# Patient Record
Sex: MALE | Race: WHITE | NOT HISPANIC OR LATINO | Employment: OTHER | URBAN - METROPOLITAN AREA
[De-identification: names, ages, dates, MRNs, and addresses within clinical notes are randomized per-mention and may not be internally consistent; named-entity substitution may affect disease eponyms.]

---

## 2017-01-13 ENCOUNTER — TELEPHONE (OUTPATIENT)
Dept: HEMATOLOGY ONCOLOGY | Facility: MEDICAL CENTER | Age: 47
End: 2017-01-13

## 2017-01-13 NOTE — TELEPHONE ENCOUNTER
Called patient's father and emergency contact regarding Temodar medication that has been shipped to [facility] pharmacy.  Someone will need to  medication for patient and bring to hospital, so he can begin treatment next week.  Left VM requesting call back.     Gael at the [facility] pharmacy is also aware that medication with new dose will be delivered shortly.  He will dispose of all previous doses not picked up by the patient.

## 2017-01-16 ENCOUNTER — HOSPITAL ENCOUNTER (OUTPATIENT)
Dept: RADIATION ONCOLOGY | Facility: MEDICAL CENTER | Age: 47
End: 2017-01-31
Attending: RADIOLOGY
Payer: COMMERCIAL

## 2017-01-16 ENCOUNTER — TELEPHONE (OUTPATIENT)
Dept: HEMATOLOGY ONCOLOGY | Facility: MEDICAL CENTER | Age: 47
End: 2017-01-16

## 2017-01-16 NOTE — TELEPHONE ENCOUNTER
Attempted to meet pt in radiation prior to his first appointment to discuss him picking up Temodar from [facility].  Pt no-showed for appointment today.  Attempted to reach pt's contact listed, his father, who does not know where patient will be residing.  Radiation dept. States they will call if patient ends up showing up late.

## 2017-01-20 ENCOUNTER — TELEPHONE (OUTPATIENT)
Dept: HEMATOLOGY ONCOLOGY | Facility: MEDICAL CENTER | Age: 47
End: 2017-01-20

## 2017-01-20 ENCOUNTER — TELEPHONE (OUTPATIENT)
Dept: OTHER | Facility: MEDICAL CENTER | Age: 47
End: 2017-01-20

## 2017-01-20 NOTE — TELEPHONE ENCOUNTER
Received phone message from unidentified male person reporting he would make sure pt would be at radiation therapy on Monday.  Oncology medical group talked to patient's father today who had reported he did not know where patient was.  Report to oncology medical group RN was that pt could not walk more than 3-4 feet without falling per father.  Pt will need to be assessed if he shows up at Radiation therapy.  Pt has not picked up Temodar at the [facility] pharmacy according to father.  Question the safety of patient having medication at this time r/t compliancy issues.  Notified radiation therapy regarding possibility of patient coming Monday.

## 2017-01-20 NOTE — TELEPHONE ENCOUNTER
"Called patient's contact, his father, asking if he knows where Fortino is or a way to get a hold of him.  He states that he left the house a few days ago and that he believes the patient went \"somewhere downtown\".  He states that the patient cannot walk more than 3-4 feet without falling.  He has not been going to radiation and has not picked up his Temodar from [facility] pharmacy.  Father states \"I'm sure he'll just end up back in the emergency room\".  I requested him to please let us know if he hears from patient or knows of a way to contact him.  Fortino West Verbalized agreement.    Spoke with radiation and they will be keeping him on the schedule, in case he shows up to future visits.   "

## 2017-01-23 ENCOUNTER — TELEPHONE (OUTPATIENT)
Dept: HEMATOLOGY ONCOLOGY | Facility: MEDICAL CENTER | Age: 47
End: 2017-01-23

## 2017-01-23 NOTE — TELEPHONE ENCOUNTER
"Received a call from, Jose Ricci, patient's brother.  He states pt was suppose to come in today for appointment with radiation and he would not be able to because of the snow.  Unsure when appointment was suppose to be.  Then saying \"you know your place on [address]?\" we would like him to go there but they said they need a referral.  Discussed that physician would need to see pt.  Asking when they could come in, Jose reporting back \"a couple of days, when the snow has melted\".  Jose then saying pt's eyesight is worse and he can't walk.  Discussed if patient had worsening symptoms he needed to go to the ER.  They can call ambulance if they feel he is worse.  Jose then saying he can walk but just a few feet.  Asking if Dr Lee can refer to skilled facility.  Informed brother that patient would also have to agree to go.  Discussed that patient had eloped from hospital.  Brother saying \"he told us 'they told me to leave'\".  Family has been inconsistent with information and not reliable regarding patient.  Jose did report they are unable to care for him and he requires 24/7 care.  Again encouraged them to have pt go to ER if he is not safe.  Also discussed he would need to be medically cleared before going to skilled since he was never discharged from hospital.  Brother then asking \"so you will take care of that?\".  Repeated that they need to bring pt to ER or call ambulance if patient had worsening symptoms.  Brother reporting \"ok\" but unsure of what follow up will be.  Asked where they were staying.  Brother said in \"motel\" but not where pt was before.  Contact number for brother provided of [phone].  Jose reported could also contact patient's father.  Verified numbers listed in [facility].  Will notify Dr Lee and radiation therapy.  "

## 2017-01-25 ENCOUNTER — APPOINTMENT (OUTPATIENT)
Dept: RADIOLOGY | Facility: MEDICAL CENTER | Age: 47
DRG: 054 | End: 2017-01-25
Attending: EMERGENCY MEDICINE

## 2017-01-25 ENCOUNTER — HOSPITAL ENCOUNTER (INPATIENT)
Facility: MEDICAL CENTER | Age: 47
LOS: 21 days | DRG: 054 | End: 2017-02-15
Attending: EMERGENCY MEDICINE | Admitting: INTERNAL MEDICINE

## 2017-01-25 ENCOUNTER — RESOLUTE PROFESSIONAL BILLING HOSPITAL PROF FEE (OUTPATIENT)
Dept: HOSPITALIST | Facility: MEDICAL CENTER | Age: 47
End: 2017-01-25

## 2017-01-25 DIAGNOSIS — R56.9 SEIZURE (HCC): ICD-10-CM

## 2017-01-25 DIAGNOSIS — C71.9 GBM (GLIOBLASTOMA MULTIFORME) (HCC): ICD-10-CM

## 2017-01-25 PROBLEM — F32.A DEPRESSION: Status: ACTIVE | Noted: 2017-01-25

## 2017-01-25 LAB
ALBUMIN SERPL BCP-MCNC: 4.5 G/DL (ref 3.2–4.9)
ALBUMIN/GLOB SERPL: 1.6 G/DL
ALP SERPL-CCNC: 80 U/L (ref 30–99)
ALT SERPL-CCNC: 16 U/L (ref 2–50)
ANION GAP SERPL CALC-SCNC: 4 MMOL/L (ref 0–11.9)
APAP SERPL-MCNC: <10 UG/ML (ref 10–30)
APTT PPP: 24.5 SEC (ref 24.7–36)
AST SERPL-CCNC: 13 U/L (ref 12–45)
BASOPHILS # BLD AUTO: 0.8 % (ref 0–1.8)
BASOPHILS # BLD: 0.06 K/UL (ref 0–0.12)
BILIRUB SERPL-MCNC: 0.5 MG/DL (ref 0.1–1.5)
BUN SERPL-MCNC: 12 MG/DL (ref 8–22)
CALCIUM SERPL-MCNC: 9.2 MG/DL (ref 8.5–10.5)
CHLORIDE SERPL-SCNC: 106 MMOL/L (ref 96–112)
CO2 SERPL-SCNC: 27 MMOL/L (ref 20–33)
CREAT SERPL-MCNC: 0.6 MG/DL (ref 0.5–1.4)
EOSINOPHIL # BLD AUTO: 0.17 K/UL (ref 0–0.51)
EOSINOPHIL NFR BLD: 2.3 % (ref 0–6.9)
ERYTHROCYTE [DISTWIDTH] IN BLOOD BY AUTOMATED COUNT: 44.4 FL (ref 35.9–50)
GFR SERPL CREATININE-BSD FRML MDRD: >60 ML/MIN/1.73 M 2
GLOBULIN SER CALC-MCNC: 2.9 G/DL (ref 1.9–3.5)
GLUCOSE SERPL-MCNC: 97 MG/DL (ref 65–99)
HCT VFR BLD AUTO: 42.1 % (ref 42–52)
HGB BLD-MCNC: 14.6 G/DL (ref 14–18)
IMM GRANULOCYTES # BLD AUTO: 0.03 K/UL (ref 0–0.11)
IMM GRANULOCYTES NFR BLD AUTO: 0.4 % (ref 0–0.9)
INR PPP: 0.95 (ref 0.87–1.13)
LYMPHOCYTES # BLD AUTO: 1.45 K/UL (ref 1–4.8)
LYMPHOCYTES NFR BLD: 19.3 % (ref 22–41)
MCH RBC QN AUTO: 32.5 PG (ref 27–33)
MCHC RBC AUTO-ENTMCNC: 34.7 G/DL (ref 33.7–35.3)
MCV RBC AUTO: 93.8 FL (ref 81.4–97.8)
MONOCYTES # BLD AUTO: 0.76 K/UL (ref 0–0.85)
MONOCYTES NFR BLD AUTO: 10.1 % (ref 0–13.4)
NEUTROPHILS # BLD AUTO: 5.04 K/UL (ref 1.82–7.42)
NEUTROPHILS NFR BLD: 67.1 % (ref 44–72)
NRBC # BLD AUTO: 0 K/UL
NRBC BLD AUTO-RTO: 0 /100 WBC
PLATELET # BLD AUTO: 290 K/UL (ref 164–446)
PMV BLD AUTO: 8.8 FL (ref 9–12.9)
POTASSIUM SERPL-SCNC: 3.8 MMOL/L (ref 3.6–5.5)
PROT SERPL-MCNC: 7.4 G/DL (ref 6–8.2)
PROTHROMBIN TIME: 13 SEC (ref 12–14.6)
RBC # BLD AUTO: 4.49 M/UL (ref 4.7–6.1)
SODIUM SERPL-SCNC: 137 MMOL/L (ref 135–145)
WBC # BLD AUTO: 7.5 K/UL (ref 4.8–10.8)

## 2017-01-25 PROCEDURE — 70450 CT HEAD/BRAIN W/O DYE: CPT

## 2017-01-25 PROCEDURE — 700111 HCHG RX REV CODE 636 W/ 250 OVERRIDE (IP): Performed by: EMERGENCY MEDICINE

## 2017-01-25 PROCEDURE — 99291 CRITICAL CARE FIRST HOUR: CPT

## 2017-01-25 PROCEDURE — 90686 IIV4 VACC NO PRSV 0.5 ML IM: CPT | Performed by: INTERNAL MEDICINE

## 2017-01-25 PROCEDURE — 96375 TX/PRO/DX INJ NEW DRUG ADDON: CPT

## 2017-01-25 PROCEDURE — 700105 HCHG RX REV CODE 258: Performed by: INTERNAL MEDICINE

## 2017-01-25 PROCEDURE — A9270 NON-COVERED ITEM OR SERVICE: HCPCS | Performed by: INTERNAL MEDICINE

## 2017-01-25 PROCEDURE — 90670 PCV13 VACCINE IM: CPT

## 2017-01-25 PROCEDURE — 700111 HCHG RX REV CODE 636 W/ 250 OVERRIDE (IP): Performed by: INTERNAL MEDICINE

## 2017-01-25 PROCEDURE — 80307 DRUG TEST PRSMV CHEM ANLYZR: CPT

## 2017-01-25 PROCEDURE — 700111 HCHG RX REV CODE 636 W/ 250 OVERRIDE (IP)

## 2017-01-25 PROCEDURE — 96376 TX/PRO/DX INJ SAME DRUG ADON: CPT

## 2017-01-25 PROCEDURE — 99291 CRITICAL CARE FIRST HOUR: CPT | Performed by: INTERNAL MEDICINE

## 2017-01-25 PROCEDURE — 700112 HCHG RX REV CODE 229: Performed by: INTERNAL MEDICINE

## 2017-01-25 PROCEDURE — 96374 THER/PROPH/DIAG INJ IV PUSH: CPT

## 2017-01-25 PROCEDURE — 80053 COMPREHEN METABOLIC PANEL: CPT

## 2017-01-25 PROCEDURE — 90471 IMMUNIZATION ADMIN: CPT

## 2017-01-25 PROCEDURE — 3E0234Z INTRODUCTION OF SERUM, TOXOID AND VACCINE INTO MUSCLE, PERCUTANEOUS APPROACH: ICD-10-PCS | Performed by: INTERNAL MEDICINE

## 2017-01-25 PROCEDURE — 770001 HCHG ROOM/CARE - MED/SURG/GYN PRIV*

## 2017-01-25 PROCEDURE — 94760 N-INVAS EAR/PLS OXIMETRY 1: CPT

## 2017-01-25 PROCEDURE — 85025 COMPLETE CBC W/AUTO DIFF WBC: CPT

## 2017-01-25 PROCEDURE — 700102 HCHG RX REV CODE 250 W/ 637 OVERRIDE(OP): Performed by: INTERNAL MEDICINE

## 2017-01-25 PROCEDURE — 85730 THROMBOPLASTIN TIME PARTIAL: CPT

## 2017-01-25 PROCEDURE — 85610 PROTHROMBIN TIME: CPT

## 2017-01-25 PROCEDURE — 96361 HYDRATE IV INFUSION ADD-ON: CPT

## 2017-01-25 RX ORDER — DIPHENHYDRAMINE HCL 25 MG
25 TABLET ORAL EVERY 6 HOURS PRN
COMMUNITY
End: 2017-04-24

## 2017-01-25 RX ORDER — LACTULOSE 20 G/30ML
30 SOLUTION ORAL
Status: DISCONTINUED | OUTPATIENT
Start: 2017-01-25 | End: 2017-02-15 | Stop reason: HOSPADM

## 2017-01-25 RX ORDER — SODIUM CHLORIDE 9 MG/ML
INJECTION, SOLUTION INTRAVENOUS CONTINUOUS
Status: DISCONTINUED | OUTPATIENT
Start: 2017-01-25 | End: 2017-01-26

## 2017-01-25 RX ORDER — ONDANSETRON 2 MG/ML
4 INJECTION INTRAMUSCULAR; INTRAVENOUS EVERY 4 HOURS PRN
Status: DISCONTINUED | OUTPATIENT
Start: 2017-01-25 | End: 2017-02-15 | Stop reason: HOSPADM

## 2017-01-25 RX ORDER — ONDANSETRON 2 MG/ML
4 INJECTION INTRAMUSCULAR; INTRAVENOUS ONCE
Status: COMPLETED | OUTPATIENT
Start: 2017-01-25 | End: 2017-01-25

## 2017-01-25 RX ORDER — AMOXICILLIN 250 MG
1 CAPSULE ORAL
Status: DISCONTINUED | OUTPATIENT
Start: 2017-01-25 | End: 2017-02-15 | Stop reason: HOSPADM

## 2017-01-25 RX ORDER — BISACODYL 10 MG
10 SUPPOSITORY, RECTAL RECTAL
Status: DISCONTINUED | OUTPATIENT
Start: 2017-01-25 | End: 2017-02-15 | Stop reason: HOSPADM

## 2017-01-25 RX ORDER — PROMETHAZINE HYDROCHLORIDE 25 MG/1
12.5-25 TABLET ORAL EVERY 4 HOURS PRN
Status: DISCONTINUED | OUTPATIENT
Start: 2017-01-25 | End: 2017-02-15 | Stop reason: HOSPADM

## 2017-01-25 RX ORDER — ACETAMINOPHEN 500 MG
500 TABLET ORAL
COMMUNITY
End: 2017-02-15

## 2017-01-25 RX ORDER — ONDANSETRON 2 MG/ML
4 INJECTION INTRAMUSCULAR; INTRAVENOUS EVERY 4 HOURS PRN
Status: DISCONTINUED | OUTPATIENT
Start: 2017-01-25 | End: 2017-01-25

## 2017-01-25 RX ORDER — ENEMA 19; 7 G/133ML; G/133ML
1 ENEMA RECTAL
Status: DISCONTINUED | OUTPATIENT
Start: 2017-01-25 | End: 2017-02-15 | Stop reason: HOSPADM

## 2017-01-25 RX ORDER — AMOXICILLIN 250 MG
1 CAPSULE ORAL NIGHTLY
Status: DISCONTINUED | OUTPATIENT
Start: 2017-01-25 | End: 2017-02-15 | Stop reason: HOSPADM

## 2017-01-25 RX ORDER — OXYCODONE HYDROCHLORIDE 5 MG/1
5 TABLET ORAL
Status: DISCONTINUED | OUTPATIENT
Start: 2017-01-25 | End: 2017-02-02

## 2017-01-25 RX ORDER — PROMETHAZINE HYDROCHLORIDE 25 MG/1
12.5-25 SUPPOSITORY RECTAL EVERY 4 HOURS PRN
Status: DISCONTINUED | OUTPATIENT
Start: 2017-01-25 | End: 2017-02-15 | Stop reason: HOSPADM

## 2017-01-25 RX ORDER — OXYCODONE HYDROCHLORIDE 10 MG/1
10 TABLET ORAL
Status: DISCONTINUED | OUTPATIENT
Start: 2017-01-25 | End: 2017-02-02

## 2017-01-25 RX ORDER — LABETALOL HYDROCHLORIDE 5 MG/ML
10 INJECTION, SOLUTION INTRAVENOUS EVERY 4 HOURS PRN
Status: DISCONTINUED | OUTPATIENT
Start: 2017-01-25 | End: 2017-02-15 | Stop reason: HOSPADM

## 2017-01-25 RX ORDER — DEXAMETHASONE SODIUM PHOSPHATE 4 MG/ML
4 INJECTION, SOLUTION INTRA-ARTICULAR; INTRALESIONAL; INTRAMUSCULAR; INTRAVENOUS; SOFT TISSUE ONCE
Status: COMPLETED | OUTPATIENT
Start: 2017-01-25 | End: 2017-01-25

## 2017-01-25 RX ORDER — SERTRALINE HYDROCHLORIDE 25 MG/1
25 TABLET, FILM COATED ORAL EVERY EVENING
Status: ON HOLD | COMMUNITY
End: 2017-02-14

## 2017-01-25 RX ORDER — ONDANSETRON 4 MG/1
4 TABLET, ORALLY DISINTEGRATING ORAL EVERY 4 HOURS PRN
Status: DISCONTINUED | OUTPATIENT
Start: 2017-01-25 | End: 2017-02-15 | Stop reason: HOSPADM

## 2017-01-25 RX ORDER — DEXAMETHASONE SODIUM PHOSPHATE 4 MG/ML
4 INJECTION, SOLUTION INTRA-ARTICULAR; INTRALESIONAL; INTRAMUSCULAR; INTRAVENOUS; SOFT TISSUE EVERY 6 HOURS
Status: DISCONTINUED | OUTPATIENT
Start: 2017-01-25 | End: 2017-01-28

## 2017-01-25 RX ORDER — DOCUSATE SODIUM 100 MG/1
100 CAPSULE, LIQUID FILLED ORAL EVERY MORNING
Status: DISCONTINUED | OUTPATIENT
Start: 2017-01-25 | End: 2017-02-15 | Stop reason: HOSPADM

## 2017-01-25 RX ORDER — LORAZEPAM 2 MG/ML
4 INJECTION INTRAMUSCULAR
Status: DISCONTINUED | OUTPATIENT
Start: 2017-01-25 | End: 2017-02-15 | Stop reason: HOSPADM

## 2017-01-25 RX ADMIN — PNEUMOCOCCAL 13-VALENT CONJUGATE VACCINE 0.5 ML: 2.2; 2.2; 2.2; 2.2; 2.2; 4.4; 2.2; 2.2; 2.2; 2.2; 2.2; 2.2; 2.2 INJECTION, SUSPENSION INTRAMUSCULAR at 20:16

## 2017-01-25 RX ADMIN — OXYCODONE HYDROCHLORIDE 10 MG: 10 TABLET ORAL at 23:59

## 2017-01-25 RX ADMIN — SERTRALINE 25 MG: 50 TABLET, FILM COATED ORAL at 20:13

## 2017-01-25 RX ADMIN — HYDROMORPHONE HYDROCHLORIDE 1 MG: 1 INJECTION, SOLUTION INTRAMUSCULAR; INTRAVENOUS; SUBCUTANEOUS at 11:33

## 2017-01-25 RX ADMIN — DEXAMETHASONE SODIUM PHOSPHATE 4 MG: 4 INJECTION, SOLUTION INTRAMUSCULAR; INTRAVENOUS at 17:48

## 2017-01-25 RX ADMIN — DEXAMETHASONE SODIUM PHOSPHATE 4 MG: 4 INJECTION, SOLUTION INTRAMUSCULAR; INTRAVENOUS at 11:46

## 2017-01-25 RX ADMIN — DOCUSATE SODIUM 100 MG: 100 CAPSULE ORAL at 14:54

## 2017-01-25 RX ADMIN — STANDARDIZED SENNA CONCENTRATE AND DOCUSATE SODIUM 1 TABLET: 8.6; 5 TABLET, FILM COATED ORAL at 20:13

## 2017-01-25 RX ADMIN — DEXTROSE MONOHYDRATE 1000 MG: 50 INJECTION, SOLUTION INTRAVENOUS at 20:13

## 2017-01-25 RX ADMIN — DEXTROSE MONOHYDRATE 500 MG: 50 INJECTION, SOLUTION INTRAVENOUS at 11:58

## 2017-01-25 RX ADMIN — ONDANSETRON 4 MG: 2 INJECTION, SOLUTION INTRAMUSCULAR; INTRAVENOUS at 11:33

## 2017-01-25 RX ADMIN — HYDROMORPHONE HYDROCHLORIDE 0.5 MG: 1 INJECTION, SOLUTION INTRAMUSCULAR; INTRAVENOUS; SUBCUTANEOUS at 13:27

## 2017-01-25 RX ADMIN — SODIUM CHLORIDE: 9 INJECTION, SOLUTION INTRAVENOUS at 14:58

## 2017-01-25 RX ADMIN — INFLUENZA A VIRUS A/CALIFORNIA/7/2009 X-179A (H1N1) ANTIGEN (FORMALDEHYDE INACTIVATED), INFLUENZA A VIRUS A/HONG KONG/4801/2014 X-263B (H3N2) ANTIGEN (FORMALDEHYDE INACTIVATED), INFLUENZA B VIRUS B/PHUKET/3073/2013 ANTIGEN (FORMALDEHYDE INACTIVATED), AND INFLUENZA B VIRUS B/BRISBANE/60/2008 ANTIGEN (FORMALDEHYDE INACTIVATED) 0.5 ML: 15; 15; 15; 15 INJECTION, SUSPENSION INTRAMUSCULAR at 20:14

## 2017-01-25 RX ADMIN — DEXAMETHASONE SODIUM PHOSPHATE 4 MG: 4 INJECTION, SOLUTION INTRAMUSCULAR; INTRAVENOUS at 23:59

## 2017-01-25 RX ADMIN — OXYCODONE HYDROCHLORIDE 5 MG: 5 TABLET ORAL at 17:45

## 2017-01-25 RX ADMIN — HYDROMORPHONE HYDROCHLORIDE 0.5 MG: 1 INJECTION, SOLUTION INTRAMUSCULAR; INTRAVENOUS; SUBCUTANEOUS at 20:20

## 2017-01-25 ASSESSMENT — LIFESTYLE VARIABLES
AVERAGE NUMBER OF DAYS PER WEEK YOU HAVE A DRINK CONTAINING ALCOHOL: 1
ALCOHOL_USE: YES
HAVE PEOPLE ANNOYED YOU BY CRITICIZING YOUR DRINKING: NO
ON A TYPICAL DAY WHEN YOU DRINK ALCOHOL HOW MANY DRINKS DO YOU HAVE: 1
HOW MANY TIMES IN THE PAST YEAR HAVE YOU HAD 5 OR MORE DRINKS IN A DAY: 5
TOTAL SCORE: 0
DO YOU DRINK ALCOHOL: NO
HAVE YOU EVER FELT YOU SHOULD CUT DOWN ON YOUR DRINKING: NO
TOTAL SCORE: 0
CONSUMPTION TOTAL: POSITIVE
TOTAL SCORE: 0
EVER FELT BAD OR GUILTY ABOUT YOUR DRINKING: NO
EVER_SMOKED: YES
EVER HAD A DRINK FIRST THING IN THE MORNING TO STEADY YOUR NERVES TO GET RID OF A HANGOVER: NO

## 2017-01-25 ASSESSMENT — PAIN SCALES - GENERAL
PAINLEVEL_OUTOF10: 5
PAINLEVEL_OUTOF10: 8
PAINLEVEL_OUTOF10: 7
PAINLEVEL_OUTOF10: 5
PAINLEVEL_OUTOF10: 8
PAINLEVEL_OUTOF10: 10
PAINLEVEL_OUTOF10: 8
PAINLEVEL_OUTOF10: 9
PAINLEVEL_OUTOF10: 10

## 2017-01-25 NOTE — ED NOTES
"Pt to triage c/o headache, dizziness, on and off numbness to right hip and shaking in left arm. Pt's friend reports pt had a seizure this morning. Pt states that he had brain surgery to remove a tumor approx. 2 weeks ago. Pt was sent by Marianne Hernandez for evaluation of possible \"brain swelling.\"  "

## 2017-01-25 NOTE — ED PROVIDER NOTES
"ED Provider Note    CHIEF COMPLAINT  Chief Complaint   Patient presents with   • Head Ache   • Dizziness   • Seizure   • Sent by MD PENNY  Fortino Ricci Jr. is a 46 y.o. male who presents for evaluation of worsening headaches, possible seizure activity to the left arm and leg. The patient unfortunately has history of a brain tumor that underwent resection and [place] 3 years ago, subsequently it recurred and he underwent stealth craniotomy with Dr. Edgar approximately 2-3 weeks ago. Having worsening headache. In addition the patient has been taking describes as \"2 bottles of Tylenol over the last 2 days for headaches. He has never had seizure activity before    REVIEW OF SYSTEMS  See HPI for further details. All other systems are negative.     PAST MEDICAL HISTORY  Past Medical History   Diagnosis Date   • Cancer (CMS-HCC)      brain   • Brain neoplasm malignant (CMS-HCC)        FAMILY HISTORY  No history of bleeding disorder    SOCIAL HISTORY  Social History     Social History   • Marital Status: Single     Spouse Name: N/A   • Number of Children: N/A   • Years of Education: N/A     Social History Main Topics   • Smoking status: Current Every Day Smoker -- 1.00 packs/day for 6 years     Types: Cigarettes     Start date: 01/01/1986   • Smokeless tobacco: Former User     Types: Chew     Quit date: 01/01/2014   • Alcohol Use: 3.5 oz/week     7 Cans of beer per week      Comment: occassional   • Drug Use: No   • Sexual Activity: No     Other Topics Concern   • None     Social History Narrative     Positive for alcohol use  SURGICAL HISTORY  Past Surgical History   Procedure Laterality Date   • Craniotomy tumor  12/7/14     [facility]   • Craniotomy  11/214   • Craniotomy stealth Right 12/23/2016     Procedure: CRANIOTOMY STEALTH  RIGHT PARIETAL OCCIPITAL FOR BRAIN TUMOR;  Surgeon: Girma Edgar M.D.;  Location: SURGERY [facility];  Service:        CURRENT MEDICATIONS  Home Medications     " "Reviewed by Paola Martinez R.N. (Registered Nurse) on 01/25/17 at 1010  Med List Status: Partial    Medication Last Dose Status          Patient Oneal Taking any Medications                        ALLERGIES  Allergies   Allergen Reactions   • Pcn [Penicillins]      Reaction was as a child.  Pt stated tolerated Augmentin and Amoxicillin        PHYSICAL EXAM  VITAL SIGNS: /83 mmHg  Pulse 66  Temp(Src) 36.5 °C (97.7 °F) (Temporal)  Resp 16  Ht 1.753 m (5' 9\")  Wt 76.204 kg (168 lb)  BMI 24.80 kg/m2  SpO2 97%      Constitutional: Well developed, Well nourished, No acute distress, Non-toxic appearance.   HENT: Craniotomy site on the right parietal region appears to be clean dry and intact no erythema no pus noted dehiscence, Bilateral external ears normal, Oropharynx moist, No oral exudates, Nose normal.   Eyes: PERRLA, EOMI, Conjunctiva normal, No discharge.   Neck: Normal range of motion, No tenderness, Supple, No stridor.   Cardiovascular: Normal heart rate, Normal rhythm, No murmurs, No rubs, No gallops.   Thorax & Lungs: Normal breath sounds, No respiratory distress, No wheezing, No chest tenderness.   Abdomen: Bowel sounds normal, Soft, No tenderness, No masses, No pulsatile masses.   Skin: Warm, Dry, No erythema, No rash.   Back: No tenderness, No CVA tenderness.   Extremities: Intact distal pulses, No edema, No tenderness, No cyanosis, No clubbing.   Neurologic: Alert & oriented x 3, Normal motor function, Normal sensory function, No focal deficits noted. Cranial nerves II through XII grossly intact activity  Psychiatric: Anxious      COURSE & MEDICAL DECISION MAKING  Pertinent Labs & Imaging studies reviewed. (See chart for details)  Results for orders placed or performed during the hospital encounter of 01/25/17   CBC WITH DIFFERENTIAL   Result Value Ref Range    WBC 7.5 4.8 - 10.8 K/uL    RBC 4.49 (L) 4.70 - 6.10 M/uL    Hemoglobin 14.6 14.0 - 18.0 g/dL    Hematocrit 42.1 42.0 - 52.0 %    " MCV 93.8 81.4 - 97.8 fL    MCH 32.5 27.0 - 33.0 pg    MCHC 34.7 33.7 - 35.3 g/dL    RDW 44.4 35.9 - 50.0 fL    Platelet Count 290 164 - 446 K/uL    MPV 8.8 (L) 9.0 - 12.9 fL    Neutrophils-Polys 67.10 44.00 - 72.00 %    Lymphocytes 19.30 (L) 22.00 - 41.00 %    Monocytes 10.10 0.00 - 13.40 %    Eosinophils 2.30 0.00 - 6.90 %    Basophils 0.80 0.00 - 1.80 %    Immature Granulocytes 0.40 0.00 - 0.90 %    Nucleated RBC 0.00 /100 WBC    Neutrophils (Absolute) 5.04 1.82 - 7.42 K/uL    Lymphs (Absolute) 1.45 1.00 - 4.80 K/uL    Monos (Absolute) 0.76 0.00 - 0.85 K/uL    Eos (Absolute) 0.17 0.00 - 0.51 K/uL    Baso (Absolute) 0.06 0.00 - 0.12 K/uL    Immature Granulocytes (abs) 0.03 0.00 - 0.11 K/uL    NRBC (Absolute) 0.00 K/uL   COMP METABOLIC PANEL   Result Value Ref Range    Sodium 137 135 - 145 mmol/L    Potassium 3.8 3.6 - 5.5 mmol/L    Chloride 106 96 - 112 mmol/L    Co2 27 20 - 33 mmol/L    Anion Gap 4.0 0.0 - 11.9    Glucose 97 65 - 99 mg/dL    Bun 12 8 - 22 mg/dL    Creatinine 0.60 0.50 - 1.40 mg/dL    Calcium 9.2 8.5 - 10.5 mg/dL    AST(SGOT) 13 12 - 45 U/L    ALT(SGPT) 16 2 - 50 U/L    Alkaline Phosphatase 80 30 - 99 U/L    Total Bilirubin 0.5 0.1 - 1.5 mg/dL    Albumin 4.5 3.2 - 4.9 g/dL    Total Protein 7.4 6.0 - 8.2 g/dL    Globulin 2.9 1.9 - 3.5 g/dL    A-G Ratio 1.6 g/dL   PROTHROMBIN TIME   Result Value Ref Range    PT 13.0 12.0 - 14.6 sec    INR 0.95 0.87 - 1.13   APTT   Result Value Ref Range    APTT 24.5 (L) 24.7 - 36.0 sec   ACETAMINOPHEN   Result Value Ref Range    Acetomenophen -Tylenol <10 10 - 30 ug/mL   ESTIMATED GFR   Result Value Ref Range    GFR If African American >60 >60 mL/min/1.73 m 2    GFR If Non African American >60 >60 mL/min/1.73 m 2      CT-HEAD W/O   Final Result      1.  Postoperative changes right temporal parietal lobe.   2.  Recent medial right occipital lobe infarct.   3.  Diffuse edema within the right cerebral hemisphere and increasing right-sided mass effect with 7.2 mm right  to left midline shift.   4.  Compressed right lateral ventricle and mild entrapment dilatation left lateral ventricle.   5.  No acute hemorrhage.   6.  Recurrent neoplasm not excluded. MRI follow-up is consideration.        Emergent consultation with Dr. Edgar was obtained. He reviewed the CT scan. He would like the patient to be admitted to internal medicine. I administered pain medicine as well as loaded him with IV Keppra as well as Decadron. He'll be admitted to internal medicine for management of brain swelling as well as seizure activity. Dr. Edgar consult on the patient later today    FINAL IMPRESSION  1. Cerebral edema related to brain tumor  2. New development of likely focal seizure activity in the left upper and lower extremity         Electronically signed by: Jose Pacheco, 1/25/2017 10:16 AM

## 2017-01-25 NOTE — IP AVS SNAPSHOT
ASSET4 Access Code: [email or web address]  Expires: 3/1/2017 10:44 AM    Your email address is not on file at [facility].  Email Addresses are required for you to sign up for ASSET4, please contact [phone] to verify your personal information and to provide your email address prior to attempting to register for ASSET4.    Fortino Hanson Radhames Oconnor  [address] [place] [address]    ASSET4  A secure, online tool to manage your health information     [facility]’s ASSET4® is a secure, online tool that connects you to your personalized health information from the privacy of your home -- day or night - making it very easy for you to manage your healthcare. Once the activation process is completed, you can even access your medical information using the ASSET4 logan, which is available for free in the Apple Logan store or Google Play store.     To learn more about ASSET4, visit [email or web address]    There are two levels of access available (as shown below):   My Chart Features  [facility] Primary Care Doctor [facility]  Specialists [facility]  Urgent  Care Non-[facility] Primary Care Doctor   Email your healthcare team securely and privately 24/7 X X X    Manage appointments: schedule your next appointment; view details of past/upcoming appointments X      Request prescription refills. X      View recent personal medical records, including lab and immunizations X X X X   View health record, including health history, allergies, medications X X X X   Read reports about your outpatient visits, procedures, consult and ER notes X X X X   See your discharge summary, which is a recap of your hospital and/or ER visit that includes your diagnosis, lab results, and care plan X X  X     How to register for ASSET4:  Once your e-mail address has been verified, follow the following steps to sign up for ASSET4.     1. Go to  [email or web address]  2. Click on the Sign Up Now box, which takes you to the New Member Sign Up page.  You will need to provide the following information:  a. Enter your [facility] Access Code exactly as it appears at the top of this page. (You will not need to use this code after you’ve completed the sign-up process. If you do not sign up before the expiration date, you must request a new code.)   b. Enter your date of birth.   c. Enter your home email address.   d. Click Submit, and follow the next screen’s instructions.  3. Create a Event[employer] ID. This will be your [facility] login ID and cannot be changed, so think of one that is secure and easy to remember.  4. Create a [facility] password. You can change your password at any time.  5. Enter your Password Reset Question and Answer. This can be used at a later time if you forget your password.   6. Enter your e-mail address. This allows you to receive e-mail notifications when new information is available in [facility].  7. Click Sign Up. You can now view your health information.    For assistance activating your [facility] account, call [phone]

## 2017-01-25 NOTE — IP AVS SNAPSHOT
" Home Care Instructions                                                                                                                  Name:Fortino Ricci Jr.  Medical Record Number:[id number]  CSN: [id number]    YOB: 1970   Age: 46 y.o.  Sex: male  HT:1.753 m (5' 9\") WT: 63.4 kg (139 lb 12.4 oz)          Admit Date: 1/25/2017     Discharge Date:   Today's Date: 2/15/2017  Attending Doctor:  Yue Thakur D.O.                  Allergies:  Pcn            Discharge Instructions       Discharge Instructions    Discharged to home by car with relative. Discharged via wheelchair, hospital escort: Yes.  Special equipment needed: Cane    Be sure to schedule a follow-up appointment with your primary care doctor or any specialists as instructed.     Discharge Plan:   Diet Plan: Discussed  Activity Level: Discussed  Smoking Cessation Offered: Patient Refused  Confirmed Follow up Appointment: Patient to Call and Schedule Appointment  Confirmed Symptoms Management: Discussed  Medication Reconciliation Updated: Yes  Pneumococcal Vaccine Given - only chart on this line when given: Given (See MAR)  Influenza Vaccine Indication: Indicated: 9 to 64 years of age  Influenza Vaccine Given - only chart on this line when given: Influenza Vaccine Given (See MAR)    I understand that a diet low in cholesterol, fat, and sodium is recommended for good health. Unless I have been given specific instructions below for another diet, I accept this instruction as my diet prescription.           · Is patient discharged on Warfarin / Coumadin?   No     · Is patient Post Blood Transfusion?  No    Depression / Suicide Risk    As you are discharged from this [facility] facility, it is important to learn how to keep safe from harming yourself.    Recognize the warning signs:  · Abrupt changes in personality, positive or negative- including increase in energy   · Giving away possessions  · Change in eating patterns- significant " weight changes-  positive or negative  · Change in sleeping patterns- unable to sleep or sleeping all the time   · Unwillingness or inability to communicate  · Depression  · Unusual sadness, discouragement and loneliness  · Talk of wanting to die  · Neglect of personal appearance   · Rebelliousness- reckless behavior  · Withdrawal from people/activities they love  · Confusion- inability to concentrate     If you or a loved one observes any of these behaviors or has concerns about self-harm, here's what you can do:  · Talk about it- your feelings and reasons for harming yourself  · Remove any means that you might use to hurt yourself (examples: pills, rope, extension cords, firearm)  · Get professional help from the community (Mental Health, Substance Abuse, psychological counseling)  · Do not be alone:Call your Safe Contact- someone whom you trust who will be there for you.  · Call your local CRISIS HOTLINE 190-6678 or 350-547-4716  · Call your local Children's Mobile Crisis Response Team [place] (763) 987-5419 or wwwMusiwave  · Call the toll free National Suicide Prevention Hotlines   · National Suicide Prevention Lifeline 593-523-ONED (6590)  · National Hope Line Network 800-SUICIDE (856-4038)        Your appointments     Feb 22, 2017 10:20 AM   ONCOLOGY EST PATIENT 30 MIN with Ashley Lee M.D.   [facility] (--)    [address], [address]  [facility] [address] [phone]              Follow-up Information     1. Follow up with Ashley Lee M.D.. Go in 1 week.    Specialty:  Oncology    Why:  Follow Up    Contact information    [address]  [facility] [address] [phone]           Discharge Medication Instructions:    Below are the medications your physician expects you to take upon discharge:    Review all your home medications and newly ordered medications with your doctor and/or pharmacist. Follow medication instructions as directed by your doctor and/or  pharmacist.    Please keep your medication list with you and share with your physician.               Medication List      START taking these medications        Instructions    dexamethasone 4 MG Tabs   Last time this was given:  4 mg on 2/15/2017  7:30 AM   Commonly known as:  DECADRON    Take 1 Tab by mouth 3 times a day.   Dose:  4 mg       gabapentin 300 MG Caps   Last time this was given:  300 mg on 2/15/2017  7:30 AM   Commonly known as:  NEURONTIN    Take 1 Cap by mouth 3 times a day.   Dose:  300 mg       HYDROmorphone 2 MG Tabs   Last time this was given:  4 mg on 2/15/2017  3:03 AM   Commonly known as:  DILAUDID    Take 1-2 Tabs by mouth every four hours as needed for Severe Pain.   Dose:  2-4 mg       levetiracetam 1000 MG tablet   Last time this was given:  1,000 mg on 2/15/2017  7:30 AM   Commonly known as:  KEPPRA    Take 1 Tab by mouth 2 Times a Day.   Dose:  1000 mg       methocarbamol 750 MG Tabs   Last time this was given:  1,500 mg on 2/15/2017  7:30 AM   Commonly known as:  ROBAXIN    Take 2 Tabs by mouth every 6 hours as needed.   Dose:  1500 mg       ondansetron 4 MG Tbdp   Last time this was given:  4 mg on 2/9/2017  5:01 PM   Commonly known as:  ZOFRAN ODT    Take 1 Tab by mouth every four hours as needed for Nausea/Vomiting (give PO if no IV route available).   Dose:  4 mg       sodium chloride 1 GM Tabs   Last time this was given:  1 g on 2/15/2017  7:30 AM   Commonly known as:  SALT    Take 1 Tab by mouth 2 times a day, with meals.   Dose:  1 g       sulfamethoxazole-trimethoprim 800-160 MG tablet   Last time this was given:  1 Tab on 2/15/2017  7:32 AM   Commonly known as:  BACTRIM DS    Take 1 Tab by mouth every day for 4 days.   Dose:  1 Tab       * Temozolomide 140 MG Caps   Last time this was given:  145 mg on 2/14/2017  9:00 PM    Take 1 Cap by mouth every bedtime.   Dose:  140 mg       * temozolomide 5 MG Caps   Last time this was given:  145 mg on 2/14/2017  9:00 PM   Commonly  known as:  TEMODAR    Take 1 Cap by mouth every bedtime.   Dose:  5 mg       * Notice:  This list has 2 medication(s) that are the same as other medications prescribed for you. Read the directions carefully, and ask your doctor or other care provider to review them with you.      CONTINUE taking these medications        Instructions    acetaminophen 500 MG Tabs   Commonly known as:  TYLENOL    Take 500 mg by mouth every hour while awake. Indications: Pain   Dose:  500 mg       diphenhydrAMINE 25 MG Tabs   Commonly known as:  BENADRYL    Take 25 mg by mouth every 6 hours as needed. Indications: Cough   Dose:  25 mg       QC DAYTIME COLD MEDICINE PO    Take 2 Caps by mouth as needed (For cold symptoms).   Dose:  2 Cap       sertraline 25 MG tablet   Last time this was given:  25 mg on 2/14/2017  8:29 PM   Commonly known as:  ZOLOFT    Take 1 Tab by mouth every evening.   Dose:  25 mg               Instructions           Diet / Nutrition:    Follow any diet instructions given to you by your doctor or the dietician, including how much salt (sodium) you are allowed each day.    If you are overweight, talk to your doctor about a weight reduction plan.    Activity:    Remain physically active following your doctor's instructions about exercise and activity.    Rest often.     Any time you become even a little tired or short of breath, SIT DOWN and rest.    Worsening Symptoms:    Report any of the following signs and symptoms to the doctor's office immediately:    *Pain of jaw, arm, or neck  *Chest pain not relieved by medication                               *Dizziness or loss of consciousness  *Difficulty breathing even when at rest   *More tired than usual                                       *Bleeding drainage or swelling of surgical site  *Swelling of feet, ankles, legs or stomach                 *Fever (>100ºF)  *Pink or blood tinged sputum  *Weight gain (3lbs/day or 5lbs /week)           *Shock from internal  defibrillator (if applicable)  *Palpitations or irregular heartbeats                *Cool and/or numb extremities    Stroke Awareness    Common Risk Factors for Stroke include:    Age  Atrial Fibrillation  Carotid Artery Stenosis  Diabetes Mellitus  Excessive alcohol consumption  High blood pressure  Overweight   Physical inactivity  Smoking    Warning signs and symptoms of a stroke include:    *Sudden numbness or weakness of the face, arm or leg (especially on one side of the body).  *Sudden confusion, trouble speaking or understanding.  *Sudden trouble seeing in one or both eyes.  *Sudden trouble walking, dizziness, loss of balance or coordination.Sudden severe headache with no known cause.    It is very important to get treatment quickly when a stroke occurs. If you experience any of the above warning signs, call 911 immediately.                   Disclaimer         Quit Smoking / Tobacco Use:    I understand the use of any tobacco products increases my chance of suffering from future heart disease or stroke and could cause other illnesses which may shorten my life. Quitting the use of tobacco products is the single most important thing I can do to improve my health. For further information on smoking / tobacco cessation call a Toll Free Quit Line at 1-871.224.9423 (*National Cancer [place]) or 1-129.978.5985 (American Lung Association) or you can access the web based program at www.lungusa.org.    [place] Tobacco Users Help Line:  (727) 276-5520       Toll Free: 1-372.115.7143  Quit Tobacco Program [facility] (644)215-0787    Crisis Hotline:    [place] Crisis Hotline:  2-808-LGUGVUX or 1-290.820.4865    [place] Crisis Hotline:    1-648.948.8157 or 675-131-1451    Discharge Survey:   Thank you for choosing [facility]. We hope we did everything we could to make your hospital stay a pleasant one. You may be receiving a phone survey and we would appreciate your time and participation in  answering the questions. Your input is very valuable to us in our efforts to improve our service to our patients and their families.        My signature on this form indicates that:    1. I have reviewed and understand the above information.  2. My questions regarding this information have been answered to my satisfaction.  3. I have formulated a plan with my discharge nurse to obtain my prescribed medications for home.                  Disclaimer         __________________________________                     __________       ________                       Patient Signature                                                 Date                    Time

## 2017-01-25 NOTE — IP AVS SNAPSHOT
2/15/2017          Fortino Ricci Jr.  [address]  [place] [address]    Dear Fortino:    [facility] wants to ensure your discharge home is safe and you or your loved ones have had all your questions answered regarding your care after you leave the hospital.    You may receive a telephone call within two days of your discharge.  This call is to make certain you understand your discharge instructions as well as ensure we provided you with the best care possible during your stay with us.     The call will only last approximately 3-5 minutes and will be done by a nurse.    Once again, we want to ensure your discharge home is safe and that you have a clear understanding of any next steps in your care.  If you have any questions or concerns, please do not hesitate to contact us, we are here for you.  Thank you for choosing [facility] for your healthcare needs.    Sincerely,    Isrrael Eaton    [facility]

## 2017-01-25 NOTE — ED NOTES
Pt up to restroom, gait unsteady at time. A & 0 x 3 but appears disoriented and times and easily distracted.

## 2017-01-25 NOTE — ED NOTES
"Med rec updated and complete  Allergies reviewed  Pt was not sure of his antidepressant medication.  Called [facility] @ [phone] to verify name and strength.   Went back and asked pt the last time he took his medication.  Pt states \"I have been taking one 500MG TYLENOL every half hour to an hour for the last 2 days, I took over 24 tablets\".  I informed the pharmacist and nurse.  Pt states \"No antibiotics in the last 30 days\".  Pt states \"No vitamins\".      "

## 2017-01-25 NOTE — DISCHARGE PLANNING
Received call from [facility] who was screening pt for Medicaid. Pt is over income for Medicaid- $1700 on disability. He is not yet qualified for Medicare, six months more to meet the two year requirement.     Pt stated he does not know what to do because he does not have insurance and is not receiving care for his cancer diagnosis. Pt has been receiving care and has a nurse navigator, Marianne Hernandez, who has worked extensively with pt to find shelter, treatment and care.     SW phone call to Nurse Navigator Marianne to inform of pt's arrival and expected admission.

## 2017-01-25 NOTE — ED NOTES
Pt. Medicated per MAR. Pt. States no other needs at this time. Family at bedside. Will continue to monitor.

## 2017-01-25 NOTE — ED NOTES
Isrrael pt's brother left phone number to be contacted with updates on his brother (the pt.). [phone]

## 2017-01-25 NOTE — PROGRESS NOTES
Spoke with  [place].  Appreciate the notification.  Pt has been non-compliant with f/u and treatment.  [facility] had been working with patient and had oral chemo medications for patient.  Pt had also been scheduled with radiation therapy for treatment.  Cancer Nurse Navigator will be available when/if patient discharged and will assist with cancer treatment related support services.  Navigator will notify medical oncology and radiation oncology regarding admission.

## 2017-01-25 NOTE — IP AVS SNAPSHOT
" <p align=\"LEFT\"><IMG SRC=\"//[email or web address]$/[email or web address]\" alt=\"Image\" WIDTH=\"50%\" HEIGHT=\"200\" BORDER=\"\"></p>                   Name:Fortino Ricci Jr.  Medical Record Number:[id number]  CSN: [id number]    YOB: 1970   Age: 46 y.o.  Sex: male  HT:1.753 m (5' 9\") WT: 63.4 kg (139 lb 12.4 oz)          Admit Date: 1/25/2017     Discharge Date:   Today's Date: 2/15/2017  Attending Doctor:  Yue Thakur D.O.                  Allergies:  Pcn          Your appointments     Feb 22, 2017 10:20 AM   ONCOLOGY EST PATIENT 30 MIN with Ashley Lee M.D.   [facility] (--)    [address], [address]  [facility] [address] [phone]              Follow-up Information     1. Follow up with Ashley Lee M.D.. Go in 1 week.    Specialty:  Oncology    Why:  Follow Up    Contact information    [address]  [facility] [id number] [phone]           Medication List      Take these Medications        Instructions    acetaminophen 500 MG Tabs   Commonly known as:  TYLENOL    Take 500 mg by mouth every hour while awake. Indications: Pain   Dose:  500 mg       dexamethasone 4 MG Tabs   Commonly known as:  DECADRON    Take 1 Tab by mouth 3 times a day.   Dose:  4 mg       diphenhydrAMINE 25 MG Tabs   Commonly known as:  BENADRYL    Take 25 mg by mouth every 6 hours as needed. Indications: Cough   Dose:  25 mg       gabapentin 300 MG Caps   Commonly known as:  NEURONTIN    Take 1 Cap by mouth 3 times a day.   Dose:  300 mg       HYDROmorphone 2 MG Tabs   Commonly known as:  DILAUDID    Take 1-2 Tabs by mouth every four hours as needed for Severe Pain.   Dose:  2-4 mg       levetiracetam 1000 MG tablet   Commonly known as:  KEPPRA    Take 1 Tab by mouth 2 Times a Day.   Dose:  1000 mg       methocarbamol 750 MG Tabs   Commonly known as:  ROBAXIN    Take 2 Tabs by mouth every 6 hours as needed.   Dose:  1500 mg       ondansetron 4 MG Tbdp   Commonly known as:  ZOFRAN ODT    Take 1 Tab by mouth " every four hours as needed for Nausea/Vomiting (give PO if no IV route available).   Dose:  4 mg       QC DAYTIME COLD MEDICINE PO    Take 2 Caps by mouth as needed (For cold symptoms).   Dose:  2 Cap       sertraline 25 MG tablet   Commonly known as:  ZOLOFT    Take 1 Tab by mouth every evening.   Dose:  25 mg       sodium chloride 1 GM Tabs   Commonly known as:  SALT    Take 1 Tab by mouth 2 times a day, with meals.   Dose:  1 g       sulfamethoxazole-trimethoprim 800-160 MG tablet   Commonly known as:  BACTRIM DS    Take 1 Tab by mouth every day for 4 days.   Dose:  1 Tab       * Temozolomide 140 MG Caps    Take 1 Cap by mouth every bedtime.   Dose:  140 mg       * temozolomide 5 MG Caps   Commonly known as:  TEMODAR    Take 1 Cap by mouth every bedtime.   Dose:  5 mg       * Notice:  This list has 2 medication(s) that are the same as other medications prescribed for you. Read the directions carefully, and ask your doctor or other care provider to review them with you.

## 2017-01-26 ENCOUNTER — APPOINTMENT (OUTPATIENT)
Dept: RADIATION ONCOLOGY | Facility: MEDICAL CENTER | Age: 47
End: 2017-01-26
Attending: RADIOLOGY
Payer: COMMERCIAL

## 2017-01-26 LAB
ALBUMIN SERPL BCP-MCNC: 4.2 G/DL (ref 3.2–4.9)
ALBUMIN/GLOB SERPL: 1.4 G/DL
ALP SERPL-CCNC: 70 U/L (ref 30–99)
ALT SERPL-CCNC: 13 U/L (ref 2–50)
ANION GAP SERPL CALC-SCNC: 7 MMOL/L (ref 0–11.9)
AST SERPL-CCNC: 11 U/L (ref 12–45)
BASOPHILS # BLD AUTO: 0.1 % (ref 0–1.8)
BASOPHILS # BLD: 0.01 K/UL (ref 0–0.12)
BILIRUB SERPL-MCNC: 0.4 MG/DL (ref 0.1–1.5)
BUN SERPL-MCNC: 11 MG/DL (ref 8–22)
CALCIUM SERPL-MCNC: 9.7 MG/DL (ref 8.5–10.5)
CHLORIDE SERPL-SCNC: 102 MMOL/L (ref 96–112)
CO2 SERPL-SCNC: 25 MMOL/L (ref 20–33)
CREAT SERPL-MCNC: 0.61 MG/DL (ref 0.5–1.4)
EOSINOPHIL # BLD AUTO: 0.01 K/UL (ref 0–0.51)
EOSINOPHIL NFR BLD: 0.1 % (ref 0–6.9)
ERYTHROCYTE [DISTWIDTH] IN BLOOD BY AUTOMATED COUNT: 42.6 FL (ref 35.9–50)
GFR SERPL CREATININE-BSD FRML MDRD: >60 ML/MIN/1.73 M 2
GLOBULIN SER CALC-MCNC: 3.1 G/DL (ref 1.9–3.5)
GLUCOSE SERPL-MCNC: 141 MG/DL (ref 65–99)
HCT VFR BLD AUTO: 42.1 % (ref 42–52)
HGB BLD-MCNC: 14.4 G/DL (ref 14–18)
IMM GRANULOCYTES # BLD AUTO: 0.02 K/UL (ref 0–0.11)
IMM GRANULOCYTES NFR BLD AUTO: 0.2 % (ref 0–0.9)
LYMPHOCYTES # BLD AUTO: 1.08 K/UL (ref 1–4.8)
LYMPHOCYTES NFR BLD: 11.6 % (ref 22–41)
MCH RBC QN AUTO: 31.4 PG (ref 27–33)
MCHC RBC AUTO-ENTMCNC: 34.2 G/DL (ref 33.7–35.3)
MCV RBC AUTO: 91.9 FL (ref 81.4–97.8)
MONOCYTES # BLD AUTO: 0.36 K/UL (ref 0–0.85)
MONOCYTES NFR BLD AUTO: 3.9 % (ref 0–13.4)
NEUTROPHILS # BLD AUTO: 7.83 K/UL (ref 1.82–7.42)
NEUTROPHILS NFR BLD: 84.1 % (ref 44–72)
NRBC # BLD AUTO: 0 K/UL
NRBC BLD AUTO-RTO: 0 /100 WBC
PLATELET # BLD AUTO: 301 K/UL (ref 164–446)
PMV BLD AUTO: 9 FL (ref 9–12.9)
POTASSIUM SERPL-SCNC: 3.9 MMOL/L (ref 3.6–5.5)
PROT SERPL-MCNC: 7.3 G/DL (ref 6–8.2)
RBC # BLD AUTO: 4.58 M/UL (ref 4.7–6.1)
SODIUM SERPL-SCNC: 134 MMOL/L (ref 135–145)
WBC # BLD AUTO: 9.3 K/UL (ref 4.8–10.8)

## 2017-01-26 PROCEDURE — 99231 SBSQ HOSP IP/OBS SF/LOW 25: CPT | Performed by: INTERNAL MEDICINE

## 2017-01-26 PROCEDURE — 85025 COMPLETE CBC W/AUTO DIFF WBC: CPT

## 2017-01-26 PROCEDURE — 700102 HCHG RX REV CODE 250 W/ 637 OVERRIDE(OP): Performed by: INTERNAL MEDICINE

## 2017-01-26 PROCEDURE — 97162 PT EVAL MOD COMPLEX 30 MIN: CPT

## 2017-01-26 PROCEDURE — G8989 SELF CARE D/C STATUS: HCPCS | Mod: CI

## 2017-01-26 PROCEDURE — 97166 OT EVAL MOD COMPLEX 45 MIN: CPT

## 2017-01-26 PROCEDURE — 700111 HCHG RX REV CODE 636 W/ 250 OVERRIDE (IP): Performed by: INTERNAL MEDICINE

## 2017-01-26 PROCEDURE — 80053 COMPREHEN METABOLIC PANEL: CPT

## 2017-01-26 PROCEDURE — 770001 HCHG ROOM/CARE - MED/SURG/GYN PRIV*

## 2017-01-26 PROCEDURE — 36415 COLL VENOUS BLD VENIPUNCTURE: CPT

## 2017-01-26 PROCEDURE — G8978 MOBILITY CURRENT STATUS: HCPCS | Mod: CI

## 2017-01-26 PROCEDURE — G8980 MOBILITY D/C STATUS: HCPCS | Mod: CI

## 2017-01-26 PROCEDURE — 77386 HCHG IMRT DELIVERY COMPLEX: CPT | Performed by: RADIOLOGY

## 2017-01-26 PROCEDURE — 700112 HCHG RX REV CODE 229: Performed by: INTERNAL MEDICINE

## 2017-01-26 PROCEDURE — G8987 SELF CARE CURRENT STATUS: HCPCS | Mod: CI

## 2017-01-26 PROCEDURE — A9270 NON-COVERED ITEM OR SERVICE: HCPCS | Performed by: INTERNAL MEDICINE

## 2017-01-26 PROCEDURE — 77280 THER RAD SIMULAJ FIELD SMPL: CPT | Performed by: RADIOLOGY

## 2017-01-26 PROCEDURE — G8979 MOBILITY GOAL STATUS: HCPCS | Mod: CI

## 2017-01-26 PROCEDURE — G8988 SELF CARE GOAL STATUS: HCPCS | Mod: CI

## 2017-01-26 RX ORDER — LEVETIRACETAM 250 MG/1
1000 TABLET ORAL 2 TIMES DAILY
Status: DISCONTINUED | OUTPATIENT
Start: 2017-01-26 | End: 2017-02-15 | Stop reason: HOSPADM

## 2017-01-26 RX ADMIN — SERTRALINE 25 MG: 50 TABLET, FILM COATED ORAL at 20:49

## 2017-01-26 RX ADMIN — LEVETIRACETAM 1000 MG: 250 TABLET, FILM COATED ORAL at 20:48

## 2017-01-26 RX ADMIN — OXYCODONE HYDROCHLORIDE 10 MG: 10 TABLET ORAL at 11:42

## 2017-01-26 RX ADMIN — DOCUSATE SODIUM 100 MG: 100 CAPSULE ORAL at 08:07

## 2017-01-26 RX ADMIN — HYDROMORPHONE HYDROCHLORIDE 0.5 MG: 1 INJECTION, SOLUTION INTRAMUSCULAR; INTRAVENOUS; SUBCUTANEOUS at 10:30

## 2017-01-26 RX ADMIN — DEXAMETHASONE SODIUM PHOSPHATE 4 MG: 4 INJECTION, SOLUTION INTRAMUSCULAR; INTRAVENOUS at 11:42

## 2017-01-26 RX ADMIN — OXYCODONE HYDROCHLORIDE 10 MG: 10 TABLET ORAL at 04:54

## 2017-01-26 RX ADMIN — DEXAMETHASONE SODIUM PHOSPHATE 4 MG: 4 INJECTION, SOLUTION INTRAMUSCULAR; INTRAVENOUS at 17:59

## 2017-01-26 RX ADMIN — OXYCODONE HYDROCHLORIDE 10 MG: 10 TABLET ORAL at 08:07

## 2017-01-26 RX ADMIN — HYDROMORPHONE HYDROCHLORIDE 0.5 MG: 1 INJECTION, SOLUTION INTRAMUSCULAR; INTRAVENOUS; SUBCUTANEOUS at 00:58

## 2017-01-26 RX ADMIN — DEXAMETHASONE SODIUM PHOSPHATE 4 MG: 4 INJECTION, SOLUTION INTRAMUSCULAR; INTRAVENOUS at 04:54

## 2017-01-26 RX ADMIN — HYDROMORPHONE HYDROCHLORIDE 0.5 MG: 1 INJECTION, SOLUTION INTRAMUSCULAR; INTRAVENOUS; SUBCUTANEOUS at 14:59

## 2017-01-26 RX ADMIN — OXYCODONE HYDROCHLORIDE 10 MG: 10 TABLET ORAL at 14:53

## 2017-01-26 RX ADMIN — STANDARDIZED SENNA CONCENTRATE AND DOCUSATE SODIUM 1 TABLET: 8.6; 5 TABLET, FILM COATED ORAL at 20:48

## 2017-01-26 RX ADMIN — OXYCODONE HYDROCHLORIDE 10 MG: 10 TABLET ORAL at 17:59

## 2017-01-26 RX ADMIN — HYDROMORPHONE HYDROCHLORIDE 0.5 MG: 1 INJECTION, SOLUTION INTRAMUSCULAR; INTRAVENOUS; SUBCUTANEOUS at 20:49

## 2017-01-26 RX ADMIN — LEVETIRACETAM 1000 MG: 250 TABLET, FILM COATED ORAL at 09:15

## 2017-01-26 ASSESSMENT — ENCOUNTER SYMPTOMS
DIZZINESS: 0
SEIZURES: 1
BLURRED VISION: 1
NAUSEA: 0
VOMITING: 0
HEADACHES: 1

## 2017-01-26 ASSESSMENT — PAIN SCALES - GENERAL
PAINLEVEL_OUTOF10: 8
PAINLEVEL_OUTOF10: 6
PAINLEVEL_OUTOF10: 6
PAINLEVEL_OUTOF10: 7
PAINLEVEL_OUTOF10: 9
PAINLEVEL_OUTOF10: 10
PAINLEVEL_OUTOF10: 7
PAINLEVEL_OUTOF10: 10

## 2017-01-26 ASSESSMENT — GAIT ASSESSMENTS
GAIT LEVEL OF ASSIST: STAND BY ASSIST
DISTANCE (FEET): 180

## 2017-01-26 ASSESSMENT — ACTIVITIES OF DAILY LIVING (ADL): TOILETING: INDEPENDENT

## 2017-01-26 NOTE — CONSULTS
DATE OF SERVICE:  01/25/2017    HISTORY OF PRESENT ILLNESS:  Patient was born in 1970.  I did a right   parietooccipital craniotomy for near total excision of recurrent glioblastoma   multiforme on the 23rd of December, 3 years earlier.  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  The patient   has been in the hospital for an extended period of time, he was discharged   roughly a week ago, readmitted and apparently had a seizure, complaining of   headaches.  I asked the patient if he is taking his Keppra, he tells me he   does not have it, he has not taking any medication.    REVIEW OF SYSTEMS:  No history of bleeding disorder.    Patient's CT scan done today, which shows a little difference compared to his   last CT scan from the 24th of December.  He has got edema in the right   hemisphere.    PHYSICAL EXAMINATION:  VITAL SIGNS:  Recorded.  GENERAL:  The patient is in no distress, awake, alert, oriented.  His speech   is fluent.  HEENT:  Pupils are equally round and reactive to light.  Extraocular   movements, face _____ symmetric.  Tongue, palate move midline.  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  Trace reflexes.  No abnormality of motor tone.    IMPRESSION:  Postoperative resection of a glioblastoma multiforme.  The   patient is ready for radiation and chemotherapy starting at any time.  Patient   needs to be started on his Keppra, ideally he should be seen by neurology for   management of the seizure disorder.  There is nothing more to be done   surgically at this point in time.  From my standpoint, the patient can be   transferred to the floor and he can be discharged whenever he is on his Keppra   and likely will need to be started on some Decadron in preparation for his   radiation therapy that can be managed by the radiation _____.       ____________________________________     TU WALSH  MD FRY / JOVITA    DD:  01/25/2017 19:52:37  DT:  01/26/2017 00:52:08    D#:  [id number]  Job#:  [id number]

## 2017-01-26 NOTE — CARE PLAN
Problem: Safety  Goal: Will remain free from falls  Outcome: PROGRESSING AS EXPECTED  Pt in room near nursing station   Bed alarm, treaded socks, and hourly rounding in place        Problem: Venous Thromboembolism (VTW)/Deep Vein Thrombosis (DVT) Prevention:  Goal: Patient will participate in Venous Thrombosis (VTE)/Deep Vein Thrombosis (DVT)Prevention Measures  Outcome: PROGRESSING AS EXPECTED  SCD's in place   Educating and encouraging pt on the importance of early ambulation

## 2017-01-26 NOTE — PROGRESS NOTES
Progress Note               Author: Girma Edgar Date & Time created: 2017  11:28 PM     Interval History:  Consult dictated.  Nothing to be done surgically.  Patient needs post op RT/Temodar to begin ASAP.    Review of Systems:  ROS    Physical Exam:  Physical Exam    Labs:        Invalid input(s): [id number]      Recent Labs      17   1021   SODIUM  137   POTASSIUM  3.8   CHLORIDE  106   CO2  27   BUN  12   CREATININE  0.60   CALCIUM  9.2     Recent Labs      17   1021   ALTSGPT  16   ASTSGOT  13   ALKPHOSPHAT  80   TBILIRUBIN  0.5   GLUCOSE  97     Recent Labs      17   1021   RBC  4.49*   HEMOGLOBIN  14.6   HEMATOCRIT  42.1   PLATELETCT  290   PROTHROMBTM  13.0   APTT  24.5*   INR  0.95     Recent Labs      17   1021   WBC  7.5   NEUTSPOLYS  67.10   LYMPHOCYTES  19.30*   MONOCYTES  10.10   EOSINOPHILS  2.30   BASOPHILS  0.80   ASTSGOT  13   ALTSGPT  16   ALKPHOSPHAT  80   TBILIRUBIN  0.5     Hemodynamics:  Temp (24hrs), Av.7 °C (98.1 °F), Min:36.5 °C (97.7 °F), Max:36.9 °C (98.4 °F)  Temperature: 36.9 °C (98.4 °F)  Pulse  Av.6  Min: 66  Max: 98Heart Rate (Monitored): 85  Blood Pressure: 116/83 mmHg, NIBP: 107/78 mmHg     Respiratory:    Respiration: (!) 24, Pulse Oximetry: 94 %        RUL Breath Sounds: Clear, RML Breath Sounds: Clear, RLL Breath Sounds: Diminished, FLY Breath Sounds: Clear, LLL Breath Sounds: Diminished  Fluids:    Intake/Output Summary (Last 24 hours) at 17 8905  Last data filed at 17   Gross per 24 hour   Intake    860 ml   Output    400 ml   Net    460 ml     Weight: 63.4 kg (139 lb 12.4 oz)  GI/Nutrition:  Orders Placed This Encounter   Procedures   • DIET ORDER     Standing Status: Standing      Number of Occurrences: 1      Standing Expiration Date:      Order Specific Question:  Diet:     Answer:  Regular [1]     Medical Decision Making, by Problem:  Active Hospital Problems    Diagnosis   • Seizure (CMS-HCC) [R56.9]   •  Depression [F32.9]   • GBM (glioblastoma multiforme) (CMS-HCC) [C71.9]   • Headache [R51]       Plan:  Rec Med and RT Oncology follow up ASAP to begin post op RT/chemo Tx,  Also rec neurology consult for seizure management.    Core Measures

## 2017-01-26 NOTE — THERAPY
"Occupational Therapy Evaluation completed.   Functional Status:  Pt performing ADLs with sup/MI with no vc's for sequencing, educated and trained on ADL modifications in presence of lightheadedness with bending during ADLs; able to demo back accurately. Pt reports brother has been helping with s/u pillbox; however admission notes indicate pt has not has his medication filled since d/c from rehab. At this time, pt demonstrates cognitive and vision deficits to perform IADLs and higher level ADLs safely; requires 24/7 supervision due to above mentioned deficits. Pt would benefit from patch over R eye; reports object appear clear when therapist had one cover the eye.  Pt might benefit from  nursing services vs group home to assist with higher level IADLs and follow through with medical appointments as current home environment does not seem to be adequate in meeting pt's needs. Pt does not present the need for acute skilled services at this time.   Plan of Care: Patient with no further skilled OT needs in the acute care setting at this time  Discharge Recommendations:  Equipment: Tub Transfer Bench. Post-acute therapy recommended after discharged home.    See \"Rehab Therapy-Acute\" Patient Summary Report for complete documentation.    "

## 2017-01-26 NOTE — PROGRESS NOTES
Received report from Sofía VILLARREAL. Pt is A&Ox4. Pt denies n/v, n/t. Pt states head pain of 8/10. Medicated pt apprropriately per MAR with 10 mg of Roxicodone. Reassessed in an hour an pt stated head pain of 10/10. Medicated pt appropriately per MAR with .5 mg of Dilaudid IV.  Pt currently resting comfortably. Pt updated on POC. All needs met at this time. Seizure precautions in place. Call light within reach. Bed alarm and hourly rounding in place.

## 2017-01-26 NOTE — PROGRESS NOTES
Dr Edgar at bedside, ok for Q4H neuro checks, no sx needed from his standpoint and can transfer out of ICU.    Dr Fletcher paged and updated on Dr Kaufman recommendations.  New orders received and implemented.

## 2017-01-26 NOTE — PROGRESS NOTES
Subjective:  States vision some worse.  Most concerned about social situation, difficulty with housing, ADLs.  Seizures, but didn't fill meds  Obj:    AAOx4. Tongue ML. No drift. FCx4. PERRl 3mm bilat. CDI, slight scabbing    Temp (24hrs), Av.7 °C (98.1 °F), Min:36.6 °C (97.8 °F), Max:36.9 °C (98.4 °F)    Pulse: 71, Heart Rate (Monitored): 85, Respiration: 18, NIBP: 107/78 mmHg, Blood Pressure: 112/72 mmHg, Weight: 63.4 kg (139 lb 12.4 oz), Pulse Oximetry: 91 %, O2 (LPM): 0            Intake/Output Summary (Last 24 hours) at 17 1143  Last data filed at 17 0000   Gross per 24 hour   Intake   1210 ml   Output    400 ml   Net    810 ml            • levetiracetam  1,000 mg     • sertraline  25 mg     • docusate sodium  100 mg      And   • senna-docusate  1 Tab      And   • senna-docusate  1 Tab      And   • lactulose  30 mL      And   • bisacodyl  10 mg      And   • fleet  1 Each     • hydromorphone  0.5 mg     • oxycodone immediate-release  5 mg     • oxycodone immediate release  10 mg     • labetalol  10 mg     • ondansetron  4 mg     • ondansetron  4 mg     • promethazine  12.5-25 mg     • promethazine  12.5-25 mg     • prochlorperazine  5-10 mg     • lorazepam  4 mg     • dexamethasone  4 mg         Recent Labs      17   1021  17   0136   WBC  7.5  9.3   RBC  4.49*  4.58*   HEMOGLOBIN  14.6  14.4   HEMATOCRIT  42.1  42.1   MCV  93.8  91.9   MCH  32.5  31.4   PLATELETCT  290  301     Recent Labs      17   1021  17   0136   SODIUM  137  134*   POTASSIUM  3.8  3.9   CHLORIDE  106  102   CO2  27  25   GLUCOSE  97  141*   BUN  12  11   CREATININE  0.60  0.61   CALCIUM  9.2  9.7     Recent Labs      17   1021   APTT  24.5*   INR  0.95       Assessment:  HD#2 S/P GBM re-resection    Plan:  CT stable- reviewed by Dr Edgar  XRT/Onc- may start xrt/temodar anytime  SW for dc planning  Neuro by to see pt- re seizures

## 2017-01-26 NOTE — DISCHARGE PLANNING
Referral: Follow Up    Intervention: SW met with pt at bedside.  Pt indicates he was staying at the [facility] with his brother Ed, does not recall Ed's phone number and indicates the rooms at the [facility] don't have phones.  Pt states, his brother Ed brought him to the hospital and will visit him later today.    Pt indicates he on February 3rd, he will have money to pay for a Motel Room.  SW provided the contact number for his Uncle Aden and his father.    Plan: As Above.

## 2017-01-26 NOTE — CARE PLAN
Problem: Safety  Goal: Will remain free from injury  Discussed plan of care with pt. Educated pt on safety and fall precautions in place to prevent injury. Proper staff assist in place for mobility.     Problem: Pain Management  Goal: Pain level will decrease to patient’s comfort goal  Multimodal approach to pain in place via pain meds per MAR and comfort measures. Discussed risk and side effects of pain medication. Pt verbalizes understanding.

## 2017-01-26 NOTE — H&P
ATTENDING:  [facility] hospitalist.    CONSULTS:  1.  Neurosurgery, Dr. Edgar.  2.  Oncology, Dr. Lee.    PRIMARY CARE PHYSICIAN:  Ashley Lee MD    CODE STATUS:  Full code.    CHIEF COMPLAINT:  Headache and seizure.    HISTORY OF PRESENT ILLNESS:  This is a 46-year-old male who presented to the   emergency department due to severe headache as well as a possible seizure.    Patient does have a significant past medical history of a brain tumor, this   was resected in [place] approximately 2 years ago.  Patient then had a recent   recurrence and underwent another craniotomy 3 weeks ago.  Patient states over   the last couple of days, he has developed worsening headache and yesterday he   had a short episode where his left hand was shaking, he was aware of it and   alert the entire time and then this happened again today.  Patient states he   has also had decreased vision over the last couple of days as well as lower   extremity weakness and anxiety.  Patient also complains of photophobia.    Patient states whenever he initially was diagnosed with a brain tumor as well   as the reoccurrence he did have significant headache and seizures which were   grand mal in nature.  Patient did have a second procedure done here by Dr. Edgar.  He has been consulted.  Patient did have a CT scan upon arrival which   showed worsened edema.    PAST MEDICAL HISTORY:  1.  Glioblastoma multiforme.  2.  Depression.    PAST SURGICAL HISTORY:  Craniotomy x2.    MEDICATIONS:  1.  Tylenol 500 mg as instructed.  2.  Benadryl 25 mg q. 6 hours p.r.n.  3.  Pseudoephedrine APAP/DM 2 caps p.r.n.  4.  Zoloft 25 mg daily.    ALLERGIES:  PENICILLIN.    SOCIAL HISTORY:  Smokes half pack a day, began smoking at the age of 16,   occasionally drinks alcohol and quit using marijuana.    FAMILY HISTORY:  Significant for unknown cancer and coronary artery disease.    REVIEW OF SYSTEMS:  Complete review of systems obtained with positives noted   in the  HPI above, all others negative.    PHYSICAL EXAMINATION:  VITAL SIGNS:  Temperature 36.5, pulse 98, respirations 18, blood pressure   116/83, satting 98% on room air.  GENERAL:  No acute distress, alert and oriented x3.  He is anxious and   emotional.  Ill-appearing.  HEENT:  Normocephalic, atraumatic.  Moist mucous membranes, does have a healed   old surgical scars on the right.  NECK:  No JVD, bruit, thyromegaly, cervical or supraclavicular adenopathy   noted.  CARDIOVASCULAR:  Regular rate and rhythm.  No murmurs, rubs or gallops.  LUNGS:  Clear to auscultation bilaterally.  No crackles, rhonchi or wheezes.  ABDOMEN:  Bowel sounds x4, soft, nontender, nondistended, no   hepatosplenomegaly.  EXTREMITIES:  No clubbing, cyanosis or edema.  SKIN:  No rash or erythema.  NEUROLOGIC:  Cranial nerves II-XII intact.  Extraocular muscles intact,   strength 5/5 bilateral upper and lower extremity; however, the left lower   extremity is weaker than the right.  Sensation intact.    LABORATORY DATA:  White count 7.5, hemoglobin 14.6, hematocrit 42.1, platelets   290.  Sodium 137, potassium 3.8, chloride 106, CO2 27, BUN is 12, creatinine   0.6, glucose is 97.    IMAGING:  CT head shows postoperative changes, right temporoparietal lobe,   recent medial right occipital lobe infarct, diffuse edema within the right   cerebral hemisphere and increasing right-sided mass effect with 7.2 mm right   to left midline shift.    ASSESSMENT AND PLAN:  1.  Glioblastoma multiforme -- I did discuss the case with Dr. Lee who   said initially he had a tumor positive for astrocytoma then was positive for   glioblastoma multiforme.  She does state that they began the workup after the   glioblastoma multiforme and then the patient quit showing, becoming   noncompliant.  Dr. Lee states patient was supposed to still be on   Decadron; however, he makes no mention of taking Decadron.  Dr. Bloch has been   consulted, await additional  recommendations.  At this point in time, patient   will need to be placed in the ICU for q. 1 hour neuro checks.  Patient does   have symptoms appropriate for his significant edema.  Unsure if there was   additional mass there or if just edema, patient will be placed on Keppra as   well as Decadron.  Additional recommendations per Dr. Edgar.  The patient will   require at least 2 days in the hospital, he will be placed in the ICU.  2.  Seizure -- secondary to edema, patient will be placed on Keppra, close   monitoring.  3.  Headache -- secondary to edema, we will give symptomatic care.  At this   point in time, patient will be made n.p.o. in case Dr. Edgar needs to do   surgery, once he is p.o. can add additional medication for his headache.   4.  Depression -- continue home meds.    Patient is critically ill and will be placed in the ICU for q. 1 hour neuro   checks.    Critical care time not including procedures, no overlap:  44 minutes.       ____________________________________     DO DANE HUDSON / JOVITA    DD:  01/25/2017 16:47:36  DT:  01/25/2017 19:45:53    D#:  [id number]  Job#:  [id number]

## 2017-01-26 NOTE — PROGRESS NOTES
Pt transported to S105, arrived to unit at 1630 with ACLS RN, attached to monitors and devices, oriented to room unit and call light.  Bed alarm on.

## 2017-01-26 NOTE — THERAPY
"Physical Therapy Evaluation completed.   Bed Mobility:  Supine to Sit: Supervised  Transfers: Sit to Stand: Supervised  Gait: Level Of Assist: Stand by Assist with No Equipment Needed       Plan of Care: Patient with no further skilled PT needs in the acute care setting at this time  Discharge Recommendations: Equipment: No Equipment Needed. Post-acute therapy recommended after discharged home.    Pt presents very near baseline regarding functional mobility. Largest deficit appears to be related to vision which causes some unsteadiness. Pt verbalized interest in group home as he feels as though he does not like his motel. Unclear if pt can manage own medications etc. However, at this time, he does not require further acute skilled PT services.     See \"Rehab Therapy-Acute\" Patient Summary Report for complete documentation.     "
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
## 2017-01-26 NOTE — PROGRESS NOTES
Hospital Medicine Progress Note, Adult, Complex               Author: Micky NICK Toscano Date & Time created: 1/26/2017  8:46 AM   CC: headache/Seizure    Interval History:  45 yo man with glioblastoma  Multiforme. This was first resected 2 years ago but had recurrence needing another craniotomy 3 weeks ago. According to the staff, radiotherapy was supposed to be done but he left AMA. Head CT showed worsening edema. NSS and Oncology were consulted. Keppra and decadron were started. Last night he received Roxicodone and Dilaudid for pain. He is still having headaches but it is better than last night.     Review of Systems:  ROS   Pertinent positives/negative as mentioned above.     A complete review of systems was done. All other systems were negative.     Physical Exam:  Physical Exam   Constitutional: Well-developed, well-nourished, (-) diaphoresis, (-) distress  HENT: Normocephalic, atraumatic, (-) tonsillopharyngal congestion, (-) tonsillopharyngeal exudate  Eyes: PERRLA, pink conjuctivae, (-) icteric sclerae  Neck: (-) cervical lymphadenopathy, (-) rigidity  Cardiovascular: RRR, (-) murmurs, (-) rubs, (-) gallops, (-) edema  Pulmonary: Equal chest expansion, (+) clear breath sounds, (-) wheezes/rhonchi, (-) crackles/rales  Abdominal: (+) bowel sounds, soft, (-) tenderness, (-) masses, (-) guarding/rebound  Musculoskeletal: (-) joint swelling, (-) joint tenderness, (-) joint deformities, (-) muscle tenderness, (-) gross limitation of movement of 4 extremities  Neurologic: weakness L lower ext  Skin: (-) erythema, (-) warmth, (-) rashes, (-) ulcers, (-) open wounds  Psychiatric: mood/affect WNL, thought content WNL, behavior age appropriate    Labs:        Invalid input(s): [id number]      Recent Labs      01/25/17   1021  01/26/17   0136   SODIUM  137  134*   POTASSIUM  3.8  3.9   CHLORIDE  106  102   CO2  27  25   BUN  12  11   CREATININE  0.60  0.61   CALCIUM  9.2  9.7     Recent Labs      01/25/17   1021   17   0136   ALTSGPT  16  13   ASTSGOT  13  11*   ALKPHOSPHAT  80  70   TBILIRUBIN  0.5  0.4   GLUCOSE  97  141*     Recent Labs      17   1021  17   0136   RBC  4.49*  4.58*   HEMOGLOBIN  14.6  14.4   HEMATOCRIT  42.1  42.1   PLATELETCT  290  301   PROTHROMBTM  13.0   --    APTT  24.5*   --    INR  0.95   --      Recent Labs      17   1021  17   0136   WBC  7.5  9.3   NEUTSPOLYS  67.10  84.10*   LYMPHOCYTES  19.30*  11.60*   MONOCYTES  10.10  3.90   EOSINOPHILS  2.30  0.10   BASOPHILS  0.80  0.10   ASTSGOT  13  11*   ALTSGPT  16  13   ALKPHOSPHAT  80  70   TBILIRUBIN  0.5  0.4           Hemodynamics:  Temp (24hrs), Av.7 °C (98.1 °F), Min:36.5 °C (97.7 °F), Max:36.9 °C (98.4 °F)  Temperature: 36.7 °C (98 °F)  Pulse  Av  Min: 66  Max: 98Heart Rate (Monitored): 85  Blood Pressure: 100/69 mmHg, NIBP: 107/78 mmHg     Respiratory:    Respiration: 17, Pulse Oximetry: 95 %        RUL Breath Sounds: Clear, RML Breath Sounds: Clear, RLL Breath Sounds: Diminished, FLY Breath Sounds: Clear, LLL Breath Sounds: Diminished  Fluids:    Intake/Output Summary (Last 24 hours) at 17 0846  Last data filed at 17 0000   Gross per 24 hour   Intake   1210 ml   Output    400 ml   Net    810 ml     Weight: 63.4 kg (139 lb 12.4 oz)  GI/Nutrition:  Orders Placed This Encounter   Procedures   • DIET ORDER     Standing Status: Standing      Number of Occurrences: 1      Standing Expiration Date:      Order Specific Question:  Diet:     Answer:  Regular [1]     Medical Decision Making, by Problem:  Active Hospital Problems    Diagnosis   • Seizure (CMS-HCC) [R56.9]  GBM (glioblastoma multiforme) (CMS-HCC) [C71.9]  Headache [R51]  Decadron 4 mg IV Q6h  Keppra 1000 mg BID  Roxicodone and Dilaudis as needed  Consult Neurology per NSS recommendation  Awaiting input from oncology      • Depression [F32.9]  Zoloft 25 at HS   •            Core Measures  Full code  SCD

## 2017-01-26 NOTE — CARE PLAN
Problem: Safety  Goal: Will remain free from falls  Treaded socks on, bed alarm on, call light in place, reviewed with pt to call for assistance pt states understanding    Problem: Pain Management  Goal: Pain level will decrease to patient’s comfort goal  Medicated per MAR for pain

## 2017-01-27 PROCEDURE — 700102 HCHG RX REV CODE 250 W/ 637 OVERRIDE(OP): Performed by: INTERNAL MEDICINE

## 2017-01-27 PROCEDURE — 77386 HCHG IMRT DELIVERY COMPLEX: CPT | Performed by: RADIOLOGY

## 2017-01-27 PROCEDURE — 99231 SBSQ HOSP IP/OBS SF/LOW 25: CPT | Performed by: INTERNAL MEDICINE

## 2017-01-27 PROCEDURE — A9270 NON-COVERED ITEM OR SERVICE: HCPCS | Performed by: INTERNAL MEDICINE

## 2017-01-27 PROCEDURE — 77014 PR CT GUIDANCE PLACEMENT RAD THERAPY FIELDS: CPT | Mod: 26 | Performed by: RADIOLOGY

## 2017-01-27 PROCEDURE — 770001 HCHG ROOM/CARE - MED/SURG/GYN PRIV*

## 2017-01-27 PROCEDURE — 700112 HCHG RX REV CODE 229: Performed by: INTERNAL MEDICINE

## 2017-01-27 PROCEDURE — 700111 HCHG RX REV CODE 636 W/ 250 OVERRIDE (IP): Performed by: INTERNAL MEDICINE

## 2017-01-27 RX ORDER — NICOTINE 21 MG/24HR
21 PATCH, TRANSDERMAL 24 HOURS TRANSDERMAL
Status: DISCONTINUED | OUTPATIENT
Start: 2017-01-27 | End: 2017-02-15 | Stop reason: HOSPADM

## 2017-01-27 RX ADMIN — STANDARDIZED SENNA CONCENTRATE AND DOCUSATE SODIUM 1 TABLET: 8.6; 5 TABLET, FILM COATED ORAL at 20:28

## 2017-01-27 RX ADMIN — NICOTINE POLACRILEX 2 MG: 2 GUM, CHEWING BUCCAL at 11:47

## 2017-01-27 RX ADMIN — OXYCODONE HYDROCHLORIDE 10 MG: 10 TABLET ORAL at 18:05

## 2017-01-27 RX ADMIN — HYDROMORPHONE HYDROCHLORIDE 0.5 MG: 1 INJECTION, SOLUTION INTRAMUSCULAR; INTRAVENOUS; SUBCUTANEOUS at 05:01

## 2017-01-27 RX ADMIN — OXYCODONE HYDROCHLORIDE 10 MG: 10 TABLET ORAL at 11:08

## 2017-01-27 RX ADMIN — HYDROMORPHONE HYDROCHLORIDE 0.5 MG: 1 INJECTION, SOLUTION INTRAMUSCULAR; INTRAVENOUS; SUBCUTANEOUS at 16:51

## 2017-01-27 RX ADMIN — HYDROMORPHONE HYDROCHLORIDE 0.5 MG: 1 INJECTION, SOLUTION INTRAMUSCULAR; INTRAVENOUS; SUBCUTANEOUS at 07:39

## 2017-01-27 RX ADMIN — NICOTINE POLACRILEX 2 MG: 2 GUM, CHEWING BUCCAL at 16:51

## 2017-01-27 RX ADMIN — DEXAMETHASONE SODIUM PHOSPHATE 4 MG: 4 INJECTION, SOLUTION INTRAMUSCULAR; INTRAVENOUS at 18:05

## 2017-01-27 RX ADMIN — HYDROMORPHONE HYDROCHLORIDE 0.5 MG: 1 INJECTION, SOLUTION INTRAMUSCULAR; INTRAVENOUS; SUBCUTANEOUS at 14:10

## 2017-01-27 RX ADMIN — DOCUSATE SODIUM 100 MG: 100 CAPSULE ORAL at 07:42

## 2017-01-27 RX ADMIN — OXYCODONE HYDROCHLORIDE 10 MG: 10 TABLET ORAL at 14:15

## 2017-01-27 RX ADMIN — NICOTINE POLACRILEX 2 MG: 2 GUM, CHEWING BUCCAL at 14:15

## 2017-01-27 RX ADMIN — OXYCODONE HYDROCHLORIDE 10 MG: 10 TABLET ORAL at 00:55

## 2017-01-27 RX ADMIN — HYDROMORPHONE HYDROCHLORIDE 0.5 MG: 1 INJECTION, SOLUTION INTRAMUSCULAR; INTRAVENOUS; SUBCUTANEOUS at 11:46

## 2017-01-27 RX ADMIN — DEXAMETHASONE SODIUM PHOSPHATE 4 MG: 4 INJECTION, SOLUTION INTRAMUSCULAR; INTRAVENOUS at 05:01

## 2017-01-27 RX ADMIN — DEXAMETHASONE SODIUM PHOSPHATE 4 MG: 4 INJECTION, SOLUTION INTRAMUSCULAR; INTRAVENOUS at 00:55

## 2017-01-27 RX ADMIN — SERTRALINE 25 MG: 50 TABLET, FILM COATED ORAL at 20:28

## 2017-01-27 RX ADMIN — HYDROMORPHONE HYDROCHLORIDE 0.5 MG: 1 INJECTION, SOLUTION INTRAMUSCULAR; INTRAVENOUS; SUBCUTANEOUS at 01:58

## 2017-01-27 RX ADMIN — NICOTINE 21 MG: 21 PATCH TRANSDERMAL at 11:47

## 2017-01-27 RX ADMIN — OXYCODONE HYDROCHLORIDE 10 MG: 10 TABLET ORAL at 07:42

## 2017-01-27 RX ADMIN — HYDROMORPHONE HYDROCHLORIDE 0.5 MG: 1 INJECTION, SOLUTION INTRAMUSCULAR; INTRAVENOUS; SUBCUTANEOUS at 09:46

## 2017-01-27 RX ADMIN — HYDROMORPHONE HYDROCHLORIDE 0.5 MG: 1 INJECTION, SOLUTION INTRAMUSCULAR; INTRAVENOUS; SUBCUTANEOUS at 20:29

## 2017-01-27 RX ADMIN — LEVETIRACETAM 1000 MG: 250 TABLET, FILM COATED ORAL at 20:28

## 2017-01-27 RX ADMIN — DEXAMETHASONE SODIUM PHOSPHATE 4 MG: 4 INJECTION, SOLUTION INTRAMUSCULAR; INTRAVENOUS at 11:47

## 2017-01-27 RX ADMIN — LEVETIRACETAM 1000 MG: 250 TABLET, FILM COATED ORAL at 07:39

## 2017-01-27 ASSESSMENT — PAIN SCALES - GENERAL
PAINLEVEL_OUTOF10: 9
PAINLEVEL_OUTOF10: 9
PAINLEVEL_OUTOF10: 8
PAINLEVEL_OUTOF10: 8
PAINLEVEL_OUTOF10: 7
PAINLEVEL_OUTOF10: 9
PAINLEVEL_OUTOF10: 5

## 2017-01-27 NOTE — PROGRESS NOTES
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  He is sitting up in bed and is eating breakfast  He continues to have intermittent headaches which have been fairly manageable  He was started on radiation yesterday and tolerated well  His brother has not picked up his oral temozolomide as of yet  He continues to have intermittent orthostatic dizziness  He continues to have bilateral vision changes which are stable  He denies any nausea and vomiting    Review of Systems:  ROS   All other review of systems are negative except what was mentioned above in the HPI.  Constitutional: Negative for fever and chills. Positive for fatigue.    HENT: Negative for ear pain and nosebleeds.     Positive for bilateral blurred vision   Respiratory: Negative for cough and shortness of breath.     Cardiovascular: Negative for chest pain.    Gastrointestinal: Negative nausea, vomiting and abdominal pain.    Genitourinary: Negative for dysuria.    Musculoskeletal: Negative for myalgias.     Skin: Negative for rash    Neurological: Positive for intermittent headaches. Negative for focal weakness. Positive for intermittent dizziness.  Endo/Heme/Allergies: No bruise/bleed easily.    Psychiatric/Behavioral: positive for anxiety.      Physical Exam:  Physical Exam   General: patient is in moderate distress, alert and oriented    HEENT: He is healing well with moist oral mucus membranes, and oral cavity without any lesions.  Neck: Supple neck, full range of motion  Lymph nodes: No palpable cervical and supraclavicular lymphadenopathy.     CVS: regular rate and rhythm  RESP: Clear to auscultation bilaterally.    ABD: Soft, non tender, non distended.     EXT: No edema    CNS: Alert and oriented and muscle strength grossly  intact.     Labs:        Invalid input(s): [id number]      Recent Labs      17   1021  17   0136   SODIUM  137  134*   POTASSIUM  3.8  3.9   CHLORIDE  106  102   CO2  27  25   BUN  12  11   CREATININE  0.60  0.61   CALCIUM  9.2  9.7     Recent Labs      17   1021  17   0136   ALTSGPT  16  13   ASTSGOT  13  11*   ALKPHOSPHAT  80  70   TBILIRUBIN  0.5  0.4   GLUCOSE  97  141*     Recent Labs      17   1021  17   0136   RBC  4.49*  4.58*   HEMOGLOBIN  14.6  14.4   HEMATOCRIT  42.1  42.1   PLATELETCT  290  301   PROTHROMBTM  13.0   --    APTT  24.5*   --    INR  0.95   --      Recent Labs      17   1021  17   0136   WBC  7.5  9.3   NEUTSPOLYS  67.10  84.10*   LYMPHOCYTES  19.30*  11.60*   MONOCYTES  10.10  3.90   EOSINOPHILS  2.30  0.10   BASOPHILS  0.80  0.10   ASTSGOT  13  11*   ALTSGPT  16  13   ALKPHOSPHAT  80  70   TBILIRUBIN  0.5  0.4     Recent Labs      17   1021  17   0136   SODIUM  137  134*   POTASSIUM  3.8  3.9   CHLORIDE  106  102   CO2  27  25   GLUCOSE  97  141*   BUN  12  11   CREATININE  0.60  0.61   CALCIUM  9.2  9.7     Hemodynamics:  Temp (24hrs), Av.7 °C (98.1 °F), Min:36.6 °C (97.8 °F), Max:36.9 °C (98.4 °F)  Temperature: 36.7 °C (98.1 °F)  Pulse  Av.2  Min: 66  Max: 98   Blood Pressure: 118/69 mmHg     Respiratory:    Respiration: 18, Pulse Oximetry: 96 %        RUL Breath Sounds: Clear, RML Breath Sounds: Clear, RLL Breath Sounds: Clear, FLY Breath Sounds: Clear, LLL Breath Sounds: Clear  Fluids:    Intake/Output Summary (Last 24 hours) at 17 1236  Last data filed at 17 0745   Gross per 24 hour   Intake    490 ml   Output      0 ml   Net    490 ml        GI/Nutrition:  Orders Placed This Encounter   Procedures   • DIET ORDER     Standing Status: Standing      Number of Occurrences: 1      Standing Expiration Date:      Order Specific Question:  Diet:     Answer:  Regular [1]     Medical Decision Making, by  Problem:  Active Hospital Problems    Diagnosis   • Seizure (CMS-HCC) [R56.9]   • Depression [F32.9]   • GBM (glioblastoma multiforme) (CMS-[facility]) [C71.9]   • Headache [R51]       Assessment and Plan:    1. GBM, stage IV: He has history of grade 3 anaplastic astrocytoma treated with chemoradiation and incompletely treated with maintenance therapy secondary to compliance issues resented with new CNS symptoms after being lost for follow-up prolonged period of time. On repeat imaging he was found to have enlarging mass biopsy of which was consistent with GBM. MGMT mythelation was requested and pending. Patient was due to start chemoradiation with single agent temozolomide however he F the hospital without signing AMA are with his medications. Patient was unable to be reached. He again presented to the emergency room secondary to increased symptoms. He had stopped his dexamethasone and Keppra at the time. He has been started back on dexamethasone and Keppra. He is temozolomide has been arranged and is awaiting  at the [facility] pharmacy. I recommended his brother to  the pills yesterday when chewing rounds. Patient has been started on radiation yesterday. His family members have not picked up the temozolomide as of yet. Patient states that today he will have his family members  the temozolomide that so that he can get started on it. I will also discuss the case with the nurse to reiterate this to his family members. I explained to him the importance of chemoradiation taking into consideration the aggressive nature of the disease.  2. Social issues: He continues to have significant social issues and really does not have a place to stay. He is followed closely by social workers as well as nurse navigators.  3. History of alcohol abuse: History of heavy alcohol abuse in the past. He states that he has stopped drinking.    4. Tobacco use: Patient continues to actively smoke. He is aware of the risks  of smoking and is strongly advised to quit and has been unable to do so.  5. Recommend chemoradiation with temozolomide to be followed by maintenance therapy.    Thank you for allowing me to participate in his care. Please feel free to contact me with     Core Measures

## 2017-01-27 NOTE — CARE PLAN
Problem: Safety  Goal: Will remain free from injury  Outcome: PROGRESSING AS EXPECTED  Free from falls. Using call light appropriately.

## 2017-01-27 NOTE — PROGRESS NOTES
Received report from Lilly VILLARREAL. Pt is A&Ox4. Pt denies n/v, n/t. Pt states head pain of 10/10. Medicated pt appropriately per MAR with .5 mg of Dilaudid IV and provided snacks for further comfort. Pt currently resting comfortably. Pt updated on POC. All needs met at this time. Seizure precautions in place. Call light within reach. Bed alarm and hourly rounding in place.

## 2017-01-27 NOTE — CARE PLAN
Problem: Safety  Goal: Will remain free from injury  Outcome: PROGRESSING SLOWER THAN EXPECTED

## 2017-01-27 NOTE — PROGRESS NOTES
Hospital Medicine Progress Note, Adult, Complex               Author: Micky Voelizabethpaige Date & Time created: 1/27/2017  7:59 AM   CC: headache/Seizure    Interval History:    He is doing better. No report of seizures. Headache is significantly better. No tremors. L lower ext weakness is better. VS are stable. He tolerated radiotx yesterday. No other complaints but he requested to meet with the  Re: DC planning.  Neurology notes are appreciated.         Review of Systems:  ROS   Pertinent positives/negative as mentioned above.     A complete review of systems was done. All other systems were negative.     Physical Exam:  Physical Exam   Constitutional: Well-developed, well-nourished, (-) diaphoresis, (-) distress  HENT: Normocephalic, atraumatic, (-) tonsillopharyngal congestion, (-) tonsillopharyngeal exudate  Eyes: PERRLA, pink conjuctivae, (-) icteric sclerae  Neck: (-) cervical lymphadenopathy, (-) rigidity  Cardiovascular: RRR, (-) murmurs, (-) rubs, (-) gallops, (-) edema  Pulmonary: Equal chest expansion, (+) clear breath sounds, (-) wheezes/rhonchi, (-) crackles/rales  Abdominal: (+) bowel sounds, soft, (-) tenderness, (-) masses, (-) guarding/rebound  Musculoskeletal: (-) joint swelling, (-) joint tenderness, (-) joint deformities, (-) muscle tenderness, (-) gross limitation of movement of 4 extremities  Neurologic:  LL 4+/5 BL  Skin: (-) erythema, (-) warmth, (-) rashes, (-) ulcers, (-) open wounds  Psychiatric: mood/affect WNL, thought content WNL, behavior age appropriate    Labs:        Invalid input(s): [id number]      Recent Labs      01/25/17   1021  01/26/17   0136   SODIUM  137  134*   POTASSIUM  3.8  3.9   CHLORIDE  106  102   CO2  27  25   BUN  12  11   CREATININE  0.60  0.61   CALCIUM  9.2  9.7     Recent Labs      01/25/17   1021  01/26/17   0136   ALTSGPT  16  13   ASTSGOT  13  11*   ALKPHOSPHAT  80  70   TBILIRUBIN  0.5  0.4   GLUCOSE  97  141*     Recent Labs      01/25/17    1021  17   0136   RBC  4.49*  4.58*   HEMOGLOBIN  14.6  14.4   HEMATOCRIT  42.1  42.1   PLATELETCT  290  301   PROTHROMBTM  13.0   --    APTT  24.5*   --    INR  0.95   --      Recent Labs      17   1021  17   0136   WBC  7.5  9.3   NEUTSPOLYS  67.10  84.10*   LYMPHOCYTES  19.30*  11.60*   MONOCYTES  10.10  3.90   EOSINOPHILS  2.30  0.10   BASOPHILS  0.80  0.10   ASTSGOT  13  11*   ALTSGPT  16  13   ALKPHOSPHAT  80  70   TBILIRUBIN  0.5  0.4           Hemodynamics:  Temp (24hrs), Av.7 °C (98 °F), Min:36.6 °C (97.8 °F), Max:36.9 °C (98.4 °F)  Temperature: 36.9 °C (98.4 °F)  Pulse  Av.9  Min: 66  Max: 98   Blood Pressure: 109/80 mmHg     Respiratory:    Respiration: 16, Pulse Oximetry: 92 %        RUL Breath Sounds: Clear, RML Breath Sounds: Clear, RLL Breath Sounds: Diminished, FLY Breath Sounds: Clear, LLL Breath Sounds: Diminished  Fluids:    Intake/Output Summary (Last 24 hours) at 17 0759  Last data filed at 17   Gross per 24 hour   Intake    250 ml   Output      0 ml   Net    250 ml        GI/Nutrition:  Orders Placed This Encounter   Procedures   • DIET ORDER     Standing Status: Standing      Number of Occurrences: 1      Standing Expiration Date:      Order Specific Question:  Diet:     Answer:  Regular [1]     Medical Decision Making, by Problem:  Active Hospital Problems    Diagnosis   • Seizure (CMS-HCC) [R56.9]  GBM (glioblastoma multiforme) (CMS-HCC) [C71.9]  Headache [R51]  Decadron 4 mg IV Q6h  Keppra 1000 mg BID  Roxicodone and Dilaudid as needed  Consult Neurology notes reviewed.  Awaiting input from oncology      • Depression [F32.9]  Zoloft 25 at HS   •            Core Measures  Full code  SCD

## 2017-01-27 NOTE — CARE PLAN
Problem: Communication  Goal: The ability to communicate needs accurately and effectively will improve  Outcome: PROGRESSING SLOWER THAN EXPECTED

## 2017-01-27 NOTE — CARE PLAN
Problem: Safety  Goal: Will remain free from falls  Outcome: PROGRESSING AS EXPECTED  Pt is near nursing station   Bed alarm and hourly rounding in place    Problem: Bowel/Gastric:  Goal: Normal bowel function is maintained or improved  Outcome: PROGRESSING AS EXPECTED  Pt is encouraged to ambulate with stand by assistance to promote motility

## 2017-01-27 NOTE — CARE PLAN
Problem: Venous Thromboembolism (VTW)/Deep Vein Thrombosis (DVT) Prevention:  Goal: Patient will participate in Venous Thrombosis (VTE)/Deep Vein Thrombosis (DVT)Prevention Measures  Outcome: PROGRESSING AS EXPECTED  Patient complaint with wearing SCDs. No evidence of DVT.

## 2017-01-27 NOTE — PROGRESS NOTES
Patient received treatment in Radiation Therapy. Patient received treatment number 2 of 15 planned.

## 2017-01-27 NOTE — CONSULTS
NEUROLOGY NOTE    Referring Physician  Micky Toscano M.D.      CHIEF COMPLAINT:  Seizure management  GBM with recurrence  Stress and anxiety, since there is not good enough social support, worry about the finance-- did not take seizure medication and developed seizures over left hand twitching and GTCS   Chief Complaint   Patient presents with   • Head Ache   • Dizziness   • Seizure   • Sent by MD         IMPRESSION:    1. GBM, recurrence, status post 2 surgeries  2. Epilepsy due to 1-- poor compliance of anti-seizure medication  3. Lack of social support, needs  to help arrange discharge planning-- patient really hopes to go to assisted living  4. Right PCA infarct-- recent, related with the brain tumor    PLAN/RECOMMENDATIONS:    Poor prognosis and poor functional status  Did not take seizure medication at home and developed seizures  Lack of social support    Depressed and anxious-- last time, he was discharged and ended in a living place with mouse and cockroaches-- and his brother? spent thousand of his money to get that living space for him-- apparently, he was very disappointed and anxious     might be the best team to help him since his major concern is where he will go after discharge NOW-- his wish is going to assisted living    I agree with him on this request though  It is also fine to give the patient narcotic or medical marijuana in this case  It is also reasonable to help this patient to lead a comfortable end of life   Not sure, he could live for the next 6 months  Hospice is also fine with me      ________________________________________________________________________    If circumstances change do not hesitate to re-consult neurology.     At this point, patient remains stable.    If any seizures, please notify me again    Usually, we have to increase anti-seizure medication as the tumor progresses since the breakthrough seizures will happen anyway    Salinas Juarez,  M.D.  [facility]  11:43 PM01/26/2017    ________________________________________________________________________            CT today  1.  Postoperative changes right temporal parietal lobe.  2.  Recent medial right occipital lobe infarct.  3.  Diffuse edema within the right cerebral hemisphere and increasing right-sided mass effect with 7.2 mm right to left midline shift.  4.  Compressed right lateral ventricle and mild entrapment dilatation left lateral ventricle.  5.  No acute hemorrhage.  6.  Recurrent neoplasm is most likely      SIGNATURE:  Salinas Juarez    CC:  Ashley Lee M.D.      PRESENT ILLNESS:   Seizure management  GBM with recurrence  Stress and anxiety, since there is not good enough social support, worry about the finance-- did not take seizure medication and developed seizures over left hand twitching and GTCS     45 yo man with glioblastoma  Multiforme. This was first resected 2 years ago but had recurrence needing another craniotomy 3 weeks ago. According to the staff, radiotherapy was supposed to be done but he left AMA. Head CT showed worsening edema. NSS and Oncology were consulted. Keppra and decadron were started. Last night he received Roxicodone and Dilaudid for pain. He is still having headaches but it is better than last night.     PAST MEDICAL HISTORY:  Past Medical History   Diagnosis Date   • Cancer (CMS-HCC)      brain   • Brain neoplasm malignant (CMS-HCC)        PAST SURGICAL HISTORY:  Past Surgical History   Procedure Laterality Date   • Craniotomy tumor  12/7/14     [facility]   • Craniotomy  11/214   • Craniotomy stealth Right 12/23/2016     Procedure: CRANIOTOMY STEALTH  RIGHT PARIETAL OCCIPITAL FOR BRAIN TUMOR;  Surgeon: Girma Edgar M.D.;  Location: SURGERY [facility];  Service:        FAMILY HISTORY:  Family History   Problem Relation Age of Onset   • Cancer Mother        SOCIAL HISTORY:  Social History     Social History   • Marital  Status: Single     Spouse Name: N/A   • Number of Children: N/A   • Years of Education: N/A     Occupational History   • Not on file.     Social History Main Topics   • Smoking status: Current Every Day Smoker -- 1.00 packs/day for 6 years     Types: Cigarettes     Start date: 01/01/1986   • Smokeless tobacco: Former User     Types: Chew     Quit date: 01/01/2014   • Alcohol Use: 3.5 oz/week     7 Cans of beer per week      Comment: occassional   • Drug Use: No   • Sexual Activity: No     Other Topics Concern   • Not on file     Social History Narrative     ALLERGIES:  Allergies   Allergen Reactions   • Pcn [Penicillins]      Reaction was as a child.  Pt stated tolerated Augmentin and Amoxicillin      TOBHX  History   Smoking status   • Current Every Day Smoker -- 1.00 packs/day for 6 years   • Types: Cigarettes   • Start date: 01/01/1986   Smokeless tobacco   • Former User   • Types: Chew   • Quit date: 01/01/2014     ALCHX  History   Alcohol Use   • 3.5 oz/week   • 7 Cans of beer per week     Comment: occassional     DRUGHX  History   Drug Use No           MEDICATIONS:  Current Facility-Administered Medications   Medication Dose   • levetiracetam (KEPPRA) tablet 1,000 mg  1,000 mg   • sertraline (ZOLOFT) tablet 25 mg  25 mg   • docusate sodium (COLACE) capsule 100 mg  100 mg    And   • senna-docusate (PERICOLACE or SENOKOT S) 8.6-50 MG per tablet 1 Tab  1 Tab    And   • senna-docusate (PERICOLACE or SENOKOT S) 8.6-50 MG per tablet 1 Tab  1 Tab    And   • lactulose 20 GM/30ML solution 30 mL  30 mL    And   • bisacodyl (DULCOLAX) suppository 10 mg  10 mg    And   • fleet enema 133 mL  1 Each   • HYDROmorphone (DILAUDID) injection 0.5 mg  0.5 mg   • oxycodone immediate-release (ROXICODONE) tablet 5 mg  5 mg   • oxycodone immediate-release (ROXICODONE) tablet 10 mg  10 mg   • labetalol (NORMODYNE,TRANDATE) injection 10 mg  10 mg   • ondansetron (ZOFRAN) syringe/vial injection 4 mg  4 mg   • ondansetron (ZOFRAN ODT)  "dispertab 4 mg  4 mg   • promethazine (PHENERGAN) tablet 12.5-25 mg  12.5-25 mg   • promethazine (PHENERGAN) suppository 12.5-25 mg  12.5-25 mg   • prochlorperazine (COMPAZINE) injection 5-10 mg  5-10 mg   • lorazepam (ATIVAN) injection 4 mg  4 mg   • dexamethasone (DECADRON) injection 4 mg  4 mg       REVIEW OF SYSTEM:    Constitutional: Denies fevers, Denies weight changes   Eyes: Denies changes in vision, no eye pain   Ears/Nose/Throat/Mouth: Denies nasal congestion or sore throat   Cardiovascular: Denies chest pain or palpitations   Respiratory: Denies SOB.   Gastrointestinal/Hepatic: Denies abdominal pain, nausea, vomiting, diarrhea, constipation or GI bleeding   Genitourinary: Denies bladder dysfunction, dysuria or frequency   Musculoskeletal/Rheum: Denies joint pain and swelling   Skin/Breast: Denies rash, denies breast lumps or discharge   Neurological: GBM, Seizures  Psychiatric: denies mood disorder   Endocrine: denies hx of diabetes or thyroid dysfunction   Heme/Oncology/Lymph Nodes: Denies enlarged lymph nodes, denies brusing or known bleeding disorder   Allergic/Immunologic: Denies hx of allergies          PHYSICAL AND NEUROLOGICAL EXMAINATIONS:  VITAL SIGNS: /66 mmHg  Pulse 87  Temp(Src) 36.7 °C (98.1 °F) (Temporal)  Resp 16  Ht 1.753 m (5' 9\")  Wt 63.4 kg (139 lb 12.4 oz)  BMI 20.63 kg/m2  SpO2 93%  CURRENT WEIGHT:   BMI: Body mass index is 20.63 kg/(m^2).  PREVIOUS WEIGHTS:  Wt Readings from Last 25 Encounters:   01/25/17 63.4 kg (139 lb 12.4 oz)   01/06/17 75.3 kg (166 lb 0.1 oz)   11/28/16 79.379 kg (175 lb)   03/15/16 75.751 kg (167 lb)   02/16/16 71.668 kg (158 lb)   01/13/16 78.926 kg (174 lb)   12/08/15 74.39 kg (164 lb)   11/02/15 78.109 kg (172 lb 3.2 oz)   10/01/15 76.204 kg (168 lb)   08/03/15 74.844 kg (165 lb)   07/31/15 74.844 kg (165 lb)   07/06/15 76.006 kg (167 lb 9 oz)   06/02/15 81.647 kg (180 lb)   05/27/15 76.658 kg (169 lb)   05/16/15 81.647 kg (180 lb)   04/29/15 " 77.52 kg (170 lb 14.4 oz)   04/13/15 81.647 kg (180 lb)   04/10/15 81.647 kg (180 lb)   04/06/15 80.513 kg (177 lb 8 oz)   03/23/15 81.239 kg (179 lb 1.6 oz)   03/06/15 83.915 kg (185 lb)   02/20/15 81.704 kg (180 lb 2 oz)   02/09/15 83.099 kg (183 lb 3.2 oz)   01/19/15 87.091 kg (192 lb)   01/14/15 85.73 kg (189 lb)       General appearance of patient: WDWN(+) NAD(+)    EYES  o Fundus : Papilledem(-) Exudates(-) Hemorrhage(-)  Nervous System  Orientation to time, place and person(+)  Memory normal(-)  Language: aphasia(-)  Knowledge: past(+) Current(+)  Attention(+)  Depressed , anxiety  Cranial Nerves  • Nerve 2: intact  • Nerve 3,4,6: intact  • Nerve 5 : intact  • Nerve 7: intact  • Nerve 8: intact  • Nerve 9 & 10: intact  • Nerve 11: intact  • Nerve 12: intact  Muscle Power and muscle tone: mild left hemiparesis  Sensory System: Pin sensation intact(+)  Reflexes: symmetric throughout  Cerebellar Function FNP normal   Gait : bed ridden  Heart and Vascular  Peripheral Vasucular system : Edema (-) Swelling(-)  RHB, Breathing sound clear  abdomen bowel sound normoactive  Extremities freely moveable  Joints no contracture       NEUROIMAGING: I reviewed the MRI/CT of brain       LAB:  Recent Labs      01/25/17   1021  01/26/17   0136   WBC  7.5  9.3   RBC  4.49*  4.58*   HEMOGLOBIN  14.6  14.4   HEMATOCRIT  42.1  42.1   MCV  93.8  91.9   MCH  32.5  31.4   MCHC  34.7  34.2   RDW  44.4  42.6   PLATELETCT  290  301   MPV  8.8*  9.0           IMPRESSION:    1. GBM, recurrence, status post 2 surgeries  2. Epilepsy due to 1-- poor compliance of anti-seizure medication  3. Lack of social support, needs  to help arrange discharge planning-- patient really hopes to go to assisted living  4. Right PCA infarct-- recent, related with the brain tumor    PLAN/RECOMMENDATIONS:    Poor prognosis and poor functional status  Did not take seizure medication at home and developed seizures  Lack of social support    Depressed  and anxious-- last time, he was discharged and ended in a living place with mouse and cockroaches-- and his brother? spent thousand of his money to get that living space for him-- apparently, he was very disappointed and anxious     might be the best team to help him since his major concern is where he will go after discharge NOW-- his wish is going to assisted living    I agree with him on this request though  It is also fine to give the patient narcotic or medical marijuana in this case  It is also reasonable to help this patient to lead a comfortable end of life   Not sure, he could live for the next 6 months  Hospice is also fine with me      ________________________________________________________________________    If circumstances change do not hesitate to re-consult neurology.     At this point, patient remains stable.    If any seizures, please notify me again    Usually, we have to increase anti-seizure medication as the tumor progresses since the breakthrough seizures will happen anyway    Salinas Juarez M.D.  [facility]  11:43 PM01/26/2017    ________________________________________________________________________            CT today  1.  Postoperative changes right temporal parietal lobe.  2.  Recent medial right occipital lobe infarct.  3.  Diffuse edema within the right cerebral hemisphere and increasing right-sided mass effect with 7.2 mm right to left midline shift.  4.  Compressed right lateral ventricle and mild entrapment dilatation left lateral ventricle.  5.  No acute hemorrhage.  6.  Recurrent neoplasm is most likely      SIGNATURE:  Salinas Juarez    CC:  Ashley Lee M.D.

## 2017-01-27 NOTE — PROGRESS NOTES
Subjective:  States vision some worse.  Most concerned about social situation, difficulty with housing, ADLs.  Seizures prior to hospitalization, but didn't fill meds  Obj:    AAOx4. Tongue ML. No drift. FCx4. PERRl 3mm bilat. CDI, slight scabbing     Temp (24hrs), Av.7 °C (98.1 °F), Min:36.6 °C (97.8 °F), Max:36.9 °C (98.4 °F)    Pulse: 84, Respiration: 18, Blood Pressure: 118/69 mmHg, Pulse Oximetry: 96 %, O2 (LPM): 0       Date 17 0700 - 17 0659   Shift [id number] 3468-4278 [id number] 24 Hour Total   I  N  T  A  K  E   P.O. 240   240      P.O. 240   240    Shift Total 240   240   O  U  T  P  U  T   Urine          Number of Times Voided 1 x   1 x    Shift Total          240         Intake/Output Summary (Last 24 hours) at 17 1143  Last data filed at 17 0000   Gross per 24 hour   Intake   1210 ml   Output    400 ml   Net    810 ml            • nicotine  21 mg     • nicotine polacrilex  2 mg     • levetiracetam  1,000 mg     • sertraline  25 mg     • docusate sodium  100 mg      And   • senna-docusate  1 Tab      And   • senna-docusate  1 Tab      And   • lactulose  30 mL      And   • bisacodyl  10 mg      And   • fleet  1 Each     • hydromorphone  0.5 mg     • oxycodone immediate-release  5 mg     • oxycodone immediate release  10 mg     • labetalol  10 mg     • ondansetron  4 mg     • ondansetron  4 mg     • promethazine  12.5-25 mg     • promethazine  12.5-25 mg     • prochlorperazine  5-10 mg     • lorazepam  4 mg     • dexamethasone  4 mg         Recent Labs      17   1021  17   0136   WBC  7.5  9.3   RBC  4.49*  4.58*   HEMOGLOBIN  14.6  14.4   HEMATOCRIT  42.1  42.1   MCV  93.8  91.9   MCH  32.5  31.4   PLATELETCT  290  301     Recent Labs      17   1021  17   0136   SODIUM  137  134*   POTASSIUM  3.8  3.9   CHLORIDE  106  102   CO2  27  25   GLUCOSE  97  141*   BUN  12  11   CREATININE  0.60  0.61   CALCIUM  9.2  9.7     Recent Labs      17    1021   APTT  24.5*   INR  0.95       Assessment:  HD#3 S/P GBM re-resection    Plan:  CT stable- reviewed by Dr Edgar  XRT/Onc- may start xrt/temodar anytime   for dc planning  Nonsurgical  Decadron per XRT

## 2017-01-28 PROCEDURE — 99231 SBSQ HOSP IP/OBS SF/LOW 25: CPT | Performed by: INTERNAL MEDICINE

## 2017-01-28 PROCEDURE — 700111 HCHG RX REV CODE 636 W/ 250 OVERRIDE (IP): Performed by: INTERNAL MEDICINE

## 2017-01-28 PROCEDURE — 700102 HCHG RX REV CODE 250 W/ 637 OVERRIDE(OP): Performed by: INTERNAL MEDICINE

## 2017-01-28 PROCEDURE — 770001 HCHG ROOM/CARE - MED/SURG/GYN PRIV*

## 2017-01-28 PROCEDURE — A9270 NON-COVERED ITEM OR SERVICE: HCPCS | Performed by: INTERNAL MEDICINE

## 2017-01-28 PROCEDURE — 700112 HCHG RX REV CODE 229: Performed by: INTERNAL MEDICINE

## 2017-01-28 RX ORDER — DEXAMETHASONE 4 MG/1
4 TABLET ORAL 4 TIMES DAILY
Status: DISCONTINUED | OUTPATIENT
Start: 2017-01-28 | End: 2017-02-15 | Stop reason: HOSPADM

## 2017-01-28 RX ADMIN — HYDROMORPHONE HYDROCHLORIDE 0.5 MG: 1 INJECTION, SOLUTION INTRAMUSCULAR; INTRAVENOUS; SUBCUTANEOUS at 17:15

## 2017-01-28 RX ADMIN — NICOTINE POLACRILEX 2 MG: 2 GUM, CHEWING BUCCAL at 07:32

## 2017-01-28 RX ADMIN — DEXAMETHASONE 4 MG: 4 TABLET ORAL at 20:08

## 2017-01-28 RX ADMIN — HYDROMORPHONE HYDROCHLORIDE 0.5 MG: 1 INJECTION, SOLUTION INTRAMUSCULAR; INTRAVENOUS; SUBCUTANEOUS at 20:12

## 2017-01-28 RX ADMIN — LEVETIRACETAM 1000 MG: 250 TABLET, FILM COATED ORAL at 20:08

## 2017-01-28 RX ADMIN — HYDROMORPHONE HYDROCHLORIDE 0.5 MG: 1 INJECTION, SOLUTION INTRAMUSCULAR; INTRAVENOUS; SUBCUTANEOUS at 10:48

## 2017-01-28 RX ADMIN — OXYCODONE HYDROCHLORIDE 10 MG: 10 TABLET ORAL at 10:49

## 2017-01-28 RX ADMIN — DEXAMETHASONE SODIUM PHOSPHATE 4 MG: 4 INJECTION, SOLUTION INTRAMUSCULAR; INTRAVENOUS at 06:13

## 2017-01-28 RX ADMIN — STANDARDIZED SENNA CONCENTRATE AND DOCUSATE SODIUM 1 TABLET: 8.6; 5 TABLET, FILM COATED ORAL at 20:08

## 2017-01-28 RX ADMIN — DEXAMETHASONE 4 MG: 4 TABLET ORAL at 13:16

## 2017-01-28 RX ADMIN — OXYCODONE HYDROCHLORIDE 10 MG: 10 TABLET ORAL at 06:13

## 2017-01-28 RX ADMIN — OXYCODONE HYDROCHLORIDE 10 MG: 10 TABLET ORAL at 00:04

## 2017-01-28 RX ADMIN — DEXAMETHASONE SODIUM PHOSPHATE 4 MG: 4 INJECTION, SOLUTION INTRAMUSCULAR; INTRAVENOUS at 00:22

## 2017-01-28 RX ADMIN — DEXAMETHASONE 4 MG: 4 TABLET ORAL at 16:29

## 2017-01-28 RX ADMIN — OXYCODONE HYDROCHLORIDE 10 MG: 10 TABLET ORAL at 16:29

## 2017-01-28 RX ADMIN — NICOTINE 21 MG: 21 PATCH TRANSDERMAL at 06:13

## 2017-01-28 RX ADMIN — DOCUSATE SODIUM 100 MG: 100 CAPSULE ORAL at 07:32

## 2017-01-28 RX ADMIN — HYDROMORPHONE HYDROCHLORIDE 0.5 MG: 1 INJECTION, SOLUTION INTRAMUSCULAR; INTRAVENOUS; SUBCUTANEOUS at 22:46

## 2017-01-28 RX ADMIN — HYDROMORPHONE HYDROCHLORIDE 0.5 MG: 1 INJECTION, SOLUTION INTRAMUSCULAR; INTRAVENOUS; SUBCUTANEOUS at 07:32

## 2017-01-28 RX ADMIN — SERTRALINE 25 MG: 50 TABLET, FILM COATED ORAL at 20:08

## 2017-01-28 RX ADMIN — NICOTINE POLACRILEX 2 MG: 2 GUM, CHEWING BUCCAL at 12:03

## 2017-01-28 RX ADMIN — NICOTINE POLACRILEX 2 MG: 2 GUM, CHEWING BUCCAL at 14:32

## 2017-01-28 RX ADMIN — HYDROMORPHONE HYDROCHLORIDE 0.5 MG: 1 INJECTION, SOLUTION INTRAMUSCULAR; INTRAVENOUS; SUBCUTANEOUS at 13:16

## 2017-01-28 RX ADMIN — LEVETIRACETAM 1000 MG: 250 TABLET, FILM COATED ORAL at 07:32

## 2017-01-28 RX ADMIN — OXYCODONE HYDROCHLORIDE 10 MG: 10 TABLET ORAL at 20:09

## 2017-01-28 ASSESSMENT — PAIN SCALES - GENERAL
PAINLEVEL_OUTOF10: 9
PAINLEVEL_OUTOF10: 6
PAINLEVEL_OUTOF10: 10
PAINLEVEL_OUTOF10: 9
PAINLEVEL_OUTOF10: 10
PAINLEVEL_OUTOF10: 8
PAINLEVEL_OUTOF10: 9
PAINLEVEL_OUTOF10: 7

## 2017-01-28 NOTE — PROGRESS NOTES
Assumed care of patient at 0715.  Pt A&Ox4, forgetful at times. Using call light appropriately. Denies N/T. Pupils sluggish but reactive. Pt drowsy at times but responsive appropriately.   Pain rated 8-9/10 throughout the day. Medcated with PRN Oxycodone and IV Dilaudid with intermittent relief.  orders for Nicotine replacement therapy obtained.   Went to radiation appt at approx 1500, returned to unit with no complications.    Fall prec in place, bed alarm on. Call light within reach.

## 2017-01-28 NOTE — PROGRESS NOTES
Hospital Medicine Progress Note, Adult, Complex               Author: Micky ROMERO Maribethpaige Date & Time created: 1/28/2017  7:29 AM   CC: headache/Seizure    Interval History:    Oncology notes are appreciated. The doctor recommended temozolomide. Unfortunately , no family member is able to get it for him. He had RT yesterday. last night, he complained of headache and had to be medicated wit dilaudid. It provided significant relief. The place that he was staying was infested with mice and cockroaches. He does not get paid again until the 3rd of Feb. No report of seizure.         Review of Systems:  ROS   Pertinent positives/negative as mentioned above.     A complete review of systems was done. All other systems were negative.     Physical Exam:  Physical Exam   Constitutional: Well-developed, well-nourished, (-) diaphoresis, (-) distress  HENT: Normocephalic, atraumatic, (-) tonsillopharyngal congestion, (-) tonsillopharyngeal exudate  Eyes: PERRLA, pink conjuctivae, (-) icteric sclerae  Neck: (-) cervical lymphadenopathy, (-) rigidity  Cardiovascular: RRR, (-) murmurs, (-) rubs, (-) gallops, (-) edema  Pulmonary: Equal chest expansion, (+) clear breath sounds, (-) wheezes/rhonchi, (-) crackles/rales  Abdominal: (+) bowel sounds, soft, (-) tenderness, (-) masses, (-) guarding/rebound  Musculoskeletal: (-) joint swelling, (-) joint tenderness, (-) joint deformities, (-) muscle tenderness, (-) gross limitation of movement of 4 extremities  Neurologic:  LL 4+/5 BL  Skin: (-) erythema, (-) warmth, (-) rashes, (-) ulcers, (-) open wounds  Psychiatric: mood/affect WNL, thought content WNL, behavior age appropriate    Labs:        Invalid input(s): [id number]      Recent Labs      01/25/17   1021  01/26/17   0136   SODIUM  137  134*   POTASSIUM  3.8  3.9   CHLORIDE  106  102   CO2  27  25   BUN  12  11   CREATININE  0.60  0.61   CALCIUM  9.2  9.7     Recent Labs      01/25/17   1021  01/26/17   0136   ALTSGPT  16  13    ASTSGOT  13  11*   ALKPHOSPHAT  80  70   TBILIRUBIN  0.5  0.4   GLUCOSE  97  141*     Recent Labs      17   1021  17   0136   RBC  4.49*  4.58*   HEMOGLOBIN  14.6  14.4   HEMATOCRIT  42.1  42.1   PLATELETCT  290  301   PROTHROMBTM  13.0   --    APTT  24.5*   --    INR  0.95   --      Recent Labs      17   1021  17   0136   WBC  7.5  9.3   NEUTSPOLYS  67.10  84.10*   LYMPHOCYTES  19.30*  11.60*   MONOCYTES  10.10  3.90   EOSINOPHILS  2.30  0.10   BASOPHILS  0.80  0.10   ASTSGOT  13  11*   ALTSGPT  16  13   ALKPHOSPHAT  80  70   TBILIRUBIN  0.5  0.4           Hemodynamics:  Temp (24hrs), Av.7 °C (98 °F), Min:36.6 °C (97.9 °F), Max:36.7 °C (98.1 °F)  Temperature: 36.7 °C (98 °F)  Pulse  Av.6  Min: 66  Max: 98   Blood Pressure: 101/71 mmHg     Respiratory:    Respiration: 16, Pulse Oximetry: 95 %        RUL Breath Sounds: Clear, RML Breath Sounds: Clear, RLL Breath Sounds: Diminished, FLY Breath Sounds: Clear, LLL Breath Sounds: Diminished  Fluids:    Intake/Output Summary (Last 24 hours) at 17 0729  Last data filed at 17 1300   Gross per 24 hour   Intake    480 ml   Output      0 ml   Net    480 ml        GI/Nutrition:  Orders Placed This Encounter   Procedures   • DIET ORDER     Standing Status: Standing      Number of Occurrences: 1      Standing Expiration Date:      Order Specific Question:  Diet:     Answer:  Regular [1]     Medical Decision Making, by Problem:  Active Hospital Problems    Diagnosis   • Seizure (CMS-HCC) [R56.9]  GBM (glioblastoma multiforme) (CMS-HCC) [C71.9]  Headache [R51]  Decadron 4change to oral form  Keppra 1000 mg BID  Roxicodone and Dilaudid as needed  I left a message for Dr. Lee to call me.   Ambulate  Chemo radiation   ( [facility]) aware of his needs     • Depression [F32.9]  Zoloft 25 at HS   •            Core Measures  Full code  SCD

## 2017-01-28 NOTE — PROGRESS NOTES
Received report from Isabela VILLARREAL. Pt is A&Ox4. Pt denies n/v, n/t. Pt states head pain of 10/10. Medicated pt appropriately per MAR with .5 mg of Dilaudid IV and provided snacks for further comfort. Pt currently resting comfortably. Pt updated on POC. All needs met at this time. Seizure precautions in place. Call light within reach. Bed alarm and hourly rounding in place.

## 2017-01-28 NOTE — CARE PLAN
Problem: Safety  Goal: Will remain free from injury  Outcome: PROGRESSING AS EXPECTED  Pt free from injury. Calls appropriately. Bed alarm in place.     Problem: Mobility  Goal: Risk for activity intolerance will decrease  Outcome: PROGRESSING AS EXPECTED  Patient ambulated in hallways throughout the day, provided frequent rest periods.

## 2017-01-29 PROCEDURE — 700102 HCHG RX REV CODE 250 W/ 637 OVERRIDE(OP): Performed by: INTERNAL MEDICINE

## 2017-01-29 PROCEDURE — A9270 NON-COVERED ITEM OR SERVICE: HCPCS | Performed by: INTERNAL MEDICINE

## 2017-01-29 PROCEDURE — 700111 HCHG RX REV CODE 636 W/ 250 OVERRIDE (IP): Performed by: INTERNAL MEDICINE

## 2017-01-29 PROCEDURE — 770001 HCHG ROOM/CARE - MED/SURG/GYN PRIV*

## 2017-01-29 PROCEDURE — 700112 HCHG RX REV CODE 229: Performed by: INTERNAL MEDICINE

## 2017-01-29 PROCEDURE — 99231 SBSQ HOSP IP/OBS SF/LOW 25: CPT | Performed by: INTERNAL MEDICINE

## 2017-01-29 RX ADMIN — OXYCODONE HYDROCHLORIDE 10 MG: 10 TABLET ORAL at 20:48

## 2017-01-29 RX ADMIN — HYDROMORPHONE HYDROCHLORIDE 0.5 MG: 1 INJECTION, SOLUTION INTRAMUSCULAR; INTRAVENOUS; SUBCUTANEOUS at 17:07

## 2017-01-29 RX ADMIN — LEVETIRACETAM 1000 MG: 250 TABLET, FILM COATED ORAL at 08:58

## 2017-01-29 RX ADMIN — DEXAMETHASONE 4 MG: 4 TABLET ORAL at 13:12

## 2017-01-29 RX ADMIN — OXYCODONE HYDROCHLORIDE 10 MG: 10 TABLET ORAL at 13:12

## 2017-01-29 RX ADMIN — HYDROMORPHONE HYDROCHLORIDE 0.5 MG: 1 INJECTION, SOLUTION INTRAMUSCULAR; INTRAVENOUS; SUBCUTANEOUS at 14:33

## 2017-01-29 RX ADMIN — DEXAMETHASONE 4 MG: 4 TABLET ORAL at 17:08

## 2017-01-29 RX ADMIN — HYDROMORPHONE HYDROCHLORIDE 0.5 MG: 1 INJECTION, SOLUTION INTRAMUSCULAR; INTRAVENOUS; SUBCUTANEOUS at 00:05

## 2017-01-29 RX ADMIN — LEVETIRACETAM 1000 MG: 250 TABLET, FILM COATED ORAL at 20:48

## 2017-01-29 RX ADMIN — DOCUSATE SODIUM 100 MG: 100 CAPSULE ORAL at 08:58

## 2017-01-29 RX ADMIN — HYDROMORPHONE HYDROCHLORIDE 0.5 MG: 1 INJECTION, SOLUTION INTRAMUSCULAR; INTRAVENOUS; SUBCUTANEOUS at 20:49

## 2017-01-29 RX ADMIN — SERTRALINE 25 MG: 50 TABLET, FILM COATED ORAL at 20:48

## 2017-01-29 RX ADMIN — OXYCODONE HYDROCHLORIDE 10 MG: 10 TABLET ORAL at 06:22

## 2017-01-29 RX ADMIN — STANDARDIZED SENNA CONCENTRATE AND DOCUSATE SODIUM 1 TABLET: 8.6; 5 TABLET, FILM COATED ORAL at 20:48

## 2017-01-29 RX ADMIN — DEXAMETHASONE 4 MG: 4 TABLET ORAL at 20:48

## 2017-01-29 RX ADMIN — HYDROMORPHONE HYDROCHLORIDE 0.5 MG: 1 INJECTION, SOLUTION INTRAMUSCULAR; INTRAVENOUS; SUBCUTANEOUS at 23:19

## 2017-01-29 RX ADMIN — HYDROMORPHONE HYDROCHLORIDE 0.5 MG: 1 INJECTION, SOLUTION INTRAMUSCULAR; INTRAVENOUS; SUBCUTANEOUS at 06:22

## 2017-01-29 RX ADMIN — DEXAMETHASONE 4 MG: 4 TABLET ORAL at 08:58

## 2017-01-29 RX ADMIN — NICOTINE 21 MG: 21 PATCH TRANSDERMAL at 06:22

## 2017-01-29 RX ADMIN — NICOTINE POLACRILEX 2 MG: 2 GUM, CHEWING BUCCAL at 23:24

## 2017-01-29 ASSESSMENT — PAIN SCALES - GENERAL
PAINLEVEL_OUTOF10: 8
PAINLEVEL_OUTOF10: 6

## 2017-01-29 NOTE — PROGRESS NOTES
Pt educated on fall risk and safety interventions.   Pt refusing bed alarm despite continued education.   Pt A&Ox4.  Fall and safety precautions maintained. Pt provides successful verbalization of fall and safety precautions.   Pt provides successful return demonstration of use of call light. Call light within reach. siderails up x2. Hourly rounding implemented.

## 2017-01-29 NOTE — PROGRESS NOTES
Pt A&Ox4, forgetful at times, reporting severe pain, medicated per mar.   LBM 1/28, stool softeners provided.   Healing crani incision noted, CDI.   POC discussed, no further needs at this time, call light within reach, bed alarm armed.

## 2017-01-29 NOTE — PROGRESS NOTES
Received report from off going nurse. Assumed care of pt at this time. Pt is lying in bed; no c/o at this time. Call light within reach. POC discussed, no further needs at this time. Bed alarm on.

## 2017-01-29 NOTE — PROGRESS NOTES
Assumed care of patient at 0715.  Pt A&Ox4, forgetful at times. Using call light appropriately. Denies N/T. Pupils sluggish but reactive. Pt drowsy at times but responsive appropriately.   Pain rated 8-9/10 throughout the day. Medcated with PRN Oxycodone and IV Dilaudid with intermittent relief.  +BM   Fall prec in place, bed alarm on. Call light within reach.

## 2017-01-29 NOTE — PROGRESS NOTES
Hospital Medicine Progress Note, Adult, Complex               Author: Micky Voelizabethpaige Date & Time created: 1/29/2017  7:45 AM   CC: headache/Seizure    Interval History:  Uneventful night. VS are stable. No seizures. Headache comes and goes. He responds well to his current pain meds. He moved his bowels. Chemo tx drugs are still not available . Family has not picked it up. He again requested to talk to a  for placement.       Review of Systems:  ROS   Pertinent positives/negative as mentioned above.     A complete review of systems was done. All other systems were negative.     Physical Exam:  Physical Exam   Constitutional: Well-developed, well-nourished, (-) diaphoresis, (-) distress  HENT: Normocephalic, atraumatic, (-) tonsillopharyngal congestion, (-) tonsillopharyngeal exudate  Eyes: PERRLA, pink conjuctivae, (-) icteric sclerae  Neck: (-) cervical lymphadenopathy, (-) rigidity  Cardiovascular: RRR, (-) murmurs, (-) rubs, (-) gallops, (-) edema  Pulmonary: Equal chest expansion, (+) clear breath sounds, (-) wheezes/rhonchi, (-) crackles/rales  Abdominal: (+) bowel sounds, soft, (-) tenderness, (-) masses, (-) guarding/rebound  Musculoskeletal: (-) joint swelling, (-) joint tenderness, (-) joint deformities, (-) muscle tenderness, (-) gross limitation of movement of 4 extremities  Neurologic:  LL 4+/5 BL  Skin: (-) erythema, (-) warmth, (-) rashes, (-) ulcers, (-) open wounds  Psychiatric: mood/affect WNL, thought content WNL, behavior age appropriate    Labs:        Invalid input(s): [id number]      No results for input(s): SODIUM, POTASSIUM, CHLORIDE, CO2, BUN, CREATININE, MAGNESIUM, PHOSPHORUS, CALCIUM in the last 72 hours.  No results for input(s): ALTSGPT, ASTSGOT, ALKPHOSPHAT, TBILIRUBIN, DBILIRUBIN, GAMMAGT, AMYLASE, LIPASE, ALB, PREALBUMIN, GLUCOSE in the last 72 hours.  No results for input(s): RBC, HEMOGLOBIN, HEMATOCRIT, PLATELETCT, PROTHROMBTM, APTT, INR, IRON, FERRITIN, TOTIRONBC  in the last 72 hours.            Hemodynamics:  Temp (24hrs), Av.7 °C (98 °F), Min:36.6 °C (97.8 °F), Max:36.7 °C (98.1 °F)  Temperature: 36.7 °C (98.1 °F)  Pulse  Av  Min: 66  Max: 98   Blood Pressure: 117/84 mmHg     Respiratory:    Respiration: 16, Pulse Oximetry: 93 %      Fluids:    Intake/Output Summary (Last 24 hours) at 17 0745  Last data filed at 17 0600   Gross per 24 hour   Intake    880 ml   Output      0 ml   Net    880 ml        GI/Nutrition:  Orders Placed This Encounter   Procedures   • DIET ORDER     Standing Status: Standing      Number of Occurrences: 1      Standing Expiration Date:      Order Specific Question:  Diet:     Answer:  Regular [1]     Medical Decision Making, by Problem:  Active Hospital Problems    Diagnosis   • Seizure (CMS-HCC) [R56.9]  GBM (glioblastoma multiforme) (CMS-HCC) [C71.9]  Headache [R51]  Decadron 4 mg q6h po  Keppra 1000 mg BID  Roxicodone and Dilaudid as needed  I left a message for Dr. Lee to call me- did not call back  Ambulate  Chemo radiation   ( Felicita) aware of his needs     • Depression [F32.9]  Zoloft 25 at HS   •            Core Measures  Full code  SCD  Ambulate

## 2017-01-30 PROCEDURE — 700111 HCHG RX REV CODE 636 W/ 250 OVERRIDE (IP): Performed by: INTERNAL MEDICINE

## 2017-01-30 PROCEDURE — A9270 NON-COVERED ITEM OR SERVICE: HCPCS | Performed by: NURSE PRACTITIONER

## 2017-01-30 PROCEDURE — A9270 NON-COVERED ITEM OR SERVICE: HCPCS | Performed by: INTERNAL MEDICINE

## 2017-01-30 PROCEDURE — 700102 HCHG RX REV CODE 250 W/ 637 OVERRIDE(OP): Performed by: INTERNAL MEDICINE

## 2017-01-30 PROCEDURE — 77336 RADIATION PHYSICS CONSULT: CPT | Performed by: RADIOLOGY

## 2017-01-30 PROCEDURE — 77014 PR CT GUIDANCE PLACEMENT RAD THERAPY FIELDS: CPT | Mod: 26 | Performed by: RADIOLOGY

## 2017-01-30 PROCEDURE — 99231 SBSQ HOSP IP/OBS SF/LOW 25: CPT | Performed by: INTERNAL MEDICINE

## 2017-01-30 PROCEDURE — 700102 HCHG RX REV CODE 250 W/ 637 OVERRIDE(OP): Performed by: NURSE PRACTITIONER

## 2017-01-30 PROCEDURE — 77386 HCHG IMRT DELIVERY COMPLEX: CPT | Performed by: RADIOLOGY

## 2017-01-30 PROCEDURE — 770001 HCHG ROOM/CARE - MED/SURG/GYN PRIV*

## 2017-01-30 RX ORDER — ONDANSETRON 4 MG/1
4 TABLET, ORALLY DISINTEGRATING ORAL EVERY 4 HOURS PRN
Status: DISCONTINUED | OUTPATIENT
Start: 2017-01-30 | End: 2017-01-30

## 2017-01-30 RX ORDER — SULFAMETHOXAZOLE AND TRIMETHOPRIM 800; 160 MG/1; MG/1
1 TABLET ORAL DAILY
Status: DISCONTINUED | OUTPATIENT
Start: 2017-01-30 | End: 2017-02-15 | Stop reason: HOSPADM

## 2017-01-30 RX ORDER — TEMOZOLOMIDE 5 MG/1
5 CAPSULE ORAL
Status: DISCONTINUED | OUTPATIENT
Start: 2017-01-30 | End: 2017-02-15 | Stop reason: HOSPADM

## 2017-01-30 RX ORDER — TEMOZOLOMIDE 140 MG/1
140 CAPSULE ORAL
Status: DISCONTINUED | OUTPATIENT
Start: 2017-01-30 | End: 2017-02-15 | Stop reason: HOSPADM

## 2017-01-30 RX ORDER — PROCHLORPERAZINE MALEATE 5 MG/1
10 TABLET ORAL EVERY 6 HOURS PRN
Status: DISCONTINUED | OUTPATIENT
Start: 2017-01-30 | End: 2017-02-15 | Stop reason: HOSPADM

## 2017-01-30 RX ADMIN — TEMOZOLOMIDE 5 MG: 5 CAPSULE ORAL at 21:00

## 2017-01-30 RX ADMIN — HYDROMORPHONE HYDROCHLORIDE 0.5 MG: 1 INJECTION, SOLUTION INTRAMUSCULAR; INTRAVENOUS; SUBCUTANEOUS at 12:52

## 2017-01-30 RX ADMIN — LEVETIRACETAM 1000 MG: 250 TABLET, FILM COATED ORAL at 09:09

## 2017-01-30 RX ADMIN — HYDROMORPHONE HYDROCHLORIDE 0.5 MG: 1 INJECTION, SOLUTION INTRAMUSCULAR; INTRAVENOUS; SUBCUTANEOUS at 21:05

## 2017-01-30 RX ADMIN — NICOTINE 21 MG: 21 PATCH TRANSDERMAL at 05:37

## 2017-01-30 RX ADMIN — DEXAMETHASONE 4 MG: 4 TABLET ORAL at 12:52

## 2017-01-30 RX ADMIN — HYDROMORPHONE HYDROCHLORIDE 0.5 MG: 1 INJECTION, SOLUTION INTRAMUSCULAR; INTRAVENOUS; SUBCUTANEOUS at 15:05

## 2017-01-30 RX ADMIN — DEXAMETHASONE 4 MG: 4 TABLET ORAL at 21:05

## 2017-01-30 RX ADMIN — NICOTINE POLACRILEX 2 MG: 2 GUM, CHEWING BUCCAL at 19:23

## 2017-01-30 RX ADMIN — OXYCODONE HYDROCHLORIDE 10 MG: 10 TABLET ORAL at 11:30

## 2017-01-30 RX ADMIN — OXYCODONE HYDROCHLORIDE 10 MG: 10 TABLET ORAL at 14:31

## 2017-01-30 RX ADMIN — SERTRALINE 25 MG: 50 TABLET, FILM COATED ORAL at 21:05

## 2017-01-30 RX ADMIN — HYDROMORPHONE HYDROCHLORIDE 0.5 MG: 1 INJECTION, SOLUTION INTRAMUSCULAR; INTRAVENOUS; SUBCUTANEOUS at 06:24

## 2017-01-30 RX ADMIN — OXYCODONE HYDROCHLORIDE 10 MG: 10 TABLET ORAL at 07:11

## 2017-01-30 RX ADMIN — HYDROMORPHONE HYDROCHLORIDE 0.5 MG: 1 INJECTION, SOLUTION INTRAMUSCULAR; INTRAVENOUS; SUBCUTANEOUS at 09:16

## 2017-01-30 RX ADMIN — DEXAMETHASONE 4 MG: 4 TABLET ORAL at 17:07

## 2017-01-30 RX ADMIN — STANDARDIZED SENNA CONCENTRATE AND DOCUSATE SODIUM 1 TABLET: 8.6; 5 TABLET, FILM COATED ORAL at 21:05

## 2017-01-30 RX ADMIN — LEVETIRACETAM 1000 MG: 250 TABLET, FILM COATED ORAL at 21:05

## 2017-01-30 RX ADMIN — OXYCODONE HYDROCHLORIDE 10 MG: 10 TABLET ORAL at 19:23

## 2017-01-30 RX ADMIN — ONDANSETRON 4 MG: 2 INJECTION, SOLUTION INTRAMUSCULAR; INTRAVENOUS at 21:16

## 2017-01-30 RX ADMIN — NICOTINE POLACRILEX 2 MG: 2 GUM, CHEWING BUCCAL at 14:32

## 2017-01-30 RX ADMIN — SULFAMETHOXAZOLE AND TRIMETHOPRIM 1 TABLET: 800; 160 TABLET ORAL at 15:06

## 2017-01-30 RX ADMIN — HYDROMORPHONE HYDROCHLORIDE 0.5 MG: 1 INJECTION, SOLUTION INTRAMUSCULAR; INTRAVENOUS; SUBCUTANEOUS at 04:15

## 2017-01-30 RX ADMIN — TEMOZOLOMIDE 140 MG: 140 CAPSULE ORAL at 21:00

## 2017-01-30 RX ADMIN — HYDROMORPHONE HYDROCHLORIDE 0.5 MG: 1 INJECTION, SOLUTION INTRAMUSCULAR; INTRAVENOUS; SUBCUTANEOUS at 18:06

## 2017-01-30 RX ADMIN — DEXAMETHASONE 4 MG: 4 TABLET ORAL at 09:09

## 2017-01-30 ASSESSMENT — ENCOUNTER SYMPTOMS
COUGH: 0
VOMITING: 0
CONSTIPATION: 0
DIARRHEA: 0
NAUSEA: 0
HEADACHES: 1

## 2017-01-30 ASSESSMENT — PAIN SCALES - GENERAL
PAINLEVEL_OUTOF10: 8
PAINLEVEL_OUTOF10: 9
PAINLEVEL_OUTOF10: 8
PAINLEVEL_OUTOF10: 2
PAINLEVEL_OUTOF10: 8

## 2017-01-30 NOTE — DISCHARGE PLANNING
Referral: Update    Intervention: Per MD, pt will stay in [facility] until he completes his therapy 2/15/17.     Plan: As Above.

## 2017-01-30 NOTE — PROGRESS NOTES
Pt A&Ox4, reporting pain, medicated per mar.   Healed crani incision to R head noted.   Denies n/t.   POC discussed, no further needs at this time, call light within reach, bed alarm armed.

## 2017-01-30 NOTE — PROGRESS NOTES
Report received from Deborah VILLARREAL, assumed pt care.  VSS, assessment complete, AAOx4.  Scheduled medications given as ordered, pt c/o headache, prn dilaudid given (see MAR). POC is pain control, radiation, possible DC. No further needs at this time.  Call light within reach, fall precautions in place.  Will continue to monitor.

## 2017-01-30 NOTE — PROGRESS NOTES
Call received from Anibal VILLARREAL regarding patient.  Cancer nurse navigator very familiar with patient.  Requesting information regarding radiation therapy treatment times.  Pt currently on radiation schedule at 8:15 in morning on most days.  This most likely will not be optimal for patient compliance, since he has demonstrated non-compliance in the past.  Suggested to talk directly to radiation regarding schedule that might work better for patient.  Provided direct number to treatment machine.  Pt has been non-compliant with navigator in the past as well.  Pt will need resources on discharge, unsure what discharge plan is at this time.  Cancer nurse navigator can follow up after discharge with patient regarding compliancy and following discharge plan set up by inpatient care team, prior to discharge.  Concern regarding chemotherapy medication and compliancy/safety related to past history of non-compliance and recent cognitive abilities.  Recommend patient have confirmed reliable support to aid with follow up.

## 2017-01-30 NOTE — PROGRESS NOTES
Subjective:  States vision some worse. No new symptoms over weekend. Has chemo meds  Obj:  AAOx4. Tongue ML. No drift. FCx4. PERRl 3mm bilat. CDI, slight scabbing superior end- suture exposed, attempted to trim, and removed with minimal pressure    Temp (24hrs), Av.6 °C (97.9 °F), Min:36.5 °C (97.7 °F), Max:36.7 °C (98 °F)    Pulse: 82, Respiration: 16, Blood Pressure: 112/81 mmHg, Pulse Oximetry: 94 %, O2 (LPM): 0            Intake/Output Summary (Last 24 hours) at 17 1143  Last data filed at 17 0000   Gross per 24 hour   Intake   1210 ml   Output    400 ml   Net    810 ml            • dexamethasone  4 mg     • nicotine  21 mg     • nicotine polacrilex  2 mg     • levetiracetam  1,000 mg     • sertraline  25 mg     • docusate sodium  100 mg      And   • senna-docusate  1 Tab      And   • senna-docusate  1 Tab      And   • lactulose  30 mL      And   • bisacodyl  10 mg      And   • fleet  1 Each     • hydromorphone  0.5 mg     • oxycodone immediate-release  5 mg     • oxycodone immediate release  10 mg     • labetalol  10 mg     • ondansetron  4 mg     • ondansetron  4 mg     • promethazine  12.5-25 mg     • promethazine  12.5-25 mg     • prochlorperazine  5-10 mg     • lorazepam  4 mg         No results for input(s): WBC, RBC, HEMOGLOBIN, ISTATHGB, HEMATOCRIT, ISTATHEMCRT, MCV, MCH, PLATELETCT in the last 72 hours.  No results for input(s): SODIUM, ISTATSODIUM, POTASSIUM, ISTATPOTASS, CHLORIDE, ISTATCHLORI, CO2, ISTATCO2, GLUCOSE, ISTATGLUCOS, BUN, ISTATBUN, CREATININE, ISTATCREAT, CALCIUM in the last 72 hours.        Assessment:  HD#7 S/P GBM re-resection    Plan:  CT stable- reviewed by Dr Edgar  XRT/Onc- may start xrt/temodar anytime  SW for dc planning  Nonsurgical  Decadron per XRT

## 2017-01-30 NOTE — PROGRESS NOTES
Hospital Medicine Progress Note, Adult, Complex               Author: Micky NICK Toscano Date & Time created: 1/30/2017  1:27 PM   CC: headache/Seizure    Interval History:  No recurrence of seizures. Headache is controlled. VS are stable. Case discussed with oncology. We both feel that it's not safe for him to go in a motel infested with mice and cockroaches. Compliance is poor. If we discharge him today, there is no guarantee that he will continue radiotx and take his medications. Oncology recommended to keep him until he is done with the radiotx.  They also recommended to start Bactrim for prophylaxis.       Review of Systems:  ROS   Pertinent positives/negative as mentioned above.     A complete review of systems was done. All other systems were negative.     Physical Exam:  Physical Exam   Constitutional: Well-developed, well-nourished, (-) diaphoresis, (-) distress  HENT: Normocephalic, atraumatic, (-) tonsillopharyngal congestion, (-) tonsillopharyngeal exudate  Eyes: PERRLA, pink conjuctivae, (-) icteric sclerae  Neck: (-) cervical lymphadenopathy, (-) rigidity  Cardiovascular: RRR, (-) murmurs, (-) rubs, (-) gallops, (-) edema  Pulmonary: Equal chest expansion, (+) clear breath sounds, (-) wheezes/rhonchi, (-) crackles/rales  Abdominal: (+) bowel sounds, soft, (-) tenderness, (-) masses, (-) guarding/rebound  Musculoskeletal: (-) joint swelling, (-) joint tenderness, (-) joint deformities, (-) muscle tenderness, (-) gross limitation of movement of 4 extremities  Neurologic:  LL 4+/5 BL  Skin: (-) erythema, (-) warmth, (-) rashes, (-) ulcers, (-) open wounds  Psychiatric: mood/affect WNL, thought content WNL, behavior age appropriate    Labs:        Invalid input(s): [id number]      No results for input(s): SODIUM, POTASSIUM, CHLORIDE, CO2, BUN, CREATININE, MAGNESIUM, PHOSPHORUS, CALCIUM in the last 72 hours.  No results for input(s): ALTSGPT, ASTSGOT, ALKPHOSPHAT, TBILIRUBIN, DBILIRUBIN, GAMMAGT, AMYLASE,  LIPASE, ALB, PREALBUMIN, GLUCOSE in the last 72 hours.  No results for input(s): RBC, HEMOGLOBIN, HEMATOCRIT, PLATELETCT, PROTHROMBTM, APTT, INR, IRON, FERRITIN, TOTIRONBC in the last 72 hours.            Hemodynamics:  Temp (24hrs), Av.6 °C (97.9 °F), Min:36.5 °C (97.7 °F), Max:36.7 °C (98 °F)  Temperature: 36.5 °C (97.7 °F)  Pulse  Av.5  Min: 66  Max: 101   Blood Pressure: 112/81 mmHg     Respiratory:    Respiration: 16, Pulse Oximetry: 94 %      Fluids:    Intake/Output Summary (Last 24 hours) at 17 1327  Last data filed at 17 0600   Gross per 24 hour   Intake    500 ml   Output      0 ml   Net    500 ml        GI/Nutrition:  Orders Placed This Encounter   Procedures   • DIET ORDER     Standing Status: Standing      Number of Occurrences: 1      Standing Expiration Date:      Order Specific Question:  Diet:     Answer:  Regular [1]     Medical Decision Making, by Problem:  Active Hospital Problems    Diagnosis   • Seizure (CMS-HCC) [R56.9]  GBM (glioblastoma multiforme) (CMS-HCC) [C71.9]  Headache [R51]  Decadron 4 mg q6h po  Keppra 1000 mg BID  Roxicodone and Dilaudid as needed  Ambulate  Chemo radiation  Discussed with    Discussed with Debbie Also ARNP  Will start Temodar and Bactrim today  Pt agreed to stay  Keep  The pt in  the hospital at least until Feb 15,2017 ( when he is done with RT)     • Depression [F32.9]  Zoloft 25 at HS   •            Core Measures  Full code  SCD  Ambulate

## 2017-01-30 NOTE — DISCHARGE PLANNING
Referral:  Follow Up    Intervention: Per RN, pt is medically clear for discharge home with daily radiation, end date is 2/15/17.  RN indicates pt's RT is schedule for M-F at 8:15am and requires assistance with transportation.  SW provided several bus tickets and the application for [facility].  Pt indicates he will discharge to a Motel Room with his brother Ed.    Plan: As Above.  Pt will receive his SSI Check 2/3/17 and will be able to pay for his own transportation to and from [facility].

## 2017-01-30 NOTE — PROGRESS NOTES
Oncology/Hematology Progress Note               Author: Debbie Glynn Date & Time created: 1/30/2017  3:52 PM     Interval History:  46-year-old male patient of Dr. Lee with glioblastoma multiforme, admitted for headaches/seizures.    Patient continued to complain of headaches. He is currently on dexamethasone 4 mg 4 times a day.  Initial plan is for patient to be discharged, however he does not have a place stay, except for a motel. According to the patient this place does have rodents.  Patient denies nausea vomiting or diarrhea.  He is eating well.  Patient has received 3 treatments of radiation for his GBM and is tolerating it well.  Patient tearful and appears to be anxious to be discharged.    Review of Systems:  Review of Systems   Constitutional: Positive for malaise/fatigue.   Respiratory: Negative for cough.    Gastrointestinal: Negative for nausea, vomiting, diarrhea and constipation.   Neurological: Positive for headaches.       Physical Exam:  Physical Exam   Constitutional: He is oriented to person, place, and time. He appears well-developed and well-nourished. No distress.   HENT:   Mouth/Throat: Oropharynx is clear and moist.   Scar noted from previous craniotomy   Cardiovascular: Normal rate, regular rhythm and normal heart sounds.    Pulmonary/Chest: Effort normal and breath sounds normal. No respiratory distress. He has no wheezes.   Neurological: He is alert and oriented to person, place, and time.   Skin: Skin is warm and dry. He is not diaphoretic.   Psychiatric:   Tearful today   Vitals reviewed.      Labs:        Invalid input(s): [id number]      No results for input(s): SODIUM, POTASSIUM, CHLORIDE, CO2, BUN, CREATININE, MAGNESIUM, PHOSPHORUS, CALCIUM in the last 72 hours.  No results for input(s): ALTSGPT, ASTSGOT, ALKPHOSPHAT, TBILIRUBIN, DBILIRUBIN, GAMMAGT, AMYLASE, LIPASE, ALB, PREALBUMIN, GLUCOSE in the last 72 hours.  No results for input(s): RBC, HEMOGLOBIN, HEMATOCRIT,  PLATELETCT, PROTHROMBTM, APTT, INR, IRON, FERRITIN, TOTIRONBC in the last 72 hours.          Hemodynamics:  Temp (24hrs), Av.6 °C (97.9 °F), Min:36.5 °C (97.7 °F), Max:36.7 °C (98 °F)  Temperature: 36.5 °C (97.7 °F)  Pulse  Av.5  Min: 66  Max: 101   Blood Pressure: 112/81 mmHg     Respiratory:    Respiration: 16, Pulse Oximetry: 94 %        RUL Breath Sounds: Clear, RML Breath Sounds: Clear, RLL Breath Sounds: Clear, FLY Breath Sounds: Clear, LLL Breath Sounds: Clear  Fluids:    Intake/Output Summary (Last 24 hours) at 17 1552  Last data filed at 17 0600   Gross per 24 hour   Intake    500 ml   Output      0 ml   Net    500 ml        GI/Nutrition:  Orders Placed This Encounter   Procedures   • DIET ORDER     Standing Status: Standing      Number of Occurrences: 1      Standing Expiration Date:      Order Specific Question:  Diet:     Answer:  Regular [1]     Medical Decision Making, by Problem:  Active Hospital Problems    Diagnosis   • Seizure (CMS-HCC) [R56.9]   • Depression [F32.9]   • GBM (glioblastoma multiforme) (CMS-HCC) [C71.9]   • Headache [R51]       Plan:  1. GBM - patient has started radiation therapy with Dr. Madrigal. He has received 3 out of 15 treatments of radiation as of today. Dr. Lee has prescribed his Temodar and patient's brother has been able to  that prescription and brought them to the hospital today. Patient is to start taking Temodar 145 mg by mouth daily at bedtime, on an empty stomach daily until completion of radiation. Patient to continue on Temodar even on the weekend, days where he is not receiving radiation. For PCP prophylaxis patient is to also start Bactrim daily while on the Temodar. I have discussed with the patient as well as the hospitalist the recommendation for his treatment for GBM and have also discussed with the unit pharmacist of patient's medication in order to be administered and controlled by hospital staff. Also recommend patient to  have Zofran and Compazine on board as needed for nausea and vomiting as patient with a history of significant nausea and vomiting in the past with Temodar, however that was at large doses of the medication. I have ordered Compazine as needed. Zofran is ineffective, and patient does have Zofran on his MAR as well.    2. Headaches - patient continuing to have headaches at times. He is currently on dexamethasone 4 mg 4 times a day. We will defer management to hospitalist.    3. Social issues - patient is currently homeless and living in a motel with his brother. According to the patient, the motel is filled with rodents and not a very clean environment. Patient has had social work involved and unfortunately he does not have any options available to him at this time. Patient has a long history of noncompliance with his treatments for his cancer. Last hospitalization he did elope without medications and was brought back into the emergency department for seizures. Patient at this time does not have any money and is unable to purchase any medications if he were to be discharged until Friday, February 3. Again, with patient's history of noncompliance and significant social issues it appears to be an unsafe discharge at this time. I discussed the case in detail with the hospitalist and  and agree to keep patient in the hospital until he completes his chemotherapy/radiation treatments for GBM. If all treatments go as planned and there are no delays patient is scheduled to complete radiation on February 15.    Medications reviewed and Labs reviewed  Anand catheter: No Anand      DVT Prophylaxis: Not indicated at this time, ambulatory  DVT prophylaxis - mechanical: Not indicated at this time, ambulatory  Ulcer prophylaxis: Not indicated

## 2017-01-30 NOTE — CARE PLAN
Problem: Safety  Goal: Will remain free from falls  Outcome: PROGRESSING AS EXPECTED  Bed alarm armed, pt A&Ox4 and uses call light appropriately.     Problem: Pain Management  Goal: Pain level will decrease to patient’s comfort goal  Outcome: PROGRESSING AS EXPECTED  Pain controlled with PO oxy and IV dilaudid.

## 2017-01-31 PROCEDURE — 99233 SBSQ HOSP IP/OBS HIGH 50: CPT | Performed by: INTERNAL MEDICINE

## 2017-01-31 PROCEDURE — 3E03305 INTRODUCTION OF OTHER ANTINEOPLASTIC INTO PERIPHERAL VEIN, PERCUTANEOUS APPROACH: ICD-10-PCS | Performed by: NURSE PRACTITIONER

## 2017-01-31 PROCEDURE — 700112 HCHG RX REV CODE 229: Performed by: INTERNAL MEDICINE

## 2017-01-31 PROCEDURE — A9270 NON-COVERED ITEM OR SERVICE: HCPCS | Performed by: INTERNAL MEDICINE

## 2017-01-31 PROCEDURE — 770001 HCHG ROOM/CARE - MED/SURG/GYN PRIV*

## 2017-01-31 PROCEDURE — 77386 HCHG IMRT DELIVERY COMPLEX: CPT | Performed by: RADIOLOGY

## 2017-01-31 PROCEDURE — 700102 HCHG RX REV CODE 250 W/ 637 OVERRIDE(OP): Performed by: INTERNAL MEDICINE

## 2017-01-31 PROCEDURE — 77014 PR CT GUIDANCE PLACEMENT RAD THERAPY FIELDS: CPT | Mod: 26 | Performed by: RADIOLOGY

## 2017-01-31 PROCEDURE — A9270 NON-COVERED ITEM OR SERVICE: HCPCS | Performed by: NURSE PRACTITIONER

## 2017-01-31 PROCEDURE — 700102 HCHG RX REV CODE 250 W/ 637 OVERRIDE(OP): Performed by: NURSE PRACTITIONER

## 2017-01-31 PROCEDURE — 700111 HCHG RX REV CODE 636 W/ 250 OVERRIDE (IP): Performed by: INTERNAL MEDICINE

## 2017-01-31 RX ADMIN — NICOTINE POLACRILEX 2 MG: 2 GUM, CHEWING BUCCAL at 14:30

## 2017-01-31 RX ADMIN — OXYCODONE HYDROCHLORIDE 10 MG: 10 TABLET ORAL at 20:10

## 2017-01-31 RX ADMIN — DEXAMETHASONE 4 MG: 4 TABLET ORAL at 17:45

## 2017-01-31 RX ADMIN — DEXAMETHASONE 4 MG: 4 TABLET ORAL at 20:11

## 2017-01-31 RX ADMIN — ONDANSETRON 4 MG: 4 TABLET, ORALLY DISINTEGRATING ORAL at 20:14

## 2017-01-31 RX ADMIN — OXYCODONE HYDROCHLORIDE 10 MG: 10 TABLET ORAL at 12:38

## 2017-01-31 RX ADMIN — LEVETIRACETAM 1000 MG: 250 TABLET, FILM COATED ORAL at 20:19

## 2017-01-31 RX ADMIN — STANDARDIZED SENNA CONCENTRATE AND DOCUSATE SODIUM 1 TABLET: 8.6; 5 TABLET, FILM COATED ORAL at 20:11

## 2017-01-31 RX ADMIN — TEMOZOLOMIDE 140 MG: 140 CAPSULE ORAL at 21:00

## 2017-01-31 RX ADMIN — NICOTINE 21 MG: 21 PATCH TRANSDERMAL at 05:58

## 2017-01-31 RX ADMIN — LEVETIRACETAM 1000 MG: 250 TABLET, FILM COATED ORAL at 09:00

## 2017-01-31 RX ADMIN — HYDROMORPHONE HYDROCHLORIDE 0.5 MG: 1 INJECTION, SOLUTION INTRAMUSCULAR; INTRAVENOUS; SUBCUTANEOUS at 10:18

## 2017-01-31 RX ADMIN — TEMOZOLOMIDE 5 MG: 5 CAPSULE ORAL at 21:00

## 2017-01-31 RX ADMIN — SULFAMETHOXAZOLE AND TRIMETHOPRIM 1 TABLET: 800; 160 TABLET ORAL at 09:00

## 2017-01-31 RX ADMIN — HYDROMORPHONE HYDROCHLORIDE 0.5 MG: 1 INJECTION, SOLUTION INTRAMUSCULAR; INTRAVENOUS; SUBCUTANEOUS at 03:46

## 2017-01-31 RX ADMIN — DEXAMETHASONE 4 MG: 4 TABLET ORAL at 12:38

## 2017-01-31 RX ADMIN — NICOTINE POLACRILEX 2 MG: 2 GUM, CHEWING BUCCAL at 10:28

## 2017-01-31 RX ADMIN — HYDROMORPHONE HYDROCHLORIDE 0.5 MG: 1 INJECTION, SOLUTION INTRAMUSCULAR; INTRAVENOUS; SUBCUTANEOUS at 22:46

## 2017-01-31 RX ADMIN — DEXAMETHASONE 4 MG: 4 TABLET ORAL at 09:00

## 2017-01-31 RX ADMIN — HYDROMORPHONE HYDROCHLORIDE 0.5 MG: 1 INJECTION, SOLUTION INTRAMUSCULAR; INTRAVENOUS; SUBCUTANEOUS at 14:37

## 2017-01-31 RX ADMIN — SERTRALINE 25 MG: 50 TABLET, FILM COATED ORAL at 20:10

## 2017-01-31 RX ADMIN — HYDROMORPHONE HYDROCHLORIDE 0.5 MG: 1 INJECTION, SOLUTION INTRAMUSCULAR; INTRAVENOUS; SUBCUTANEOUS at 17:47

## 2017-01-31 RX ADMIN — NICOTINE POLACRILEX 2 MG: 2 GUM, CHEWING BUCCAL at 07:00

## 2017-01-31 RX ADMIN — OXYCODONE HYDROCHLORIDE 10 MG: 10 TABLET ORAL at 06:12

## 2017-01-31 RX ADMIN — DOCUSATE SODIUM 100 MG: 100 CAPSULE ORAL at 09:00

## 2017-01-31 ASSESSMENT — ENCOUNTER SYMPTOMS
FEVER: 0
FOCAL WEAKNESS: 0
SEIZURES: 0
CHILLS: 0
COUGH: 0
DOUBLE VISION: 0
PHOTOPHOBIA: 1
NERVOUS/ANXIOUS: 0
EYE DISCHARGE: 0
PALPITATIONS: 0
SHORTNESS OF BREATH: 0
HEADACHES: 1
CLAUDICATION: 0
WEAKNESS: 1
HEARTBURN: 0
CONSTIPATION: 0
BLURRED VISION: 0
DIZZINESS: 0
NAUSEA: 0
MYALGIAS: 0
HEADACHES: 0
DIARRHEA: 0
VOMITING: 0
HALLUCINATIONS: 0
DEPRESSION: 0

## 2017-01-31 ASSESSMENT — PAIN SCALES - GENERAL
PAINLEVEL_OUTOF10: 3
PAINLEVEL_OUTOF10: 2

## 2017-01-31 ASSESSMENT — LIFESTYLE VARIABLES: DO YOU DRINK ALCOHOL: YES

## 2017-01-31 NOTE — CARE PLAN
Problem: Safety  Goal: Will remain free from falls  Outcome: PROGRESSING AS EXPECTED  Patient advised to call as needed for assistance. Patient is very compliant

## 2017-01-31 NOTE — CARE PLAN
"Problem: Pain Management  Goal: Pain level will decrease to patient’s comfort goal  Outcome: PROGRESSING AS EXPECTED  Pt with frequent if not constant headache. PRN oxycodone and dilaudid utilized (see MAR).    Problem: Psychosocial Needs:  Goal: Level of anxiety will decrease  Pt tearful about remaining inpatient for duration of radiation tx, but states \"I know I'm getting great care from you guys, and my headache is so much worse when I'm not here.\" Reassured pt as able, answered questions.        "

## 2017-01-31 NOTE — CARE PLAN
Problem: Pain Management  Goal: Pain level will decrease to patient’s comfort goal  Outcome: PROGRESSING AS EXPECTED  Patient will stay ahead of pain as best as possible via medications

## 2017-01-31 NOTE — PROGRESS NOTES
"Hospital Medicine Progress Note, Adult, Complex               Author: Yue Thakur Date & Time created: 1/31/2017  3:36 PM     Interval History:  Patient with GBM and currently undergoing daily radiation and started the oral chemo, temodar.  Patient states his head pain is better compared to previous hospital stay.  He doesn't really have answer as to why he eloped on 1/16 as he \"just had some things to take care of\".  He states he will be compliant with treatment.      Review of Systems:  Review of Systems   Constitutional: Negative for fever and chills.   HENT: Negative for congestion and nosebleeds.    Eyes: Negative for blurred vision, double vision and discharge.   Respiratory: Negative for cough and shortness of breath.    Cardiovascular: Negative for chest pain, claudication and leg swelling.   Gastrointestinal: Negative for heartburn and constipation.   Genitourinary: Negative for dysuria and frequency.   Musculoskeletal: Negative for myalgias and joint pain.   Skin: Negative for itching and rash.   Neurological: Positive for weakness. Negative for dizziness, focal weakness, seizures (none in hospital) and headaches.   Psychiatric/Behavioral: Negative for depression and hallucinations. The patient is not nervous/anxious.        Physical Exam:  Physical Exam   Constitutional: He is oriented to person, place, and time. He appears well-developed and well-nourished. No distress.   HENT:   Head: Normocephalic and atraumatic.   Eyes: Conjunctivae are normal. No scleral icterus.   Neck: Neck supple. No JVD present.   Cardiovascular: Normal rate and regular rhythm.  Exam reveals no gallop and no friction rub.    No murmur heard.  Pulmonary/Chest: Effort normal and breath sounds normal. No respiratory distress. He exhibits no tenderness.   Abdominal: Soft. Bowel sounds are normal. He exhibits no distension and no mass. There is no tenderness.   Musculoskeletal: He exhibits no edema or tenderness.   Neurological: He " is alert and oriented to person, place, and time. No cranial nerve deficit.   Skin: Skin is dry. He is not diaphoretic.   Psychiatric: He has a normal mood and affect. His behavior is normal.   Nursing note and vitals reviewed.      Labs:        Invalid input(s): [id number]      No results for input(s): SODIUM, POTASSIUM, CHLORIDE, CO2, BUN, CREATININE, MAGNESIUM, PHOSPHORUS, CALCIUM in the last 72 hours.  No results for input(s): ALTSGPT, ASTSGOT, ALKPHOSPHAT, TBILIRUBIN, DBILIRUBIN, GAMMAGT, AMYLASE, LIPASE, ALB, PREALBUMIN, GLUCOSE in the last 72 hours.  No results for input(s): RBC, HEMOGLOBIN, HEMATOCRIT, PLATELETCT, PROTHROMBTM, APTT, INR, IRON, FERRITIN, TOTIRONBC in the last 72 hours.            Hemodynamics:  Temp (24hrs), Av.7 °C (98 °F), Min:36.6 °C (97.9 °F), Max:36.7 °C (98.1 °F)  Temperature: 36.7 °C (98 °F)  Pulse  Av.9  Min: 60  Max: 101   Blood Pressure: 111/87 mmHg     Respiratory:    Respiration: 16, Pulse Oximetry: 96 %        RUL Breath Sounds: Clear, RML Breath Sounds: Clear, RLL Breath Sounds: Clear, FLY Breath Sounds: Clear, LLL Breath Sounds: Clear  Fluids:  No intake or output data in the 24 hours ending 17 1536     GI/Nutrition:  Orders Placed This Encounter   Procedures   • DIET ORDER     Standing Status: Standing      Number of Occurrences: 1      Standing Expiration Date:      Order Specific Question:  Diet:     Answer:  Regular [1]     Medical Decision Making, by Problem:  Active Hospital Problems    Diagnosis   • Seizure (CMS-HCC) [R56.9]keppra, decadron     • Depression [F32.9]zoloft     • GBM (glioblastoma multiforme) (CMS-HCC) [C71.9]Mocherla/Peddada, on daily rads and oral chemo     • Headache [R51]better    Tobacco Abuse - nicotine replacement.       Labs reviewed, Radiology images reviewed and Medications reviewed  Anand catheter: No Anand      DVT Prophylaxis: Not indicated at this time, ambulatory  DVT prophylaxis - mechanical: SCDs

## 2017-01-31 NOTE — PROGRESS NOTES
Received report from off going nurse. Stated no sig med event during shift. Pt has some c/o pain. Will medicated per protocol. Vss. Will continue to monitor.

## 2017-01-31 NOTE — PROGRESS NOTES
Received report from off going nurse. Assumed care. Pt is awake, laying in bed; call light within reach; bed in lowest position.

## 2017-01-31 NOTE — PROGRESS NOTES
Oncology/Hematology Progress Note               Author: Debbie lGynn Date & Time created: 1/31/2017  9:12 AM     Interval History:  46-year-old male patient of Dr. Lee with glioblastoma multiforme, admitted for headaches/seizures.    Patient doing well this morning and tolerated radiation therapy.  He did receive his first dose of Temodar last night and tolerated it well.  Patient stated he had mild nausea which was resolved with some crackers and ginger ale.  Patient appetite is good and he is eating well.  Denies diarrhea or constipation.  Complains of headaches, more so when he become stressed or due to light sensitivity. He does take Dilaudid as needed.  Patient with unsteady gait when ambulating.  Today is day for 15 of radiation treatment, due to complete on February 15.    Review of Systems:  Review of Systems   Constitutional: Positive for malaise/fatigue (mild).   Eyes: Positive for photophobia (at times).   Respiratory: Negative for cough and shortness of breath.    Cardiovascular: Negative for chest pain, palpitations and leg swelling.   Gastrointestinal: Negative for nausea, vomiting, diarrhea and constipation.   Genitourinary: Negative for dysuria.   Neurological: Positive for headaches. Negative for seizures (none since hospitalized).        Patient with unsteady gait when ambulating       Physical Exam:  Physical Exam   Constitutional: He is oriented to person, place, and time. He appears well-developed and well-nourished. No distress.   HENT:   Head: Normocephalic and atraumatic.   Scar noted from recent craniotomy   Cardiovascular: Normal rate, regular rhythm and normal heart sounds.    Pulmonary/Chest: Effort normal and breath sounds normal. No respiratory distress. He has no wheezes.   Abdominal: Soft. Bowel sounds are normal. He exhibits no distension. There is no tenderness.   Neurological: He is alert and oriented to person, place, and time.   Skin: Skin is warm and dry. He is not  diaphoretic.   Vitals reviewed.      Labs:        Invalid input(s): [id number]      No results for input(s): SODIUM, POTASSIUM, CHLORIDE, CO2, BUN, CREATININE, MAGNESIUM, PHOSPHORUS, CALCIUM in the last 72 hours.  No results for input(s): ALTSGPT, ASTSGOT, ALKPHOSPHAT, TBILIRUBIN, DBILIRUBIN, GAMMAGT, AMYLASE, LIPASE, ALB, PREALBUMIN, GLUCOSE in the last 72 hours.  No results for input(s): RBC, HEMOGLOBIN, HEMATOCRIT, PLATELETCT, PROTHROMBTM, APTT, INR, IRON, FERRITIN, TOTIRONBC in the last 72 hours.          Hemodynamics:  Temp (24hrs), Av.7 °C (98 °F), Min:36.6 °C (97.9 °F), Max:36.7 °C (98.1 °F)  Temperature: 36.7 °C (98 °F)  Pulse  Av.7  Min: 60  Max: 101   Blood Pressure: 109/71 mmHg     Respiratory:    Respiration: 16, Pulse Oximetry: 93 %        RUL Breath Sounds: Clear, RML Breath Sounds: Clear, RLL Breath Sounds: Clear, FLY Breath Sounds: Clear, LLL Breath Sounds: Clear  Fluids:  No intake or output data in the 24 hours ending 17 0912     GI/Nutrition:  Orders Placed This Encounter   Procedures   • DIET ORDER     Standing Status: Standing      Number of Occurrences: 1      Standing Expiration Date:      Order Specific Question:  Diet:     Answer:  Regular [1]     Medical Decision Making, by Problem:  Active Hospital Problems    Diagnosis   • Seizure (CMS-HCC) [R56.9]   • Depression [F32.9]   • GBM (glioblastoma multiforme) (CMS-[facility]) [C71.9]   • Headache [R51]       Plan:  1. GBM - patient has started radiation therapy with Dr. Madrigal. He has received 4 out of 15 treatments of radiation as of today. Patient was able to obtain his Temodar medications and started his first dose last night. He has continued on Temodar daily with Bactrim for PCP prophylaxis daily as well. Patient with mild nausea but relieved with crackers and ginger ale. Patient also has Zofran and Compazine as needed for nausea and vomiting.    2. Headaches - patient continuing to have headaches at times. He stated he  notices his headaches more so when he is stressed out or also with light sensitivity. He is currently on dexamethasone 4 mg 4 times a day, and has Dilaudid as needed for headaches.. We will defer management to hospitalist.    3. Social issues - patient is currently homeless and living in a motel with his brother. Due to significant compliance issues in the past it is not a safe discharge while undergoing chemotherapy and radiation treatment. Patient also not able to afford medications at this time. As of yesterday it was discussed that patient will remain in the hospital until he completes his chemotherapy and radiation treatments, due to complete on February 15.      Medications reviewed  Anand catheter: No Anand      DVT Prophylaxis: Not indicated at this time, ambulatory  DVT prophylaxis - mechanical: Not indicated at this time, ambulatory  Ulcer prophylaxis: Not indicated

## 2017-02-01 ENCOUNTER — HOSPITAL ENCOUNTER (OUTPATIENT)
Dept: RADIATION ONCOLOGY | Facility: MEDICAL CENTER | Age: 47
End: 2017-02-28
Attending: RADIOLOGY
Payer: COMMERCIAL

## 2017-02-01 PROCEDURE — 700102 HCHG RX REV CODE 250 W/ 637 OVERRIDE(OP): Performed by: INTERNAL MEDICINE

## 2017-02-01 PROCEDURE — 77427 RADIATION TX MANAGEMENT X5: CPT | Performed by: RADIOLOGY

## 2017-02-01 PROCEDURE — 700112 HCHG RX REV CODE 229: Performed by: INTERNAL MEDICINE

## 2017-02-01 PROCEDURE — A9270 NON-COVERED ITEM OR SERVICE: HCPCS | Performed by: INTERNAL MEDICINE

## 2017-02-01 PROCEDURE — 770001 HCHG ROOM/CARE - MED/SURG/GYN PRIV*

## 2017-02-01 PROCEDURE — 700111 HCHG RX REV CODE 636 W/ 250 OVERRIDE (IP): Performed by: INTERNAL MEDICINE

## 2017-02-01 PROCEDURE — A9270 NON-COVERED ITEM OR SERVICE: HCPCS | Performed by: NURSE PRACTITIONER

## 2017-02-01 PROCEDURE — 77386 HCHG IMRT DELIVERY COMPLEX: CPT | Performed by: RADIOLOGY

## 2017-02-01 PROCEDURE — 99233 SBSQ HOSP IP/OBS HIGH 50: CPT | Performed by: INTERNAL MEDICINE

## 2017-02-01 PROCEDURE — 77014 PR CT GUIDANCE PLACEMENT RAD THERAPY FIELDS: CPT | Mod: 26 | Performed by: RADIOLOGY

## 2017-02-01 PROCEDURE — 700102 HCHG RX REV CODE 250 W/ 637 OVERRIDE(OP): Performed by: NURSE PRACTITIONER

## 2017-02-01 RX ADMIN — HYDROMORPHONE HYDROCHLORIDE 0.5 MG: 1 INJECTION, SOLUTION INTRAMUSCULAR; INTRAVENOUS; SUBCUTANEOUS at 07:59

## 2017-02-01 RX ADMIN — OXYCODONE HYDROCHLORIDE 10 MG: 10 TABLET ORAL at 12:47

## 2017-02-01 RX ADMIN — NICOTINE 21 MG: 21 PATCH TRANSDERMAL at 05:49

## 2017-02-01 RX ADMIN — DEXAMETHASONE 4 MG: 4 TABLET ORAL at 19:47

## 2017-02-01 RX ADMIN — STANDARDIZED SENNA CONCENTRATE AND DOCUSATE SODIUM 1 TABLET: 8.6; 5 TABLET, FILM COATED ORAL at 19:47

## 2017-02-01 RX ADMIN — LEVETIRACETAM 1000 MG: 250 TABLET, FILM COATED ORAL at 07:59

## 2017-02-01 RX ADMIN — HYDROMORPHONE HYDROCHLORIDE 0.5 MG: 1 INJECTION, SOLUTION INTRAMUSCULAR; INTRAVENOUS; SUBCUTANEOUS at 16:10

## 2017-02-01 RX ADMIN — SERTRALINE 25 MG: 50 TABLET, FILM COATED ORAL at 19:47

## 2017-02-01 RX ADMIN — SULFAMETHOXAZOLE AND TRIMETHOPRIM 1 TABLET: 800; 160 TABLET ORAL at 07:59

## 2017-02-01 RX ADMIN — DEXAMETHASONE 4 MG: 4 TABLET ORAL at 16:38

## 2017-02-01 RX ADMIN — TEMOZOLOMIDE 5 MG: 5 CAPSULE ORAL at 21:00

## 2017-02-01 RX ADMIN — HYDROMORPHONE HYDROCHLORIDE 0.5 MG: 1 INJECTION, SOLUTION INTRAMUSCULAR; INTRAVENOUS; SUBCUTANEOUS at 18:01

## 2017-02-01 RX ADMIN — TEMOZOLOMIDE 140 MG: 140 CAPSULE ORAL at 21:00

## 2017-02-01 RX ADMIN — NICOTINE POLACRILEX 2 MG: 2 GUM, CHEWING BUCCAL at 19:47

## 2017-02-01 RX ADMIN — HYDROMORPHONE HYDROCHLORIDE 0.5 MG: 1 INJECTION, SOLUTION INTRAMUSCULAR; INTRAVENOUS; SUBCUTANEOUS at 10:30

## 2017-02-01 RX ADMIN — DEXAMETHASONE 4 MG: 4 TABLET ORAL at 07:59

## 2017-02-01 RX ADMIN — OXYCODONE HYDROCHLORIDE 10 MG: 10 TABLET ORAL at 19:48

## 2017-02-01 RX ADMIN — OXYCODONE HYDROCHLORIDE 5 MG: 5 TABLET ORAL at 05:49

## 2017-02-01 RX ADMIN — HYDROMORPHONE HYDROCHLORIDE 0.5 MG: 1 INJECTION, SOLUTION INTRAMUSCULAR; INTRAVENOUS; SUBCUTANEOUS at 13:57

## 2017-02-01 RX ADMIN — HYDROMORPHONE HYDROCHLORIDE 0.5 MG: 1 INJECTION, SOLUTION INTRAMUSCULAR; INTRAVENOUS; SUBCUTANEOUS at 21:40

## 2017-02-01 RX ADMIN — DEXAMETHASONE 4 MG: 4 TABLET ORAL at 12:48

## 2017-02-01 RX ADMIN — NICOTINE POLACRILEX 2 MG: 2 GUM, CHEWING BUCCAL at 09:41

## 2017-02-01 RX ADMIN — DOCUSATE SODIUM 100 MG: 100 CAPSULE ORAL at 08:00

## 2017-02-01 RX ADMIN — LEVETIRACETAM 1000 MG: 250 TABLET, FILM COATED ORAL at 19:47

## 2017-02-01 ASSESSMENT — ENCOUNTER SYMPTOMS
DEPRESSION: 0
CLAUDICATION: 0
CONSTIPATION: 0
COUGH: 0
MYALGIAS: 0
DOUBLE VISION: 0
BLURRED VISION: 0
FOCAL WEAKNESS: 0
EYE DISCHARGE: 0
DIZZINESS: 0
SEIZURES: 0
SHORTNESS OF BREATH: 0
HEARTBURN: 0
NERVOUS/ANXIOUS: 0
FEVER: 0
WEAKNESS: 1
CHILLS: 0
HEADACHES: 0
HALLUCINATIONS: 0

## 2017-02-01 ASSESSMENT — PAIN SCALES - GENERAL
PAINLEVEL_OUTOF10: 7
PAINLEVEL_OUTOF10: 9

## 2017-02-01 NOTE — CARE PLAN
Problem: Safety  Goal: Will remain free from injury  Outcome: PROGRESSING AS EXPECTED  Patient will call for help as needed; bed in lowest position; call light in reach

## 2017-02-01 NOTE — PROGRESS NOTES
Received report from off going nurse. Assumed patient care. Patient is lying in bed awake; a/o x 4; call light in reach; bed lowest position. No c/o at this time

## 2017-02-01 NOTE — PROGRESS NOTES
"Hospital Medicine Progress Note, Adult, Complex               Author: Yue Thakur Date & Time created: 2/1/2017  2:31 PM     Interval History:  1/31 Patient with GBM and currently undergoing daily radiation and started the oral chemo, temodar.  Patient states his head pain is better compared to previous hospital stay.  He doesn't really have answer as to why he eloped on 1/16 as he \"just had some things to take care of\".  He states he will be compliant with treatment.    2/1 Patient states he is doing well, he will stay in hospital through the end of his radiation therapies on 2/15/17 as scheduled.  He states his head/neck were stiff and remembers that I did OMM on his last stay and requested another treatment.  Facilitated positional release on left occiput done with good results.    Review of Systems:  Review of Systems   Constitutional: Negative for fever and chills.   HENT: Negative for congestion and nosebleeds.    Eyes: Negative for blurred vision, double vision and discharge.   Respiratory: Negative for cough and shortness of breath.    Cardiovascular: Negative for chest pain, claudication and leg swelling.   Gastrointestinal: Negative for heartburn and constipation.   Genitourinary: Negative for dysuria and frequency.   Musculoskeletal: Negative for myalgias and joint pain.   Skin: Negative for itching and rash.   Neurological: Positive for weakness. Negative for dizziness, focal weakness, seizures (none in hospital) and headaches.   Psychiatric/Behavioral: Negative for depression and hallucinations. The patient is not nervous/anxious.        Physical Exam:  Physical Exam   Constitutional: He is oriented to person, place, and time. He appears well-developed and well-nourished. No distress.   HENT:   Head: Normocephalic and atraumatic.   Eyes: Conjunctivae are normal. No scleral icterus.   Neck: Neck supple. No JVD present.   Cardiovascular: Normal rate and regular rhythm.  Exam reveals no gallop and no " friction rub.    No murmur heard.  Pulmonary/Chest: Effort normal and breath sounds normal. No respiratory distress. He exhibits no tenderness.   Abdominal: Soft. Bowel sounds are normal. He exhibits no distension and no mass. There is no tenderness.   Musculoskeletal: He exhibits no edema or tenderness.   Neurological: He is alert and oriented to person, place, and time. No cranial nerve deficit.   Skin: Skin is dry. He is not diaphoretic.   Psychiatric: He has a normal mood and affect. His behavior is normal.   Nursing note and vitals reviewed.      Labs:        Invalid input(s): [id number]      No results for input(s): SODIUM, POTASSIUM, CHLORIDE, CO2, BUN, CREATININE, MAGNESIUM, PHOSPHORUS, CALCIUM in the last 72 hours.  No results for input(s): ALTSGPT, ASTSGOT, ALKPHOSPHAT, TBILIRUBIN, DBILIRUBIN, GAMMAGT, AMYLASE, LIPASE, ALB, PREALBUMIN, GLUCOSE in the last 72 hours.  No results for input(s): RBC, HEMOGLOBIN, HEMATOCRIT, PLATELETCT, PROTHROMBTM, APTT, INR, IRON, FERRITIN, TOTIRONBC in the last 72 hours.            Hemodynamics:  Temp (24hrs), Av.8 °C (98.2 °F), Min:36.7 °C (98 °F), Max:36.9 °C (98.5 °F)  Temperature: 36.7 °C (98.1 °F)  Pulse  Av.9  Min: 60  Max: 101   Blood Pressure: 122/87 mmHg     Respiratory:    Respiration: 18, Pulse Oximetry: 94 %        RUL Breath Sounds: Clear, RML Breath Sounds: Clear, RLL Breath Sounds: Clear, FLY Breath Sounds: Clear, LLL Breath Sounds: Clear  Fluids:  No intake or output data in the 24 hours ending 17 1431     GI/Nutrition:  Orders Placed This Encounter   Procedures   • DIET ORDER     Standing Status: Standing      Number of Occurrences: 1      Standing Expiration Date:      Order Specific Question:  Diet:     Answer:  Regular [1]     Medical Decision Making, by Problem:  Active Hospital Problems    Diagnosis   • Seizure (CMS-HCC) [R56.9]keppra, decadron     • Depression [F32.9]zoloft     • GBM (glioblastoma multiforme) (CMS-HCC)  [C71.9]Mocherla/Peddada, on daily rads and oral chemo     • Headache [R51]better, OMM as needed for muscle release.    Tobacco Abuse - nicotine replacement.       Labs reviewed, Radiology images reviewed and Medications reviewed  Anand catheter: No Anand      DVT Prophylaxis: Not indicated at this time, ambulatory  DVT prophylaxis - mechanical: SCDs

## 2017-02-01 NOTE — PROGRESS NOTES
Patient received treatment in Radiation Therapy. Patient received treatment number 5 of 15 planned.

## 2017-02-01 NOTE — PROGRESS NOTES
Received report from off going nurse.. Stated no sig med event during shift. Pt is resting with no indication of pain. Vss. Will continue to monitor.

## 2017-02-01 NOTE — CARE PLAN
Problem: Pain Management  Goal: Pain level will decrease to patient’s comfort goal  Outcome: PROGRESSING AS EXPECTED  Patient will call for pain management as needed. Will give medication per MAR and also implement some nonpharmalogical pain management

## 2017-02-02 ENCOUNTER — PATIENT OUTREACH (OUTPATIENT)
Dept: HEALTH INFORMATION MANAGEMENT | Facility: OTHER | Age: 47
End: 2017-02-02

## 2017-02-02 PROCEDURE — 700102 HCHG RX REV CODE 250 W/ 637 OVERRIDE(OP): Performed by: INTERNAL MEDICINE

## 2017-02-02 PROCEDURE — 700112 HCHG RX REV CODE 229: Performed by: INTERNAL MEDICINE

## 2017-02-02 PROCEDURE — 77014 PR CT GUIDANCE PLACEMENT RAD THERAPY FIELDS: CPT | Mod: 26 | Performed by: RADIOLOGY

## 2017-02-02 PROCEDURE — 770001 HCHG ROOM/CARE - MED/SURG/GYN PRIV*

## 2017-02-02 PROCEDURE — A9270 NON-COVERED ITEM OR SERVICE: HCPCS | Performed by: NURSE PRACTITIONER

## 2017-02-02 PROCEDURE — A9270 NON-COVERED ITEM OR SERVICE: HCPCS | Performed by: INTERNAL MEDICINE

## 2017-02-02 PROCEDURE — 99233 SBSQ HOSP IP/OBS HIGH 50: CPT | Performed by: INTERNAL MEDICINE

## 2017-02-02 PROCEDURE — 77386 HCHG IMRT DELIVERY COMPLEX: CPT | Performed by: RADIOLOGY

## 2017-02-02 PROCEDURE — 700102 HCHG RX REV CODE 250 W/ 637 OVERRIDE(OP): Performed by: NURSE PRACTITIONER

## 2017-02-02 PROCEDURE — 700111 HCHG RX REV CODE 636 W/ 250 OVERRIDE (IP): Performed by: INTERNAL MEDICINE

## 2017-02-02 RX ORDER — METHOCARBAMOL 750 MG/1
1500 TABLET, FILM COATED ORAL 3 TIMES DAILY
Status: DISCONTINUED | OUTPATIENT
Start: 2017-02-02 | End: 2017-02-06

## 2017-02-02 RX ORDER — GABAPENTIN 100 MG/1
200 CAPSULE ORAL 3 TIMES DAILY
Status: DISCONTINUED | OUTPATIENT
Start: 2017-02-02 | End: 2017-02-02

## 2017-02-02 RX ORDER — HYDROMORPHONE HYDROCHLORIDE 4 MG/1
2-4 TABLET ORAL EVERY 4 HOURS PRN
Status: DISCONTINUED | OUTPATIENT
Start: 2017-02-02 | End: 2017-02-15 | Stop reason: HOSPADM

## 2017-02-02 RX ORDER — GABAPENTIN 100 MG/1
200 CAPSULE ORAL 3 TIMES DAILY
Status: DISCONTINUED | OUTPATIENT
Start: 2017-02-02 | End: 2017-02-03

## 2017-02-02 RX ADMIN — OXYCODONE HYDROCHLORIDE 10 MG: 10 TABLET ORAL at 03:23

## 2017-02-02 RX ADMIN — METHOCARBAMOL 1500 MG: 750 TABLET ORAL at 20:34

## 2017-02-02 RX ADMIN — NICOTINE POLACRILEX 2 MG: 2 GUM, CHEWING BUCCAL at 20:35

## 2017-02-02 RX ADMIN — LEVETIRACETAM 1000 MG: 250 TABLET, FILM COATED ORAL at 20:33

## 2017-02-02 RX ADMIN — HYDROMORPHONE HYDROCHLORIDE 4 MG: 4 TABLET ORAL at 20:35

## 2017-02-02 RX ADMIN — STANDARDIZED SENNA CONCENTRATE AND DOCUSATE SODIUM 1 TABLET: 8.6; 5 TABLET, FILM COATED ORAL at 20:35

## 2017-02-02 RX ADMIN — HYDROMORPHONE HYDROCHLORIDE 4 MG: 4 TABLET ORAL at 16:27

## 2017-02-02 RX ADMIN — DEXAMETHASONE 4 MG: 4 TABLET ORAL at 20:34

## 2017-02-02 RX ADMIN — DEXAMETHASONE 4 MG: 4 TABLET ORAL at 17:17

## 2017-02-02 RX ADMIN — NICOTINE POLACRILEX 2 MG: 2 GUM, CHEWING BUCCAL at 13:39

## 2017-02-02 RX ADMIN — GABAPENTIN 200 MG: 100 CAPSULE ORAL at 20:34

## 2017-02-02 RX ADMIN — TEMOZOLOMIDE 140 MG: 140 CAPSULE ORAL at 21:00

## 2017-02-02 RX ADMIN — NICOTINE POLACRILEX 2 MG: 2 GUM, CHEWING BUCCAL at 17:20

## 2017-02-02 RX ADMIN — SULFAMETHOXAZOLE AND TRIMETHOPRIM 1 TABLET: 800; 160 TABLET ORAL at 09:33

## 2017-02-02 RX ADMIN — GABAPENTIN 200 MG: 100 CAPSULE ORAL at 17:15

## 2017-02-02 RX ADMIN — HYDROMORPHONE HYDROCHLORIDE 0.5 MG: 1 INJECTION, SOLUTION INTRAMUSCULAR; INTRAVENOUS; SUBCUTANEOUS at 03:22

## 2017-02-02 RX ADMIN — OXYCODONE HYDROCHLORIDE 10 MG: 10 TABLET ORAL at 06:52

## 2017-02-02 RX ADMIN — DOCUSATE SODIUM 100 MG: 100 CAPSULE ORAL at 09:27

## 2017-02-02 RX ADMIN — HYDROMORPHONE HYDROCHLORIDE 0.5 MG: 1 INJECTION, SOLUTION INTRAMUSCULAR; INTRAVENOUS; SUBCUTANEOUS at 00:18

## 2017-02-02 RX ADMIN — HYDROMORPHONE HYDROCHLORIDE 0.5 MG: 1 INJECTION, SOLUTION INTRAMUSCULAR; INTRAVENOUS; SUBCUTANEOUS at 08:05

## 2017-02-02 RX ADMIN — OXYCODONE HYDROCHLORIDE 10 MG: 10 TABLET ORAL at 09:27

## 2017-02-02 RX ADMIN — LEVETIRACETAM 1000 MG: 250 TABLET, FILM COATED ORAL at 08:05

## 2017-02-02 RX ADMIN — HYDROMORPHONE HYDROCHLORIDE 0.5 MG: 1 INJECTION, SOLUTION INTRAMUSCULAR; INTRAVENOUS; SUBCUTANEOUS at 13:35

## 2017-02-02 RX ADMIN — OXYCODONE HYDROCHLORIDE 10 MG: 10 TABLET ORAL at 00:18

## 2017-02-02 RX ADMIN — TEMOZOLOMIDE 5 MG: 5 CAPSULE ORAL at 21:00

## 2017-02-02 RX ADMIN — NICOTINE POLACRILEX 2 MG: 2 GUM, CHEWING BUCCAL at 08:05

## 2017-02-02 RX ADMIN — ONDANSETRON 4 MG: 2 INJECTION, SOLUTION INTRAMUSCULAR; INTRAVENOUS at 07:08

## 2017-02-02 RX ADMIN — DEXAMETHASONE 4 MG: 4 TABLET ORAL at 09:26

## 2017-02-02 RX ADMIN — SERTRALINE 25 MG: 50 TABLET, FILM COATED ORAL at 20:34

## 2017-02-02 RX ADMIN — DEXAMETHASONE 4 MG: 4 TABLET ORAL at 13:35

## 2017-02-02 RX ADMIN — NICOTINE 21 MG: 21 PATCH TRANSDERMAL at 06:52

## 2017-02-02 RX ADMIN — HYDROMORPHONE HYDROCHLORIDE 0.5 MG: 1 INJECTION, SOLUTION INTRAMUSCULAR; INTRAVENOUS; SUBCUTANEOUS at 10:39

## 2017-02-02 RX ADMIN — OXYCODONE HYDROCHLORIDE 5 MG: 5 TABLET ORAL at 12:09

## 2017-02-02 RX ADMIN — ONDANSETRON 4 MG: 2 INJECTION, SOLUTION INTRAMUSCULAR; INTRAVENOUS at 20:38

## 2017-02-02 RX ADMIN — METHOCARBAMOL 1500 MG: 750 TABLET ORAL at 18:16

## 2017-02-02 ASSESSMENT — ENCOUNTER SYMPTOMS
DEPRESSION: 0
WEAKNESS: 1
FEVER: 0
HALLUCINATIONS: 0
COUGH: 0
DIZZINESS: 0
CLAUDICATION: 0
HEARTBURN: 0
NECK PAIN: 1
NERVOUS/ANXIOUS: 0
BLURRED VISION: 0
SHORTNESS OF BREATH: 0
CHILLS: 0
CONSTIPATION: 0
FOCAL WEAKNESS: 0
EYE DISCHARGE: 0
MYALGIAS: 0
HEADACHES: 1
DOUBLE VISION: 0
SEIZURES: 0

## 2017-02-02 ASSESSMENT — PAIN SCALES - GENERAL
PAINLEVEL_OUTOF10: 8
PAINLEVEL_OUTOF10: 9

## 2017-02-02 NOTE — PROGRESS NOTES
Assumed pt care at 1900. Received report from day RN. Assessment completed. Pt A&Ox4. Pain 9/10. Bed in lowest position, locked, and bed alarm is on. Pt calls appropriately. Treaded socks in place, call light within reach and staff numbers provided. Pt needs met at this time.

## 2017-02-02 NOTE — PROGRESS NOTES
Received referral to  requesting this LSW to assist inpatient social workers with any recommendations for placement and coverage. Pt is currently inpatient at [facility]. Review of chart appears pt will likely be admitted until 02/15/2017 to complete treatment. Outreach call to inpatient  (Inez) to determine if any assistance/recommendation needed/collaboration of care. Received VM. Left message on  line requesting call back.

## 2017-02-02 NOTE — PROGRESS NOTES
Received report from off going nurse. Assumed care. Patient is up lying in bed; call light within reach; bed alarm on. Patient complaining with some nausea treated with Zofran. Will continue to monitor.

## 2017-02-02 NOTE — PROGRESS NOTES
Patient received treatment in Radiation Therapy. Patient received treatment number 6 of 15 planned.

## 2017-02-03 PROCEDURE — A9270 NON-COVERED ITEM OR SERVICE: HCPCS | Performed by: INTERNAL MEDICINE

## 2017-02-03 PROCEDURE — 700102 HCHG RX REV CODE 250 W/ 637 OVERRIDE(OP): Performed by: NURSE PRACTITIONER

## 2017-02-03 PROCEDURE — 77386 HCHG IMRT DELIVERY COMPLEX: CPT | Performed by: RADIOLOGY

## 2017-02-03 PROCEDURE — 700102 HCHG RX REV CODE 250 W/ 637 OVERRIDE(OP): Performed by: INTERNAL MEDICINE

## 2017-02-03 PROCEDURE — 77014 PR CT GUIDANCE PLACEMENT RAD THERAPY FIELDS: CPT | Mod: 26 | Performed by: RADIOLOGY

## 2017-02-03 PROCEDURE — 770001 HCHG ROOM/CARE - MED/SURG/GYN PRIV*

## 2017-02-03 PROCEDURE — 700112 HCHG RX REV CODE 229: Performed by: INTERNAL MEDICINE

## 2017-02-03 PROCEDURE — 99233 SBSQ HOSP IP/OBS HIGH 50: CPT | Performed by: INTERNAL MEDICINE

## 2017-02-03 PROCEDURE — A9270 NON-COVERED ITEM OR SERVICE: HCPCS | Performed by: NURSE PRACTITIONER

## 2017-02-03 PROCEDURE — 700111 HCHG RX REV CODE 636 W/ 250 OVERRIDE (IP): Performed by: INTERNAL MEDICINE

## 2017-02-03 RX ORDER — GABAPENTIN 300 MG/1
300 CAPSULE ORAL 3 TIMES DAILY
Status: DISCONTINUED | OUTPATIENT
Start: 2017-02-03 | End: 2017-02-15 | Stop reason: HOSPADM

## 2017-02-03 RX ADMIN — METHOCARBAMOL 1500 MG: 750 TABLET ORAL at 07:41

## 2017-02-03 RX ADMIN — HYDROMORPHONE HYDROCHLORIDE 4 MG: 4 TABLET ORAL at 05:35

## 2017-02-03 RX ADMIN — DEXAMETHASONE 4 MG: 4 TABLET ORAL at 21:37

## 2017-02-03 RX ADMIN — GABAPENTIN 300 MG: 300 CAPSULE ORAL at 21:37

## 2017-02-03 RX ADMIN — STANDARDIZED SENNA CONCENTRATE AND DOCUSATE SODIUM 1 TABLET: 8.6; 5 TABLET, FILM COATED ORAL at 21:37

## 2017-02-03 RX ADMIN — GABAPENTIN 200 MG: 100 CAPSULE ORAL at 15:48

## 2017-02-03 RX ADMIN — GABAPENTIN 200 MG: 100 CAPSULE ORAL at 07:41

## 2017-02-03 RX ADMIN — DEXAMETHASONE 4 MG: 4 TABLET ORAL at 07:41

## 2017-02-03 RX ADMIN — HYDROMORPHONE HYDROCHLORIDE 4 MG: 4 TABLET ORAL at 13:45

## 2017-02-03 RX ADMIN — NICOTINE 21 MG: 21 PATCH TRANSDERMAL at 05:35

## 2017-02-03 RX ADMIN — METHOCARBAMOL 1500 MG: 750 TABLET ORAL at 15:48

## 2017-02-03 RX ADMIN — ONDANSETRON 4 MG: 2 INJECTION, SOLUTION INTRAMUSCULAR; INTRAVENOUS at 03:15

## 2017-02-03 RX ADMIN — TEMOZOLOMIDE 5 MG: 5 CAPSULE ORAL at 21:00

## 2017-02-03 RX ADMIN — DEXAMETHASONE 4 MG: 4 TABLET ORAL at 12:35

## 2017-02-03 RX ADMIN — TEMOZOLOMIDE 140 MG: 140 CAPSULE ORAL at 21:00

## 2017-02-03 RX ADMIN — ONDANSETRON 4 MG: 2 INJECTION, SOLUTION INTRAMUSCULAR; INTRAVENOUS at 21:33

## 2017-02-03 RX ADMIN — HYDROMORPHONE HYDROCHLORIDE 4 MG: 4 TABLET ORAL at 17:46

## 2017-02-03 RX ADMIN — HYDROMORPHONE HYDROCHLORIDE 4 MG: 4 TABLET ORAL at 09:36

## 2017-02-03 RX ADMIN — METHOCARBAMOL 1500 MG: 750 TABLET ORAL at 21:37

## 2017-02-03 RX ADMIN — SULFAMETHOXAZOLE AND TRIMETHOPRIM 1 TABLET: 800; 160 TABLET ORAL at 07:44

## 2017-02-03 RX ADMIN — HYDROMORPHONE HYDROCHLORIDE 4 MG: 4 TABLET ORAL at 21:29

## 2017-02-03 RX ADMIN — SERTRALINE 25 MG: 50 TABLET, FILM COATED ORAL at 21:37

## 2017-02-03 RX ADMIN — DOCUSATE SODIUM 100 MG: 100 CAPSULE ORAL at 07:45

## 2017-02-03 RX ADMIN — LEVETIRACETAM 1000 MG: 250 TABLET, FILM COATED ORAL at 21:37

## 2017-02-03 RX ADMIN — HYDROMORPHONE HYDROCHLORIDE 4 MG: 4 TABLET ORAL at 00:38

## 2017-02-03 RX ADMIN — LEVETIRACETAM 1000 MG: 250 TABLET, FILM COATED ORAL at 07:41

## 2017-02-03 RX ADMIN — DEXAMETHASONE 4 MG: 4 TABLET ORAL at 17:46

## 2017-02-03 ASSESSMENT — ENCOUNTER SYMPTOMS
MYALGIAS: 0
EYE DISCHARGE: 0
HEARTBURN: 0
DEPRESSION: 0
COUGH: 0
NECK PAIN: 1
DOUBLE VISION: 0
CLAUDICATION: 0
CHILLS: 0
NERVOUS/ANXIOUS: 0
HEADACHES: 1
DIZZINESS: 0
FOCAL WEAKNESS: 0
BLURRED VISION: 0
HALLUCINATIONS: 0
WEAKNESS: 1
CONSTIPATION: 0
SEIZURES: 0
FEVER: 0
SHORTNESS OF BREATH: 0

## 2017-02-03 ASSESSMENT — PAIN SCALES - GENERAL
PAINLEVEL_OUTOF10: 9
PAINLEVEL_OUTOF10: 10
PAINLEVEL_OUTOF10: 9
PAINLEVEL_OUTOF10: 10
PAINLEVEL_OUTOF10: 9

## 2017-02-03 NOTE — PROGRESS NOTES
Patient received treatment in Radiation Therapy. Patient received treatment number 7 of 15 planned.

## 2017-02-03 NOTE — CARE PLAN
Problem: Safety  Goal: Will remain free from injury  Intervention: Provide assistance with mobility    02/03/17 0754   OTHER   Assistance / Tolerance Standby Assist   With a steady gait.       Problem: Venous Thromboembolism (VTW)/Deep Vein Thrombosis (DVT) Prevention:  Goal: Patient will participate in Venous Thrombosis (VTE)/Deep Vein Thrombosis (DVT)Prevention Measures  Intervention: Ensure patient wears graduated elastic stockings (SUDEEP hose) and/or SCDs, if ordered, when in bed or chair (Remove at least once per shift for skin check)  SCDs in place. Pt educated on importance.       Problem: Pain Management  Goal: Pain level will decrease to patient’s comfort goal  Pt complains of headache 9/10. Medicated per mar with robaxin will continue to medicated with dilaudid as available.

## 2017-02-03 NOTE — PROGRESS NOTES
Received report from night nurse at the bedside. Assume care of Pt at 0700. Assessment completed Pt A&O X 4. Pt denies numbness and tingling, Pt complains of pain 9/10 in head, medicated per mar,  Assist of 1 stand by with a steady gait. Pt in bed, bed in lowest position, call light in place, bed alarm is on, SCDs are on, treaded slipper socks on.

## 2017-02-03 NOTE — PROGRESS NOTES
Pt A&Ox4, reporting pain, medicated per mar.   Denies n/t.   Chemo precautions in place.   POC discussed, no further needs at this time, call light within reach, bed alarm armed.

## 2017-02-03 NOTE — PROGRESS NOTES
"Hospital Medicine Progress Note, Adult, Complex               Author: Yue Thakur Date & Time created: 2/2/2017  4:34 PM     Interval History:  1/31 Patient with GBM and currently undergoing daily radiation and started the oral chemo, temodar.  Patient states his head pain is better compared to previous hospital stay.  He doesn't really have answer as to why he eloped on 1/16 as he \"just had some things to take care of\".  He states he will be compliant with treatment.    2/1 Patient states he is doing well, he will stay in hospital through the end of his radiation therapies on 2/15/17 as scheduled.  He states his head/neck were stiff and remembers that I did OMM on his last stay and requested another treatment.  Facilitated positional release on left occiput done with good results.  2/2 Patient having headaches again, likely d/t radiation and muscle spasms at base of occiput where I worked yesterday.  Will schedule robaxin and stay consistent with one oral pain medication 2-4 mg po dilaudid every 4 hours as needed.  I will also resume the gabapentin I had him on last admission to also help with HA - 200mg tid to start.    Review of Systems:  Review of Systems   Constitutional: Negative for fever and chills.   HENT: Negative for congestion and nosebleeds.    Eyes: Negative for blurred vision, double vision and discharge.   Respiratory: Negative for cough and shortness of breath.    Cardiovascular: Negative for chest pain, claudication and leg swelling.   Gastrointestinal: Negative for heartburn and constipation.   Genitourinary: Negative for dysuria and frequency.   Musculoskeletal: Positive for neck pain. Negative for myalgias and joint pain.   Skin: Negative for itching and rash.   Neurological: Positive for weakness and headaches. Negative for dizziness, focal weakness and seizures (none in hospital).   Psychiatric/Behavioral: Negative for depression and hallucinations. The patient is not nervous/anxious.  "       Physical Exam:  Physical Exam   Constitutional: He is oriented to person, place, and time. He appears well-developed and well-nourished. No distress.   HENT:   Head: Normocephalic and atraumatic.   Eyes: Conjunctivae are normal. No scleral icterus.   Neck: Neck supple. No JVD present.   Cardiovascular: Normal rate and regular rhythm.  Exam reveals no gallop and no friction rub.    No murmur heard.  Pulmonary/Chest: Effort normal and breath sounds normal. No respiratory distress. He exhibits no tenderness.   Abdominal: Soft. Bowel sounds are normal. He exhibits no distension and no mass. There is no tenderness.   Musculoskeletal: He exhibits no edema or tenderness.   Neurological: He is alert and oriented to person, place, and time. No cranial nerve deficit.   Skin: Skin is dry. He is not diaphoretic.   Psychiatric: He has a normal mood and affect. His behavior is normal.   Nursing note and vitals reviewed.      Labs:        Invalid input(s): [id number]      No results for input(s): SODIUM, POTASSIUM, CHLORIDE, CO2, BUN, CREATININE, MAGNESIUM, PHOSPHORUS, CALCIUM in the last 72 hours.  No results for input(s): ALTSGPT, ASTSGOT, ALKPHOSPHAT, TBILIRUBIN, DBILIRUBIN, GAMMAGT, AMYLASE, LIPASE, ALB, PREALBUMIN, GLUCOSE in the last 72 hours.  No results for input(s): RBC, HEMOGLOBIN, HEMATOCRIT, PLATELETCT, PROTHROMBTM, APTT, INR, IRON, FERRITIN, TOTIRONBC in the last 72 hours.            Hemodynamics:  Temp (24hrs), Av.6 °C (97.8 °F), Min:36.4 °C (97.6 °F), Max:36.6 °C (97.9 °F)  Temperature: 36.4 °C (97.6 °F)  Pulse  Av.5  Min: 60  Max: 101   Blood Pressure: 121/85 mmHg     Respiratory:    Respiration: 18, Pulse Oximetry: 96 %        RUL Breath Sounds: Clear, RML Breath Sounds: Clear, RLL Breath Sounds: Clear, FLY Breath Sounds: Clear, LLL Breath Sounds: Clear  Fluids:  No intake or output data in the 24 hours ending 17 1634     GI/Nutrition:  Orders Placed This Encounter   Procedures   • DIET  ORDER     Standing Status: Standing      Number of Occurrences: 1      Standing Expiration Date:      Order Specific Question:  Diet:     Answer:  Regular [1]     Medical Decision Making, by Problem:  Active Hospital Problems    Diagnosis   • Seizure (CMS-[facility]) [R56.9]keppra, decadron     • Depression [F32.9]zoloft     • GBM (glioblastoma multiforme) (CMS-[facility]) [C71.9]Mocherla/Peddada, on daily rads and oral chemo     • Headache [R51]better, OMM as needed for muscle release.    Tobacco Abuse - nicotine replacement.       Labs reviewed, Radiology images reviewed and Medications reviewed  Anand catheter: No Anand      DVT Prophylaxis: Not indicated at this time, ambulatory  DVT prophylaxis - mechanical: SCDs

## 2017-02-03 NOTE — CARE PLAN
Problem: Safety  Goal: Will remain free from falls  Outcome: PROGRESSING AS EXPECTED  Bed alarm armed, room close to nursing station, call light used appropriately at times.     Problem: Mobility  Goal: Risk for activity intolerance will decrease  Outcome: PROGRESSING AS EXPECTED  No limitations with mobility noted, pt ambulatory without assistance.

## 2017-02-04 LAB
ANION GAP SERPL CALC-SCNC: 8 MMOL/L (ref 0–11.9)
BASOPHILS # BLD AUTO: 0.8 % (ref 0–1.8)
BASOPHILS # BLD: 0.12 K/UL (ref 0–0.12)
BUN SERPL-MCNC: 24 MG/DL (ref 8–22)
CALCIUM SERPL-MCNC: 8.9 MG/DL (ref 8.5–10.5)
CHLORIDE SERPL-SCNC: 97 MMOL/L (ref 96–112)
CO2 SERPL-SCNC: 26 MMOL/L (ref 20–33)
COMMENT 1642: NORMAL
CREAT SERPL-MCNC: 0.59 MG/DL (ref 0.5–1.4)
EOSINOPHIL # BLD AUTO: 0 K/UL (ref 0–0.51)
EOSINOPHIL NFR BLD: 0 % (ref 0–6.9)
ERYTHROCYTE [DISTWIDTH] IN BLOOD BY AUTOMATED COUNT: 45.4 FL (ref 35.9–50)
GFR SERPL CREATININE-BSD FRML MDRD: >60 ML/MIN/1.73 M 2
GLUCOSE SERPL-MCNC: 161 MG/DL (ref 65–99)
HCT VFR BLD AUTO: 42.2 % (ref 42–52)
HGB BLD-MCNC: 14.2 G/DL (ref 14–18)
IMM GRANULOCYTES # BLD AUTO: 1.27 K/UL (ref 0–0.11)
IMM GRANULOCYTES NFR BLD AUTO: 8.1 % (ref 0–0.9)
LYMPHOCYTES # BLD AUTO: 1.17 K/UL (ref 1–4.8)
LYMPHOCYTES NFR BLD: 7.5 % (ref 22–41)
MCH RBC QN AUTO: 31.7 PG (ref 27–33)
MCHC RBC AUTO-ENTMCNC: 33.6 G/DL (ref 33.7–35.3)
MCV RBC AUTO: 94.2 FL (ref 81.4–97.8)
MONOCYTES # BLD AUTO: 1.09 K/UL (ref 0–0.85)
MONOCYTES NFR BLD AUTO: 7 % (ref 0–13.4)
MORPHOLOGY BLD-IMP: NORMAL
NEUTROPHILS # BLD AUTO: 12 K/UL (ref 1.82–7.42)
NEUTROPHILS NFR BLD: 76.6 % (ref 44–72)
NRBC # BLD AUTO: 0.02 K/UL
NRBC BLD AUTO-RTO: 0.1 /100 WBC
PLATELET # BLD AUTO: 227 K/UL (ref 164–446)
PMV BLD AUTO: 9.2 FL (ref 9–12.9)
POTASSIUM SERPL-SCNC: 4.2 MMOL/L (ref 3.6–5.5)
RBC # BLD AUTO: 4.48 M/UL (ref 4.7–6.1)
SODIUM SERPL-SCNC: 131 MMOL/L (ref 135–145)
WBC # BLD AUTO: 15.7 K/UL (ref 4.8–10.8)

## 2017-02-04 PROCEDURE — 770001 HCHG ROOM/CARE - MED/SURG/GYN PRIV*

## 2017-02-04 PROCEDURE — A9270 NON-COVERED ITEM OR SERVICE: HCPCS | Performed by: INTERNAL MEDICINE

## 2017-02-04 PROCEDURE — 80048 BASIC METABOLIC PNL TOTAL CA: CPT

## 2017-02-04 PROCEDURE — 36415 COLL VENOUS BLD VENIPUNCTURE: CPT

## 2017-02-04 PROCEDURE — 85025 COMPLETE CBC W/AUTO DIFF WBC: CPT

## 2017-02-04 PROCEDURE — 99232 SBSQ HOSP IP/OBS MODERATE 35: CPT | Performed by: INTERNAL MEDICINE

## 2017-02-04 PROCEDURE — 700102 HCHG RX REV CODE 250 W/ 637 OVERRIDE(OP): Performed by: INTERNAL MEDICINE

## 2017-02-04 PROCEDURE — 700102 HCHG RX REV CODE 250 W/ 637 OVERRIDE(OP): Performed by: NURSE PRACTITIONER

## 2017-02-04 PROCEDURE — 700111 HCHG RX REV CODE 636 W/ 250 OVERRIDE (IP): Performed by: INTERNAL MEDICINE

## 2017-02-04 PROCEDURE — A9270 NON-COVERED ITEM OR SERVICE: HCPCS | Performed by: NURSE PRACTITIONER

## 2017-02-04 PROCEDURE — 700112 HCHG RX REV CODE 229: Performed by: INTERNAL MEDICINE

## 2017-02-04 RX ADMIN — SULFAMETHOXAZOLE AND TRIMETHOPRIM 1 TABLET: 800; 160 TABLET ORAL at 09:37

## 2017-02-04 RX ADMIN — LEVETIRACETAM 1000 MG: 250 TABLET, FILM COATED ORAL at 21:36

## 2017-02-04 RX ADMIN — DEXAMETHASONE 4 MG: 4 TABLET ORAL at 09:37

## 2017-02-04 RX ADMIN — NICOTINE POLACRILEX 2 MG: 2 GUM, CHEWING BUCCAL at 04:33

## 2017-02-04 RX ADMIN — ONDANSETRON 4 MG: 2 INJECTION, SOLUTION INTRAMUSCULAR; INTRAVENOUS at 01:35

## 2017-02-04 RX ADMIN — DEXAMETHASONE 4 MG: 4 TABLET ORAL at 13:25

## 2017-02-04 RX ADMIN — HYDROMORPHONE HYDROCHLORIDE 4 MG: 4 TABLET ORAL at 21:35

## 2017-02-04 RX ADMIN — GABAPENTIN 300 MG: 300 CAPSULE ORAL at 09:37

## 2017-02-04 RX ADMIN — DOCUSATE SODIUM 100 MG: 100 CAPSULE ORAL at 09:37

## 2017-02-04 RX ADMIN — HYDROMORPHONE HYDROCHLORIDE 4 MG: 4 TABLET ORAL at 17:25

## 2017-02-04 RX ADMIN — DEXAMETHASONE 4 MG: 4 TABLET ORAL at 17:27

## 2017-02-04 RX ADMIN — LEVETIRACETAM 1000 MG: 250 TABLET, FILM COATED ORAL at 09:37

## 2017-02-04 RX ADMIN — STANDARDIZED SENNA CONCENTRATE AND DOCUSATE SODIUM 1 TABLET: 8.6; 5 TABLET, FILM COATED ORAL at 21:36

## 2017-02-04 RX ADMIN — DEXAMETHASONE 4 MG: 4 TABLET ORAL at 21:36

## 2017-02-04 RX ADMIN — GABAPENTIN 300 MG: 300 CAPSULE ORAL at 21:36

## 2017-02-04 RX ADMIN — ONDANSETRON 4 MG: 2 INJECTION, SOLUTION INTRAMUSCULAR; INTRAVENOUS at 21:36

## 2017-02-04 RX ADMIN — METHOCARBAMOL 1500 MG: 750 TABLET ORAL at 21:36

## 2017-02-04 RX ADMIN — HYDROMORPHONE HYDROCHLORIDE 4 MG: 4 TABLET ORAL at 01:35

## 2017-02-04 RX ADMIN — HYDROMORPHONE HYDROCHLORIDE 4 MG: 4 TABLET ORAL at 13:25

## 2017-02-04 RX ADMIN — TEMOZOLOMIDE 5 MG: 5 CAPSULE ORAL at 21:00

## 2017-02-04 RX ADMIN — METHOCARBAMOL 1500 MG: 750 TABLET ORAL at 13:25

## 2017-02-04 RX ADMIN — GABAPENTIN 300 MG: 300 CAPSULE ORAL at 13:25

## 2017-02-04 RX ADMIN — TEMOZOLOMIDE 140 MG: 140 CAPSULE ORAL at 21:00

## 2017-02-04 RX ADMIN — HYDROMORPHONE HYDROCHLORIDE 4 MG: 4 TABLET ORAL at 05:31

## 2017-02-04 RX ADMIN — SERTRALINE 25 MG: 50 TABLET, FILM COATED ORAL at 21:36

## 2017-02-04 RX ADMIN — HYDROMORPHONE HYDROCHLORIDE 4 MG: 4 TABLET ORAL at 09:36

## 2017-02-04 RX ADMIN — METHOCARBAMOL 1500 MG: 750 TABLET ORAL at 09:37

## 2017-02-04 ASSESSMENT — ENCOUNTER SYMPTOMS
SEIZURES: 0
BLURRED VISION: 0
CONSTIPATION: 0
CLAUDICATION: 0
DIZZINESS: 0
CHILLS: 0
HEARTBURN: 0
NECK PAIN: 1
WEAKNESS: 0
FEVER: 0
HEADACHES: 1
NERVOUS/ANXIOUS: 0
EYE DISCHARGE: 0
DEPRESSION: 0
HALLUCINATIONS: 0
COUGH: 0
SHORTNESS OF BREATH: 0
MYALGIAS: 0
DOUBLE VISION: 0
FOCAL WEAKNESS: 0

## 2017-02-04 ASSESSMENT — PAIN SCALES - GENERAL
PAINLEVEL_OUTOF10: 9
PAINLEVEL_OUTOF10: 10
PAINLEVEL_OUTOF10: 9
PAINLEVEL_OUTOF10: 10

## 2017-02-04 NOTE — CARE PLAN
Problem: Communication  Goal: The ability to communicate needs accurately and effectively will improve  Intervention: Lawrence patient and significant other/support system to call light to alert staff of needs  Pt displays appropriate means to communicate needs.       Problem: Pain Management  Goal: Pain level will decrease to patient’s comfort goal  Pain assessed. Medicated per MAR with + results.

## 2017-02-04 NOTE — PROGRESS NOTES
Pt AA&Ox4, confusion at times. Medicated for pain per MAR. Denies n/t. No respiratory distress noted on RA. Pt up self, steady gait. Tolerating diet, denies any n/v at this time.  Pt calls appropriately for assistance.  POC discussed, all questions at this time. Call light within reach. Will continue hourly rounding.

## 2017-02-04 NOTE — PROGRESS NOTES
"Hospital Medicine Progress Note, Adult, Complex               Author: Yue Thakur Date & Time created: 2/4/2017  10:18 AM     Interval History:  1/31 Patient with GBM and currently undergoing daily radiation and started the oral chemo, temodar.  Patient states his head pain is better compared to previous hospital stay.  He doesn't really have answer as to why he eloped on 1/16 as he \"just had some things to take care of\".  He states he will be compliant with treatment.    2/1 Patient states he is doing well, he will stay in hospital through the end of his radiation therapies on 2/15/17 as scheduled.  He states his head/neck were stiff and remembers that I did OMM on his last stay and requested another treatment.  Facilitated positional release on left occiput done with good results.  2/2 Patient having headaches again, likely d/t radiation and muscle spasms at base of occiput where I worked yesterday.  Will schedule robaxin and stay consistent with one oral pain medication 2-4 mg po dilaudid every 4 hours as needed.  I will also resume the gabapentin I had him on last admission to also help with HA - 200mg tid to start.  2/3 Patient states headache is better with changes made. He is doing well with radiation treatments.  No acute changes today.  Will increase neurontin to 300 tid to maximize benefit.  2/4 Patient states his head and neck pain/tightness feels okay, he was feeling anxious as he thought radiation was late today - explained no radiation on weekends.  No new complaints.    Review of Systems:  Review of Systems   Constitutional: Negative for fever and chills.   HENT: Negative for congestion and nosebleeds.    Eyes: Negative for blurred vision, double vision and discharge.   Respiratory: Negative for cough and shortness of breath.    Cardiovascular: Negative for chest pain, claudication and leg swelling.   Gastrointestinal: Negative for heartburn and constipation.   Genitourinary: Negative for dysuria " and frequency.   Musculoskeletal: Positive for neck pain (better). Negative for myalgias and joint pain.   Skin: Negative for itching and rash.   Neurological: Positive for headaches (better). Negative for dizziness, focal weakness, seizures (none in hospital) and weakness.   Psychiatric/Behavioral: Negative for depression and hallucinations. The patient is not nervous/anxious.        Physical Exam:  Physical Exam   Constitutional: He is oriented to person, place, and time. He appears well-developed and well-nourished. No distress.   HENT:   Head: Normocephalic and atraumatic.   Eyes: Conjunctivae are normal. No scleral icterus.   Neck: Neck supple. No JVD present.   Cardiovascular: Normal rate and regular rhythm.  Exam reveals no gallop and no friction rub.    No murmur heard.  Pulmonary/Chest: Effort normal and breath sounds normal. No respiratory distress. He exhibits no tenderness.   Abdominal: Soft. Bowel sounds are normal. He exhibits no distension and no mass. There is no tenderness.   Musculoskeletal: He exhibits no edema or tenderness.   Neurological: He is alert and oriented to person, place, and time. No cranial nerve deficit.   Skin: Skin is dry. He is not diaphoretic.   Psychiatric: He has a normal mood and affect. His behavior is normal.   Nursing note and vitals reviewed.      Labs:        Invalid input(s): [id number]      Recent Labs      17   040   SODIUM  131*   POTASSIUM  4.2   CHLORIDE  97   CO2  26   BUN  24*   CREATININE  0.59   CALCIUM  8.9     Recent Labs      17   0408   GLUCOSE  161*     Recent Labs      17   0408   RBC  4.48*   HEMOGLOBIN  14.2   HEMATOCRIT  42.2   PLATELETCT  227     Recent Labs      17   0408   WBC  15.7*   NEUTSPOLYS  76.60*   LYMPHOCYTES  7.50*   MONOCYTES  7.00   EOSINOPHILS  0.00   BASOPHILS  0.80           Hemodynamics:  Temp (24hrs), Av.8 °C (98.2 °F), Min:36.6 °C (97.8 °F), Max:36.9 °C (98.5 °F)  Temperature: 36.6 °C (97.8  °F)  Pulse  Av.3  Min: 60  Max: 101   Blood Pressure: 133/98 mmHg     Respiratory:    Respiration: 18, Pulse Oximetry: 95 %           Fluids:    Intake/Output Summary (Last 24 hours) at 17 1018  Last data filed at 17 0000   Gross per 24 hour   Intake    480 ml   Output      0 ml   Net    480 ml        GI/Nutrition:  Orders Placed This Encounter   Procedures   • DIET ORDER     Standing Status: Standing      Number of Occurrences: 1      Standing Expiration Date:      Order Specific Question:  Diet:     Answer:  Regular [1]     Medical Decision Making, by Problem:  Active Hospital Problems    Diagnosis   • Seizure (CMS-[facility]) [R56.9]keppra, decadron     • Depression [F32.9]zoloft     • GBM (glioblastoma multiforme) (CMS-[facility]) [C71.9]Mocherla/Peddada, on daily rads and oral chemo     • Headache [R51]better, OMM as needed for muscle release.    Tobacco Abuse - nicotine replacement.    Headache - neurontin 300 tid, schedule robaxin, prn dilaudid for severe pain.       Labs reviewed, Radiology images reviewed and Medications reviewed  Anand catheter: No Anand      DVT Prophylaxis: Not indicated at this time, ambulatory  DVT prophylaxis - mechanical: SCDs

## 2017-02-04 NOTE — CARE PLAN
Problem: Safety  Goal: Will remain free from injury  Intervention: Provide assistance with mobility  Safety precautions in place.       Problem: Psychosocial Needs:  Goal: Level of anxiety will decrease  Supportive and comfort measures provided.

## 2017-02-04 NOTE — PROGRESS NOTES
"Hospital Medicine Progress Note, Adult, Complex               Author: Yue Thakur Date & Time created: 2/3/2017  4:34 PM     Interval History:  1/31 Patient with GBM and currently undergoing daily radiation and started the oral chemo, temodar.  Patient states his head pain is better compared to previous hospital stay.  He doesn't really have answer as to why he eloped on 1/16 as he \"just had some things to take care of\".  He states he will be compliant with treatment.    2/1 Patient states he is doing well, he will stay in hospital through the end of his radiation therapies on 2/15/17 as scheduled.  He states his head/neck were stiff and remembers that I did OMM on his last stay and requested another treatment.  Facilitated positional release on left occiput done with good results.  2/2 Patient having headaches again, likely d/t radiation and muscle spasms at base of occiput where I worked yesterday.  Will schedule robaxin and stay consistent with one oral pain medication 2-4 mg po dilaudid every 4 hours as needed.  I will also resume the gabapentin I had him on last admission to also help with HA - 200mg tid to start.  2/3 Patient states headache is better with changes made. He is doing well with radiation treatments.  No acute changes today.  Will increase neurontin to 300 tid to maximize benefit.    Review of Systems:  Review of Systems   Constitutional: Negative for fever and chills.   HENT: Negative for congestion and nosebleeds.    Eyes: Negative for blurred vision, double vision and discharge.   Respiratory: Negative for cough and shortness of breath.    Cardiovascular: Negative for chest pain, claudication and leg swelling.   Gastrointestinal: Negative for heartburn and constipation.   Genitourinary: Negative for dysuria and frequency.   Musculoskeletal: Positive for neck pain. Negative for myalgias and joint pain.   Skin: Negative for itching and rash.   Neurological: Positive for weakness and headaches. " Negative for dizziness, focal weakness and seizures (none in hospital).   Psychiatric/Behavioral: Negative for depression and hallucinations. The patient is not nervous/anxious.        Physical Exam:  Physical Exam   Constitutional: He is oriented to person, place, and time. He appears well-developed and well-nourished. No distress.   HENT:   Head: Normocephalic and atraumatic.   Eyes: Conjunctivae are normal. No scleral icterus.   Neck: Neck supple. No JVD present.   Cardiovascular: Normal rate and regular rhythm.  Exam reveals no gallop and no friction rub.    No murmur heard.  Pulmonary/Chest: Effort normal and breath sounds normal. No respiratory distress. He exhibits no tenderness.   Abdominal: Soft. Bowel sounds are normal. He exhibits no distension and no mass. There is no tenderness.   Musculoskeletal: He exhibits no edema or tenderness.   Neurological: He is alert and oriented to person, place, and time. No cranial nerve deficit.   Skin: Skin is dry. He is not diaphoretic.   Psychiatric: He has a normal mood and affect. His behavior is normal.   Nursing note and vitals reviewed.      Labs:        Invalid input(s): [id number]      No results for input(s): SODIUM, POTASSIUM, CHLORIDE, CO2, BUN, CREATININE, MAGNESIUM, PHOSPHORUS, CALCIUM in the last 72 hours.  No results for input(s): ALTSGPT, ASTSGOT, ALKPHOSPHAT, TBILIRUBIN, DBILIRUBIN, GAMMAGT, AMYLASE, LIPASE, ALB, PREALBUMIN, GLUCOSE in the last 72 hours.  No results for input(s): RBC, HEMOGLOBIN, HEMATOCRIT, PLATELETCT, PROTHROMBTM, APTT, INR, IRON, FERRITIN, TOTIRONBC in the last 72 hours.            Hemodynamics:  Temp (24hrs), Av.5 °C (97.7 °F), Min:36.2 °C (97.2 °F), Max:36.8 °C (98.2 °F)  Temperature: 36.6 °C (97.8 °F)  Pulse  Av.2  Min: 60  Max: 101   Blood Pressure: 139/93 mmHg     Respiratory:    Respiration: 16, Pulse Oximetry: 96 %        RUL Breath Sounds: Clear, RML Breath Sounds: Clear, RLL Breath Sounds: Clear, FLY Breath  Sounds: Clear, LLL Breath Sounds: Clear  Fluids:  No intake or output data in the 24 hours ending 02/03/17 1634     GI/Nutrition:  Orders Placed This Encounter   Procedures   • DIET ORDER     Standing Status: Standing      Number of Occurrences: 1      Standing Expiration Date:      Order Specific Question:  Diet:     Answer:  Regular [1]     Medical Decision Making, by Problem:  Active Hospital Problems    Diagnosis   • Seizure (CMS-[facility]) [R56.9]keppra, decadron     • Depression [F32.9]zoloft     • GBM (glioblastoma multiforme) (CMS-[facility]) [C71.9]Mocherla/Peddada, on daily rads and oral chemo     • Headache [R51]better, OMM as needed for muscle release.    Tobacco Abuse - nicotine replacement.    Headache - neurontin 300 tid, schedule robaxin, prn dilaudid for severe pain.       Labs reviewed, Radiology images reviewed and Medications reviewed  Anand catheter: No Anand      DVT Prophylaxis: Not indicated at this time, ambulatory  DVT prophylaxis - mechanical: SCDs

## 2017-02-04 NOTE — PROGRESS NOTES
Pt is A+Ox4. DIXON. Denies any n/t. C/o pain, medicated per MAR. Pt medicated for nausea prior to administering chemo medications. Chemo precautions in effect. Pt has a flat affect. All questions answered regarding POC.

## 2017-02-05 PROCEDURE — 700102 HCHG RX REV CODE 250 W/ 637 OVERRIDE(OP): Performed by: INTERNAL MEDICINE

## 2017-02-05 PROCEDURE — A9270 NON-COVERED ITEM OR SERVICE: HCPCS | Performed by: INTERNAL MEDICINE

## 2017-02-05 PROCEDURE — 700111 HCHG RX REV CODE 636 W/ 250 OVERRIDE (IP): Performed by: INTERNAL MEDICINE

## 2017-02-05 PROCEDURE — A9270 NON-COVERED ITEM OR SERVICE: HCPCS | Performed by: NURSE PRACTITIONER

## 2017-02-05 PROCEDURE — 99232 SBSQ HOSP IP/OBS MODERATE 35: CPT | Performed by: INTERNAL MEDICINE

## 2017-02-05 PROCEDURE — 700112 HCHG RX REV CODE 229: Performed by: INTERNAL MEDICINE

## 2017-02-05 PROCEDURE — 700102 HCHG RX REV CODE 250 W/ 637 OVERRIDE(OP): Performed by: NURSE PRACTITIONER

## 2017-02-05 PROCEDURE — 770001 HCHG ROOM/CARE - MED/SURG/GYN PRIV*

## 2017-02-05 RX ADMIN — HYDROMORPHONE HYDROCHLORIDE 4 MG: 4 TABLET ORAL at 10:11

## 2017-02-05 RX ADMIN — DEXAMETHASONE 4 MG: 4 TABLET ORAL at 08:10

## 2017-02-05 RX ADMIN — HYDROMORPHONE HYDROCHLORIDE 4 MG: 4 TABLET ORAL at 06:06

## 2017-02-05 RX ADMIN — GABAPENTIN 300 MG: 300 CAPSULE ORAL at 19:56

## 2017-02-05 RX ADMIN — METHOCARBAMOL 1500 MG: 750 TABLET ORAL at 19:55

## 2017-02-05 RX ADMIN — TEMOZOLOMIDE 5 MG: 5 CAPSULE ORAL at 21:00

## 2017-02-05 RX ADMIN — HYDROMORPHONE HYDROCHLORIDE 4 MG: 4 TABLET ORAL at 18:30

## 2017-02-05 RX ADMIN — METHOCARBAMOL 1500 MG: 750 TABLET ORAL at 08:10

## 2017-02-05 RX ADMIN — LEVETIRACETAM 1000 MG: 250 TABLET, FILM COATED ORAL at 08:10

## 2017-02-05 RX ADMIN — GABAPENTIN 300 MG: 300 CAPSULE ORAL at 08:10

## 2017-02-05 RX ADMIN — LEVETIRACETAM 1000 MG: 250 TABLET, FILM COATED ORAL at 19:54

## 2017-02-05 RX ADMIN — HYDROMORPHONE HYDROCHLORIDE 4 MG: 4 TABLET ORAL at 02:00

## 2017-02-05 RX ADMIN — DEXAMETHASONE 4 MG: 4 TABLET ORAL at 14:35

## 2017-02-05 RX ADMIN — ONDANSETRON 4 MG: 2 INJECTION, SOLUTION INTRAMUSCULAR; INTRAVENOUS at 20:00

## 2017-02-05 RX ADMIN — DEXAMETHASONE 4 MG: 4 TABLET ORAL at 19:55

## 2017-02-05 RX ADMIN — GABAPENTIN 300 MG: 300 CAPSULE ORAL at 14:35

## 2017-02-05 RX ADMIN — DEXAMETHASONE 4 MG: 4 TABLET ORAL at 17:44

## 2017-02-05 RX ADMIN — DOCUSATE SODIUM 100 MG: 100 CAPSULE ORAL at 08:10

## 2017-02-05 RX ADMIN — TEMOZOLOMIDE 140 MG: 140 CAPSULE ORAL at 21:00

## 2017-02-05 RX ADMIN — SERTRALINE 25 MG: 50 TABLET, FILM COATED ORAL at 19:56

## 2017-02-05 RX ADMIN — HYDROMORPHONE HYDROCHLORIDE 4 MG: 4 TABLET ORAL at 22:32

## 2017-02-05 RX ADMIN — METHOCARBAMOL 1500 MG: 750 TABLET ORAL at 14:35

## 2017-02-05 RX ADMIN — SULFAMETHOXAZOLE AND TRIMETHOPRIM 1 TABLET: 800; 160 TABLET ORAL at 08:10

## 2017-02-05 RX ADMIN — STANDARDIZED SENNA CONCENTRATE AND DOCUSATE SODIUM 1 TABLET: 8.6; 5 TABLET, FILM COATED ORAL at 19:57

## 2017-02-05 RX ADMIN — HYDROMORPHONE HYDROCHLORIDE 4 MG: 4 TABLET ORAL at 14:35

## 2017-02-05 ASSESSMENT — PAIN SCALES - GENERAL
PAINLEVEL_OUTOF10: 8
PAINLEVEL_OUTOF10: 9
PAINLEVEL_OUTOF10: 8
PAINLEVEL_OUTOF10: 9
PAINLEVEL_OUTOF10: 10
PAINLEVEL_OUTOF10: 10
PAINLEVEL_OUTOF10: 9
PAINLEVEL_OUTOF10: 8
PAINLEVEL_OUTOF10: 8
PAINLEVEL_OUTOF10: 10
PAINLEVEL_OUTOF10: 8

## 2017-02-05 ASSESSMENT — ENCOUNTER SYMPTOMS
HALLUCINATIONS: 0
EYE DISCHARGE: 0
MYALGIAS: 0
HEARTBURN: 0
NECK PAIN: 1
WEAKNESS: 0
HEADACHES: 1
FOCAL WEAKNESS: 0
NERVOUS/ANXIOUS: 0
FEVER: 0
COUGH: 0
BLURRED VISION: 0
DOUBLE VISION: 0
CHILLS: 0
CONSTIPATION: 0
DIZZINESS: 0
SEIZURES: 0
DEPRESSION: 0
SHORTNESS OF BREATH: 0
CLAUDICATION: 0

## 2017-02-05 NOTE — CARE PLAN
Problem: Knowledge Deficit  Goal: Knowledge of disease process/condition, treatment plan, diagnostic tests, and medications will improve  Intervention: Assess knowledge level of disease process/condition, treatment plan, diagnostic tests, and medications  D/w pt about chemo precautions and need to flush toilet twice after use.       Problem: Psychosocial Needs:  Goal: Level of anxiety will decrease  Emotional support provided to pt.

## 2017-02-05 NOTE — CARE PLAN
Problem: Safety  Goal: Will remain free from falls  Outcome: PROGRESSING AS EXPECTED  Fall precautions remain in place: treaded socks on pt, appropriate signs on doorway, armbands on pt, IV pole on side of bathroom, lowest bed rails down, bed in lowest position and locked, call light and phone within reach, bed alarm off at this time.    Problem: Pain Management  Goal: Pain level will decrease to patient’s comfort goal  Outcome: PROGRESSING AS EXPECTED  Pain medication given per pt request.  Pre and post pain assessment complete with each administration.    Pt c/o constant pain to head, ranging from 8 to 10.

## 2017-02-05 NOTE — PROGRESS NOTES
Pt re-educated on benefits of and safety risks of not having a bed alarm.  Pt continues to refuse to have bed alarm on.  Pt calls appropriately with any needs and to get OOB.  Bed alarm off at this time.      Pt up ad sania with steady gait.

## 2017-02-05 NOTE — PROGRESS NOTES
Assumed pt care at 0700.  Received report from STEFFEN Landers RN.  AM assessment completed.  AAOx4. Pt denies SOB; c/o pain of 8 on 0 to 10 pain scale (unable to administer rx at this time, pt aware).  Pt educated on use of call light and bed alarm, provided pt with RN and CNA extension numbers on white board and encouraged pt to call when needed, and discussed plan of care for the day (safety, comfort, pain control, ambulate); pt verbalizes understanding.  Denies any additional needs at this time.  Call light and phone within reach; treaded socks in place and bed alarm off at this time.  Hourly rounding in place.  Will continue to monitor.

## 2017-02-06 LAB
ANION GAP SERPL CALC-SCNC: 5 MMOL/L (ref 0–11.9)
BUN SERPL-MCNC: 25 MG/DL (ref 8–22)
CALCIUM SERPL-MCNC: 8.8 MG/DL (ref 8.5–10.5)
CHLORIDE SERPL-SCNC: 98 MMOL/L (ref 96–112)
CO2 SERPL-SCNC: 29 MMOL/L (ref 20–33)
CREAT SERPL-MCNC: 0.5 MG/DL (ref 0.5–1.4)
GFR SERPL CREATININE-BSD FRML MDRD: >60 ML/MIN/1.73 M 2
GLUCOSE SERPL-MCNC: 105 MG/DL (ref 65–99)
POTASSIUM SERPL-SCNC: 4.4 MMOL/L (ref 3.6–5.5)
SODIUM SERPL-SCNC: 132 MMOL/L (ref 135–145)

## 2017-02-06 PROCEDURE — 700102 HCHG RX REV CODE 250 W/ 637 OVERRIDE(OP): Performed by: INTERNAL MEDICINE

## 2017-02-06 PROCEDURE — 99232 SBSQ HOSP IP/OBS MODERATE 35: CPT | Performed by: INTERNAL MEDICINE

## 2017-02-06 PROCEDURE — A9270 NON-COVERED ITEM OR SERVICE: HCPCS | Performed by: INTERNAL MEDICINE

## 2017-02-06 PROCEDURE — 80048 BASIC METABOLIC PNL TOTAL CA: CPT

## 2017-02-06 PROCEDURE — 700112 HCHG RX REV CODE 229: Performed by: INTERNAL MEDICINE

## 2017-02-06 PROCEDURE — A9270 NON-COVERED ITEM OR SERVICE: HCPCS | Performed by: NURSE PRACTITIONER

## 2017-02-06 PROCEDURE — 36415 COLL VENOUS BLD VENIPUNCTURE: CPT

## 2017-02-06 PROCEDURE — 700102 HCHG RX REV CODE 250 W/ 637 OVERRIDE(OP): Performed by: NURSE PRACTITIONER

## 2017-02-06 PROCEDURE — 770001 HCHG ROOM/CARE - MED/SURG/GYN PRIV*

## 2017-02-06 PROCEDURE — 77386 HCHG IMRT DELIVERY COMPLEX: CPT | Performed by: RADIOLOGY

## 2017-02-06 PROCEDURE — 77336 RADIATION PHYSICS CONSULT: CPT | Performed by: RADIOLOGY

## 2017-02-06 PROCEDURE — 700111 HCHG RX REV CODE 636 W/ 250 OVERRIDE (IP): Performed by: INTERNAL MEDICINE

## 2017-02-06 PROCEDURE — 77014 PR CT GUIDANCE PLACEMENT RAD THERAPY FIELDS: CPT | Mod: 26 | Performed by: RADIOLOGY

## 2017-02-06 RX ORDER — METHOCARBAMOL 750 MG/1
1500 TABLET, FILM COATED ORAL 4 TIMES DAILY
Status: DISCONTINUED | OUTPATIENT
Start: 2017-02-06 | End: 2017-02-11

## 2017-02-06 RX ORDER — FAMOTIDINE 20 MG/1
40 TABLET, FILM COATED ORAL DAILY
Status: DISCONTINUED | OUTPATIENT
Start: 2017-02-06 | End: 2017-02-15 | Stop reason: HOSPADM

## 2017-02-06 RX ADMIN — HYDROMORPHONE HYDROCHLORIDE 4 MG: 4 TABLET ORAL at 03:37

## 2017-02-06 RX ADMIN — GABAPENTIN 300 MG: 300 CAPSULE ORAL at 09:53

## 2017-02-06 RX ADMIN — TEMOZOLOMIDE 5 MG: 5 CAPSULE ORAL at 19:31

## 2017-02-06 RX ADMIN — METHOCARBAMOL 1500 MG: 750 TABLET ORAL at 22:03

## 2017-02-06 RX ADMIN — GABAPENTIN 300 MG: 300 CAPSULE ORAL at 19:30

## 2017-02-06 RX ADMIN — DEXAMETHASONE 4 MG: 4 TABLET ORAL at 09:54

## 2017-02-06 RX ADMIN — HYDROMORPHONE HYDROCHLORIDE 4 MG: 4 TABLET ORAL at 13:48

## 2017-02-06 RX ADMIN — DOCUSATE SODIUM 100 MG: 100 CAPSULE ORAL at 09:53

## 2017-02-06 RX ADMIN — STANDARDIZED SENNA CONCENTRATE AND DOCUSATE SODIUM 1 TABLET: 8.6; 5 TABLET, FILM COATED ORAL at 19:30

## 2017-02-06 RX ADMIN — SULFAMETHOXAZOLE AND TRIMETHOPRIM 1 TABLET: 800; 160 TABLET ORAL at 09:54

## 2017-02-06 RX ADMIN — LEVETIRACETAM 1000 MG: 250 TABLET, FILM COATED ORAL at 19:30

## 2017-02-06 RX ADMIN — SERTRALINE 25 MG: 50 TABLET, FILM COATED ORAL at 19:30

## 2017-02-06 RX ADMIN — METHOCARBAMOL 1500 MG: 750 TABLET ORAL at 13:48

## 2017-02-06 RX ADMIN — DEXAMETHASONE 4 MG: 4 TABLET ORAL at 19:01

## 2017-02-06 RX ADMIN — FAMOTIDINE 40 MG: 20 TABLET, FILM COATED ORAL at 22:00

## 2017-02-06 RX ADMIN — LEVETIRACETAM 1000 MG: 250 TABLET, FILM COATED ORAL at 09:53

## 2017-02-06 RX ADMIN — DEXAMETHASONE 4 MG: 4 TABLET ORAL at 22:01

## 2017-02-06 RX ADMIN — DEXAMETHASONE 4 MG: 4 TABLET ORAL at 13:48

## 2017-02-06 RX ADMIN — HYDROMORPHONE HYDROCHLORIDE 4 MG: 4 TABLET ORAL at 23:01

## 2017-02-06 RX ADMIN — METHOCARBAMOL 1500 MG: 750 TABLET ORAL at 09:53

## 2017-02-06 RX ADMIN — TEMOZOLOMIDE 140 MG: 140 CAPSULE ORAL at 19:31

## 2017-02-06 RX ADMIN — HYDROMORPHONE HYDROCHLORIDE 4 MG: 4 TABLET ORAL at 07:51

## 2017-02-06 RX ADMIN — GABAPENTIN 300 MG: 300 CAPSULE ORAL at 13:48

## 2017-02-06 RX ADMIN — HYDROMORPHONE HYDROCHLORIDE 4 MG: 4 TABLET ORAL at 19:01

## 2017-02-06 RX ADMIN — NICOTINE 21 MG: 21 PATCH TRANSDERMAL at 05:42

## 2017-02-06 RX ADMIN — METHOCARBAMOL 1500 MG: 750 TABLET ORAL at 19:01

## 2017-02-06 RX ADMIN — ONDANSETRON 4 MG: 2 INJECTION, SOLUTION INTRAMUSCULAR; INTRAVENOUS at 19:35

## 2017-02-06 ASSESSMENT — ENCOUNTER SYMPTOMS
HALLUCINATIONS: 0
DEPRESSION: 0
NERVOUS/ANXIOUS: 0
COUGH: 0
BLURRED VISION: 0
FOCAL WEAKNESS: 0
HEARTBURN: 0
HEADACHES: 1
DOUBLE VISION: 0
NECK PAIN: 1
CLAUDICATION: 0
SHORTNESS OF BREATH: 0
SEIZURES: 0
CHILLS: 0
EYE DISCHARGE: 0
WEAKNESS: 0
FEVER: 0
MYALGIAS: 0
DIZZINESS: 0
CONSTIPATION: 0

## 2017-02-06 ASSESSMENT — PAIN SCALES - GENERAL
PAINLEVEL_OUTOF10: 8
PAINLEVEL_OUTOF10: 9
PAINLEVEL_OUTOF10: 10
PAINLEVEL_OUTOF10: 8
PAINLEVEL_OUTOF10: 8

## 2017-02-06 NOTE — PROGRESS NOTES
Patient seen awake and oriented x4. Pain mentioned on head, constant. Medication given per MAR. Ambulates without any assistance. Patient has a steady gait. Voids without any discomfort. Went for radiation therapy at 0800. Instructed to call for assistance when needed. Safety precautions and hourly rounding in place.

## 2017-02-06 NOTE — PROGRESS NOTES
Subjective:  States vision some worse. No new symptoms over weekend. Staying in house through xrt  Obj:  AAOx4. Tongue ML. No drift. FCx4. PERRl 3mm bilat. CDI, slight scabbing superior end  Temp (24hrs), Av.8 °C (98.3 °F), Min:36.6 °C (97.9 °F), Max:37.1 °C (98.7 °F)    Pulse: 83, Respiration: 18, Blood Pressure: 118/70 mmHg, Pulse Oximetry: 92 %, O2 (LPM): 0            Intake/Output Summary (Last 24 hours) at 17 1143  Last data filed at 17 0000   Gross per 24 hour   Intake   1210 ml   Output    400 ml   Net    810 ml            • methocarbamol  1,500 mg     • gabapentin  300 mg     • HYDROmorphone  2-4 mg     • sulfamethoxazole-trimethoprim  1 Tab     • prochlorperazine  10 mg     • Temozolomide  140 mg      And   • temozolomide  5 mg     • dexamethasone  4 mg     • nicotine  21 mg     • nicotine polacrilex  2 mg     • levetiracetam  1,000 mg     • sertraline  25 mg     • docusate sodium  100 mg      And   • senna-docusate  1 Tab      And   • senna-docusate  1 Tab      And   • lactulose  30 mL      And   • bisacodyl  10 mg      And   • fleet  1 Each     • labetalol  10 mg     • ondansetron  4 mg     • ondansetron  4 mg     • promethazine  12.5-25 mg     • promethazine  12.5-25 mg     • prochlorperazine  5-10 mg     • lorazepam  4 mg         Recent Labs      17   0408   WBC  15.7*   RBC  4.48*   HEMOGLOBIN  14.2   HEMATOCRIT  42.2   MCV  94.2   MCH  31.7   PLATELETCT  227     Recent Labs      17   0408   SODIUM  131*   POTASSIUM  4.2   CHLORIDE  97   CO2  26   GLUCOSE  161*   BUN  24*   CREATININE  0.59   CALCIUM  8.9           Assessment:  HD#14 S/P GBM re-resection    Plan:  CT stable- reviewed by Dr Edgar  XRMARIO/Onc- may start xrt/temodar anytime  SW for dc planning  Nonsurgical  Decadron per XRT

## 2017-02-06 NOTE — PROGRESS NOTES
Bedside report completed, assumed pt care. Pt resting in bed, A&Ox4. Assessment complete.   Neuro intact, PERRLA,  equally, WNL  Healing crani scar to head present  Pt up self, tolerates well  On chemo precautions, educated to flush x 2 with voids  +BM, +flatus  Tolerating diet  Some nausea with chemo med admin, medicated per MAR  Pt declines numbness tingling chest pain and SOB  complaints of pain, medicated per MAR.   Ambulated in room.   Skin check done with julee VILLARREAL.   Discussed plan of care with pt. Call light within reach, bed in low position, possessions within reach, treaded socks on, floor free from trip hazards. SCDs refused.  refused. Will continue to monitor. Hourly rounding in place.

## 2017-02-06 NOTE — CARE PLAN
Problem: Safety  Goal: Will remain free from injury  Outcome: PROGRESSING AS EXPECTED  Call light within reach. Instructed to call for assistance when needed. Safety precautions and hourly rounding in place.    Problem: Pain Management  Goal: Pain level will decrease to patient’s comfort goal  Outcome: PROGRESSING AS EXPECTED  Pain medication given per MAR. Comfort measures provided.

## 2017-02-06 NOTE — PROGRESS NOTES
Pt educated regarding fall risk and safety. Pt bed in low position, call light and possessions within reach, treaded socks on, floor free from trip hazards. Pt educated regarding use of call light. Pt refuses bed alarm at this time. Hourly rounding in place.

## 2017-02-06 NOTE — PROGRESS NOTES
"Hospital Medicine Progress Note, Adult, Complex               Author: Yue Thakur Date & Time created: 2/5/2017  6:44 PM     Interval History:  1/31 Patient with GBM and currently undergoing daily radiation and started the oral chemo, temodar.  Patient states his head pain is better compared to previous hospital stay.  He doesn't really have answer as to why he eloped on 1/16 as he \"just had some things to take care of\".  He states he will be compliant with treatment.    2/1 Patient states he is doing well, he will stay in hospital through the end of his radiation therapies on 2/15/17 as scheduled.  He states his head/neck were stiff and remembers that I did OMM on his last stay and requested another treatment.  Facilitated positional release on left occiput done with good results.  2/2 Patient having headaches again, likely d/t radiation and muscle spasms at base of occiput where I worked yesterday.  Will schedule robaxin and stay consistent with one oral pain medication 2-4 mg po dilaudid every 4 hours as needed.  I will also resume the gabapentin I had him on last admission to also help with HA - 200mg tid to start.  2/3 Patient states headache is better with changes made. He is doing well with radiation treatments.  No acute changes today.  Will increase neurontin to 300 tid to maximize benefit.  2/4 Patient states his head and neck pain/tightness feels okay, he was feeling anxious as he thought radiation was late today - explained no radiation on weekends.  No new complaints.  2/5 Patient states he is doing okay, good appetite and no new complaints.    Review of Systems:  Review of Systems   Constitutional: Negative for fever and chills.   HENT: Negative for congestion and nosebleeds.    Eyes: Negative for blurred vision, double vision and discharge.   Respiratory: Negative for cough and shortness of breath.    Cardiovascular: Negative for chest pain, claudication and leg swelling.   Gastrointestinal: " Negative for heartburn and constipation.   Genitourinary: Negative for dysuria and frequency.   Musculoskeletal: Positive for neck pain (better). Negative for myalgias and joint pain.   Skin: Negative for itching and rash.   Neurological: Positive for headaches (better). Negative for dizziness, focal weakness, seizures (none in hospital) and weakness.   Psychiatric/Behavioral: Negative for depression and hallucinations. The patient is not nervous/anxious.        Physical Exam:  Physical Exam   Constitutional: He is oriented to person, place, and time. He appears well-developed and well-nourished. No distress.   HENT:   Head: Normocephalic and atraumatic.   Eyes: Conjunctivae are normal. No scleral icterus.   Neck: Neck supple. No JVD present.   Cardiovascular: Normal rate and regular rhythm.  Exam reveals no gallop and no friction rub.    No murmur heard.  Pulmonary/Chest: Effort normal and breath sounds normal. No respiratory distress. He exhibits no tenderness.   Abdominal: Soft. Bowel sounds are normal. He exhibits no distension and no mass. There is no tenderness.   Musculoskeletal: He exhibits no edema or tenderness.   Neurological: He is alert and oriented to person, place, and time. No cranial nerve deficit.   Skin: Skin is dry. He is not diaphoretic.   Psychiatric: He has a normal mood and affect. His behavior is normal.   Nursing note and vitals reviewed.      Labs:        Invalid input(s): [id number]      Recent Labs      17   040   SODIUM  131*   POTASSIUM  4.2   CHLORIDE  97   CO2  26   BUN  24*   CREATININE  0.59   CALCIUM  8.9     Recent Labs      17   040   GLUCOSE  161*     Recent Labs      17   0408   RBC  4.48*   HEMOGLOBIN  14.2   HEMATOCRIT  42.2   PLATELETCT  227     Recent Labs      17   0408   WBC  15.7*   NEUTSPOLYS  76.60*   LYMPHOCYTES  7.50*   MONOCYTES  7.00   EOSINOPHILS  0.00   BASOPHILS  0.80           Hemodynamics:  Temp (24hrs), Av.4 °C (97.5 °F),  Min:36.2 °C (97.1 °F), Max:36.6 °C (97.9 °F)  Temperature: 36.6 °C (97.9 °F)  Pulse  Av.3  Min: 60  Max: 101   Blood Pressure: 128/91 mmHg     Respiratory:    Respiration: 16, Pulse Oximetry: 90 %           Fluids:    Intake/Output Summary (Last 24 hours) at 17 1844  Last data filed at 17 1744   Gross per 24 hour   Intake    600 ml   Output      0 ml   Net    600 ml        GI/Nutrition:  Orders Placed This Encounter   Procedures   • DIET ORDER     Standing Status: Standing      Number of Occurrences: 1      Standing Expiration Date:      Order Specific Question:  Diet:     Answer:  Regular [1]     Medical Decision Making, by Problem:  Active Hospital Problems    Diagnosis   • Seizure (CMS-HCC) [R56.9]keppra, decadron     • Depression [F32.9]zoloft     • GBM (glioblastoma multiforme) (CMS-[facility]) [C71.9]Mocherla/Peddada, on daily rads and oral chemo     • Headache [R51]better, OMM as needed for muscle release.    Tobacco Abuse - nicotine replacement.    Headache - neurontin 300 tid, schedule robaxin, prn dilaudid for severe pain.       Labs reviewed, Radiology images reviewed and Medications reviewed  Anand catheter: No Anand      DVT Prophylaxis: Not indicated at this time, ambulatory  DVT prophylaxis - mechanical: SCDs

## 2017-02-06 NOTE — PROGRESS NOTES
Patient received treatment in Radiation Therapy. Patient received treatment number 8 of 15 planned.

## 2017-02-06 NOTE — CARE PLAN
Problem: Pain Management  Goal: Pain level will decrease to patient’s comfort goal  Medicated per MAR        Problem: Mobility  Goal: Risk for activity intolerance will decrease  Up self, steady, tolerating well.     Problem: Medication  Goal: Compliance with prescribed medication will improve  All meds given as ordered tonight.

## 2017-02-07 PROBLEM — D72.829 LEUKOCYTOSIS: Status: ACTIVE | Noted: 2017-02-07

## 2017-02-07 PROBLEM — E87.1 HYPONATREMIA: Status: ACTIVE | Noted: 2017-02-07

## 2017-02-07 LAB
ANION GAP SERPL CALC-SCNC: 9 MMOL/L (ref 0–11.9)
BASOPHILS # BLD AUTO: 0.8 % (ref 0–1.8)
BASOPHILS # BLD: 0.15 K/UL (ref 0–0.12)
BUN SERPL-MCNC: 25 MG/DL (ref 8–22)
CALCIUM SERPL-MCNC: 8.3 MG/DL (ref 8.5–10.5)
CHLORIDE SERPL-SCNC: 102 MMOL/L (ref 96–112)
CO2 SERPL-SCNC: 23 MMOL/L (ref 20–33)
COMMENT 1642: NORMAL
CREAT SERPL-MCNC: 0.54 MG/DL (ref 0.5–1.4)
EOSINOPHIL # BLD AUTO: 0 K/UL (ref 0–0.51)
EOSINOPHIL NFR BLD: 0 % (ref 0–6.9)
ERYTHROCYTE [DISTWIDTH] IN BLOOD BY AUTOMATED COUNT: 48.1 FL (ref 35.9–50)
GFR SERPL CREATININE-BSD FRML MDRD: >60 ML/MIN/1.73 M 2
GLUCOSE SERPL-MCNC: 163 MG/DL (ref 65–99)
HCT VFR BLD AUTO: 42.1 % (ref 42–52)
HGB BLD-MCNC: 14 G/DL (ref 14–18)
IMM GRANULOCYTES # BLD AUTO: 1.06 K/UL (ref 0–0.11)
IMM GRANULOCYTES NFR BLD AUTO: 5.9 % (ref 0–0.9)
LYMPHOCYTES # BLD AUTO: 1.17 K/UL (ref 1–4.8)
LYMPHOCYTES NFR BLD: 6.6 % (ref 22–41)
MCH RBC QN AUTO: 31.7 PG (ref 27–33)
MCHC RBC AUTO-ENTMCNC: 33.3 G/DL (ref 33.7–35.3)
MCV RBC AUTO: 95.5 FL (ref 81.4–97.8)
MONOCYTES # BLD AUTO: 0.99 K/UL (ref 0–0.85)
MONOCYTES NFR BLD AUTO: 5.5 % (ref 0–13.4)
MORPHOLOGY BLD-IMP: NORMAL
NEUTROPHILS # BLD AUTO: 14.49 K/UL (ref 1.82–7.42)
NEUTROPHILS NFR BLD: 81.2 % (ref 44–72)
NRBC # BLD AUTO: 0.02 K/UL
NRBC BLD AUTO-RTO: 0.1 /100 WBC
PLATELET # BLD AUTO: 178 K/UL (ref 164–446)
PMV BLD AUTO: 9.4 FL (ref 9–12.9)
POTASSIUM SERPL-SCNC: 4.1 MMOL/L (ref 3.6–5.5)
RBC # BLD AUTO: 4.41 M/UL (ref 4.7–6.1)
SODIUM SERPL-SCNC: 134 MMOL/L (ref 135–145)
WBC # BLD AUTO: 17.9 K/UL (ref 4.8–10.8)

## 2017-02-07 PROCEDURE — 77014 PR CT GUIDANCE PLACEMENT RAD THERAPY FIELDS: CPT | Mod: 26 | Performed by: RADIOLOGY

## 2017-02-07 PROCEDURE — 85025 COMPLETE CBC W/AUTO DIFF WBC: CPT

## 2017-02-07 PROCEDURE — 700102 HCHG RX REV CODE 250 W/ 637 OVERRIDE(OP): Performed by: NURSE PRACTITIONER

## 2017-02-07 PROCEDURE — 700102 HCHG RX REV CODE 250 W/ 637 OVERRIDE(OP): Performed by: INTERNAL MEDICINE

## 2017-02-07 PROCEDURE — A9270 NON-COVERED ITEM OR SERVICE: HCPCS | Performed by: INTERNAL MEDICINE

## 2017-02-07 PROCEDURE — 77386 HCHG IMRT DELIVERY COMPLEX: CPT | Performed by: RADIOLOGY

## 2017-02-07 PROCEDURE — 80048 BASIC METABOLIC PNL TOTAL CA: CPT

## 2017-02-07 PROCEDURE — 700111 HCHG RX REV CODE 636 W/ 250 OVERRIDE (IP): Performed by: INTERNAL MEDICINE

## 2017-02-07 PROCEDURE — A9270 NON-COVERED ITEM OR SERVICE: HCPCS | Performed by: NURSE PRACTITIONER

## 2017-02-07 PROCEDURE — 36415 COLL VENOUS BLD VENIPUNCTURE: CPT

## 2017-02-07 PROCEDURE — 770001 HCHG ROOM/CARE - MED/SURG/GYN PRIV*

## 2017-02-07 PROCEDURE — 700112 HCHG RX REV CODE 229: Performed by: INTERNAL MEDICINE

## 2017-02-07 PROCEDURE — 700102 HCHG RX REV CODE 250 W/ 637 OVERRIDE(OP): Performed by: FAMILY MEDICINE

## 2017-02-07 PROCEDURE — A9270 NON-COVERED ITEM OR SERVICE: HCPCS | Performed by: FAMILY MEDICINE

## 2017-02-07 PROCEDURE — 99233 SBSQ HOSP IP/OBS HIGH 50: CPT | Performed by: FAMILY MEDICINE

## 2017-02-07 RX ORDER — SODIUM CHLORIDE 1 G/1
1 TABLET ORAL 2 TIMES DAILY WITH MEALS
Status: DISCONTINUED | OUTPATIENT
Start: 2017-02-07 | End: 2017-02-15 | Stop reason: HOSPADM

## 2017-02-07 RX ADMIN — METHOCARBAMOL 1500 MG: 750 TABLET ORAL at 07:58

## 2017-02-07 RX ADMIN — TEMOZOLOMIDE 140 MG: 140 CAPSULE ORAL at 21:00

## 2017-02-07 RX ADMIN — NICOTINE 21 MG: 21 PATCH TRANSDERMAL at 07:02

## 2017-02-07 RX ADMIN — LEVETIRACETAM 1000 MG: 250 TABLET, FILM COATED ORAL at 20:26

## 2017-02-07 RX ADMIN — SULFAMETHOXAZOLE AND TRIMETHOPRIM 1 TABLET: 800; 160 TABLET ORAL at 07:58

## 2017-02-07 RX ADMIN — ONDANSETRON 4 MG: 2 INJECTION, SOLUTION INTRAMUSCULAR; INTRAVENOUS at 20:26

## 2017-02-07 RX ADMIN — METHOCARBAMOL 1500 MG: 750 TABLET ORAL at 16:45

## 2017-02-07 RX ADMIN — HYDROMORPHONE HYDROCHLORIDE 4 MG: 4 TABLET ORAL at 16:45

## 2017-02-07 RX ADMIN — DOCUSATE SODIUM 100 MG: 100 CAPSULE ORAL at 07:58

## 2017-02-07 RX ADMIN — HYDROMORPHONE HYDROCHLORIDE 4 MG: 4 TABLET ORAL at 03:06

## 2017-02-07 RX ADMIN — FAMOTIDINE 40 MG: 20 TABLET, FILM COATED ORAL at 07:58

## 2017-02-07 RX ADMIN — GABAPENTIN 300 MG: 300 CAPSULE ORAL at 16:45

## 2017-02-07 RX ADMIN — DEXAMETHASONE 4 MG: 4 TABLET ORAL at 20:27

## 2017-02-07 RX ADMIN — METHOCARBAMOL 1500 MG: 750 TABLET ORAL at 20:26

## 2017-02-07 RX ADMIN — TEMOZOLOMIDE 5 MG: 5 CAPSULE ORAL at 21:00

## 2017-02-07 RX ADMIN — HYDROMORPHONE HYDROCHLORIDE 4 MG: 4 TABLET ORAL at 07:02

## 2017-02-07 RX ADMIN — METHOCARBAMOL 1500 MG: 750 TABLET ORAL at 12:05

## 2017-02-07 RX ADMIN — STANDARDIZED SENNA CONCENTRATE AND DOCUSATE SODIUM 1 TABLET: 8.6; 5 TABLET, FILM COATED ORAL at 20:27

## 2017-02-07 RX ADMIN — GABAPENTIN 300 MG: 300 CAPSULE ORAL at 20:26

## 2017-02-07 RX ADMIN — GABAPENTIN 300 MG: 300 CAPSULE ORAL at 08:08

## 2017-02-07 RX ADMIN — DEXAMETHASONE 4 MG: 4 TABLET ORAL at 17:29

## 2017-02-07 RX ADMIN — SERTRALINE 25 MG: 50 TABLET, FILM COATED ORAL at 20:27

## 2017-02-07 RX ADMIN — SODIUM CHLORIDE TAB 1 GM 1 G: 1 TAB at 20:26

## 2017-02-07 RX ADMIN — HYDROMORPHONE HYDROCHLORIDE 4 MG: 4 TABLET ORAL at 12:01

## 2017-02-07 RX ADMIN — DEXAMETHASONE 4 MG: 4 TABLET ORAL at 07:58

## 2017-02-07 RX ADMIN — DEXAMETHASONE 4 MG: 4 TABLET ORAL at 12:05

## 2017-02-07 RX ADMIN — LEVETIRACETAM 1000 MG: 250 TABLET, FILM COATED ORAL at 07:58

## 2017-02-07 ASSESSMENT — ENCOUNTER SYMPTOMS
WEIGHT LOSS: 0
NAUSEA: 0
COUGH: 0
WHEEZING: 0
FEVER: 0
BLURRED VISION: 0
HEADACHES: 1
DIARRHEA: 0
SEIZURES: 0
SPEECH CHANGE: 0
HEARTBURN: 0
SENSORY CHANGE: 0
CONSTIPATION: 0
ABDOMINAL PAIN: 0
CHILLS: 0
VOMITING: 0
SHORTNESS OF BREATH: 0
SORE THROAT: 0
DIZZINESS: 0
FOCAL WEAKNESS: 0

## 2017-02-07 ASSESSMENT — PAIN SCALES - GENERAL
PAINLEVEL_OUTOF10: 6
PAINLEVEL_OUTOF10: 8

## 2017-02-07 ASSESSMENT — LIFESTYLE VARIABLES: DO YOU DRINK ALCOHOL: NO

## 2017-02-07 NOTE — PROGRESS NOTES
"Hospital Medicine Progress Note, Adult, Complex               Author: Yue Thakur Date & Time created: 2/6/2017  7:56 PM     Interval History:  1/31 Patient with GBM and currently undergoing daily radiation and started the oral chemo, temodar.  Patient states his head pain is better compared to previous hospital stay.  He doesn't really have answer as to why he eloped on 1/16 as he \"just had some things to take care of\".  He states he will be compliant with treatment.    2/1 Patient states he is doing well, he will stay in hospital through the end of his radiation therapies on 2/15/17 as scheduled.  He states his head/neck were stiff and remembers that I did OMM on his last stay and requested another treatment.  Facilitated positional release on left occiput done with good results.  2/2 Patient having headaches again, likely d/t radiation and muscle spasms at base of occiput where I worked yesterday.  Will schedule robaxin and stay consistent with one oral pain medication 2-4 mg po dilaudid every 4 hours as needed.  I will also resume the gabapentin I had him on last admission to also help with HA - 200mg tid to start.  2/3 Patient states headache is better with changes made. He is doing well with radiation treatments.  No acute changes today.  Will increase neurontin to 300 tid to maximize benefit.  2/4 Patient states his head and neck pain/tightness feels okay, he was feeling anxious as he thought radiation was late today - explained no radiation on weekends.  No new complaints.  2/5 Patient states he is doing okay, good appetite and no new complaints.  2/6 Patient with indigestion after radiation today but also having chocolate milkshake at the time.  Will add 4th dose robaxin daily for more muscle relaxation post radiation.  No other complaints.    Review of Systems:  Review of Systems   Constitutional: Negative for fever and chills.   HENT: Negative for congestion and nosebleeds.    Eyes: Negative for blurred " vision, double vision and discharge.   Respiratory: Negative for cough and shortness of breath.    Cardiovascular: Negative for chest pain, claudication and leg swelling.   Gastrointestinal: Negative for heartburn and constipation.   Genitourinary: Negative for dysuria and frequency.   Musculoskeletal: Positive for neck pain (better). Negative for myalgias and joint pain.   Skin: Negative for itching and rash.   Neurological: Positive for headaches (better). Negative for dizziness, focal weakness, seizures (none in hospital) and weakness.   Psychiatric/Behavioral: Negative for depression and hallucinations. The patient is not nervous/anxious.        Physical Exam:  Physical Exam   Constitutional: He is oriented to person, place, and time. He appears well-developed and well-nourished. No distress.   HENT:   Head: Normocephalic and atraumatic.   Eyes: Conjunctivae are normal. No scleral icterus.   Neck: Neck supple. No JVD present.   Cardiovascular: Normal rate and regular rhythm.  Exam reveals no gallop and no friction rub.    No murmur heard.  Pulmonary/Chest: Effort normal and breath sounds normal. No respiratory distress. He exhibits no tenderness.   Abdominal: Soft. Bowel sounds are normal. He exhibits no distension and no mass. There is no tenderness.   Musculoskeletal: He exhibits no edema or tenderness.   Neurological: He is alert and oriented to person, place, and time. No cranial nerve deficit.   Skin: Skin is dry. He is not diaphoretic.   Psychiatric: He has a normal mood and affect. His behavior is normal.   Nursing note and vitals reviewed.      Labs:        Invalid input(s): [id number]      Recent Labs      02/04/17   0408  02/06/17   1157   SODIUM  131*  132*   POTASSIUM  4.2  4.4   CHLORIDE  97  98   CO2  26  29   BUN  24*  25*   CREATININE  0.59  0.50   CALCIUM  8.9  8.8     Recent Labs      02/04/17   0408  02/06/17   1157   GLUCOSE  161*  105*     Recent Labs      02/04/17   0408   RBC  4.48*    HEMOGLOBIN  14.2   HEMATOCRIT  42.2   PLATELETCT  227     Recent Labs      17   0408   WBC  15.7*   NEUTSPOLYS  76.60*   LYMPHOCYTES  7.50*   MONOCYTES  7.00   EOSINOPHILS  0.00   BASOPHILS  0.80           Hemodynamics:  Temp (24hrs), Av °C (98.6 °F), Min:36.8 °C (98.3 °F), Max:37.2 °C (98.9 °F)  Temperature: 37.2 °C (98.9 °F)  Pulse  Av.7  Min: 60  Max: 101   Blood Pressure: 127/79 mmHg     Respiratory:    Respiration: 18, Pulse Oximetry: 90 %        RUL Breath Sounds: Clear, RML Breath Sounds: Clear, RLL Breath Sounds: Clear, FLY Breath Sounds: Clear, LLL Breath Sounds: Clear  Fluids:    Intake/Output Summary (Last 24 hours) at 17  Last data filed at 17 194   Gross per 24 hour   Intake    800 ml   Output      0 ml   Net    800 ml        GI/Nutrition:  Orders Placed This Encounter   Procedures   • DIET ORDER     Standing Status: Standing      Number of Occurrences: 1      Standing Expiration Date:      Order Specific Question:  Diet:     Answer:  Regular [1]     Medical Decision Making, by Problem:  Active Hospital Problems    Diagnosis   • Seizure (CMS-HCC) [R56.9]keppra, decadron     • Depression [F32.9]zoloft     • GBM (glioblastoma multiforme) (CMS-HCC) [C71.9]Mocherla/Peddada, on daily rads and oral chemo     • Headache [R51]better, OMM as needed for muscle release.    Tobacco Abuse - nicotine replacement.    Headache - neurontin 300 tid, schedule robaxin qid, prn oral dilaudid for severe pain.       Labs reviewed, Radiology images reviewed and Medications reviewed  Anand catheter: No Anand      DVT Prophylaxis: Not indicated at this time, ambulatory  DVT prophylaxis - mechanical: SCDs

## 2017-02-07 NOTE — PROGRESS NOTES
Pt refusing bed alarm. Educated about preventing falls and importance of safety, pt verbalizes understanding.

## 2017-02-07 NOTE — PROGRESS NOTES
Oncology/Hematology Progress Note               Author: Debbie Glynn Date & Time created: 2/7/2017  12:41 PM     Interval History:  46-year-old male patient of Dr. Lee with glioblastoma, admitted for headaches and seizures.    Patient is tolerating treatment well.  Patient denies any nausea or vomiting.  He is taking his Temodar every night with his Bactrim daily for PCP prophylaxis.  He is tolerating radiation therapy well to the brain.  Stated his appetite is well.  He is ambulating the halls.  Stated his headaches are a little better, especially when he takes Dilaudid with his muscle relaxer.  Patient stated he is having normal bowel movements and denies any diarrhea.    Review of Systems:  Review of Systems   Constitutional: Negative for weight loss and malaise/fatigue.   Respiratory: Negative for cough, shortness of breath and wheezing.    Gastrointestinal: Negative for nausea, vomiting, diarrhea and constipation.   Genitourinary: Negative for dysuria.   Neurological: Positive for headaches (mild but improving with muscle relaxer and Dilaudid). Negative for dizziness.       Physical Exam:  Physical Exam   Constitutional: He is oriented to person, place, and time. He appears well-developed and well-nourished. No distress.   HENT:   Head: Normocephalic and atraumatic.   Mouth/Throat: Oropharynx is clear and moist. No oropharyngeal exudate.   Eyes: Conjunctivae and EOM are normal. Pupils are equal, round, and reactive to light. Right eye exhibits no discharge. Left eye exhibits no discharge. No scleral icterus.   Cardiovascular: Normal rate, regular rhythm, normal heart sounds and intact distal pulses.  Exam reveals no gallop and no friction rub.    No murmur heard.  Pulmonary/Chest: Effort normal and breath sounds normal. No respiratory distress. He has no wheezes.   Abdominal: Soft. Bowel sounds are normal. He exhibits no distension. There is no tenderness.   Neurological: He is alert and oriented to  person, place, and time.   Skin: He is not diaphoretic.   Vitals reviewed.      Labs:        Invalid input(s): [id number]      Recent Labs      177  17   0236   SODIUM  132*  134*   POTASSIUM  4.4  4.1   CHLORIDE  98  102   CO2  29  23   BUN  25*  25*   CREATININE  0.50  0.54   CALCIUM  8.8  8.3*     Recent Labs      17   1157  17   0236   GLUCOSE  105*  163*     Recent Labs      17   0236   RBC  4.41*   HEMOGLOBIN  14.0   HEMATOCRIT  42.1   PLATELETCT  178     Recent Labs      17   0236   WBC  17.9*   NEUTSPOLYS  81.20*   LYMPHOCYTES  6.60*   MONOCYTES  5.50   EOSINOPHILS  0.00   BASOPHILS  0.80     Recent Labs      17   11517   0236   SODIUM  132*  134*   POTASSIUM  4.4  4.1   CHLORIDE  98  102   CO2  29  23   GLUCOSE  105*  163*   BUN  25*  25*   CREATININE  0.50  0.54   CALCIUM  8.8  8.3*     Hemodynamics:  Temp (24hrs), Av.9 °C (98.4 °F), Min:36.7 °C (98 °F), Max:37.2 °C (98.9 °F)  Temperature: 36.7 °C (98 °F)  Pulse  Av.1  Min: 60  Max: 101   Blood Pressure: 131/96 mmHg     Respiratory:    Respiration: 18, Pulse Oximetry: 93 %        RUL Breath Sounds: Clear, RML Breath Sounds: Clear, RLL Breath Sounds: Clear, FLY Breath Sounds: Clear, LLL Breath Sounds: Clear  Fluids:    Intake/Output Summary (Last 24 hours) at 17 1241  Last data filed at 17 0402   Gross per 24 hour   Intake    600 ml   Output      0 ml   Net    600 ml        GI/Nutrition:  Orders Placed This Encounter   Procedures   • DIET ORDER     Standing Status: Standing      Number of Occurrences: 1      Standing Expiration Date:      Order Specific Question:  Diet:     Answer:  Regular [1]     Medical Decision Making, by Problem:  Active Hospital Problems    Diagnosis   • Seizure (CMS-HCC) [R56.9]   • Depression [F32.9]   • GBM (glioblastoma multiforme) (CMS-HCC) [C71.9]   • Headache [R51]       Plan:  1. GBM - patient on continued treatment with Temodar and radiation  therapy to brain. Due to significant compliance issues patient remaining in the hospital until completion of therapy, which is scheduled to be completed approximately around February 15. Patient is to continue on daily Temodar with today dose of Bactrim for PCP prophylaxis. Continue to monitor labs intermittently.    2. Headaches - patient stated his headaches are a little better especially when he takes Dilaudid with his muscle relaxers. We will defer further management to the hospitalist team.    Labs reviewed and Medications reviewed  Anand catheter: No Anand      DVT Prophylaxis: Not indicated at this time, ambulatory  DVT prophylaxis - mechanical: Not indicated at this time, ambulatory  Ulcer prophylaxis: Not indicated

## 2017-02-07 NOTE — PROGRESS NOTES
Pt A&OX4, c/o constant HA, needing pain medications q4. Medicated with zofran prior to Temodar, effective, pt tolerating diet, -N/V, pepcid given per MAR. Pt denies N/T. Ambulating up self with FWW at times, tolerates well.

## 2017-02-07 NOTE — CARE PLAN
Problem: Safety  Goal: Will remain free from injury  Outcome: PROGRESSING AS EXPECTED  Pt refusing three side rails padded   Educated about safety and preventing falls, pt verbalizes understanding         Problem: Pain Management  Goal: Pain level will decrease to patient’s comfort goal  Outcome: PROGRESSING AS EXPECTED  Medicating for HA PRN per MAR   Offering non pharm interventions         Problem: Psychosocial Needs:  Goal: Level of anxiety will decrease  Outcome: PROGRESSING AS EXPECTED  Allowing for pt to express feelings and concerns

## 2017-02-08 ENCOUNTER — APPOINTMENT (OUTPATIENT)
Dept: RADIATION ONCOLOGY | Facility: MEDICAL CENTER | Age: 47
End: 2017-02-08
Attending: RADIOLOGY
Payer: COMMERCIAL

## 2017-02-08 LAB
ANION GAP SERPL CALC-SCNC: 8 MMOL/L (ref 0–11.9)
BASOPHILS # BLD AUTO: 0.6 % (ref 0–1.8)
BASOPHILS # BLD: 0.1 K/UL (ref 0–0.12)
BUN SERPL-MCNC: 21 MG/DL (ref 8–22)
CALCIUM SERPL-MCNC: 8.8 MG/DL (ref 8.5–10.5)
CHLORIDE SERPL-SCNC: 99 MMOL/L (ref 96–112)
CO2 SERPL-SCNC: 27 MMOL/L (ref 20–33)
CREAT SERPL-MCNC: 0.54 MG/DL (ref 0.5–1.4)
EOSINOPHIL # BLD AUTO: 0 K/UL (ref 0–0.51)
EOSINOPHIL NFR BLD: 0 % (ref 0–6.9)
ERYTHROCYTE [DISTWIDTH] IN BLOOD BY AUTOMATED COUNT: 46.4 FL (ref 35.9–50)
GFR SERPL CREATININE-BSD FRML MDRD: >60 ML/MIN/1.73 M 2
GLUCOSE SERPL-MCNC: 134 MG/DL (ref 65–99)
HCT VFR BLD AUTO: 40 % (ref 42–52)
HGB BLD-MCNC: 13.5 G/DL (ref 14–18)
IMM GRANULOCYTES # BLD AUTO: 0.8 K/UL (ref 0–0.11)
IMM GRANULOCYTES NFR BLD AUTO: 4.7 % (ref 0–0.9)
LYMPHOCYTES # BLD AUTO: 1.05 K/UL (ref 1–4.8)
LYMPHOCYTES NFR BLD: 6.2 % (ref 22–41)
MCH RBC QN AUTO: 31.8 PG (ref 27–33)
MCHC RBC AUTO-ENTMCNC: 33.8 G/DL (ref 33.7–35.3)
MCV RBC AUTO: 94.1 FL (ref 81.4–97.8)
MONOCYTES # BLD AUTO: 1.31 K/UL (ref 0–0.85)
MONOCYTES NFR BLD AUTO: 7.7 % (ref 0–13.4)
NEUTROPHILS # BLD AUTO: 13.71 K/UL (ref 1.82–7.42)
NEUTROPHILS NFR BLD: 80.8 % (ref 44–72)
NRBC # BLD AUTO: 0 K/UL
NRBC BLD AUTO-RTO: 0 /100 WBC
PLATELET # BLD AUTO: 168 K/UL (ref 164–446)
PMV BLD AUTO: 9.4 FL (ref 9–12.9)
POTASSIUM SERPL-SCNC: 4.3 MMOL/L (ref 3.6–5.5)
RBC # BLD AUTO: 4.25 M/UL (ref 4.7–6.1)
SODIUM SERPL-SCNC: 134 MMOL/L (ref 135–145)
WBC # BLD AUTO: 17 K/UL (ref 4.8–10.8)

## 2017-02-08 PROCEDURE — 700102 HCHG RX REV CODE 250 W/ 637 OVERRIDE(OP): Performed by: NURSE PRACTITIONER

## 2017-02-08 PROCEDURE — A9270 NON-COVERED ITEM OR SERVICE: HCPCS | Performed by: INTERNAL MEDICINE

## 2017-02-08 PROCEDURE — 700102 HCHG RX REV CODE 250 W/ 637 OVERRIDE(OP): Performed by: INTERNAL MEDICINE

## 2017-02-08 PROCEDURE — 700112 HCHG RX REV CODE 229: Performed by: INTERNAL MEDICINE

## 2017-02-08 PROCEDURE — 77386 HCHG IMRT DELIVERY COMPLEX: CPT | Performed by: RADIOLOGY

## 2017-02-08 PROCEDURE — 77427 RADIATION TX MANAGEMENT X5: CPT | Performed by: RADIOLOGY

## 2017-02-08 PROCEDURE — 77014 PR CT GUIDANCE PLACEMENT RAD THERAPY FIELDS: CPT | Mod: 26 | Performed by: RADIOLOGY

## 2017-02-08 PROCEDURE — 770001 HCHG ROOM/CARE - MED/SURG/GYN PRIV*

## 2017-02-08 PROCEDURE — 99232 SBSQ HOSP IP/OBS MODERATE 35: CPT | Performed by: FAMILY MEDICINE

## 2017-02-08 PROCEDURE — 700111 HCHG RX REV CODE 636 W/ 250 OVERRIDE (IP): Performed by: INTERNAL MEDICINE

## 2017-02-08 PROCEDURE — A9270 NON-COVERED ITEM OR SERVICE: HCPCS | Performed by: FAMILY MEDICINE

## 2017-02-08 PROCEDURE — 85025 COMPLETE CBC W/AUTO DIFF WBC: CPT

## 2017-02-08 PROCEDURE — 700102 HCHG RX REV CODE 250 W/ 637 OVERRIDE(OP): Performed by: FAMILY MEDICINE

## 2017-02-08 PROCEDURE — 36415 COLL VENOUS BLD VENIPUNCTURE: CPT

## 2017-02-08 PROCEDURE — 80048 BASIC METABOLIC PNL TOTAL CA: CPT

## 2017-02-08 PROCEDURE — A9270 NON-COVERED ITEM OR SERVICE: HCPCS | Performed by: NURSE PRACTITIONER

## 2017-02-08 RX ADMIN — HYDROMORPHONE HYDROCHLORIDE 4 MG: 4 TABLET ORAL at 12:47

## 2017-02-08 RX ADMIN — LEVETIRACETAM 1000 MG: 250 TABLET, FILM COATED ORAL at 20:43

## 2017-02-08 RX ADMIN — HYDROMORPHONE HYDROCHLORIDE 4 MG: 4 TABLET ORAL at 20:44

## 2017-02-08 RX ADMIN — GABAPENTIN 300 MG: 300 CAPSULE ORAL at 14:58

## 2017-02-08 RX ADMIN — PROMETHAZINE HYDROCHLORIDE 25 MG: 25 TABLET ORAL at 09:58

## 2017-02-08 RX ADMIN — DEXAMETHASONE 4 MG: 4 TABLET ORAL at 12:48

## 2017-02-08 RX ADMIN — GABAPENTIN 300 MG: 300 CAPSULE ORAL at 09:58

## 2017-02-08 RX ADMIN — ONDANSETRON 4 MG: 2 INJECTION, SOLUTION INTRAMUSCULAR; INTRAVENOUS at 20:43

## 2017-02-08 RX ADMIN — SERTRALINE 25 MG: 50 TABLET, FILM COATED ORAL at 20:44

## 2017-02-08 RX ADMIN — PROCHLORPERAZINE EDISYLATE 10 MG: 5 INJECTION INTRAMUSCULAR; INTRAVENOUS at 23:08

## 2017-02-08 RX ADMIN — SODIUM CHLORIDE TAB 1 GM 1 G: 1 TAB at 17:00

## 2017-02-08 RX ADMIN — SULFAMETHOXAZOLE AND TRIMETHOPRIM 1 TABLET: 800; 160 TABLET ORAL at 09:58

## 2017-02-08 RX ADMIN — METHOCARBAMOL 1500 MG: 750 TABLET ORAL at 12:47

## 2017-02-08 RX ADMIN — STANDARDIZED SENNA CONCENTRATE AND DOCUSATE SODIUM 1 TABLET: 8.6; 5 TABLET, FILM COATED ORAL at 20:44

## 2017-02-08 RX ADMIN — HYDROMORPHONE HYDROCHLORIDE 4 MG: 4 TABLET ORAL at 03:09

## 2017-02-08 RX ADMIN — SODIUM CHLORIDE TAB 1 GM 1 G: 1 TAB at 06:01

## 2017-02-08 RX ADMIN — TEMOZOLOMIDE 140 MG: 140 CAPSULE ORAL at 20:42

## 2017-02-08 RX ADMIN — METHOCARBAMOL 1500 MG: 750 TABLET ORAL at 17:00

## 2017-02-08 RX ADMIN — DEXAMETHASONE 4 MG: 4 TABLET ORAL at 09:58

## 2017-02-08 RX ADMIN — GABAPENTIN 300 MG: 300 CAPSULE ORAL at 20:44

## 2017-02-08 RX ADMIN — DEXAMETHASONE 4 MG: 4 TABLET ORAL at 20:44

## 2017-02-08 RX ADMIN — HYDROMORPHONE HYDROCHLORIDE 4 MG: 4 TABLET ORAL at 17:00

## 2017-02-08 RX ADMIN — DEXAMETHASONE 4 MG: 4 TABLET ORAL at 17:00

## 2017-02-08 RX ADMIN — TEMOZOLOMIDE 5 MG: 5 CAPSULE ORAL at 20:42

## 2017-02-08 RX ADMIN — FAMOTIDINE 40 MG: 20 TABLET, FILM COATED ORAL at 09:58

## 2017-02-08 RX ADMIN — METHOCARBAMOL 1500 MG: 750 TABLET ORAL at 20:44

## 2017-02-08 RX ADMIN — METHOCARBAMOL 1500 MG: 750 TABLET ORAL at 07:52

## 2017-02-08 RX ADMIN — LEVETIRACETAM 1000 MG: 250 TABLET, FILM COATED ORAL at 09:58

## 2017-02-08 RX ADMIN — DOCUSATE SODIUM 100 MG: 100 CAPSULE ORAL at 09:58

## 2017-02-08 RX ADMIN — ONDANSETRON 4 MG: 2 INJECTION, SOLUTION INTRAMUSCULAR; INTRAVENOUS at 06:57

## 2017-02-08 RX ADMIN — HYDROMORPHONE HYDROCHLORIDE 4 MG: 4 TABLET ORAL at 07:52

## 2017-02-08 RX ADMIN — ONDANSETRON 4 MG: 4 TABLET, ORALLY DISINTEGRATING ORAL at 12:46

## 2017-02-08 ASSESSMENT — LIFESTYLE VARIABLES
TOTAL SCORE: 0
HAVE YOU EVER FELT YOU SHOULD CUT DOWN ON YOUR DRINKING: NO
EVER HAD A DRINK FIRST THING IN THE MORNING TO STEADY YOUR NERVES TO GET RID OF A HANGOVER: NO
CONSUMPTION TOTAL: POSITIVE
TOTAL SCORE: 0
EVER FELT BAD OR GUILTY ABOUT YOUR DRINKING: NO
DO YOU DRINK ALCOHOL: NO
TOTAL SCORE: 0
ON A TYPICAL DAY WHEN YOU DRINK ALCOHOL HOW MANY DRINKS DO YOU HAVE: 1
HOW MANY TIMES IN THE PAST YEAR HAVE YOU HAD 5 OR MORE DRINKS IN A DAY: 5
HAVE PEOPLE ANNOYED YOU BY CRITICIZING YOUR DRINKING: NO
AVERAGE NUMBER OF DAYS PER WEEK YOU HAVE A DRINK CONTAINING ALCOHOL: 1

## 2017-02-08 ASSESSMENT — ENCOUNTER SYMPTOMS
VOMITING: 0
WHEEZING: 0
SEIZURES: 0
HEARTBURN: 0
ABDOMINAL PAIN: 0
HEADACHES: 1
SORE THROAT: 0
NAUSEA: 0
DIZZINESS: 0
SENSORY CHANGE: 0
FEVER: 0
DIARRHEA: 0
FOCAL WEAKNESS: 0
BLURRED VISION: 0
COUGH: 0
CHILLS: 0
SHORTNESS OF BREATH: 0
SPEECH CHANGE: 0

## 2017-02-08 ASSESSMENT — PAIN SCALES - GENERAL
PAINLEVEL_OUTOF10: 6
PAINLEVEL_OUTOF10: 7
PAINLEVEL_OUTOF10: 6

## 2017-02-08 NOTE — PROGRESS NOTES
Hospital Medicine Progress Note, Adult, Complex               Author: Tai Yun Date & Time created: 2/7/2017  7:02 PM     Interval History:  Admitted for Glioblastoma, Seizure.    GBM - less headache and pain  Seizure - no recurrence    Review of Systems:  Review of Systems   Constitutional: Negative for fever and chills.   HENT: Negative for sore throat.    Eyes: Negative for blurred vision.   Respiratory: Negative for cough, shortness of breath and wheezing.    Cardiovascular: Negative for chest pain and leg swelling.   Gastrointestinal: Negative for heartburn, nausea, vomiting, abdominal pain and diarrhea.   Genitourinary: Negative for dysuria.   Neurological: Positive for headaches. Negative for dizziness, sensory change, speech change, focal weakness and seizures.       Physical Exam:  Physical Exam   Constitutional: He is oriented to person, place, and time. He appears well-developed and well-nourished.   HENT:   Head: Normocephalic.   R craniotomy healed   Eyes: Conjunctivae are normal. Pupils are equal, round, and reactive to light.   Neck: No tracheal deviation present. No thyromegaly present.   Cardiovascular: Normal rate and regular rhythm.    Pulmonary/Chest: Effort normal and breath sounds normal.   Abdominal: Soft. Bowel sounds are normal. He exhibits no distension. There is no tenderness.   Lymphadenopathy:     He has no cervical adenopathy.   Neurological: He is alert and oriented to person, place, and time. No cranial nerve deficit.   MMT 5/5   Skin: Skin is warm and dry.   Nursing note and vitals reviewed.      Labs:        Invalid input(s): [id number]      Recent Labs      02/06/17   1157  02/07/17   0236   SODIUM  132*  134*   POTASSIUM  4.4  4.1   CHLORIDE  98  102   CO2  29  23   BUN  25*  25*   CREATININE  0.50  0.54   CALCIUM  8.8  8.3*     Recent Labs      02/06/17   1157  02/07/17   0236   GLUCOSE  105*  163*     Recent Labs      02/07/17   0236   RBC  4.41*   HEMOGLOBIN  14.0    HEMATOCRIT  42.1   PLATELETCT  178     Recent Labs      17   0236   WBC  17.9*   NEUTSPOLYS  81.20*   LYMPHOCYTES  6.60*   MONOCYTES  5.50   EOSINOPHILS  0.00   BASOPHILS  0.80           Hemodynamics:  Temp (24hrs), Av.8 °C (98.2 °F), Min:36.7 °C (98 °F), Max:37.1 °C (98.7 °F)  Temperature: 36.7 °C (98 °F)  Pulse  Av.4  Min: 60  Max: 101   Blood Pressure: 141/87 mmHg     Respiratory:    Respiration: 18, Pulse Oximetry: 91 %        RUL Breath Sounds: Clear, RML Breath Sounds: Clear, RLL Breath Sounds: Clear, FLY Breath Sounds: Clear, LLL Breath Sounds: Clear  Fluids:    Intake/Output Summary (Last 24 hours) at 17 1902  Last data filed at 17 0402   Gross per 24 hour   Intake    600 ml   Output      0 ml   Net    600 ml        GI/Nutrition:  Orders Placed This Encounter   Procedures   • DIET ORDER     Standing Status: Standing      Number of Occurrences: 1      Standing Expiration Date:      Order Specific Question:  Diet:     Answer:  Regular [1]     Medical Decision Making, by Problem:  Active Hospital Problems    Diagnosis   • *GBM (glioblastoma multiforme) (CMS-HCC) [C71.9]      Temodar, Radiation treatments, Decadron   • Leukocytosis [D72.829]       steroid induced?, follow cbc   • Hyponatremia [E87.1]         start Salt tabs, follow bmp   • Seizure (CMS-HCC) [R56.9]      Keppra   • Steroid-induced hyperglycemia [R73.9]        recent hgba1c 5.3   • Depression [F32.9]       Zoloft   • Alcohol abuse [F10.10]      Last drink 2 mos ago   • Tobacco abuse [Z72.0]      Nicotine patch        Noncompliance.    counseling    Medications reviewed, Labs reviewed and Radiology images reviewed  Anand catheter: No Anand      DVT Prophylaxis: Contraindicated - High bleeding risk  DVT prophylaxis - mechanical: SCDs  Ulcer prophylaxis: Yes

## 2017-02-08 NOTE — PROGRESS NOTES
Patient arrived back to his room a few minutes ago. VSS. Patient in bed with callbell w/in reach. Bed in lowest position and locked. Patient asked to call for assistance and patient verbalized understanding. Will continue to monitor closely for any changes.

## 2017-02-08 NOTE — PROGRESS NOTES
Patient sitting up in the wheelchair at the time of assessment. Transport arrived to take patient for radiation therapy. Patient c/o pain and requested pain medication and a muscle relaxer prior to being taken for radiation. RN gave those medications as requested and as ordered by MD. Patient verbalized understanding. Bed in lowest position and locked and RN will make sure that bed remains in lowest position and locked when patient arrives back to his room. Callbell w/in reach of patient at all times. Patient A&Ox4. VSS. Plan of care discussed with patient. Patient encouraged to call for assistance from staff with getting out of the bed, using the bathroom, and ambulation. Patient verbalized understanding of all information provided by the RN. Will continue to monitor patient for any changes in condition.

## 2017-02-08 NOTE — DISCHARGE PLANNING
Met with patient to discuss Advance Directives - he would like to look at document prior to completing. Left packet and instructed patient to have nurse call  when he is ready.

## 2017-02-08 NOTE — PROGRESS NOTES
RECEIVED REPORT FROM OFF GOING  NURSE. NURSE STATED NO SIG MED EVENT DURING SHIFT. PT DENIES PAIN AT THIS TIME. VSS. WILL CONTINUE TO MONITOR.

## 2017-02-09 LAB
ANION GAP SERPL CALC-SCNC: 8 MMOL/L (ref 0–11.9)
BASOPHILS # BLD AUTO: 0.3 % (ref 0–1.8)
BASOPHILS # BLD: 0.05 K/UL (ref 0–0.12)
BUN SERPL-MCNC: 21 MG/DL (ref 8–22)
CALCIUM SERPL-MCNC: 8.3 MG/DL (ref 8.5–10.5)
CHLORIDE SERPL-SCNC: 100 MMOL/L (ref 96–112)
CO2 SERPL-SCNC: 26 MMOL/L (ref 20–33)
CREAT SERPL-MCNC: 0.46 MG/DL (ref 0.5–1.4)
EOSINOPHIL # BLD AUTO: 0 K/UL (ref 0–0.51)
EOSINOPHIL NFR BLD: 0 % (ref 0–6.9)
ERYTHROCYTE [DISTWIDTH] IN BLOOD BY AUTOMATED COUNT: 50.8 FL (ref 35.9–50)
GFR SERPL CREATININE-BSD FRML MDRD: >60 ML/MIN/1.73 M 2
GLUCOSE SERPL-MCNC: 130 MG/DL (ref 65–99)
HCT VFR BLD AUTO: 39 % (ref 42–52)
HGB BLD-MCNC: 13.5 G/DL (ref 14–18)
IMM GRANULOCYTES # BLD AUTO: 0.55 K/UL (ref 0–0.11)
IMM GRANULOCYTES NFR BLD AUTO: 3.5 % (ref 0–0.9)
LYMPHOCYTES # BLD AUTO: 0.74 K/UL (ref 1–4.8)
LYMPHOCYTES NFR BLD: 4.7 % (ref 22–41)
MCH RBC QN AUTO: 33.3 PG (ref 27–33)
MCHC RBC AUTO-ENTMCNC: 34.6 G/DL (ref 33.7–35.3)
MCV RBC AUTO: 96.3 FL (ref 81.4–97.8)
MONOCYTES # BLD AUTO: 1.24 K/UL (ref 0–0.85)
MONOCYTES NFR BLD AUTO: 8 % (ref 0–13.4)
NEUTROPHILS # BLD AUTO: 13.01 K/UL (ref 1.82–7.42)
NEUTROPHILS NFR BLD: 83.5 % (ref 44–72)
NRBC # BLD AUTO: 0 K/UL
NRBC BLD AUTO-RTO: 0 /100 WBC
PLATELET # BLD AUTO: 150 K/UL (ref 164–446)
PMV BLD AUTO: 9.1 FL (ref 9–12.9)
POTASSIUM SERPL-SCNC: 4.2 MMOL/L (ref 3.6–5.5)
RBC # BLD AUTO: 4.05 M/UL (ref 4.7–6.1)
SODIUM SERPL-SCNC: 134 MMOL/L (ref 135–145)
WBC # BLD AUTO: 15.6 K/UL (ref 4.8–10.8)

## 2017-02-09 PROCEDURE — A9270 NON-COVERED ITEM OR SERVICE: HCPCS | Performed by: INTERNAL MEDICINE

## 2017-02-09 PROCEDURE — 85025 COMPLETE CBC W/AUTO DIFF WBC: CPT

## 2017-02-09 PROCEDURE — 700102 HCHG RX REV CODE 250 W/ 637 OVERRIDE(OP): Performed by: FAMILY MEDICINE

## 2017-02-09 PROCEDURE — 700102 HCHG RX REV CODE 250 W/ 637 OVERRIDE(OP): Performed by: INTERNAL MEDICINE

## 2017-02-09 PROCEDURE — 700102 HCHG RX REV CODE 250 W/ 637 OVERRIDE(OP): Performed by: NURSE PRACTITIONER

## 2017-02-09 PROCEDURE — 700112 HCHG RX REV CODE 229: Performed by: INTERNAL MEDICINE

## 2017-02-09 PROCEDURE — 700111 HCHG RX REV CODE 636 W/ 250 OVERRIDE (IP): Performed by: INTERNAL MEDICINE

## 2017-02-09 PROCEDURE — 770001 HCHG ROOM/CARE - MED/SURG/GYN PRIV*

## 2017-02-09 PROCEDURE — 80048 BASIC METABOLIC PNL TOTAL CA: CPT

## 2017-02-09 PROCEDURE — 77386 HCHG IMRT DELIVERY COMPLEX: CPT | Performed by: RADIOLOGY

## 2017-02-09 PROCEDURE — A9270 NON-COVERED ITEM OR SERVICE: HCPCS | Performed by: FAMILY MEDICINE

## 2017-02-09 PROCEDURE — A9270 NON-COVERED ITEM OR SERVICE: HCPCS | Performed by: NURSE PRACTITIONER

## 2017-02-09 PROCEDURE — 99232 SBSQ HOSP IP/OBS MODERATE 35: CPT | Performed by: FAMILY MEDICINE

## 2017-02-09 PROCEDURE — 36415 COLL VENOUS BLD VENIPUNCTURE: CPT

## 2017-02-09 RX ADMIN — METHOCARBAMOL 1500 MG: 750 TABLET ORAL at 20:48

## 2017-02-09 RX ADMIN — DOCUSATE SODIUM 100 MG: 100 CAPSULE ORAL at 08:18

## 2017-02-09 RX ADMIN — FAMOTIDINE 40 MG: 20 TABLET, FILM COATED ORAL at 08:18

## 2017-02-09 RX ADMIN — TEMOZOLOMIDE 5 MG: 5 CAPSULE ORAL at 20:49

## 2017-02-09 RX ADMIN — GABAPENTIN 300 MG: 300 CAPSULE ORAL at 15:05

## 2017-02-09 RX ADMIN — HYDROMORPHONE HYDROCHLORIDE 4 MG: 4 TABLET ORAL at 04:39

## 2017-02-09 RX ADMIN — ONDANSETRON 4 MG: 2 INJECTION, SOLUTION INTRAMUSCULAR; INTRAVENOUS at 04:39

## 2017-02-09 RX ADMIN — GABAPENTIN 300 MG: 300 CAPSULE ORAL at 08:19

## 2017-02-09 RX ADMIN — METHOCARBAMOL 1500 MG: 750 TABLET ORAL at 08:19

## 2017-02-09 RX ADMIN — SODIUM CHLORIDE TAB 1 GM 1 G: 1 TAB at 08:19

## 2017-02-09 RX ADMIN — HYDROMORPHONE HYDROCHLORIDE 4 MG: 4 TABLET ORAL at 00:40

## 2017-02-09 RX ADMIN — SODIUM CHLORIDE TAB 1 GM 1 G: 1 TAB at 17:02

## 2017-02-09 RX ADMIN — DEXAMETHASONE 4 MG: 4 TABLET ORAL at 08:19

## 2017-02-09 RX ADMIN — ONDANSETRON 4 MG: 2 INJECTION, SOLUTION INTRAMUSCULAR; INTRAVENOUS at 20:48

## 2017-02-09 RX ADMIN — GABAPENTIN 300 MG: 300 CAPSULE ORAL at 20:48

## 2017-02-09 RX ADMIN — ONDANSETRON 4 MG: 2 INJECTION, SOLUTION INTRAMUSCULAR; INTRAVENOUS at 12:29

## 2017-02-09 RX ADMIN — METHOCARBAMOL 1500 MG: 750 TABLET ORAL at 17:01

## 2017-02-09 RX ADMIN — LEVETIRACETAM 1000 MG: 250 TABLET, FILM COATED ORAL at 08:19

## 2017-02-09 RX ADMIN — LEVETIRACETAM 1000 MG: 250 TABLET, FILM COATED ORAL at 20:48

## 2017-02-09 RX ADMIN — SULFAMETHOXAZOLE AND TRIMETHOPRIM 1 TABLET: 800; 160 TABLET ORAL at 08:18

## 2017-02-09 RX ADMIN — DEXAMETHASONE 4 MG: 4 TABLET ORAL at 17:01

## 2017-02-09 RX ADMIN — HYDROMORPHONE HYDROCHLORIDE 4 MG: 4 TABLET ORAL at 08:19

## 2017-02-09 RX ADMIN — ONDANSETRON 4 MG: 2 INJECTION, SOLUTION INTRAMUSCULAR; INTRAVENOUS at 00:40

## 2017-02-09 RX ADMIN — HYDROMORPHONE HYDROCHLORIDE 4 MG: 4 TABLET ORAL at 12:29

## 2017-02-09 RX ADMIN — TEMOZOLOMIDE 140 MG: 140 CAPSULE ORAL at 20:49

## 2017-02-09 RX ADMIN — LORAZEPAM 4 MG: 2 INJECTION INTRAMUSCULAR; INTRAVENOUS at 15:14

## 2017-02-09 RX ADMIN — HYDROMORPHONE HYDROCHLORIDE 4 MG: 4 TABLET ORAL at 17:01

## 2017-02-09 RX ADMIN — STANDARDIZED SENNA CONCENTRATE AND DOCUSATE SODIUM 1 TABLET: 8.6; 5 TABLET, FILM COATED ORAL at 20:48

## 2017-02-09 RX ADMIN — ONDANSETRON 4 MG: 2 INJECTION, SOLUTION INTRAMUSCULAR; INTRAVENOUS at 08:18

## 2017-02-09 RX ADMIN — METHOCARBAMOL 1500 MG: 750 TABLET ORAL at 12:29

## 2017-02-09 RX ADMIN — DEXAMETHASONE 4 MG: 4 TABLET ORAL at 20:48

## 2017-02-09 RX ADMIN — HYDROMORPHONE HYDROCHLORIDE 4 MG: 4 TABLET ORAL at 20:48

## 2017-02-09 RX ADMIN — SERTRALINE 25 MG: 50 TABLET, FILM COATED ORAL at 20:48

## 2017-02-09 RX ADMIN — DEXAMETHASONE 4 MG: 4 TABLET ORAL at 12:30

## 2017-02-09 RX ADMIN — ONDANSETRON 4 MG: 4 TABLET, ORALLY DISINTEGRATING ORAL at 17:01

## 2017-02-09 ASSESSMENT — ENCOUNTER SYMPTOMS
VOMITING: 0
DIARRHEA: 0
CHILLS: 0
BLURRED VISION: 0
SENSORY CHANGE: 0
SORE THROAT: 0
WHEEZING: 0
SHORTNESS OF BREATH: 0
DIZZINESS: 0
COUGH: 0
HEARTBURN: 0
ABDOMINAL PAIN: 0
HEADACHES: 1
SEIZURES: 0
FOCAL WEAKNESS: 0
FEVER: 0
SPEECH CHANGE: 0
NAUSEA: 0

## 2017-02-09 ASSESSMENT — PAIN SCALES - GENERAL
PAINLEVEL_OUTOF10: 6

## 2017-02-09 ASSESSMENT — LIFESTYLE VARIABLES
HOW MANY TIMES IN THE PAST YEAR HAVE YOU HAD 5 OR MORE DRINKS IN A DAY: 5
TOTAL SCORE: 0
CONSUMPTION TOTAL: POSITIVE
HAVE YOU EVER FELT YOU SHOULD CUT DOWN ON YOUR DRINKING: NO
AVERAGE NUMBER OF DAYS PER WEEK YOU HAVE A DRINK CONTAINING ALCOHOL: 1
TOTAL SCORE: 0
EVER FELT BAD OR GUILTY ABOUT YOUR DRINKING: NO
EVER HAD A DRINK FIRST THING IN THE MORNING TO STEADY YOUR NERVES TO GET RID OF A HANGOVER: NO
TOTAL SCORE: 0
ON A TYPICAL DAY WHEN YOU DRINK ALCOHOL HOW MANY DRINKS DO YOU HAVE: 1
HAVE PEOPLE ANNOYED YOU BY CRITICIZING YOUR DRINKING: NO
DO YOU DRINK ALCOHOL: NO

## 2017-02-09 NOTE — PROGRESS NOTES
Patient sitting up in bed at the time of assessment. Patient c/o pain at the time of the assessment. Will medicate when medication is available to be administered. Bed in lowest position and locked. Callbell w/in reach of patient. Patient ambulated to the bathroom and was visualized by the RN. Steady gait noted. Ambulated independently. Patient voided continently. Patient A&Ox4. VSS. Plan of care discussed with patient. Patient encouraged to call for assistance from staff with getting out of the bed, using the bathroom, and ambulation. Patient verbalized understanding of all information provided by the RN. Will continue to monitor patient for any changes in condition.

## 2017-02-09 NOTE — PROGRESS NOTES
Hospital Medicine Progress Note, Adult, Complex               Author: Tai Yun Date & Time created: 2/8/2017  4:54 PM     Interval History:  Admitted for Glioblastoma, Seizure.    GBM - less headache and pain  Seizure - no recurrence  Low Na    Review of Systems:  Review of Systems   Constitutional: Negative for fever and chills.   HENT: Negative for sore throat.    Eyes: Negative for blurred vision.   Respiratory: Negative for cough, shortness of breath and wheezing.    Cardiovascular: Negative for chest pain and leg swelling.   Gastrointestinal: Negative for heartburn, nausea, vomiting, abdominal pain and diarrhea.   Genitourinary: Negative for dysuria.   Neurological: Positive for headaches. Negative for dizziness, sensory change, speech change, focal weakness and seizures.       Physical Exam:  Physical Exam   Constitutional: He is oriented to person, place, and time. He appears well-developed and well-nourished.   HENT:   Head: Normocephalic.   R craniotomy healed   Eyes: Conjunctivae are normal. Pupils are equal, round, and reactive to light.   Neck: No tracheal deviation present. No thyromegaly present.   Cardiovascular: Normal rate and regular rhythm.    Pulmonary/Chest: Effort normal and breath sounds normal.   Abdominal: Soft. Bowel sounds are normal. He exhibits no distension. There is no tenderness.   Lymphadenopathy:     He has no cervical adenopathy.   Neurological: He is alert and oriented to person, place, and time. No cranial nerve deficit.   MMT 5/5   Skin: Skin is warm and dry.   Nursing note and vitals reviewed.      Labs:        Invalid input(s): [id number]      Recent Labs      02/06/17   1157  02/07/17   0236  02/08/17   0033   SODIUM  132*  134*  134*   POTASSIUM  4.4  4.1  4.3   CHLORIDE  98  102  99   CO2  29  23  27   BUN  25*  25*  21   CREATININE  0.50  0.54  0.54   CALCIUM  8.8  8.3*  8.8     Recent Labs      02/06/17   1157  02/07/17   0236  02/08/17   0033   GLUCOSE   105*  163*  134*     Recent Labs      17   0236  17   0033   RBC  4.41*  4.25*   HEMOGLOBIN  14.0  13.5*   HEMATOCRIT  42.1  40.0*   PLATELETCT  178  168     Recent Labs      17   0236  17   0033   WBC  17.9*  17.0*   NEUTSPOLYS  81.20*  80.80*   LYMPHOCYTES  6.60*  6.20*   MONOCYTES  5.50  7.70   EOSINOPHILS  0.00  0.00   BASOPHILS  0.80  0.60           Hemodynamics:  Temp (24hrs), Av °C (98.6 °F), Min:36.7 °C (98 °F), Max:37.5 °C (99.5 °F)  Temperature: 37.5 °C (99.5 °F)  Pulse  Av.7  Min: 60  Max: 101   Blood Pressure: 133/91 mmHg     Respiratory:    Respiration: 18, Pulse Oximetry: 91 %        RUL Breath Sounds: Clear, RML Breath Sounds: Clear, RLL Breath Sounds: Clear, FLY Breath Sounds: Clear, LLL Breath Sounds: Clear  Fluids:    Intake/Output Summary (Last 24 hours) at 17 1654  Last data filed at 17 1100   Gross per 24 hour   Intake    240 ml   Output      0 ml   Net    240 ml        GI/Nutrition:  Orders Placed This Encounter   Procedures   • DIET ORDER     Standing Status: Standing      Number of Occurrences: 1      Standing Expiration Date:      Order Specific Question:  Diet:     Answer:  Regular [1]     Medical Decision Making, by Problem:  Active Hospital Problems    Diagnosis   • *GBM (glioblastoma multiforme) (CMS-HCC) [C71.9]      Temodar, Radiation treatments, Decadron   • Leukocytosis [D72.829]       steroid induced?, follow cbc   • Hyponatremia [E87.1]          Salt tabs, follow bmp   • Seizure (CMS-HCC) [R56.9]      Keppra   • Steroid-induced hyperglycemia [R73.9]        recent hgba1c 5.3   • Depression [F32.9]       Zoloft   • Alcohol abuse [F10.10]      Last drink 2 mos ago   • Tobacco abuse [Z72.0]      Nicotine patch        Noncompliance.    counseling    Medications reviewed, Labs reviewed and Radiology images reviewed  Anand catheter: No Anand      DVT Prophylaxis: Contraindicated - High bleeding risk  DVT prophylaxis - mechanical: SCDs  Ulcer  prophylaxis: Yes

## 2017-02-09 NOTE — PROGRESS NOTES
Met with patient to check in with.  Pt reports is being well cared for.  He talked about his brother getting him a very nice place, where they will both be staying.  He talked about being able to walk to the hospital form this new place.  Pt gave some information regarding what he believes plan of care is.  Clarified some information for him.  Pt a little tearful when talking about prognosis.  Provided additional education and information.  Discussed importance of follow up once discharged and taking medications he has been prescribed.  Pt reported understanding and talked about how scared he felt prior to coming to ER regarding symptoms.  Reinforced talking to physicians with symptoms at first sign and following up with their recommendations.  Will follow patient after discharged.

## 2017-02-10 LAB
ANION GAP SERPL CALC-SCNC: 10 MMOL/L (ref 0–11.9)
BASOPHILS # BLD AUTO: 0.4 % (ref 0–1.8)
BASOPHILS # BLD: 0.06 K/UL (ref 0–0.12)
BUN SERPL-MCNC: 24 MG/DL (ref 8–22)
CALCIUM SERPL-MCNC: 8.9 MG/DL (ref 8.5–10.5)
CHLORIDE SERPL-SCNC: 101 MMOL/L (ref 96–112)
CO2 SERPL-SCNC: 23 MMOL/L (ref 20–33)
CREAT SERPL-MCNC: 0.59 MG/DL (ref 0.5–1.4)
EOSINOPHIL # BLD AUTO: 0.01 K/UL (ref 0–0.51)
EOSINOPHIL NFR BLD: 0.1 % (ref 0–6.9)
ERYTHROCYTE [DISTWIDTH] IN BLOOD BY AUTOMATED COUNT: 50.3 FL (ref 35.9–50)
GFR SERPL CREATININE-BSD FRML MDRD: >60 ML/MIN/1.73 M 2
GLUCOSE SERPL-MCNC: 124 MG/DL (ref 65–99)
HCT VFR BLD AUTO: 44.1 % (ref 42–52)
HGB BLD-MCNC: 14.6 G/DL (ref 14–18)
IMM GRANULOCYTES # BLD AUTO: 0.39 K/UL (ref 0–0.11)
IMM GRANULOCYTES NFR BLD AUTO: 2.3 % (ref 0–0.9)
LYMPHOCYTES # BLD AUTO: 0.76 K/UL (ref 1–4.8)
LYMPHOCYTES NFR BLD: 4.5 % (ref 22–41)
MCH RBC QN AUTO: 31.9 PG (ref 27–33)
MCHC RBC AUTO-ENTMCNC: 33.1 G/DL (ref 33.7–35.3)
MCV RBC AUTO: 96.5 FL (ref 81.4–97.8)
MONOCYTES # BLD AUTO: 1.59 K/UL (ref 0–0.85)
MONOCYTES NFR BLD AUTO: 9.4 % (ref 0–13.4)
NEUTROPHILS # BLD AUTO: 14.14 K/UL (ref 1.82–7.42)
NEUTROPHILS NFR BLD: 83.3 % (ref 44–72)
NRBC # BLD AUTO: 0 K/UL
NRBC BLD AUTO-RTO: 0 /100 WBC
PLATELET # BLD AUTO: 158 K/UL (ref 164–446)
PMV BLD AUTO: 9.1 FL (ref 9–12.9)
POTASSIUM SERPL-SCNC: 4.1 MMOL/L (ref 3.6–5.5)
RBC # BLD AUTO: 4.57 M/UL (ref 4.7–6.1)
SODIUM SERPL-SCNC: 134 MMOL/L (ref 135–145)
WBC # BLD AUTO: 17 K/UL (ref 4.8–10.8)

## 2017-02-10 PROCEDURE — 77386 HCHG IMRT DELIVERY COMPLEX: CPT | Performed by: RADIOLOGY

## 2017-02-10 PROCEDURE — 700102 HCHG RX REV CODE 250 W/ 637 OVERRIDE(OP): Performed by: FAMILY MEDICINE

## 2017-02-10 PROCEDURE — 77014 PR CT GUIDANCE PLACEMENT RAD THERAPY FIELDS: CPT | Mod: 26 | Performed by: RADIOLOGY

## 2017-02-10 PROCEDURE — 700111 HCHG RX REV CODE 636 W/ 250 OVERRIDE (IP): Performed by: INTERNAL MEDICINE

## 2017-02-10 PROCEDURE — 700112 HCHG RX REV CODE 229: Performed by: INTERNAL MEDICINE

## 2017-02-10 PROCEDURE — A9270 NON-COVERED ITEM OR SERVICE: HCPCS | Performed by: FAMILY MEDICINE

## 2017-02-10 PROCEDURE — 99232 SBSQ HOSP IP/OBS MODERATE 35: CPT | Performed by: FAMILY MEDICINE

## 2017-02-10 PROCEDURE — A9270 NON-COVERED ITEM OR SERVICE: HCPCS | Performed by: INTERNAL MEDICINE

## 2017-02-10 PROCEDURE — 80048 BASIC METABOLIC PNL TOTAL CA: CPT

## 2017-02-10 PROCEDURE — 700102 HCHG RX REV CODE 250 W/ 637 OVERRIDE(OP): Performed by: INTERNAL MEDICINE

## 2017-02-10 PROCEDURE — 85025 COMPLETE CBC W/AUTO DIFF WBC: CPT

## 2017-02-10 PROCEDURE — 36415 COLL VENOUS BLD VENIPUNCTURE: CPT

## 2017-02-10 PROCEDURE — 770001 HCHG ROOM/CARE - MED/SURG/GYN PRIV*

## 2017-02-10 PROCEDURE — A9270 NON-COVERED ITEM OR SERVICE: HCPCS | Performed by: NURSE PRACTITIONER

## 2017-02-10 PROCEDURE — 700102 HCHG RX REV CODE 250 W/ 637 OVERRIDE(OP): Performed by: NURSE PRACTITIONER

## 2017-02-10 RX ORDER — METHOCARBAMOL 750 MG/1
1500 TABLET, FILM COATED ORAL ONCE
Status: COMPLETED | OUTPATIENT
Start: 2017-02-10 | End: 2017-02-10

## 2017-02-10 RX ADMIN — HYDROMORPHONE HYDROCHLORIDE 4 MG: 4 TABLET ORAL at 02:32

## 2017-02-10 RX ADMIN — HYDROMORPHONE HYDROCHLORIDE 4 MG: 4 TABLET ORAL at 06:29

## 2017-02-10 RX ADMIN — TEMOZOLOMIDE 140 MG: 140 CAPSULE ORAL at 21:00

## 2017-02-10 RX ADMIN — DEXAMETHASONE 4 MG: 4 TABLET ORAL at 13:07

## 2017-02-10 RX ADMIN — LEVETIRACETAM 1000 MG: 250 TABLET, FILM COATED ORAL at 20:44

## 2017-02-10 RX ADMIN — METHOCARBAMOL 1500 MG: 750 TABLET ORAL at 20:44

## 2017-02-10 RX ADMIN — DEXAMETHASONE 4 MG: 4 TABLET ORAL at 07:50

## 2017-02-10 RX ADMIN — METHOCARBAMOL 1500 MG: 750 TABLET ORAL at 13:07

## 2017-02-10 RX ADMIN — ONDANSETRON 4 MG: 2 INJECTION, SOLUTION INTRAMUSCULAR; INTRAVENOUS at 16:26

## 2017-02-10 RX ADMIN — ONDANSETRON 4 MG: 2 INJECTION, SOLUTION INTRAMUSCULAR; INTRAVENOUS at 06:30

## 2017-02-10 RX ADMIN — DEXAMETHASONE 4 MG: 4 TABLET ORAL at 17:00

## 2017-02-10 RX ADMIN — METHOCARBAMOL 1500 MG: 750 TABLET ORAL at 07:50

## 2017-02-10 RX ADMIN — ONDANSETRON 4 MG: 2 INJECTION, SOLUTION INTRAMUSCULAR; INTRAVENOUS at 20:45

## 2017-02-10 RX ADMIN — LEVETIRACETAM 1000 MG: 250 TABLET, FILM COATED ORAL at 07:49

## 2017-02-10 RX ADMIN — SULFAMETHOXAZOLE AND TRIMETHOPRIM 1 TABLET: 800; 160 TABLET ORAL at 07:50

## 2017-02-10 RX ADMIN — GABAPENTIN 300 MG: 300 CAPSULE ORAL at 14:26

## 2017-02-10 RX ADMIN — METHOCARBAMOL 1500 MG: 750 TABLET ORAL at 03:28

## 2017-02-10 RX ADMIN — DOCUSATE SODIUM 100 MG: 100 CAPSULE ORAL at 07:50

## 2017-02-10 RX ADMIN — HYDROMORPHONE HYDROCHLORIDE 4 MG: 4 TABLET ORAL at 11:38

## 2017-02-10 RX ADMIN — DEXAMETHASONE 4 MG: 4 TABLET ORAL at 20:44

## 2017-02-10 RX ADMIN — FAMOTIDINE 40 MG: 20 TABLET, FILM COATED ORAL at 07:50

## 2017-02-10 RX ADMIN — SODIUM CHLORIDE TAB 1 GM 1 G: 1 TAB at 16:26

## 2017-02-10 RX ADMIN — STANDARDIZED SENNA CONCENTRATE AND DOCUSATE SODIUM 1 TABLET: 8.6; 5 TABLET, FILM COATED ORAL at 20:44

## 2017-02-10 RX ADMIN — GABAPENTIN 300 MG: 300 CAPSULE ORAL at 07:49

## 2017-02-10 RX ADMIN — GABAPENTIN 300 MG: 300 CAPSULE ORAL at 20:44

## 2017-02-10 RX ADMIN — ONDANSETRON 4 MG: 2 INJECTION, SOLUTION INTRAMUSCULAR; INTRAVENOUS at 02:32

## 2017-02-10 RX ADMIN — TEMOZOLOMIDE 5 MG: 5 CAPSULE ORAL at 21:00

## 2017-02-10 RX ADMIN — SERTRALINE 25 MG: 50 TABLET, FILM COATED ORAL at 20:44

## 2017-02-10 RX ADMIN — SODIUM CHLORIDE TAB 1 GM 1 G: 1 TAB at 07:49

## 2017-02-10 RX ADMIN — HYDROMORPHONE HYDROCHLORIDE 4 MG: 4 TABLET ORAL at 20:44

## 2017-02-10 RX ADMIN — ONDANSETRON 4 MG: 2 INJECTION, SOLUTION INTRAMUSCULAR; INTRAVENOUS at 11:39

## 2017-02-10 RX ADMIN — METHOCARBAMOL 1500 MG: 750 TABLET ORAL at 16:26

## 2017-02-10 RX ADMIN — HYDROMORPHONE HYDROCHLORIDE 4 MG: 4 TABLET ORAL at 16:26

## 2017-02-10 ASSESSMENT — LIFESTYLE VARIABLES
TOTAL SCORE: 0
TOTAL SCORE: 0
HOW MANY TIMES IN THE PAST YEAR HAVE YOU HAD 5 OR MORE DRINKS IN A DAY: 5
HAVE PEOPLE ANNOYED YOU BY CRITICIZING YOUR DRINKING: NO
CONSUMPTION TOTAL: POSITIVE
EVER HAD A DRINK FIRST THING IN THE MORNING TO STEADY YOUR NERVES TO GET RID OF A HANGOVER: NO
TOTAL SCORE: 0
HAVE YOU EVER FELT YOU SHOULD CUT DOWN ON YOUR DRINKING: NO
AVERAGE NUMBER OF DAYS PER WEEK YOU HAVE A DRINK CONTAINING ALCOHOL: 1
DO YOU DRINK ALCOHOL: NO
ON A TYPICAL DAY WHEN YOU DRINK ALCOHOL HOW MANY DRINKS DO YOU HAVE: 1
EVER FELT BAD OR GUILTY ABOUT YOUR DRINKING: NO

## 2017-02-10 ASSESSMENT — PAIN SCALES - GENERAL
PAINLEVEL_OUTOF10: 7
PAINLEVEL_OUTOF10: 9
PAINLEVEL_OUTOF10: 10
PAINLEVEL_OUTOF10: 10

## 2017-02-10 NOTE — PROGRESS NOTES
Pt requested another dose of muscle relaxer.  Dr. Reyes notified and orders received for Robaxin 1500 mg PO one time.

## 2017-02-10 NOTE — PROGRESS NOTES
Hospital Medicine Progress Note, Adult, Complex               Author: Tai Yun Date & Time created: 2/9/2017  5:26 PM     Interval History:  Admitted for Glioblastoma, Seizure.    GBM - less headache and pain  Seizure - no recurrence  Low Na - stable    Review of Systems:  Review of Systems   Constitutional: Negative for fever and chills.   HENT: Negative for sore throat.    Eyes: Negative for blurred vision.   Respiratory: Negative for cough, shortness of breath and wheezing.    Cardiovascular: Negative for chest pain and leg swelling.   Gastrointestinal: Negative for heartburn, nausea, vomiting, abdominal pain and diarrhea.   Genitourinary: Negative for dysuria.   Neurological: Positive for headaches. Negative for dizziness, sensory change, speech change, focal weakness and seizures.       Physical Exam:  Physical Exam   Constitutional: He is oriented to person, place, and time. He appears well-developed and well-nourished.   HENT:   Head: Normocephalic.   R craniotomy healed   Eyes: Conjunctivae are normal. Pupils are equal, round, and reactive to light.   Neck: No tracheal deviation present. No thyromegaly present.   Cardiovascular: Normal rate and regular rhythm.    Pulmonary/Chest: Effort normal and breath sounds normal.   Abdominal: Soft. Bowel sounds are normal. He exhibits no distension. There is no tenderness.   Lymphadenopathy:     He has no cervical adenopathy.   Neurological: He is alert and oriented to person, place, and time. No cranial nerve deficit.   MMT 5/5   Skin: Skin is warm and dry.   Nursing note and vitals reviewed.      Labs:        Invalid input(s): [id number]      Recent Labs      02/07/17   0236  02/08/17   0033  02/09/17   0151   SODIUM  134*  134*  134*   POTASSIUM  4.1  4.3  4.2   CHLORIDE  102  99  100   CO2  23  27  26   BUN  25*  21  21   CREATININE  0.54  0.54  0.46*   CALCIUM  8.3*  8.8  8.3*     Recent Labs      02/07/17   0236  02/08/17   0033  02/09/17   0151    GLUCOSE  163*  134*  130*     Recent Labs      17   0236  17   0033  17   0151   RBC  4.41*  4.25*  4.05*   HEMOGLOBIN  14.0  13.5*  13.5*   HEMATOCRIT  42.1  40.0*  39.0*   PLATELETCT  178  168  150*     Recent Labs      17   0236  17   0033  17   0151   WBC  17.9*  17.0*  15.6*   NEUTSPOLYS  81.20*  80.80*  83.50*   LYMPHOCYTES  6.60*  6.20*  4.70*   MONOCYTES  5.50  7.70  8.00   EOSINOPHILS  0.00  0.00  0.00   BASOPHILS  0.80  0.60  0.30           Hemodynamics:  Temp (24hrs), Av.9 °C (98.4 °F), Min:36.4 °C (97.6 °F), Max:37.2 °C (99 °F)  Temperature: 36.8 °C (98.3 °F)  Pulse  Av.6  Min: 60  Max: 101   Blood Pressure: 130/82 mmHg     Respiratory:    Respiration: 18, Pulse Oximetry: 92 %        RUL Breath Sounds: Clear, RML Breath Sounds: Clear, RLL Breath Sounds: Clear, FLY Breath Sounds: Clear, LLL Breath Sounds: Clear  Fluids:    Intake/Output Summary (Last 24 hours) at 17 1726  Last data filed at 17 1000   Gross per 24 hour   Intake    480 ml   Output      0 ml   Net    480 ml        GI/Nutrition:  Orders Placed This Encounter   Procedures   • DIET ORDER     Standing Status: Standing      Number of Occurrences: 1      Standing Expiration Date:      Order Specific Question:  Diet:     Answer:  Regular [1]     Medical Decision Making, by Problem:  Active Hospital Problems    Diagnosis   • *GBM (glioblastoma multiforme) (CMS-HCC) [C71.9]      Temodar, Radiation treatments, Decadron   • Leukocytosis [D72.829]       steroid induced?, follow cbc   • Hyponatremia [E87.1]          Salt tabs, follow bmp   • Seizure (CMS-HCC) [R56.9]      Keppra   • Steroid-induced hyperglycemia [R73.9]        recent hgba1c 5.3   • Depression [F32.9]       Zoloft   • Alcohol abuse [F10.10]      Last drink 2 mos ago   • Tobacco abuse [Z72.0]      Nicotine patch        Noncompliance.    counseling    Medications reviewed, Labs reviewed and Radiology images reviewed  Anand catheter:  No Anand      DVT Prophylaxis: Contraindicated - High bleeding risk  DVT prophylaxis - mechanical: SCDs  Ulcer prophylaxis: Yes

## 2017-02-10 NOTE — PROGRESS NOTES
Patient sitting up in bed at the time of assessment. Patient c/o pain at the time of the assessment. Will medicate when medication is available to be administered. Bed in lowest position and locked. Callbell w/in reach of patient. Patient ambulates independently. Patient voids continently. Patient A&Ox4. VSS. Plan of care discussed with patient. Patient encouraged to call for assistance from staff with getting out of the bed, using the bathroom, and ambulation. Patient verbalized understanding of all information provided by the RN. Will continue to monitor patient for any changes in condition.

## 2017-02-11 LAB
ANION GAP SERPL CALC-SCNC: 8 MMOL/L (ref 0–11.9)
BASOPHILS # BLD AUTO: 0.2 % (ref 0–1.8)
BASOPHILS # BLD: 0.03 K/UL (ref 0–0.12)
BUN SERPL-MCNC: 19 MG/DL (ref 8–22)
CALCIUM SERPL-MCNC: 8.5 MG/DL (ref 8.5–10.5)
CHLORIDE SERPL-SCNC: 101 MMOL/L (ref 96–112)
CO2 SERPL-SCNC: 25 MMOL/L (ref 20–33)
CREAT SERPL-MCNC: 0.49 MG/DL (ref 0.5–1.4)
EOSINOPHIL # BLD AUTO: 0 K/UL (ref 0–0.51)
EOSINOPHIL NFR BLD: 0 % (ref 0–6.9)
ERYTHROCYTE [DISTWIDTH] IN BLOOD BY AUTOMATED COUNT: 50.8 FL (ref 35.9–50)
GFR SERPL CREATININE-BSD FRML MDRD: >60 ML/MIN/1.73 M 2
GLUCOSE SERPL-MCNC: 121 MG/DL (ref 65–99)
HCT VFR BLD AUTO: 40.3 % (ref 42–52)
HGB BLD-MCNC: 13.6 G/DL (ref 14–18)
IMM GRANULOCYTES # BLD AUTO: 0.3 K/UL (ref 0–0.11)
IMM GRANULOCYTES NFR BLD AUTO: 2.1 % (ref 0–0.9)
LYMPHOCYTES # BLD AUTO: 0.67 K/UL (ref 1–4.8)
LYMPHOCYTES NFR BLD: 4.8 % (ref 22–41)
MCH RBC QN AUTO: 32.2 PG (ref 27–33)
MCHC RBC AUTO-ENTMCNC: 33.7 G/DL (ref 33.7–35.3)
MCV RBC AUTO: 95.3 FL (ref 81.4–97.8)
MONOCYTES # BLD AUTO: 1.17 K/UL (ref 0–0.85)
MONOCYTES NFR BLD AUTO: 8.4 % (ref 0–13.4)
NEUTROPHILS # BLD AUTO: 11.83 K/UL (ref 1.82–7.42)
NEUTROPHILS NFR BLD: 84.5 % (ref 44–72)
NRBC # BLD AUTO: 0 K/UL
NRBC BLD AUTO-RTO: 0 /100 WBC
PLATELET # BLD AUTO: 142 K/UL (ref 164–446)
PMV BLD AUTO: 9 FL (ref 9–12.9)
POTASSIUM SERPL-SCNC: 4.6 MMOL/L (ref 3.6–5.5)
RBC # BLD AUTO: 4.23 M/UL (ref 4.7–6.1)
SODIUM SERPL-SCNC: 134 MMOL/L (ref 135–145)
WBC # BLD AUTO: 14 K/UL (ref 4.8–10.8)

## 2017-02-11 PROCEDURE — A9270 NON-COVERED ITEM OR SERVICE: HCPCS | Performed by: INTERNAL MEDICINE

## 2017-02-11 PROCEDURE — 700102 HCHG RX REV CODE 250 W/ 637 OVERRIDE(OP): Performed by: INTERNAL MEDICINE

## 2017-02-11 PROCEDURE — A9270 NON-COVERED ITEM OR SERVICE: HCPCS | Performed by: FAMILY MEDICINE

## 2017-02-11 PROCEDURE — 80048 BASIC METABOLIC PNL TOTAL CA: CPT

## 2017-02-11 PROCEDURE — 36415 COLL VENOUS BLD VENIPUNCTURE: CPT

## 2017-02-11 PROCEDURE — 700112 HCHG RX REV CODE 229: Performed by: INTERNAL MEDICINE

## 2017-02-11 PROCEDURE — 99232 SBSQ HOSP IP/OBS MODERATE 35: CPT | Performed by: FAMILY MEDICINE

## 2017-02-11 PROCEDURE — 85025 COMPLETE CBC W/AUTO DIFF WBC: CPT

## 2017-02-11 PROCEDURE — 700111 HCHG RX REV CODE 636 W/ 250 OVERRIDE (IP): Performed by: INTERNAL MEDICINE

## 2017-02-11 PROCEDURE — 770001 HCHG ROOM/CARE - MED/SURG/GYN PRIV*

## 2017-02-11 PROCEDURE — 700102 HCHG RX REV CODE 250 W/ 637 OVERRIDE(OP): Performed by: NURSE PRACTITIONER

## 2017-02-11 PROCEDURE — 700102 HCHG RX REV CODE 250 W/ 637 OVERRIDE(OP): Performed by: FAMILY MEDICINE

## 2017-02-11 PROCEDURE — A9270 NON-COVERED ITEM OR SERVICE: HCPCS | Performed by: NURSE PRACTITIONER

## 2017-02-11 RX ORDER — METHOCARBAMOL 750 MG/1
1500 TABLET, FILM COATED ORAL EVERY 6 HOURS PRN
Status: DISCONTINUED | OUTPATIENT
Start: 2017-02-11 | End: 2017-02-15 | Stop reason: HOSPADM

## 2017-02-11 RX ADMIN — DOCUSATE SODIUM 100 MG: 100 CAPSULE ORAL at 08:55

## 2017-02-11 RX ADMIN — TEMOZOLOMIDE 5 MG: 5 CAPSULE ORAL at 21:00

## 2017-02-11 RX ADMIN — GABAPENTIN 300 MG: 300 CAPSULE ORAL at 21:04

## 2017-02-11 RX ADMIN — SERTRALINE 25 MG: 50 TABLET, FILM COATED ORAL at 21:04

## 2017-02-11 RX ADMIN — HYDROMORPHONE HYDROCHLORIDE 4 MG: 4 TABLET ORAL at 19:35

## 2017-02-11 RX ADMIN — DEXAMETHASONE 4 MG: 4 TABLET ORAL at 13:26

## 2017-02-11 RX ADMIN — HYDROMORPHONE HYDROCHLORIDE 4 MG: 4 TABLET ORAL at 15:21

## 2017-02-11 RX ADMIN — LEVETIRACETAM 1000 MG: 250 TABLET, FILM COATED ORAL at 21:04

## 2017-02-11 RX ADMIN — SULFAMETHOXAZOLE AND TRIMETHOPRIM 1 TABLET: 800; 160 TABLET ORAL at 08:55

## 2017-02-11 RX ADMIN — DEXAMETHASONE 4 MG: 4 TABLET ORAL at 17:27

## 2017-02-11 RX ADMIN — ONDANSETRON 4 MG: 2 INJECTION, SOLUTION INTRAMUSCULAR; INTRAVENOUS at 20:59

## 2017-02-11 RX ADMIN — FAMOTIDINE 40 MG: 20 TABLET, FILM COATED ORAL at 08:55

## 2017-02-11 RX ADMIN — DEXAMETHASONE 4 MG: 4 TABLET ORAL at 21:04

## 2017-02-11 RX ADMIN — HYDROMORPHONE HYDROCHLORIDE 4 MG: 4 TABLET ORAL at 01:59

## 2017-02-11 RX ADMIN — METHOCARBAMOL 1500 MG: 750 TABLET ORAL at 19:34

## 2017-02-11 RX ADMIN — ONDANSETRON 4 MG: 2 INJECTION, SOLUTION INTRAMUSCULAR; INTRAVENOUS at 06:50

## 2017-02-11 RX ADMIN — GABAPENTIN 300 MG: 300 CAPSULE ORAL at 15:11

## 2017-02-11 RX ADMIN — TEMOZOLOMIDE 140 MG: 140 CAPSULE ORAL at 21:00

## 2017-02-11 RX ADMIN — METHOCARBAMOL 1500 MG: 750 TABLET ORAL at 07:53

## 2017-02-11 RX ADMIN — SODIUM CHLORIDE TAB 1 GM 1 G: 1 TAB at 17:27

## 2017-02-11 RX ADMIN — HYDROMORPHONE HYDROCHLORIDE 4 MG: 4 TABLET ORAL at 10:56

## 2017-02-11 RX ADMIN — METHOCARBAMOL 1500 MG: 750 TABLET ORAL at 13:33

## 2017-02-11 RX ADMIN — DEXAMETHASONE 4 MG: 4 TABLET ORAL at 08:55

## 2017-02-11 RX ADMIN — STANDARDIZED SENNA CONCENTRATE AND DOCUSATE SODIUM 1 TABLET: 8.6; 5 TABLET, FILM COATED ORAL at 21:04

## 2017-02-11 RX ADMIN — ONDANSETRON 4 MG: 2 INJECTION, SOLUTION INTRAMUSCULAR; INTRAVENOUS at 01:59

## 2017-02-11 RX ADMIN — GABAPENTIN 300 MG: 300 CAPSULE ORAL at 08:55

## 2017-02-11 RX ADMIN — HYDROMORPHONE HYDROCHLORIDE 4 MG: 4 TABLET ORAL at 23:27

## 2017-02-11 RX ADMIN — SODIUM CHLORIDE TAB 1 GM 1 G: 1 TAB at 07:53

## 2017-02-11 RX ADMIN — HYDROMORPHONE HYDROCHLORIDE 4 MG: 4 TABLET ORAL at 06:49

## 2017-02-11 RX ADMIN — LEVETIRACETAM 1000 MG: 250 TABLET, FILM COATED ORAL at 08:55

## 2017-02-11 ASSESSMENT — PAIN SCALES - GENERAL
PAINLEVEL_OUTOF10: 8
PAINLEVEL_OUTOF10: 8
PAINLEVEL_OUTOF10: 7
PAINLEVEL_OUTOF10: 10
PAINLEVEL_OUTOF10: 8
PAINLEVEL_OUTOF10: 9
PAINLEVEL_OUTOF10: 10
PAINLEVEL_OUTOF10: 7

## 2017-02-11 ASSESSMENT — ENCOUNTER SYMPTOMS
HEADACHES: 1
ABDOMINAL PAIN: 0
VOMITING: 0
SEIZURES: 0
CHILLS: 0
BLURRED VISION: 0
WHEEZING: 0
COUGH: 0
FOCAL WEAKNESS: 0
HEARTBURN: 0
SORE THROAT: 0
SENSORY CHANGE: 0
DIZZINESS: 0
FEVER: 0
SHORTNESS OF BREATH: 0
DIARRHEA: 0
SPEECH CHANGE: 0
NAUSEA: 0

## 2017-02-11 NOTE — PROGRESS NOTES
Provided pt with his belongings that were being stored in safekeeping per his request.  Pt to give to his brother.  He understands risk of keeping belongings at bedside, especially valuables.

## 2017-02-11 NOTE — CARE PLAN
Problem: Safety  Goal: Will remain free from injury  Outcome: PROGRESSING AS EXPECTED  Pt ambulating in hallways and left unit with his brother x 1 time for one hour.  He verbalizes understanding of fall precautions and chemotherapeutic precautions.  Uses call light appropriately.    Problem: Pain Management  Goal: Pain level will decrease to patient’s comfort goal  Outcome: PROGRESSING SLOWER THAN EXPECTED  Discussed plan of care for pain with Dr. Yun.  Changed robaxin dosing to 1500 mg q 6 hours, to allow pt to have more regular dosing of medication.  Pt continues to c/o severe headache on R side of head.  Vital signs are stable and he is able to tolerate pain enough to participate in adls.  Using dilaudi 4 mg po for BTP.

## 2017-02-11 NOTE — PROGRESS NOTES
Hospital Medicine Progress Note, Adult, Complex               Author: Tai Luuwachavo Date & Time created: 2/11/2017  1:33 PM     Interval History:  Admitted for Glioblastoma, Seizure.    GBM - less headache and pain  Seizure - no recurrence  Low Na - stable  Walking in hallways    Review of Systems:  Review of Systems   Constitutional: Negative for fever and chills.   HENT: Negative for sore throat.    Eyes: Negative for blurred vision.   Respiratory: Negative for cough, shortness of breath and wheezing.    Cardiovascular: Negative for chest pain and leg swelling.   Gastrointestinal: Negative for heartburn, nausea, vomiting, abdominal pain and diarrhea.   Genitourinary: Negative for dysuria.   Neurological: Positive for headaches. Negative for dizziness, sensory change, speech change, focal weakness and seizures.       Physical Exam:  Physical Exam   Constitutional: He is oriented to person, place, and time. He appears well-developed and well-nourished.   HENT:   Head: Normocephalic.   R craniotomy healed   Eyes: Conjunctivae are normal. Pupils are equal, round, and reactive to light.   Neck: No tracheal deviation present. No thyromegaly present.   Cardiovascular: Normal rate and regular rhythm.    Pulmonary/Chest: Effort normal and breath sounds normal.   Abdominal: Soft. Bowel sounds are normal. He exhibits no distension. There is no tenderness.   Lymphadenopathy:     He has no cervical adenopathy.   Neurological: He is alert and oriented to person, place, and time. No cranial nerve deficit.   MMT 5/5   Skin: Skin is warm and dry.   Nursing note and vitals reviewed.      Labs:        Invalid input(s): [id number]      Recent Labs      02/09/17   0151  02/10/17   0307  02/11/17   0153   SODIUM  134*  134*  134*   POTASSIUM  4.2  4.1  4.6   CHLORIDE  100  101  101   CO2  26  23  25   BUN  21  24*  19   CREATININE  0.46*  0.59  0.49*   CALCIUM  8.3*  8.9  8.5     Recent Labs      02/09/17   0151  02/10/17    03017   0153   GLUCOSE  130*  124*  121*     Recent Labs      17   0151  02/10/17   0307  17   0153   RBC  4.05*  4.57*  4.23*   HEMOGLOBIN  13.5*  14.6  13.6*   HEMATOCRIT  39.0*  44.1  40.3*   PLATELETCT  150*  158*  142*     Recent Labs      17   0151  02/10/17   03017   0153   WBC  15.6*  17.0*  14.0*   NEUTSPOLYS  83.50*  83.30*  84.50*   LYMPHOCYTES  4.70*  4.50*  4.80*   MONOCYTES  8.00  9.40  8.40   EOSINOPHILS  0.00  0.10  0.00   BASOPHILS  0.30  0.40  0.20           Hemodynamics:  Temp (24hrs), Av.1 °C (98.7 °F), Min:36.7 °C (98 °F), Max:37.4 °C (99.3 °F)  Temperature: 37.4 °C (99.3 °F)  Pulse  Av.8  Min: 60  Max: 101   Blood Pressure: 142/82 mmHg     Respiratory:    Respiration: 18, Pulse Oximetry: 95 %        RUL Breath Sounds: Clear, RML Breath Sounds: Clear, RLL Breath Sounds: Clear, FLY Breath Sounds: Clear, LLL Breath Sounds: Clear  Fluids:    Intake/Output Summary (Last 24 hours) at 17 1333  Last data filed at 02/10/17 1500   Gross per 24 hour   Intake    480 ml   Output      0 ml   Net    480 ml        GI/Nutrition:  Orders Placed This Encounter   Procedures   • DIET ORDER     Standing Status: Standing      Number of Occurrences: 1      Standing Expiration Date:      Order Specific Question:  Diet:     Answer:  Regular [1]     Medical Decision Making, by Problem:  Active Hospital Problems    Diagnosis   • *GBM (glioblastoma multiforme) (CMS-HCC) [C71.9]      Temodar, Radiation treatments, Decadron   • Leukocytosis [D72.829]       steroid induced?, follow cbc   • Hyponatremia [E87.1]          Salt tabs, follow bmp   • Seizure (CMS-HCC) [R56.9]      Keppra   • Steroid-induced hyperglycemia [R73.9]        recent hgba1c 5.3   • Depression [F32.9]       Zoloft   • Alcohol abuse [F10.10]      Last drink > 2 mos ago   • Tobacco abuse [Z72.0]      Nicotine patch        Noncompliance.    counseling    Medications reviewed, Labs reviewed and Radiology images  reviewed  Anand catheter: No Anand      DVT Prophylaxis: Contraindicated - High bleeding risk  DVT prophylaxis - mechanical: SCDs  Ulcer prophylaxis: Yes

## 2017-02-12 LAB
ANION GAP SERPL CALC-SCNC: 9 MMOL/L (ref 0–11.9)
BASOPHILS # BLD AUTO: 0.2 % (ref 0–1.8)
BASOPHILS # BLD: 0.03 K/UL (ref 0–0.12)
BUN SERPL-MCNC: 20 MG/DL (ref 8–22)
CALCIUM SERPL-MCNC: 8.9 MG/DL (ref 8.5–10.5)
CHLORIDE SERPL-SCNC: 101 MMOL/L (ref 96–112)
CO2 SERPL-SCNC: 24 MMOL/L (ref 20–33)
CREAT SERPL-MCNC: 0.58 MG/DL (ref 0.5–1.4)
EOSINOPHIL # BLD AUTO: 0 K/UL (ref 0–0.51)
EOSINOPHIL NFR BLD: 0 % (ref 0–6.9)
ERYTHROCYTE [DISTWIDTH] IN BLOOD BY AUTOMATED COUNT: 51 FL (ref 35.9–50)
GFR SERPL CREATININE-BSD FRML MDRD: >60 ML/MIN/1.73 M 2
GLUCOSE SERPL-MCNC: 145 MG/DL (ref 65–99)
HCT VFR BLD AUTO: 42.5 % (ref 42–52)
HGB BLD-MCNC: 14.1 G/DL (ref 14–18)
IMM GRANULOCYTES # BLD AUTO: 0.24 K/UL (ref 0–0.11)
IMM GRANULOCYTES NFR BLD AUTO: 1.9 % (ref 0–0.9)
LYMPHOCYTES # BLD AUTO: 0.49 K/UL (ref 1–4.8)
LYMPHOCYTES NFR BLD: 3.9 % (ref 22–41)
MCH RBC QN AUTO: 31.8 PG (ref 27–33)
MCHC RBC AUTO-ENTMCNC: 33.2 G/DL (ref 33.7–35.3)
MCV RBC AUTO: 95.9 FL (ref 81.4–97.8)
MONOCYTES # BLD AUTO: 0.91 K/UL (ref 0–0.85)
MONOCYTES NFR BLD AUTO: 7.2 % (ref 0–13.4)
NEUTROPHILS # BLD AUTO: 10.92 K/UL (ref 1.82–7.42)
NEUTROPHILS NFR BLD: 86.8 % (ref 44–72)
NRBC # BLD AUTO: 0 K/UL
NRBC BLD AUTO-RTO: 0 /100 WBC
PLATELET # BLD AUTO: 147 K/UL (ref 164–446)
PMV BLD AUTO: 8.8 FL (ref 9–12.9)
POTASSIUM SERPL-SCNC: 4.6 MMOL/L (ref 3.6–5.5)
RBC # BLD AUTO: 4.43 M/UL (ref 4.7–6.1)
SODIUM SERPL-SCNC: 134 MMOL/L (ref 135–145)
WBC # BLD AUTO: 12.6 K/UL (ref 4.8–10.8)

## 2017-02-12 PROCEDURE — 700112 HCHG RX REV CODE 229: Performed by: INTERNAL MEDICINE

## 2017-02-12 PROCEDURE — 700102 HCHG RX REV CODE 250 W/ 637 OVERRIDE(OP): Performed by: INTERNAL MEDICINE

## 2017-02-12 PROCEDURE — 80048 BASIC METABOLIC PNL TOTAL CA: CPT

## 2017-02-12 PROCEDURE — 700102 HCHG RX REV CODE 250 W/ 637 OVERRIDE(OP): Performed by: FAMILY MEDICINE

## 2017-02-12 PROCEDURE — 99232 SBSQ HOSP IP/OBS MODERATE 35: CPT | Performed by: FAMILY MEDICINE

## 2017-02-12 PROCEDURE — A9270 NON-COVERED ITEM OR SERVICE: HCPCS | Performed by: INTERNAL MEDICINE

## 2017-02-12 PROCEDURE — 85025 COMPLETE CBC W/AUTO DIFF WBC: CPT

## 2017-02-12 PROCEDURE — 770001 HCHG ROOM/CARE - MED/SURG/GYN PRIV*

## 2017-02-12 PROCEDURE — 36415 COLL VENOUS BLD VENIPUNCTURE: CPT

## 2017-02-12 PROCEDURE — 700102 HCHG RX REV CODE 250 W/ 637 OVERRIDE(OP): Performed by: NURSE PRACTITIONER

## 2017-02-12 PROCEDURE — A9270 NON-COVERED ITEM OR SERVICE: HCPCS | Performed by: NURSE PRACTITIONER

## 2017-02-12 PROCEDURE — A9270 NON-COVERED ITEM OR SERVICE: HCPCS | Performed by: FAMILY MEDICINE

## 2017-02-12 PROCEDURE — 700111 HCHG RX REV CODE 636 W/ 250 OVERRIDE (IP): Performed by: INTERNAL MEDICINE

## 2017-02-12 RX ADMIN — SULFAMETHOXAZOLE AND TRIMETHOPRIM 1 TABLET: 800; 160 TABLET ORAL at 08:33

## 2017-02-12 RX ADMIN — DEXAMETHASONE 4 MG: 4 TABLET ORAL at 17:29

## 2017-02-12 RX ADMIN — STANDARDIZED SENNA CONCENTRATE AND DOCUSATE SODIUM 1 TABLET: 8.6; 5 TABLET, FILM COATED ORAL at 19:45

## 2017-02-12 RX ADMIN — METHOCARBAMOL 1500 MG: 750 TABLET ORAL at 12:55

## 2017-02-12 RX ADMIN — HYDROMORPHONE HYDROCHLORIDE 4 MG: 4 TABLET ORAL at 12:55

## 2017-02-12 RX ADMIN — TEMOZOLOMIDE 5 MG: 5 CAPSULE ORAL at 21:00

## 2017-02-12 RX ADMIN — DEXAMETHASONE 4 MG: 4 TABLET ORAL at 12:55

## 2017-02-12 RX ADMIN — METHOCARBAMOL 1500 MG: 750 TABLET ORAL at 08:37

## 2017-02-12 RX ADMIN — METHOCARBAMOL 1500 MG: 750 TABLET ORAL at 19:45

## 2017-02-12 RX ADMIN — HYDROMORPHONE HYDROCHLORIDE 4 MG: 4 TABLET ORAL at 08:33

## 2017-02-12 RX ADMIN — HYDROMORPHONE HYDROCHLORIDE 4 MG: 4 TABLET ORAL at 04:14

## 2017-02-12 RX ADMIN — SODIUM CHLORIDE TAB 1 GM 1 G: 1 TAB at 17:29

## 2017-02-12 RX ADMIN — DOCUSATE SODIUM 100 MG: 100 CAPSULE ORAL at 08:33

## 2017-02-12 RX ADMIN — DEXAMETHASONE 4 MG: 4 TABLET ORAL at 08:33

## 2017-02-12 RX ADMIN — HYDROMORPHONE HYDROCHLORIDE 4 MG: 4 TABLET ORAL at 23:16

## 2017-02-12 RX ADMIN — HYDROMORPHONE HYDROCHLORIDE 4 MG: 4 TABLET ORAL at 17:29

## 2017-02-12 RX ADMIN — SERTRALINE 25 MG: 50 TABLET, FILM COATED ORAL at 19:45

## 2017-02-12 RX ADMIN — ONDANSETRON 4 MG: 2 INJECTION, SOLUTION INTRAMUSCULAR; INTRAVENOUS at 01:39

## 2017-02-12 RX ADMIN — GABAPENTIN 300 MG: 300 CAPSULE ORAL at 17:29

## 2017-02-12 RX ADMIN — SODIUM CHLORIDE TAB 1 GM 1 G: 1 TAB at 08:33

## 2017-02-12 RX ADMIN — METHOCARBAMOL 1500 MG: 750 TABLET ORAL at 01:39

## 2017-02-12 RX ADMIN — GABAPENTIN 300 MG: 300 CAPSULE ORAL at 08:33

## 2017-02-12 RX ADMIN — ONDANSETRON 4 MG: 2 INJECTION, SOLUTION INTRAMUSCULAR; INTRAVENOUS at 19:44

## 2017-02-12 RX ADMIN — TEMOZOLOMIDE 140 MG: 140 CAPSULE ORAL at 21:00

## 2017-02-12 RX ADMIN — DEXAMETHASONE 4 MG: 4 TABLET ORAL at 21:43

## 2017-02-12 RX ADMIN — LEVETIRACETAM 1000 MG: 250 TABLET, FILM COATED ORAL at 19:45

## 2017-02-12 RX ADMIN — LEVETIRACETAM 1000 MG: 250 TABLET, FILM COATED ORAL at 08:33

## 2017-02-12 RX ADMIN — FAMOTIDINE 40 MG: 20 TABLET, FILM COATED ORAL at 08:33

## 2017-02-12 RX ADMIN — GABAPENTIN 300 MG: 300 CAPSULE ORAL at 19:45

## 2017-02-12 ASSESSMENT — ENCOUNTER SYMPTOMS
VOMITING: 0
CHILLS: 0
COUGH: 0
FEVER: 0
NAUSEA: 0
SORE THROAT: 0
ABDOMINAL PAIN: 0
SHORTNESS OF BREATH: 0
HEARTBURN: 0
FOCAL WEAKNESS: 0
WHEEZING: 0
SEIZURES: 0
DIZZINESS: 0
HEADACHES: 1
SENSORY CHANGE: 0
BLURRED VISION: 0
SPEECH CHANGE: 0
DIARRHEA: 0

## 2017-02-12 ASSESSMENT — PAIN SCALES - GENERAL
PAINLEVEL_OUTOF10: 5
PAINLEVEL_OUTOF10: 9
PAINLEVEL_OUTOF10: 8
PAINLEVEL_OUTOF10: 8

## 2017-02-12 NOTE — PROGRESS NOTES
Patient seen awake and oriented x4. Able to ambulate without any assistance. Constant pain, PRN meds given. On chemo and radiation therapy. Instructed to call for assistance when needed. Safety precautions and hourly rounding in place.

## 2017-02-12 NOTE — PROGRESS NOTES
Hospital Medicine Progress Note, Adult, Complex               Author: Tai Yun Date & Time created: 2/12/2017  3:18 PM     Interval History:  Admitted for Glioblastoma, Seizure.    GBM - less headache and pain, feels much better  Seizure - no recurrence  Low Na - stable    Review of Systems:  Review of Systems   Constitutional: Negative for fever and chills.   HENT: Negative for sore throat.    Eyes: Negative for blurred vision.   Respiratory: Negative for cough, shortness of breath and wheezing.    Cardiovascular: Negative for chest pain and leg swelling.   Gastrointestinal: Negative for heartburn, nausea, vomiting, abdominal pain and diarrhea.   Genitourinary: Negative for dysuria.   Neurological: Positive for headaches. Negative for dizziness, sensory change, speech change, focal weakness and seizures.       Physical Exam:  Physical Exam   Constitutional: He is oriented to person, place, and time. He appears well-developed and well-nourished.   HENT:   Head: Normocephalic.   R craniotomy healed   Eyes: Conjunctivae are normal. Pupils are equal, round, and reactive to light.   Neck: No tracheal deviation present. No thyromegaly present.   Cardiovascular: Normal rate and regular rhythm.    Pulmonary/Chest: Effort normal and breath sounds normal.   Abdominal: Soft. Bowel sounds are normal. He exhibits no distension. There is no tenderness.   Lymphadenopathy:     He has no cervical adenopathy.   Neurological: He is alert and oriented to person, place, and time. No cranial nerve deficit.   MMT 5/5   Skin: Skin is warm and dry.   Nursing note and vitals reviewed.      Labs:        Invalid input(s): [id number]      Recent Labs      02/10/17   0307  02/11/17   0153  02/12/17   0151   SODIUM  134*  134*  134*   POTASSIUM  4.1  4.6  4.6   CHLORIDE  101  101  101   CO2  23  25  24   BUN  24*  19  20   CREATININE  0.59  0.49*  0.58   CALCIUM  8.9  8.5  8.9     Recent Labs      02/10/17   0307  02/11/17   0153   17   0151   GLUCOSE  124*  121*  145*     Recent Labs      02/10/17   0307  17   0153  17   0151   RBC  4.57*  4.23*  4.43*   HEMOGLOBIN  14.6  13.6*  14.1   HEMATOCRIT  44.1  40.3*  42.5   PLATELETCT  158*  142*  147*     Recent Labs      02/10/17   0307  17   0153  17   0151   WBC  17.0*  14.0*  12.6*   NEUTSPOLYS  83.30*  84.50*  86.80*   LYMPHOCYTES  4.50*  4.80*  3.90*   MONOCYTES  9.40  8.40  7.20   EOSINOPHILS  0.10  0.00  0.00   BASOPHILS  0.40  0.20  0.20           Hemodynamics:  Temp (24hrs), Av.7 °C (98.1 °F), Min:36.4 °C (97.6 °F), Max:36.9 °C (98.5 °F)  Temperature: 36.4 °C (97.6 °F)  Pulse  Av  Min: 60  Max: 101   Blood Pressure: 127/88 mmHg     Respiratory:    Respiration: 18, Pulse Oximetry: 92 %        RUL Breath Sounds: Clear, RML Breath Sounds: Clear, RLL Breath Sounds: Clear, FLY Breath Sounds: Clear, LLL Breath Sounds: Clear  Fluids:  No intake or output data in the 24 hours ending 17 1518     GI/Nutrition:  Orders Placed This Encounter   Procedures   • DIET ORDER     Standing Status: Standing      Number of Occurrences: 1      Standing Expiration Date:      Order Specific Question:  Diet:     Answer:  Regular [1]     Medical Decision Making, by Problem:  Active Hospital Problems    Diagnosis   • *GBM (glioblastoma multiforme) (CMS-HCC) [C71.9]      Temodar, Radiation treatments, Decadron   • Leukocytosis [D72.829]       steroid induced?, follow cbc   • Hyponatremia [E87.1]          Salt tabs, follow bmp   • Seizure (CMS-HCC) [R56.9]      Keppra   • Steroid-induced hyperglycemia [R73.9]        recent hgba1c 5.3   • Depression [F32.9]       Zoloft   • Alcohol abuse [F10.10]      Last drink > 2 mos ago   • Tobacco abuse [Z72.0]      Nicotine patch        Noncompliance.    counseling    Medications reviewed, Labs reviewed and Radiology images reviewed  Anand catheter: No Anand      DVT Prophylaxis: Contraindicated - High bleeding risk  DVT prophylaxis -  mechanical: SCDs  Ulcer prophylaxis: Yes

## 2017-02-12 NOTE — PROGRESS NOTES
Pt is A+Ox4. DIXON. C/o pain and n/v, medicated per MAR. Provided with frequent snack. All questions answered regarding POC.

## 2017-02-12 NOTE — CARE PLAN
Problem: Pain Management  Goal: Pain level will decrease to patient’s comfort goal  Pain assessed. Medicated per MAR with + results.          Problem: Psychosocial Needs:  Goal: Level of anxiety will decrease  Emotional support provided for flat affect.

## 2017-02-13 LAB
ANION GAP SERPL CALC-SCNC: 7 MMOL/L (ref 0–11.9)
BASOPHILS # BLD AUTO: 0.3 % (ref 0–1.8)
BASOPHILS # BLD: 0.04 K/UL (ref 0–0.12)
BUN SERPL-MCNC: 19 MG/DL (ref 8–22)
CALCIUM SERPL-MCNC: 8.9 MG/DL (ref 8.5–10.5)
CHLORIDE SERPL-SCNC: 100 MMOL/L (ref 96–112)
CO2 SERPL-SCNC: 27 MMOL/L (ref 20–33)
CREAT SERPL-MCNC: 0.48 MG/DL (ref 0.5–1.4)
EOSINOPHIL # BLD AUTO: 0 K/UL (ref 0–0.51)
EOSINOPHIL NFR BLD: 0 % (ref 0–6.9)
ERYTHROCYTE [DISTWIDTH] IN BLOOD BY AUTOMATED COUNT: 52 FL (ref 35.9–50)
GFR SERPL CREATININE-BSD FRML MDRD: >60 ML/MIN/1.73 M 2
GLUCOSE SERPL-MCNC: 125 MG/DL (ref 65–99)
HCT VFR BLD AUTO: 44.9 % (ref 42–52)
HGB BLD-MCNC: 14.6 G/DL (ref 14–18)
IMM GRANULOCYTES # BLD AUTO: 0.36 K/UL (ref 0–0.11)
IMM GRANULOCYTES NFR BLD AUTO: 2.9 % (ref 0–0.9)
LYMPHOCYTES # BLD AUTO: 0.58 K/UL (ref 1–4.8)
LYMPHOCYTES NFR BLD: 4.7 % (ref 22–41)
MCH RBC QN AUTO: 31.6 PG (ref 27–33)
MCHC RBC AUTO-ENTMCNC: 32.5 G/DL (ref 33.7–35.3)
MCV RBC AUTO: 97.2 FL (ref 81.4–97.8)
MONOCYTES # BLD AUTO: 1.01 K/UL (ref 0–0.85)
MONOCYTES NFR BLD AUTO: 8.3 % (ref 0–13.4)
NEUTROPHILS # BLD AUTO: 10.25 K/UL (ref 1.82–7.42)
NEUTROPHILS NFR BLD: 83.8 % (ref 44–72)
NRBC # BLD AUTO: 0 K/UL
NRBC BLD AUTO-RTO: 0 /100 WBC
PLATELET # BLD AUTO: 142 K/UL (ref 164–446)
PMV BLD AUTO: 9 FL (ref 9–12.9)
POTASSIUM SERPL-SCNC: 4.2 MMOL/L (ref 3.6–5.5)
RBC # BLD AUTO: 4.62 M/UL (ref 4.7–6.1)
SODIUM SERPL-SCNC: 134 MMOL/L (ref 135–145)
WBC # BLD AUTO: 12.2 K/UL (ref 4.8–10.8)

## 2017-02-13 PROCEDURE — 77014 PR CT GUIDANCE PLACEMENT RAD THERAPY FIELDS: CPT | Mod: 26 | Performed by: RADIOLOGY

## 2017-02-13 PROCEDURE — 77336 RADIATION PHYSICS CONSULT: CPT | Performed by: RADIOLOGY

## 2017-02-13 PROCEDURE — 700102 HCHG RX REV CODE 250 W/ 637 OVERRIDE(OP): Performed by: INTERNAL MEDICINE

## 2017-02-13 PROCEDURE — 700102 HCHG RX REV CODE 250 W/ 637 OVERRIDE(OP): Performed by: FAMILY MEDICINE

## 2017-02-13 PROCEDURE — A9270 NON-COVERED ITEM OR SERVICE: HCPCS | Performed by: FAMILY MEDICINE

## 2017-02-13 PROCEDURE — A9270 NON-COVERED ITEM OR SERVICE: HCPCS | Performed by: INTERNAL MEDICINE

## 2017-02-13 PROCEDURE — 700112 HCHG RX REV CODE 229: Performed by: INTERNAL MEDICINE

## 2017-02-13 PROCEDURE — A9270 NON-COVERED ITEM OR SERVICE: HCPCS | Performed by: NURSE PRACTITIONER

## 2017-02-13 PROCEDURE — 700102 HCHG RX REV CODE 250 W/ 637 OVERRIDE(OP): Performed by: NURSE PRACTITIONER

## 2017-02-13 PROCEDURE — 80048 BASIC METABOLIC PNL TOTAL CA: CPT

## 2017-02-13 PROCEDURE — 770001 HCHG ROOM/CARE - MED/SURG/GYN PRIV*

## 2017-02-13 PROCEDURE — 99232 SBSQ HOSP IP/OBS MODERATE 35: CPT | Performed by: FAMILY MEDICINE

## 2017-02-13 PROCEDURE — 36415 COLL VENOUS BLD VENIPUNCTURE: CPT

## 2017-02-13 PROCEDURE — 77386 HCHG IMRT DELIVERY COMPLEX: CPT | Performed by: RADIOLOGY

## 2017-02-13 PROCEDURE — 700111 HCHG RX REV CODE 636 W/ 250 OVERRIDE (IP): Performed by: INTERNAL MEDICINE

## 2017-02-13 PROCEDURE — 85025 COMPLETE CBC W/AUTO DIFF WBC: CPT

## 2017-02-13 RX ADMIN — DEXAMETHASONE 4 MG: 4 TABLET ORAL at 14:31

## 2017-02-13 RX ADMIN — DEXAMETHASONE 4 MG: 4 TABLET ORAL at 17:40

## 2017-02-13 RX ADMIN — SODIUM CHLORIDE TAB 1 GM 1 G: 1 TAB at 17:40

## 2017-02-13 RX ADMIN — SODIUM CHLORIDE TAB 1 GM 1 G: 1 TAB at 08:54

## 2017-02-13 RX ADMIN — GABAPENTIN 300 MG: 300 CAPSULE ORAL at 08:54

## 2017-02-13 RX ADMIN — METHOCARBAMOL 1500 MG: 750 TABLET ORAL at 07:54

## 2017-02-13 RX ADMIN — DEXAMETHASONE 4 MG: 4 TABLET ORAL at 08:54

## 2017-02-13 RX ADMIN — TEMOZOLOMIDE 5 MG: 5 CAPSULE ORAL at 21:00

## 2017-02-13 RX ADMIN — HYDROMORPHONE HYDROCHLORIDE 4 MG: 4 TABLET ORAL at 10:11

## 2017-02-13 RX ADMIN — ONDANSETRON 4 MG: 2 INJECTION, SOLUTION INTRAMUSCULAR; INTRAVENOUS at 04:59

## 2017-02-13 RX ADMIN — ONDANSETRON 4 MG: 2 INJECTION, SOLUTION INTRAMUSCULAR; INTRAVENOUS at 19:43

## 2017-02-13 RX ADMIN — HYDROMORPHONE HYDROCHLORIDE 4 MG: 4 TABLET ORAL at 14:31

## 2017-02-13 RX ADMIN — SULFAMETHOXAZOLE AND TRIMETHOPRIM 1 TABLET: 800; 160 TABLET ORAL at 08:54

## 2017-02-13 RX ADMIN — FAMOTIDINE 40 MG: 20 TABLET, FILM COATED ORAL at 08:54

## 2017-02-13 RX ADMIN — HYDROMORPHONE HYDROCHLORIDE 4 MG: 4 TABLET ORAL at 04:59

## 2017-02-13 RX ADMIN — METHOCARBAMOL 1500 MG: 750 TABLET ORAL at 19:43

## 2017-02-13 RX ADMIN — STANDARDIZED SENNA CONCENTRATE AND DOCUSATE SODIUM 1 TABLET: 8.6; 5 TABLET, FILM COATED ORAL at 19:43

## 2017-02-13 RX ADMIN — GABAPENTIN 300 MG: 300 CAPSULE ORAL at 19:43

## 2017-02-13 RX ADMIN — DOCUSATE SODIUM 100 MG: 100 CAPSULE ORAL at 08:54

## 2017-02-13 RX ADMIN — HYDROMORPHONE HYDROCHLORIDE 4 MG: 4 TABLET ORAL at 19:43

## 2017-02-13 RX ADMIN — LEVETIRACETAM 1000 MG: 250 TABLET, FILM COATED ORAL at 08:54

## 2017-02-13 RX ADMIN — GABAPENTIN 300 MG: 300 CAPSULE ORAL at 14:31

## 2017-02-13 RX ADMIN — LEVETIRACETAM 1000 MG: 250 TABLET, FILM COATED ORAL at 19:44

## 2017-02-13 RX ADMIN — SERTRALINE 25 MG: 50 TABLET, FILM COATED ORAL at 19:43

## 2017-02-13 RX ADMIN — TEMOZOLOMIDE 140 MG: 140 CAPSULE ORAL at 21:00

## 2017-02-13 RX ADMIN — DEXAMETHASONE 4 MG: 4 TABLET ORAL at 19:43

## 2017-02-13 ASSESSMENT — ENCOUNTER SYMPTOMS
CHILLS: 0
FEVER: 0
NAUSEA: 0
SHORTNESS OF BREATH: 0
SENSORY CHANGE: 0
DIARRHEA: 0
SPEECH CHANGE: 0
ABDOMINAL PAIN: 0
VOMITING: 0
SEIZURES: 0
FOCAL WEAKNESS: 0
HEARTBURN: 0
SORE THROAT: 0
WHEEZING: 0
HEADACHES: 1
DIZZINESS: 0
BLURRED VISION: 0
COUGH: 0

## 2017-02-13 ASSESSMENT — PAIN SCALES - GENERAL
PAINLEVEL_OUTOF10: 6
PAINLEVEL_OUTOF10: 7
PAINLEVEL_OUTOF10: 5
PAINLEVEL_OUTOF10: 6

## 2017-02-13 NOTE — PROGRESS NOTES
Pt refused bed alarm. Pt educated about safety and the importance of the bed alarm. Pt still refused. Pt educated to call before getting up. Hourly rounding in place.

## 2017-02-13 NOTE — PROGRESS NOTES
Subjective:  No new symptoms, staying house through XRT  Obj:  AAOx4. Tongue ML.FCx4. PERRl 3mm bilat. CDI, No scabbing  Temp (24hrs), Av.8 °C (98.3 °F), Min:36.7 °C (98 °F), Max:37 °C (98.6 °F)    Pulse: 80, Respiration: 18, Blood Pressure: 118/68 mmHg, Pulse Oximetry: 95 %, O2 (LPM): 0            Intake/Output Summary (Last 24 hours) at 17 1143  Last data filed at 17 0000   Gross per 24 hour   Intake   1210 ml   Output    400 ml   Net    810 ml            • methocarbamol  1,500 mg     • sodium chloride  1 g     • famotidine  40 mg     • gabapentin  300 mg     • HYDROmorphone  2-4 mg     • sulfamethoxazole-trimethoprim  1 Tab     • prochlorperazine  10 mg     • Temozolomide  140 mg      And   • temozolomide  5 mg     • dexamethasone  4 mg     • nicotine  21 mg     • nicotine polacrilex  2 mg     • levetiracetam  1,000 mg     • sertraline  25 mg     • docusate sodium  100 mg      And   • senna-docusate  1 Tab      And   • senna-docusate  1 Tab      And   • lactulose  30 mL      And   • bisacodyl  10 mg      And   • fleet  1 Each     • labetalol  10 mg     • ondansetron  4 mg     • ondansetron  4 mg     • promethazine  12.5-25 mg     • promethazine  12.5-25 mg     • prochlorperazine  5-10 mg     • lorazepam  4 mg         Recent Labs      17   0202   WBC  14.0*  12.6*  12.2*   RBC  4.23*  4.43*  4.62*   HEMOGLOBIN  13.6*  14.1  14.6   HEMATOCRIT  40.3*  42.5  44.9   MCV  95.3  95.9  97.2   MCH  32.2  31.8  31.6   PLATELETCT  142*  147*  142*     Recent Labs      17   01517   0202   SODIUM  134*  134*  134*   POTASSIUM  4.6  4.6  4.2   CHLORIDE  101  101  100   CO2  25  24  27   GLUCOSE  121*  145*  125*   BUN  19  20  19   CREATININE  0.49*  0.58  0.48*   CALCIUM  8.5  8.9  8.9           Assessment:  HD#21 S/P GBM re-resection    Plan:  CT stable- reviewed by Dr Edgar  XRT/Onc- continue xrt/temodar   SW for dc  planning  Nonsurgical  Decadron per XRT

## 2017-02-13 NOTE — PROGRESS NOTES
Assumed pt care at 1900. Received report from day RN. Assessment completed. Pt A&Ox4. Pain 5/10. Bed in lowest position, locked, and pt refused bed alarm. Pt calls appropriately. Treaded socks in place, call light within reach and staff numbers provided. Pt needs met at this time.

## 2017-02-13 NOTE — CARE PLAN
Problem: Venous Thromboembolism (VTW)/Deep Vein Thrombosis (DVT) Prevention:  Goal: Patient will participate in Venous Thrombosis (VTE)/Deep Vein Thrombosis (DVT)Prevention Measures  Outcome: PROGRESSING AS EXPECTED  Educated pt to do foot pumps while awake since he refuses to have SCD's.    Problem: Pain Management  Goal: Pain level will decrease to patient’s comfort goal  Outcome: PROGRESSING AS EXPECTED

## 2017-02-13 NOTE — PROGRESS NOTES
Patient refusing bed alarm. Patient educated about the importance of the bed alarm for his safety. Patient still refusing bed alarm. Patient educated to use the call light to call for assistance. Patient verbalized understanding.

## 2017-02-13 NOTE — PROGRESS NOTES
Hospital Medicine Progress Note, Adult, Complex               Author: Tai Luuwachavo Date & Time created: 2/13/2017  12:57 PM     Interval History:  Admitted for Glioblastoma, Seizure.    GBM - less headache and pain, feels much better, looking forward to going home soon  Seizure - no recurrence  Low Na - stable    Review of Systems:  Review of Systems   Constitutional: Negative for fever and chills.   HENT: Negative for sore throat.    Eyes: Negative for blurred vision.   Respiratory: Negative for cough, shortness of breath and wheezing.    Cardiovascular: Negative for chest pain and leg swelling.   Gastrointestinal: Negative for heartburn, nausea, vomiting, abdominal pain and diarrhea.   Genitourinary: Negative for dysuria.   Neurological: Positive for headaches. Negative for dizziness, sensory change, speech change, focal weakness and seizures.       Physical Exam:  Physical Exam   Constitutional: He is oriented to person, place, and time. He appears well-developed and well-nourished.   HENT:   Head: Normocephalic.   R craniotomy healed   Eyes: Conjunctivae are normal. Pupils are equal, round, and reactive to light.   Neck: No tracheal deviation present. No thyromegaly present.   Cardiovascular: Normal rate and regular rhythm.    Pulmonary/Chest: Effort normal and breath sounds normal.   Abdominal: Soft. Bowel sounds are normal. He exhibits no distension. There is no tenderness.   Lymphadenopathy:     He has no cervical adenopathy.   Neurological: He is alert and oriented to person, place, and time. No cranial nerve deficit.   MMT 5/5   Skin: Skin is warm and dry.   Nursing note and vitals reviewed.      Labs:        Invalid input(s): [id number]      Recent Labs      02/11/17   0153  02/12/17   0151  02/13/17   0202   SODIUM  134*  134*  134*   POTASSIUM  4.6  4.6  4.2   CHLORIDE  101  101  100   CO2  25  24  27   BUN  19  20  19   CREATININE  0.49*  0.58  0.48*   CALCIUM  8.5  8.9  8.9     Recent Labs       17   0153  17   0151  17   0202   GLUCOSE  121*  145*  125*     Recent Labs      17   0153  17   0151  17   0202   RBC  4.23*  4.43*  4.62*   HEMOGLOBIN  13.6*  14.1  14.6   HEMATOCRIT  40.3*  42.5  44.9   PLATELETCT  142*  147*  142*     Recent Labs      17   01517   0202   WBC  14.0*  12.6*  12.2*   NEUTSPOLYS  84.50*  86.80*  83.80*   LYMPHOCYTES  4.80*  3.90*  4.70*   MONOCYTES  8.40  7.20  8.30   EOSINOPHILS  0.00  0.00  0.00   BASOPHILS  0.20  0.20  0.30           Hemodynamics:  Temp (24hrs), Av.8 °C (98.3 °F), Min:36.7 °C (98 °F), Max:37 °C (98.6 °F)  Temperature: 37 °C (98.6 °F)  Pulse  Av.1  Min: 60  Max: 101   Blood Pressure: 118/68 mmHg     Respiratory:    Respiration: 18, Pulse Oximetry: 95 %           Fluids:  No intake or output data in the 24 hours ending 17 1257     GI/Nutrition:  Orders Placed This Encounter   Procedures   • DIET ORDER     Standing Status: Standing      Number of Occurrences: 1      Standing Expiration Date:      Order Specific Question:  Diet:     Answer:  Regular [1]     Medical Decision Making, by Problem:  Active Hospital Problems    Diagnosis   • *GBM (glioblastoma multiforme) (CMS-HCC) [C71.9]      Temodar, Radiation treatments until 2/15, Decadron   • Leukocytosis [D72.829]       steroid induced?, follow cbc   • Hyponatremia [E87.1]          Salt tabs, follow bmp   • Seizure (CMS-HCC) [R56.9]      Keppra   • Steroid-induced hyperglycemia [R73.9]        recent hgba1c 5.3   • Depression [F32.9]       Zoloft   • Alcohol abuse [F10.10]      Last drink > 2 mos ago   • Tobacco abuse [Z72.0]      Nicotine patch        Noncompliance.    counseling    Medications reviewed, Labs reviewed and Radiology images reviewed  Anand catheter: No Anand      DVT Prophylaxis: Contraindicated - High bleeding risk  DVT prophylaxis - mechanical: SCDs  Ulcer prophylaxis: Yes

## 2017-02-13 NOTE — DISCHARGE PLANNING
"Referral: Follow Up    Intervention: SW met with pt at bedside.  Pt voiced understanding of anticipated discharge date 2/15/17.  Pt states he will discharge to a [facility] with his brother.  Pt recognizes his brother's alcoholism; however, pt states \" me and my brother help each other out \".  SW explained, assistance with discharge medications can be provided, pt agrees to cover the cost of any Narcotics.      Pt states he lost his ID, his brother will assist him obtaining a replacement ID.    Plan: As Above.  Await scripts.  "

## 2017-02-14 LAB
ANION GAP SERPL CALC-SCNC: 10 MMOL/L (ref 0–11.9)
BASOPHILS # BLD AUTO: 0.2 % (ref 0–1.8)
BASOPHILS # BLD: 0.02 K/UL (ref 0–0.12)
BUN SERPL-MCNC: 21 MG/DL (ref 8–22)
CALCIUM SERPL-MCNC: 8.5 MG/DL (ref 8.5–10.5)
CHLORIDE SERPL-SCNC: 101 MMOL/L (ref 96–112)
CO2 SERPL-SCNC: 22 MMOL/L (ref 20–33)
CREAT SERPL-MCNC: 0.41 MG/DL (ref 0.5–1.4)
EOSINOPHIL # BLD AUTO: 0 K/UL (ref 0–0.51)
EOSINOPHIL NFR BLD: 0 % (ref 0–6.9)
ERYTHROCYTE [DISTWIDTH] IN BLOOD BY AUTOMATED COUNT: 51.6 FL (ref 35.9–50)
GFR SERPL CREATININE-BSD FRML MDRD: >60 ML/MIN/1.73 M 2
GLUCOSE SERPL-MCNC: 161 MG/DL (ref 65–99)
HCT VFR BLD AUTO: 41.5 % (ref 42–52)
HGB BLD-MCNC: 14.1 G/DL (ref 14–18)
IMM GRANULOCYTES # BLD AUTO: 0.3 K/UL (ref 0–0.11)
IMM GRANULOCYTES NFR BLD AUTO: 2.6 % (ref 0–0.9)
LYMPHOCYTES # BLD AUTO: 0.54 K/UL (ref 1–4.8)
LYMPHOCYTES NFR BLD: 4.7 % (ref 22–41)
MCH RBC QN AUTO: 32.6 PG (ref 27–33)
MCHC RBC AUTO-ENTMCNC: 34 G/DL (ref 33.7–35.3)
MCV RBC AUTO: 95.8 FL (ref 81.4–97.8)
MONOCYTES # BLD AUTO: 1 K/UL (ref 0–0.85)
MONOCYTES NFR BLD AUTO: 8.7 % (ref 0–13.4)
NEUTROPHILS # BLD AUTO: 9.69 K/UL (ref 1.82–7.42)
NEUTROPHILS NFR BLD: 83.8 % (ref 44–72)
NRBC # BLD AUTO: 0 K/UL
NRBC BLD AUTO-RTO: 0 /100 WBC
PLATELET # BLD AUTO: 135 K/UL (ref 164–446)
PMV BLD AUTO: 8.5 FL (ref 9–12.9)
POTASSIUM SERPL-SCNC: 4.1 MMOL/L (ref 3.6–5.5)
RBC # BLD AUTO: 4.33 M/UL (ref 4.7–6.1)
SODIUM SERPL-SCNC: 133 MMOL/L (ref 135–145)
WBC # BLD AUTO: 11.6 K/UL (ref 4.8–10.8)

## 2017-02-14 PROCEDURE — A9270 NON-COVERED ITEM OR SERVICE: HCPCS | Performed by: INTERNAL MEDICINE

## 2017-02-14 PROCEDURE — 99232 SBSQ HOSP IP/OBS MODERATE 35: CPT | Performed by: INTERNAL MEDICINE

## 2017-02-14 PROCEDURE — 700112 HCHG RX REV CODE 229: Performed by: INTERNAL MEDICINE

## 2017-02-14 PROCEDURE — 77386 HCHG IMRT DELIVERY COMPLEX: CPT | Performed by: RADIOLOGY

## 2017-02-14 PROCEDURE — A9270 NON-COVERED ITEM OR SERVICE: HCPCS | Performed by: FAMILY MEDICINE

## 2017-02-14 PROCEDURE — 700102 HCHG RX REV CODE 250 W/ 637 OVERRIDE(OP): Performed by: FAMILY MEDICINE

## 2017-02-14 PROCEDURE — 80048 BASIC METABOLIC PNL TOTAL CA: CPT

## 2017-02-14 PROCEDURE — 85025 COMPLETE CBC W/AUTO DIFF WBC: CPT

## 2017-02-14 PROCEDURE — 700102 HCHG RX REV CODE 250 W/ 637 OVERRIDE(OP): Performed by: NURSE PRACTITIONER

## 2017-02-14 PROCEDURE — 77014 PR CT GUIDANCE PLACEMENT RAD THERAPY FIELDS: CPT | Mod: 26 | Performed by: RADIOLOGY

## 2017-02-14 PROCEDURE — 700102 HCHG RX REV CODE 250 W/ 637 OVERRIDE(OP): Performed by: INTERNAL MEDICINE

## 2017-02-14 PROCEDURE — 700111 HCHG RX REV CODE 636 W/ 250 OVERRIDE (IP): Performed by: INTERNAL MEDICINE

## 2017-02-14 PROCEDURE — A9270 NON-COVERED ITEM OR SERVICE: HCPCS | Performed by: NURSE PRACTITIONER

## 2017-02-14 PROCEDURE — 770001 HCHG ROOM/CARE - MED/SURG/GYN PRIV*

## 2017-02-14 PROCEDURE — 36415 COLL VENOUS BLD VENIPUNCTURE: CPT

## 2017-02-14 RX ORDER — TEMOZOLOMIDE 5 MG/1
5 CAPSULE ORAL
Qty: 4 CAP | Refills: 0
Start: 2017-02-14 | End: 2017-04-24

## 2017-02-14 RX ORDER — TEMOZOLOMIDE 140 MG/1
140 CAPSULE ORAL
Qty: 4 CAP | Refills: 0
Start: 2017-02-14 | End: 2017-04-24

## 2017-02-14 RX ORDER — HYDROMORPHONE HYDROCHLORIDE 2 MG/1
2-4 TABLET ORAL EVERY 4 HOURS PRN
Qty: 120 TAB | Refills: 0 | Status: SHIPPED | OUTPATIENT
Start: 2017-02-14 | End: 2017-03-03 | Stop reason: SDUPTHER

## 2017-02-14 RX ORDER — SODIUM CHLORIDE 1 G/1
1 TABLET ORAL 2 TIMES DAILY WITH MEALS
Qty: 60 TAB | Refills: 0 | Status: SHIPPED | OUTPATIENT
Start: 2017-02-14 | End: 2017-04-24

## 2017-02-14 RX ORDER — METHOCARBAMOL 750 MG/1
1500 TABLET, FILM COATED ORAL EVERY 6 HOURS PRN
Qty: 120 TAB | Refills: 0 | Status: SHIPPED | OUTPATIENT
Start: 2017-02-14 | End: 2017-03-03 | Stop reason: SDUPTHER

## 2017-02-14 RX ORDER — SERTRALINE HYDROCHLORIDE 25 MG/1
25 TABLET, FILM COATED ORAL EVERY EVENING
Qty: 30 TAB | Refills: 11 | Status: SHIPPED | OUTPATIENT
Start: 2017-02-14 | End: 2017-04-24

## 2017-02-14 RX ORDER — GABAPENTIN 300 MG/1
300 CAPSULE ORAL 3 TIMES DAILY
Qty: 90 CAP | Refills: 0 | Status: SHIPPED | OUTPATIENT
Start: 2017-02-14 | End: 2017-03-03 | Stop reason: SDUPTHER

## 2017-02-14 RX ORDER — SULFAMETHOXAZOLE AND TRIMETHOPRIM 800; 160 MG/1; MG/1
1 TABLET ORAL DAILY
Qty: 4 TAB | Refills: 0 | Status: SHIPPED | OUTPATIENT
Start: 2017-02-14 | End: 2017-02-18

## 2017-02-14 RX ORDER — LEVETIRACETAM 1000 MG/1
1000 TABLET ORAL 2 TIMES DAILY
Qty: 60 TAB | Refills: 0 | Status: SHIPPED | OUTPATIENT
Start: 2017-02-14 | End: 2017-03-08 | Stop reason: SDUPTHER

## 2017-02-14 RX ORDER — DEXAMETHASONE 4 MG/1
4 TABLET ORAL 3 TIMES DAILY
Qty: 120 TAB | Refills: 0 | Status: SHIPPED | OUTPATIENT
Start: 2017-02-14 | End: 2017-04-24

## 2017-02-14 RX ORDER — ONDANSETRON 4 MG/1
4 TABLET, ORALLY DISINTEGRATING ORAL EVERY 4 HOURS PRN
Qty: 30 TAB | Refills: 0 | Status: SHIPPED | OUTPATIENT
Start: 2017-02-14 | End: 2017-04-07 | Stop reason: SDUPTHER

## 2017-02-14 RX ADMIN — ONDANSETRON 4 MG: 2 INJECTION, SOLUTION INTRAMUSCULAR; INTRAVENOUS at 20:29

## 2017-02-14 RX ADMIN — SERTRALINE 25 MG: 50 TABLET, FILM COATED ORAL at 20:29

## 2017-02-14 RX ADMIN — SODIUM CHLORIDE TAB 1 GM 1 G: 1 TAB at 07:26

## 2017-02-14 RX ADMIN — DEXAMETHASONE 4 MG: 4 TABLET ORAL at 20:29

## 2017-02-14 RX ADMIN — LEVETIRACETAM 1000 MG: 250 TABLET, FILM COATED ORAL at 07:26

## 2017-02-14 RX ADMIN — METHOCARBAMOL 1500 MG: 750 TABLET ORAL at 22:12

## 2017-02-14 RX ADMIN — SULFAMETHOXAZOLE AND TRIMETHOPRIM 1 TABLET: 800; 160 TABLET ORAL at 07:26

## 2017-02-14 RX ADMIN — HYDROMORPHONE HYDROCHLORIDE 4 MG: 4 TABLET ORAL at 12:18

## 2017-02-14 RX ADMIN — METHOCARBAMOL 1500 MG: 750 TABLET ORAL at 09:45

## 2017-02-14 RX ADMIN — TEMOZOLOMIDE 140 MG: 140 CAPSULE ORAL at 21:00

## 2017-02-14 RX ADMIN — GABAPENTIN 300 MG: 300 CAPSULE ORAL at 14:52

## 2017-02-14 RX ADMIN — GABAPENTIN 300 MG: 300 CAPSULE ORAL at 07:26

## 2017-02-14 RX ADMIN — LEVETIRACETAM 1000 MG: 250 TABLET, FILM COATED ORAL at 20:28

## 2017-02-14 RX ADMIN — DOCUSATE SODIUM 100 MG: 100 CAPSULE ORAL at 07:26

## 2017-02-14 RX ADMIN — TEMOZOLOMIDE 5 MG: 5 CAPSULE ORAL at 21:00

## 2017-02-14 RX ADMIN — HYDROMORPHONE HYDROCHLORIDE 4 MG: 4 TABLET ORAL at 22:12

## 2017-02-14 RX ADMIN — METHOCARBAMOL 1500 MG: 750 TABLET ORAL at 02:58

## 2017-02-14 RX ADMIN — DEXAMETHASONE 4 MG: 4 TABLET ORAL at 09:45

## 2017-02-14 RX ADMIN — HYDROMORPHONE HYDROCHLORIDE 4 MG: 4 TABLET ORAL at 07:26

## 2017-02-14 RX ADMIN — HYDROMORPHONE HYDROCHLORIDE 4 MG: 4 TABLET ORAL at 17:11

## 2017-02-14 RX ADMIN — STANDARDIZED SENNA CONCENTRATE AND DOCUSATE SODIUM 1 TABLET: 8.6; 5 TABLET, FILM COATED ORAL at 20:29

## 2017-02-14 RX ADMIN — FAMOTIDINE 40 MG: 20 TABLET, FILM COATED ORAL at 07:26

## 2017-02-14 RX ADMIN — DEXAMETHASONE 4 MG: 4 TABLET ORAL at 16:10

## 2017-02-14 RX ADMIN — SODIUM CHLORIDE TAB 1 GM 1 G: 1 TAB at 17:11

## 2017-02-14 RX ADMIN — DEXAMETHASONE 4 MG: 4 TABLET ORAL at 12:18

## 2017-02-14 RX ADMIN — HYDROMORPHONE HYDROCHLORIDE 4 MG: 4 TABLET ORAL at 02:58

## 2017-02-14 RX ADMIN — METHOCARBAMOL 1500 MG: 750 TABLET ORAL at 16:10

## 2017-02-14 RX ADMIN — GABAPENTIN 300 MG: 300 CAPSULE ORAL at 20:29

## 2017-02-14 ASSESSMENT — ENCOUNTER SYMPTOMS
HEADACHES: 0
HEARTBURN: 0
SHORTNESS OF BREATH: 0
DIZZINESS: 0
WEAKNESS: 0
NERVOUS/ANXIOUS: 0
FEVER: 0
EYE PAIN: 0
EYE DISCHARGE: 0
COUGH: 0
DEPRESSION: 0
FOCAL WEAKNESS: 0
DIAPHORESIS: 0
CLAUDICATION: 0
SPEECH CHANGE: 0
BLURRED VISION: 0
CONSTIPATION: 0
MYALGIAS: 0
DIARRHEA: 0

## 2017-02-14 ASSESSMENT — PAIN SCALES - GENERAL
PAINLEVEL_OUTOF10: 8
PAINLEVEL_OUTOF10: 9
PAINLEVEL_OUTOF10: 7
PAINLEVEL_OUTOF10: 8
PAINLEVEL_OUTOF10: 10
PAINLEVEL_OUTOF10: 4

## 2017-02-14 NOTE — CARE PLAN
Problem: Medication  Goal: Compliance with prescribed medication will improve  Intervention: Educate patient and significant other/support system medication rationale and regimen  Educated pt on importance of continuing chemo meds. Pt complains of nausea but agrees to continue as long as zofran is given with chemo meds.

## 2017-02-14 NOTE — PROGRESS NOTES
Assumed pt care at 1900. Received report from day RN. Assessment completed. Pt A&Ox4. Pain 7/10. Bed in lowest position, locked, and pt refused bed alarm. Pt calls appropriately. Treaded socks in place, call light within reach and staff numbers provided. Pt needs met at this time.

## 2017-02-14 NOTE — PROGRESS NOTES
Received report from night nurse at the bedside. Assume care of Pt at 0700. Assessment completed Pt A&O X 4 . Pt denies numbness and tingling, Pt complains of pain in head and generalized body aches 7/10, medicated per mar,  Assist of self. Pt in bed, bed in lowest position, call light in place, treaded slipper socks on.

## 2017-02-14 NOTE — PROGRESS NOTES
Pt educated regarding fall risk and safety. Pt bed in low position, call light and possessions within reach, treaded slipper socks are on, floor free from trip hazards. Pt educated to call for assistance. Pt still refusing bed alarm at this time. Hourly rounding in place.

## 2017-02-14 NOTE — DISCHARGE PLANNING
Referral: Follow Up    Intervention: SW received discharge scripts in anticipation of discharge 2/14/17.  Scripts faxed to the [facility].    Plan: Home with Medication Assistance.

## 2017-02-14 NOTE — CARE PLAN
Problem: Safety  Goal: Will remain free from injury  Intervention: Provide assistance with mobility  Pt encouraged to ambulate during the day in the hallway. Pt is up self with a steady gait.       Problem: Pain Management  Goal: Pain level will decrease to patient’s comfort goal  Pt complains of generalized pain throughout body and a headache. Dilaudid provided per mar.

## 2017-02-14 NOTE — CARE PLAN
Problem: Pain Management  Goal: Pain level will decrease to patient’s comfort goal  Outcome: PROGRESSING AS EXPECTED  Pt educated to call for pain meds before pain exceeds comfort goal.

## 2017-02-15 VITALS
DIASTOLIC BLOOD PRESSURE: 70 MMHG | HEIGHT: 69 IN | TEMPERATURE: 98.5 F | RESPIRATION RATE: 18 BRPM | BODY MASS INDEX: 20.7 KG/M2 | HEART RATE: 72 BPM | SYSTOLIC BLOOD PRESSURE: 125 MMHG | WEIGHT: 139.77 LBS | OXYGEN SATURATION: 95 %

## 2017-02-15 LAB
ANION GAP SERPL CALC-SCNC: 8 MMOL/L (ref 0–11.9)
BASOPHILS # BLD AUTO: 0.1 % (ref 0–1.8)
BASOPHILS # BLD: 0.01 K/UL (ref 0–0.12)
BUN SERPL-MCNC: 21 MG/DL (ref 8–22)
CALCIUM SERPL-MCNC: 8.5 MG/DL (ref 8.5–10.5)
CHLORIDE SERPL-SCNC: 102 MMOL/L (ref 96–112)
CO2 SERPL-SCNC: 24 MMOL/L (ref 20–33)
CREAT SERPL-MCNC: 0.43 MG/DL (ref 0.5–1.4)
EOSINOPHIL # BLD AUTO: 0 K/UL (ref 0–0.51)
EOSINOPHIL NFR BLD: 0 % (ref 0–6.9)
ERYTHROCYTE [DISTWIDTH] IN BLOOD BY AUTOMATED COUNT: 52.6 FL (ref 35.9–50)
GFR SERPL CREATININE-BSD FRML MDRD: >60 ML/MIN/1.73 M 2
GLUCOSE SERPL-MCNC: 149 MG/DL (ref 65–99)
HCT VFR BLD AUTO: 40.4 % (ref 42–52)
HGB BLD-MCNC: 13.5 G/DL (ref 14–18)
IMM GRANULOCYTES # BLD AUTO: 0.23 K/UL (ref 0–0.11)
IMM GRANULOCYTES NFR BLD AUTO: 2.1 % (ref 0–0.9)
LYMPHOCYTES # BLD AUTO: 0.38 K/UL (ref 1–4.8)
LYMPHOCYTES NFR BLD: 3.4 % (ref 22–41)
MCH RBC QN AUTO: 32.1 PG (ref 27–33)
MCHC RBC AUTO-ENTMCNC: 33.4 G/DL (ref 33.7–35.3)
MCV RBC AUTO: 96 FL (ref 81.4–97.8)
MONOCYTES # BLD AUTO: 0.96 K/UL (ref 0–0.85)
MONOCYTES NFR BLD AUTO: 8.6 % (ref 0–13.4)
NEUTROPHILS # BLD AUTO: 9.57 K/UL (ref 1.82–7.42)
NEUTROPHILS NFR BLD: 85.8 % (ref 44–72)
NRBC # BLD AUTO: 0 K/UL
NRBC BLD AUTO-RTO: 0 /100 WBC
PLATELET # BLD AUTO: 124 K/UL (ref 164–446)
PMV BLD AUTO: 8.7 FL (ref 9–12.9)
POTASSIUM SERPL-SCNC: 4.1 MMOL/L (ref 3.6–5.5)
RBC # BLD AUTO: 4.21 M/UL (ref 4.7–6.1)
SODIUM SERPL-SCNC: 134 MMOL/L (ref 135–145)
WBC # BLD AUTO: 11.2 K/UL (ref 4.8–10.8)

## 2017-02-15 PROCEDURE — A9270 NON-COVERED ITEM OR SERVICE: HCPCS | Performed by: INTERNAL MEDICINE

## 2017-02-15 PROCEDURE — 700102 HCHG RX REV CODE 250 W/ 637 OVERRIDE(OP): Performed by: INTERNAL MEDICINE

## 2017-02-15 PROCEDURE — 85025 COMPLETE CBC W/AUTO DIFF WBC: CPT

## 2017-02-15 PROCEDURE — 700111 HCHG RX REV CODE 636 W/ 250 OVERRIDE (IP): Performed by: INTERNAL MEDICINE

## 2017-02-15 PROCEDURE — 36415 COLL VENOUS BLD VENIPUNCTURE: CPT

## 2017-02-15 PROCEDURE — 77014 PR CT GUIDANCE PLACEMENT RAD THERAPY FIELDS: CPT | Mod: 26 | Performed by: RADIOLOGY

## 2017-02-15 PROCEDURE — 99239 HOSP IP/OBS DSCHRG MGMT >30: CPT | Performed by: INTERNAL MEDICINE

## 2017-02-15 PROCEDURE — 700112 HCHG RX REV CODE 229: Performed by: INTERNAL MEDICINE

## 2017-02-15 PROCEDURE — 700102 HCHG RX REV CODE 250 W/ 637 OVERRIDE(OP): Performed by: NURSE PRACTITIONER

## 2017-02-15 PROCEDURE — 80048 BASIC METABOLIC PNL TOTAL CA: CPT

## 2017-02-15 PROCEDURE — A9270 NON-COVERED ITEM OR SERVICE: HCPCS | Performed by: FAMILY MEDICINE

## 2017-02-15 PROCEDURE — 77427 RADIATION TX MANAGEMENT X5: CPT | Performed by: RADIOLOGY

## 2017-02-15 PROCEDURE — 77386 HCHG IMRT DELIVERY COMPLEX: CPT | Performed by: RADIOLOGY

## 2017-02-15 PROCEDURE — 700102 HCHG RX REV CODE 250 W/ 637 OVERRIDE(OP): Performed by: FAMILY MEDICINE

## 2017-02-15 PROCEDURE — A9270 NON-COVERED ITEM OR SERVICE: HCPCS | Performed by: NURSE PRACTITIONER

## 2017-02-15 RX ORDER — ACETAMINOPHEN 500 MG
500 TABLET ORAL EVERY 6 HOURS PRN
COMMUNITY
Start: 2017-02-15 | End: 2017-04-24

## 2017-02-15 RX ADMIN — DOCUSATE SODIUM 100 MG: 100 CAPSULE ORAL at 07:30

## 2017-02-15 RX ADMIN — HYDROMORPHONE HYDROCHLORIDE 4 MG: 4 TABLET ORAL at 03:03

## 2017-02-15 RX ADMIN — SULFAMETHOXAZOLE AND TRIMETHOPRIM 1 TABLET: 800; 160 TABLET ORAL at 07:32

## 2017-02-15 RX ADMIN — ONDANSETRON 4 MG: 2 INJECTION, SOLUTION INTRAMUSCULAR; INTRAVENOUS at 04:47

## 2017-02-15 RX ADMIN — FAMOTIDINE 40 MG: 20 TABLET, FILM COATED ORAL at 07:30

## 2017-02-15 RX ADMIN — SODIUM CHLORIDE TAB 1 GM 1 G: 1 TAB at 07:30

## 2017-02-15 RX ADMIN — GABAPENTIN 300 MG: 300 CAPSULE ORAL at 07:30

## 2017-02-15 RX ADMIN — LEVETIRACETAM 1000 MG: 250 TABLET, FILM COATED ORAL at 07:30

## 2017-02-15 RX ADMIN — METHOCARBAMOL 1500 MG: 750 TABLET ORAL at 07:30

## 2017-02-15 RX ADMIN — DEXAMETHASONE 4 MG: 4 TABLET ORAL at 07:30

## 2017-02-15 ASSESSMENT — PAIN SCALES - GENERAL: PAINLEVEL_OUTOF10: 4

## 2017-02-15 NOTE — PROGRESS NOTES
Patient educated to wait until Debbie REYES has seen him before leaving. APN at bedside and patient not in room. Likely left with brother despite being educated to wait. Patients discharge paperwork given prior and did include follow up appointment.

## 2017-02-15 NOTE — DISCHARGE INSTRUCTIONS
Discharge Instructions    Discharged to home by car with relative. Discharged via wheelchair, hospital escort: Yes.  Special equipment needed: Cane    Be sure to schedule a follow-up appointment with your primary care doctor or any specialists as instructed.     Discharge Plan:   Diet Plan: Discussed  Activity Level: Discussed  Smoking Cessation Offered: Patient Refused  Confirmed Follow up Appointment: Patient to Call and Schedule Appointment  Confirmed Symptoms Management: Discussed  Medication Reconciliation Updated: Yes  Pneumococcal Vaccine Given - only chart on this line when given: Given (See MAR)  Influenza Vaccine Indication: Indicated: 9 to 64 years of age  Influenza Vaccine Given - only chart on this line when given: Influenza Vaccine Given (See MAR)    I understand that a diet low in cholesterol, fat, and sodium is recommended for good health. Unless I have been given specific instructions below for another diet, I accept this instruction as my diet prescription.           · Is patient discharged on Warfarin / Coumadin?   No     · Is patient Post Blood Transfusion?  No    Depression / Suicide Risk    As you are discharged from this [facility] facility, it is important to learn how to keep safe from harming yourself.    Recognize the warning signs:  · Abrupt changes in personality, positive or negative- including increase in energy   · Giving away possessions  · Change in eating patterns- significant weight changes-  positive or negative  · Change in sleeping patterns- unable to sleep or sleeping all the time   · Unwillingness or inability to communicate  · Depression  · Unusual sadness, discouragement and loneliness  · Talk of wanting to die  · Neglect of personal appearance   · Rebelliousness- reckless behavior  · Withdrawal from people/activities they love  · Confusion- inability to concentrate     If you or a loved one observes any of these behaviors or has concerns about self-harm, here's what you  can do:  · Talk about it- your feelings and reasons for harming yourself  · Remove any means that you might use to hurt yourself (examples: pills, rope, extension cords, firearm)  · Get professional help from the community (Mental Health, Substance Abuse, psychological counseling)  · Do not be alone:Call your Safe Contact- someone whom you trust who will be there for you.  · Call your local CRISIS HOTLINE 375-9312 or 767-227-9459  · Call your local Children's Mobile Crisis Response Team [place] (553) 526-6726 or www.[place]  · Call the toll free National Suicide Prevention Hotlines   · National Suicide Prevention Lifeline 637-654-TVLL (9412)  · National Hope Line Network 800-SUICIDE (694-4700)

## 2017-02-15 NOTE — PROGRESS NOTES
Plan to see patient at 10:30 this morning but unfortunately patient was not in his room. Spoke with bedside nurse and she stated she had asked the patient to wait until I has seen the patient. It appears that patient had left the hospital before being seen by myself. Did have patient scheduled to be seen by Dr. Lee in the outpatient clinic on Wednesday, February 22 at 10:20 AM. Bedside nurse stated she did see that appointment and gave patient the appointment time. Discharge has been completed by hospitalist.

## 2017-02-15 NOTE — PROGRESS NOTES
Hospital Medicine Progress Note, Adult, Complex               Author: Yue Thakur Date & Time created: 2/14/2017  7:02 PM     Interval History:  Patient doing about same, completes radiation tomorrow.  Rx's given to  to help with coordination on discharge tomorrow.  Patient to follow up at Dr Lee's office in 1 week.    Review of Systems:  Review of Systems   Constitutional: Negative for fever and diaphoresis.   HENT: Negative for congestion and nosebleeds.    Eyes: Negative for blurred vision, pain and discharge.   Respiratory: Negative for cough and shortness of breath.    Cardiovascular: Negative for chest pain, claudication and leg swelling.   Gastrointestinal: Negative for heartburn, diarrhea and constipation.   Genitourinary: Negative for dysuria and hematuria.   Musculoskeletal: Negative for myalgias and joint pain.   Skin: Negative for itching and rash.   Neurological: Negative for dizziness, speech change, focal weakness, weakness and headaches.   Psychiatric/Behavioral: Negative for depression. The patient is not nervous/anxious.        Physical Exam:  Physical Exam   Constitutional: He is oriented to person, place, and time. He appears well-developed and well-nourished. No distress.   HENT:   Head: Atraumatic.   Eyes: Conjunctivae are normal. No scleral icterus.   Neck: Neck supple. No JVD present.   Cardiovascular: Normal rate and regular rhythm.  Exam reveals no gallop and no friction rub.    No murmur heard.  Pulmonary/Chest: Effort normal and breath sounds normal. No respiratory distress. He has no wheezes. He exhibits no tenderness.   Abdominal: Soft. Bowel sounds are normal. He exhibits no distension and no mass.   Musculoskeletal: He exhibits no edema or tenderness.   Neurological: He is alert and oriented to person, place, and time. No cranial nerve deficit.   Skin: Skin is warm and dry. He is not diaphoretic.   Psychiatric: He has a normal mood and affect. His behavior is  normal.   Nursing note and vitals reviewed.      Labs:        Invalid input(s): [id number]      Recent Labs      17   0202  17   0308   SODIUM  134*  134*  133*   POTASSIUM  4.6  4.2  4.1   CHLORIDE  101  100  101   CO2  24  27  22   BUN  20  19  21   CREATININE  0.58  0.48*  0.41*   CALCIUM  8.9  8.9  8.5     Recent Labs      17   0308   GLUCOSE  145*  125*  161*     Recent Labs      17   0202  17   0308   RBC  4.43*  4.62*  4.33*   HEMOGLOBIN  14.1  14.6  14.1   HEMATOCRIT  42.5  44.9  41.5*   PLATELETCT  147*  142*  135*     Recent Labs      17   0308   WBC  12.6*  12.2*  11.6*   NEUTSPOLYS  86.80*  83.80*  83.80*   LYMPHOCYTES  3.90*  4.70*  4.70*   MONOCYTES  7.20  8.30  8.70   EOSINOPHILS  0.00  0.00  0.00   BASOPHILS  0.20  0.30  0.20           Hemodynamics:  Temp (24hrs), Av.8 °C (98.2 °F), Min:36.6 °C (97.8 °F), Max:37.1 °C (98.8 °F)  Temperature: 36.7 °C (98 °F)  Pulse  Av.3  Min: 60  Max: 101   Blood Pressure: 121/73 mmHg     Respiratory:    Respiration: 18, Pulse Oximetry: 97 %        RUL Breath Sounds: Clear, RML Breath Sounds: Clear, RLL Breath Sounds: Clear, FLY Breath Sounds: Clear, LLL Breath Sounds: Clear  Fluids:  No intake or output data in the 24 hours ending 17     GI/Nutrition:  Orders Placed This Encounter   Procedures   • DIET ORDER     Standing Status: Standing      Number of Occurrences: 1      Standing Expiration Date:      Order Specific Question:  Diet:     Answer:  Regular [1]     Medical Decision Making, by Problem:  Active Hospital Problems    Diagnosis   • *GBM (glioblastoma multiforme) (CMS-HCC) [C71.9]completing radiation tomorrow     • Leukocytosis [D72.829]     • Hyponatremia [E87.1]salt tab     • Seizure (CMS-HCC) [R56.9]keppra     • Steroid-induced hyperglycemia [R73.9]mild     • Depression [F32.9]zoloft     • Alcohol  abuse [F10.10]   • Tobacco abuse [Z72.0]       Labs reviewed, Radiology images reviewed and Medications reviewed  Anand catheter: No Anand      DVT Prophylaxis: Not indicated at this time, ambulatory  DVT prophylaxis - mechanical: Not indicated at this time, ambulatory

## 2017-02-15 NOTE — CARE PLAN
Problem: Venous Thromboembolism (VTW)/Deep Vein Thrombosis (DVT) Prevention:  Goal: Patient will participate in Venous Thrombosis (VTE)/Deep Vein Thrombosis (DVT)Prevention Measures  Intervention: Encourage patient to perform ankle flex, foot rotation, and knee flex exercises in addition to other prophylatic measures every hour while awake  Pt educated and performs them while awake.      Problem: Knowledge Deficit  Goal: Knowledge of the prescribed therapeutic regimen will improve  Outcome: PROGRESSING AS EXPECTED  Pt educated to write down time meds are due and set reminders to help him remember.

## 2017-02-15 NOTE — PROGRESS NOTES
Aox4. DESTINY. Denies n/t Left visual field cut. Medicated per MAR for pain. Ambulating. Voiding. Plan for radiation treatment this am and discharge this afternoon. Able to make needs known.

## 2017-02-15 NOTE — PROGRESS NOTES
Assumed pt care at 1850. Received report from day RN. Assessment completed. Pt A&Ox4. Pain 4/10. Bed in lowest position, locked, and pt refused bed alarm. Pt calls appropriately. Treaded socks in place, call light within reach and staff numbers provided. Pt needs met at this time.

## 2017-02-15 NOTE — CARE PLAN
Problem: Pain Management  Goal: Pain level will decrease to patient’s comfort goal  Intervention: Follow pain managment plan developed in collaboration with patient and Interdisciplinary Team  Medicated per MAR.      Problem: Mobility  Goal: Risk for activity intolerance will decrease  Intervention: Assess and monitor signs of activity intolerance  Ambulates often independently with steady gait.

## 2017-02-15 NOTE — PROGRESS NOTES
Pt and pt's brother educated on home care instructions, diet, activity, follow up. Verbalized understanding. Prescriptions given to patient by social work. Discharged with brother, belongings with pt.

## 2017-02-15 NOTE — DISCHARGE PLANNING
Referral: Discharge Plan    Intervention: SW met with pt and pt's brother at bedside.  SW provided discharge scripts and written instructions on picking up the discharge medications.    Plan: As Above.

## 2017-02-16 ENCOUNTER — PATIENT OUTREACH (OUTPATIENT)
Dept: HEALTH INFORMATION MANAGEMENT | Facility: OTHER | Age: 47
End: 2017-02-16

## 2017-02-17 NOTE — DISCHARGE SUMMARY
DIAGNOSES:  Glioblastoma multiforme, status post chemotherapy as well as   radiation treatment; headache as well as chronic neck pain, medical   noncompliance with the patient; however, the patient is making a good effort   to trying be more appropriate with his medical care, hyponatremia,   steroid-induced hyperglycemia, depression, history of alcohol abuse, and   history of tobacco abuse.    REASON FOR HOSPITALIZATION:  Patient is a 46-year-old male who _____ from the   hospital early in January in the process of waiting for appropriate healing   time before initiating radiation therapy.  He presented back to the emergency   room with increased headache as well as seizure.    HOSPITAL COURSE:  Patient was readmitted to the hospital and started on   seizure prophylaxis as well as steroids.  He was seen by his oncologist, Dr. Lee as well as radiation oncologist, Dr. Madrigal.  Patient underwent 15   treatments of radiation on his tumor bed and completed therapy today on   February 15th.  He was also seen by neurosurgery, Dr. Edgar as well as   neurology, Dr. Juarez.  Patient was also concurrently on Temodar chemotherapy   while on the radiation.  He has 4 more days remaining of Temodar as well as 4   more days of antibiotic prophylaxis while on his medications.  His medications   were provided for him on the day of discharge other than his pain medication.    Patient is instructed to follow up with Dr. Lee in the next week, has   an appointment on February 22nd at 10:20 a.m.  The patient also instructed to   follow up at the [facility] for primary care.    DISCHARGE MEDICATIONS:  1.  Dexamethasone 4 mg p.o. 3 times daily, taper as directed by Dr. Lee.  2.  Gabapentin 300 mg p.o. t.i.d.  3.  Dilaudid 2-4 mg every 4 hours as needed for severe pain.  4.  Keppra 1000 mg twice daily.  5.  Robaxin 750 mg tablets 2 tabs every 6 hours as needed for muscle spasm.  6.  Zofran 4 mg every 4 hours as needed for  nausea and vomiting.  7.  Salt tabs 1 g b.i.d. with meals.  8.  Bactrim 800/160 one tab p.o. daily for the next 4 days.  9.  Temodar 145 mg p.o. at bedtime for the next 4 days.  10.  Tylenol 500 mg every 4 hours as needed for mild pain.  11.  Benadryl 25 mg every 6 hours as needed for cough.  12.  Zoloft 25 mg p.o. at bedtime.    Discharge instructions and follow up explained to the patient who is agreeable   with discharge.  Patient states he will be more compliant and follow up with   Dr. Lee in the next week.  Discharge process lasted approximately 35   minutes.       ____________________________________     DO ALIX Christine / JOVITA    DD:  02/15/2017 18:53:35  DT:  02/16/2017 16:22:05    D#:  [id number]  Job#:  [id number]

## 2017-02-22 ENCOUNTER — OFFICE VISIT (OUTPATIENT)
Dept: HEMATOLOGY ONCOLOGY | Facility: MEDICAL CENTER | Age: 47
End: 2017-02-22

## 2017-02-22 VITALS
DIASTOLIC BLOOD PRESSURE: 90 MMHG | HEART RATE: 82 BPM | OXYGEN SATURATION: 96 % | TEMPERATURE: 99.8 F | WEIGHT: 182.54 LBS | RESPIRATION RATE: 12 BRPM | SYSTOLIC BLOOD PRESSURE: 126 MMHG | BODY MASS INDEX: 26.94 KG/M2

## 2017-02-22 DIAGNOSIS — C71.9 GBM (GLIOBLASTOMA MULTIFORME) (HCC): ICD-10-CM

## 2017-02-22 PROCEDURE — 99214 OFFICE O/P EST MOD 30 MIN: CPT | Performed by: INTERNAL MEDICINE

## 2017-02-22 ASSESSMENT — PAIN SCALES - GENERAL: PAINLEVEL: 9=SEVERE PAIN

## 2017-02-22 NOTE — PROGRESS NOTES
Follow Up Note: Oncology     Date: 2/22/17  Time:10:20 am     Primary Care Physician: None  Neurosurgery: Dr. Tala Hancock  Radiation Oncology: Dr. Madrigal     Diagnosis: Anaplastic Astrocytoma, Grade III with recurrence with GBM    Chief compliant: He is here for a follow up visit.    History of presenting illness:  Mr. Ricci is a 46-year-old male with history of grade 3 astrocytoma and recurrence with glioblastoma. He was initially diagnosed in 11/2014 secondary to syncopal episode. He was found to have a right temporal mass with uncal herniation. He underwent craniotomy with right temporal tumor resection which was positive for anaplastic astrocytoma, grade 3 with the variable Ki-67 low to high and IDH1-R132H (E5) negative.  He then moved to the [facility] and establish care. He was treated with chemoradiation with temozolomide. During his therapy he had multiple complications secondary to social issues, alcohol abuse and noncompliance. Post chemoradiation MRI was consistent with a subtotal resection. He was placed on maintenance temozolomide. He had issues with nausea, vomiting which were well controlled when he took antiemetics. Patient has been periodically noncompliance and continued to have issues with alcohol abuse as well as tobacco use. He was last seen in the oncology clinic in 3/2016. He was then lost for follow up and unreachable despite multiple attempts.  He then presented to the emergency room and 12/2016 secondary to increased headaches. MRI brain showed progression of disease. He was then seen by Dr. Edgar and underwent biopsy of the mass. Pathology was consistent with GBM. During the hospitalization he was consulted by myself and Dr. Madrigal and plan was to restart him on chemoradiation with temozolomide. He was simulated and was due to start treatment as outpatient. Patient however elope from the hospital and did not follow up for his appointments with oncology or radiation oncology was  unreachable. He also left without taking any dexamethasone or Keppra. He then again presented to the emergency room in 1/16/17 secondary to worsening of his symptoms including headaches, vision changes and difficulty with ambulation. Repeat imaging showed increased edema in the right cerebral hemisphere with mass effect. He was started back on Keppra and dexamethasone. Secondary to complicated social issues he was started on chemoradiation in the hospital. He underwent radiation with temozolomide and was given Bactrim for the concurrent phase. He completed 15 fractions of radiation completed on 2/16/17 and was discharged with the steroidal taper.      Interval History:  He is here for follow-up visit and is accompanied by his brother who was present in the room. Since his discharge he is now living in a motel with his brother. Clinically he has been stable without any significant issues. He has been compliant with Keppra as well as dexamethasone as per patient. He however could not recall the dose of his dexamethasone. He denies any nausea and vomiting. He has had a mild headache this morning otherwise these are fairly manageable. He’s been taking Dilaudid as needed further headaches. He has noticed some constipation but is having a bowel movement every day. He has a good appetite with stable weight. He continues to have left leg numbness. Denies any more alcohol use. He denies tobacco use but is chewing tobacco. He denies any fevers, chills or night sweats.        Past Medical History:    1. GBM  2. History of Anaplastic Astrocytoma, Grade III  3. GERD  4. Depression  5. Anxiety     Allergies:  Pcn      Medications:    Current Outpatient Prescriptions on File Prior to Visit   Medication Sig Dispense Refill   • acetaminophen (TYLENOL) 500 MG Tab Take 1 Tab by mouth every 6 hours as needed. Indications: Pain     • dexamethasone (DECADRON) 4 MG Tab Take 1 Tab by mouth 3 times a day. 120 Tab 0   • gabapentin  (NEURONTIN) 300 MG Cap Take 1 Cap by mouth 3 times a day. 90 Cap 0   • levetiracetam (KEPPRA) 1000 MG tablet Take 1 Tab by mouth 2 Times a Day. 60 Tab 0   • methocarbamol (ROBAXIN) 750 MG Tab Take 2 Tabs by mouth every 6 hours as needed. 120 Tab 0   • ondansetron (ZOFRAN ODT) 4 MG TABLET DISPERSIBLE Take 1 Tab by mouth every four hours as needed for Nausea/Vomiting (give PO if no IV route available). 30 Tab 0   • Temozolomide 140 MG Cap Take 1 Cap by mouth every bedtime. 4 Cap 0   • temozolomide (TEMODAR) 5 MG Cap Take 1 Cap by mouth every bedtime. 4 Cap 0   • sertraline (ZOLOFT) 25 MG tablet Take 1 Tab by mouth every evening. 30 Tab 11   • HYDROmorphone (DILAUDID) 2 MG Tab Take 1-2 Tabs by mouth every four hours as needed for Severe Pain. 120 Tab 0   • sodium chloride (SALT) 1 GM Tab Take 1 Tab by mouth 2 times a day, with meals. 60 Tab 0   • diphenhydrAMINE (BENADRYL) 25 MG Tab Take 25 mg by mouth every 6 hours as needed. Indications: Cough     • Pseudoephedrine-APAP-DM (QC DAYTIME COLD MEDICINE PO) Take 2 Caps by mouth as needed (For cold symptoms).       No current facility-administered medications on file prior to visit.       Review of systems:    All other review of systems are negative except what was mentioned above in the HPI.  Constitutional: Negative for fever, chills. Positive for mild fatigue    HENT: Negative for ear pain, nosebleeds.     Eyes: Negative for blurred vision.    Respiratory: PNegative for cough, sputum production, and shortness of breath.     Cardiovascular: Negative for chest pain.    Gastrointestinal: Negative nausea, vomiting and abdominal pain.    Genitourinary: Negative for dysuria.    Musculoskeletal: Negative for myalgias.    Skin: Negative for rash and itching.    Neurological: Positive for intermittent headaches, negative for dizziness, positive for sensory changes, negative for focal weakness   Endo/Heme/Allergies: No bruise/bleed easily.    Psychiatric/Behavioral: positive for  anxiety.      Physical Exam:  Vitals:    Filed Vitals:    02/22/17 1018   BP: 126/90   Pulse: 82   Temp: 37.7 °C (99.8 °F)   Resp: 12   Weight: 82.8 kg (182 lb 8.7 oz)   SpO2: 96%       General: patient is in moderate distress, alert and oriented    HEENT: Post op changes with clean dressing, extra ocular muscles intact, moist oral mucus membranes, and oral cavity without any lesions.  Neck: Supple neck, full range of motion  Lymph nodes: No palpable cervical, supraclavicular lymphadenopathy.     CVS: regular rate and rhythm  RESP: Clear to auscultation bilaterally.    ABD: Soft, non tender, non distended.     EXT: No edema   CNS: Alert and oriented, cranial nerves grossly intact, muscle strength grossly intact.       Assessment and Plan:  1. GBM, stage IV: With history of grade 3 anaplastic astrocytoma underwent chemoradiation followed by maintenance therapy which was discontinued early secondary to noncompliance. He then presented with CNS symptoms and was found to have recurrence and on biopsy positive for GBM. MGMT mythelation was requested and pending. He underwent salvage radiation with temozolomide. He completed therapy on 2/16/17. Patient has had issues with compliance.  His chemoradiation was done as inpatient secondary to issues with compliance. Patient is on dexamethasone and Keppra. He is on a very slow taper of dexamethasone. Patient currently is unable to recall his dexamethasone dose. He is advised to bring his medications during his next follow-up visit. He will need a very slow taper. He is advised to continue his Keppra. Needs to start him on single agent temozolomide after 4 weeks of break. I also went over NovoTTF however it is unclear whether patient would be compliant.  2. Social issues: Appeared to be stable at present. He is currently living in a motel with his brother.  3.  History of alcohol abuse: He denies any further alcohol use at present.  4. Tobacco use: Patient has stopped smoking  however is chewing tobacco. I advised him to stop all tobacco products including chewing tobacco and went over some of the risks associated with it.  5. Constipation: Patient is advised over-the-counter stool softeners to maintain bowel movements daily.  6. Plan is to start him on maintenance temozolomide after 4 weeks of therapy. A repeat MRI brain also to be done 4 weeks posttherapy. He will be monitored closely secondary to issues with compliance. He is to follow-up again in one week or sooner as needed.        He agreed and verbalized his agreement and understanding with the current plan.  I answered all questions and concerns he has at this time and advised him to call at any time in the interim with questions or concerns in regards to his care.  Thank you for allowing me to participate in his care, I will continue to follow closely.

## 2017-02-22 NOTE — MR AVS SNAPSHOT
Fortinophani Hanson Radhames Oconnor   2017 10:20 AM   Office Visit   MRN: [id number]    Department:  [facility]   Dept Phone:  [phone]    Description:  Male : 1970   Provider:  Ashley Lee M.D.           Reason for Visit     Follow-Up 1wk FV      Allergies as of 2017     Allergen Noted Reactions    Pcn [Penicillins] 12/10/2014       Reaction was as a child.  Pt stated tolerated Augmentin and Amoxicillin       Vital Signs     Blood Pressure Pulse Temperature Respirations Weight Oxygen Saturation    126/90 mmHg 82 37.7 °C (99.8 °F) 12 82.8 kg (182 lb 8.7 oz) 96%    Smoking Status                   Current Every Day Smoker           Basic Information     Date Of Birth Sex Race Ethnicity Preferred Language    1970 Male White Non- English      Your appointments     Mar 08, 2017 10:00 AM   ONCOLOGY EST PATIENT 30 MIN with Ashley Lee M.D.   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   12/10/2014 - Present    GERD (gastroesophageal reflux disease) K21.9   2014 - Present    Singultus R06.6   2014 - Present    Headache R51   2014 - Present    Anaplastic astrocytoma of brain (CMS-HCC) C71.9   2015 - Present    Intractable nausea and vomiting R11.2   4/10/2015 - Present    Nausea & vomiting R11.2   2015 - Present    Astrocytoma brain tumor (CMS-HCC) C71.9   2015 - Present    Hypokalemia E87.6   2015 - Present    Hx of brain cancer Z85.841   2015 - Present    Glioblastoma (CMS-HCC) C71.9 High  2016 - Present    Brain mass G93.9   2016 - Present    Midline shift of brain G93.9   2016 - Present    Tobacco abuse Z72.0 Low  2016 - Present    Alcohol abuse F10.10 Low  2016 -  Present    Steroid-induced hyperglycemia R73.9 Medium  12/20/2016 - Present    Acute viral conjunctivitis of right eye B30.9   12/23/2016 - Present    GBM (glioblastoma multiforme) (CMS-HCC) C71.9 High  12/26/2016 - Present    Insomnia disorder G47.00   12/26/2016 - Present    Seizure (CMS-HCC) R56.9 Medium  1/25/2017 - Present    Depression F32.9 Low  1/25/2017 - Present    Leukocytosis D72.829 Medium  2/7/2017 - Present    Hyponatremia E87.1 Medium  2/7/2017 - Present      Health Maintenance        Date Due Completion Dates    IMM DTaP/Tdap/Td Vaccine (1 - Tdap) 10/14/1989 ---    IMM PNEUMOCOCCAL 19-64 (ADULT) MEDIUM RISK SERIES (1 of 1 - PPSV23) 3/22/2017 1/25/2017            Current Immunizations     13-VALENT PCV PREVNAR 1/25/2017  8:16 PM    Influenza TIV (IM) 11/10/2014    Influenza Vaccine Quad Inj (Pf) 1/25/2017  8:14 PM    Pneumococcal Vaccine (UF)Historical Data 11/10/2014      Below and/or attached are the medications your provider expects you to take. Review all of your home medications and newly ordered medications with your provider and/or pharmacist. Follow medication instructions as directed by your provider and/or pharmacist. Please keep your medication list with you and share with your provider. Update the information when medications are discontinued, doses are changed, or new medications (including over-the-counter products) are added; and carry medication information at all times in the event of emergency situations     Allergies:  PCN - (reactions not documented)               Medications  Valid as of: February 22, 2017 - 10:39 AM    Generic Name Brand Name Tablet Size Instructions for use    Acetaminophen (Tab) TYLENOL 500 MG Take 1 Tab by mouth every 6 hours as needed. Indications: Pain        Dexamethasone (Tab) DECADRON 4 MG Take 1 Tab by mouth 3 times a day.        DiphenhydrAMINE HCl (Tab) BENADRYL 25 MG Take 25 mg by mouth every 6 hours as needed. Indications: Cough        Gabapentin  (Cap) NEURONTIN 300 MG Take 1 Cap by mouth 3 times a day.        HYDROmorphone HCl (Tab) DILAUDID 2 MG Take 1-2 Tabs by mouth every four hours as needed for Severe Pain.        LevETIRAcetam (Tab) KEPPRA 1000 MG Take 1 Tab by mouth 2 Times a Day.        Methocarbamol (Tab) ROBAXIN 750 MG Take 2 Tabs by mouth every 6 hours as needed.        Ondansetron (TABLET DISPERSIBLE) ZOFRAN ODT 4 MG Take 1 Tab by mouth every four hours as needed for Nausea/Vomiting (give PO if no IV route available).        Pseudoephedrine-APAP-DM   Take 2 Caps by mouth as needed (For cold symptoms).        Sertraline HCl (Tab) ZOLOFT 25 MG Take 1 Tab by mouth every evening.        Sodium Chloride (Tab) SALT 1 GM Take 1 Tab by mouth 2 times a day, with meals.        Temozolomide (Cap) Temozolomide 140 MG Take 1 Cap by mouth every bedtime.        Temozolomide (Cap) TEMODAR 5 MG Take 1 Cap by mouth every bedtime.        .                 Medicines prescribed today were sent to:     [facility] [address] [place] [address] [address]     [address] [facility] [address]    Phone: [phone] Fax: [phone]    Open 24 Hours?: No      Medication refill instructions:       If your prescription bottle indicates you have medication refills left, it is not necessary to call your provider’s office. Please contact your pharmacy and they will refill your medication.    If your prescription bottle indicates you do not have any refills left, you may request refills at any time through one of the following ways: The online [employer] system (except Urgent Care), by calling your provider’s office, or by asking your pharmacy to contact your provider’s office with a refill request. Medication refills are processed only during regular business hours and may not be available until the next business day. Your provider may request additional information or to have a follow-up visit with you prior to refilling your medication.   *Please Note: Medication refills are assigned  a new Rx number when refilled electronically. Your pharmacy may indicate that no refills were authorized even though a new prescription for the same medication is available at the pharmacy. Please request the medicine by name with the pharmacy before contacting your provider for a refill.           Coghead Access Code: [email or web address]  Expires: 3/1/2017 10:44 AM    Coghead  A secure, online tool to manage your health information     [employer]’s Coghead® is a secure, online tool that connects you to your personalized health information from the privacy of your home -- day or night - making it very easy for you to manage your healthcare. Once the activation process is completed, you can even access your medical information using the Coghead logan, which is available for free in the Apple Logan store or Google Play store.     Coghead provides the following levels of access (as shown below):   My Chart Features   [facility] Primary Care Doctor [facility]  Specialists [facility]  Urgent  Care Non-[facility]  Primary Care  Doctor   Email your healthcare team securely and privately 24/7 X X X    Manage appointments: schedule your next appointment; view details of past/upcoming appointments X      Request prescription refills. X      View recent personal medical records, including lab and immunizations X X X X   View health record, including health history, allergies, medications X X X X   Read reports about your outpatient visits, procedures, consult and ER notes X X X X   See your discharge summary, which is a recap of your hospital and/or ER visit that includes your diagnosis, lab results, and care plan. X X       How to register for Coghead:  1. Go to  [email or web address].  2. Click on the Sign Up Now box, which takes you to the New Member Sign Up page. You will need to provide the following information:  a. Enter your Coghead Access Code exactly as it appears at the top of this page. (You will not need to use this code after  you’ve completed the sign-up process. If you do not sign up before the expiration date, you must request a new code.)   b. Enter your date of birth.   c. Enter your home email address.   d. Click Submit, and follow the next screen’s instructions.  3. Create a N(i)Â²t ID. This will be your N(i)Â²t login ID and cannot be changed, so think of one that is secure and easy to remember.  4. Create a N(i)Â²t password. You can change your password at any time.  5. Enter your Password Reset Question and Answer. This can be used at a later time if you forget your password.   6. Enter your e-mail address. This allows you to receive e-mail notifications when new information is available in [employer].  7. Click Sign Up. You can now view your health information.    For assistance activating your [employer] account, call [phone]

## 2017-02-28 ENCOUNTER — TELEPHONE (OUTPATIENT)
Dept: OTHER | Facility: MEDICAL CENTER | Age: 47
End: 2017-02-28

## 2017-03-02 RX ORDER — METHOCARBAMOL 750 MG/1
1500 TABLET, FILM COATED ORAL EVERY 6 HOURS PRN
Qty: 120 TAB | Refills: 0 | Status: CANCELLED | OUTPATIENT
Start: 2017-03-02

## 2017-03-02 RX ORDER — HYDROMORPHONE HYDROCHLORIDE 2 MG/1
2-4 TABLET ORAL EVERY 4 HOURS PRN
Qty: 120 TAB | Refills: 0 | Status: CANCELLED | OUTPATIENT
Start: 2017-03-02

## 2017-03-02 RX ORDER — GABAPENTIN 300 MG/1
300 CAPSULE ORAL 3 TIMES DAILY
Qty: 90 CAP | Refills: 0 | Status: CANCELLED | OUTPATIENT
Start: 2017-03-02

## 2017-03-02 NOTE — TELEPHONE ENCOUNTER
Was the patient seen in the last year in this department? Yes     Does patient have an active prescription for medications requested? No     Received Request Via: Patient

## 2017-03-03 DIAGNOSIS — C71.9 GLIOBLASTOMA (HCC): ICD-10-CM

## 2017-03-03 RX ORDER — GABAPENTIN 300 MG/1
300 CAPSULE ORAL 3 TIMES DAILY
Qty: 90 CAP | Refills: 1 | Status: SHIPPED | OUTPATIENT
Start: 2017-03-03 | End: 2017-03-08 | Stop reason: SDUPTHER

## 2017-03-03 RX ORDER — HYDROMORPHONE HYDROCHLORIDE 2 MG/1
2-4 TABLET ORAL EVERY 4 HOURS PRN
Qty: 45 TAB | Refills: 0 | Status: SHIPPED | OUTPATIENT
Start: 2017-03-03 | End: 2017-03-15 | Stop reason: SDUPTHER

## 2017-03-03 RX ORDER — METHOCARBAMOL 750 MG/1
1500 TABLET, FILM COATED ORAL EVERY 6 HOURS PRN
Qty: 120 TAB | Refills: 0 | Status: SHIPPED | OUTPATIENT
Start: 2017-03-03 | End: 2017-03-03 | Stop reason: SDUPTHER

## 2017-03-03 RX ORDER — METHOCARBAMOL 750 MG/1
1500 TABLET, FILM COATED ORAL EVERY 6 HOURS PRN
Qty: 45 TAB | Refills: 0 | Status: SHIPPED | OUTPATIENT
Start: 2017-03-03 | End: 2017-03-08 | Stop reason: SDUPTHER

## 2017-03-07 ENCOUNTER — TELEPHONE (OUTPATIENT)
Dept: OTHER | Facility: MEDICAL CENTER | Age: 47
End: 2017-03-07

## 2017-03-08 ENCOUNTER — OFFICE VISIT (OUTPATIENT)
Dept: HEMATOLOGY ONCOLOGY | Facility: MEDICAL CENTER | Age: 47
End: 2017-03-08

## 2017-03-08 VITALS
DIASTOLIC BLOOD PRESSURE: 82 MMHG | BODY MASS INDEX: 27.27 KG/M2 | RESPIRATION RATE: 12 BRPM | WEIGHT: 184.08 LBS | HEIGHT: 69 IN | HEART RATE: 83 BPM | TEMPERATURE: 99.9 F | SYSTOLIC BLOOD PRESSURE: 124 MMHG | OXYGEN SATURATION: 97 %

## 2017-03-08 DIAGNOSIS — C71.9 GBM (GLIOBLASTOMA MULTIFORME) (HCC): ICD-10-CM

## 2017-03-08 DIAGNOSIS — C71.9 GLIOBLASTOMA (HCC): ICD-10-CM

## 2017-03-08 PROCEDURE — 99214 OFFICE O/P EST MOD 30 MIN: CPT | Performed by: INTERNAL MEDICINE

## 2017-03-08 RX ORDER — LEVETIRACETAM 1000 MG/1
1000 TABLET ORAL 2 TIMES DAILY
Qty: 60 TAB | Refills: 3 | Status: SHIPPED | OUTPATIENT
Start: 2017-03-08 | End: 2017-04-24

## 2017-03-08 RX ORDER — GABAPENTIN 300 MG/1
300 CAPSULE ORAL 3 TIMES DAILY
Qty: 90 CAP | Refills: 3 | Status: SHIPPED | OUTPATIENT
Start: 2017-03-08 | End: 2017-04-24

## 2017-03-08 RX ORDER — METHOCARBAMOL 750 MG/1
750 TABLET, FILM COATED ORAL EVERY 6 HOURS PRN
Qty: 45 TAB | Refills: 0 | Status: SHIPPED | OUTPATIENT
Start: 2017-03-08 | End: 2017-03-15 | Stop reason: SDUPTHER

## 2017-03-08 RX ORDER — FAMOTIDINE 20 MG/1
20 TABLET, FILM COATED ORAL 2 TIMES DAILY
Qty: 60 TAB | Refills: 3 | Status: SHIPPED | OUTPATIENT
Start: 2017-03-08 | End: 2017-04-24

## 2017-03-08 RX ORDER — PROCHLORPERAZINE MALEATE 10 MG
10 TABLET ORAL EVERY 6 HOURS PRN
Qty: 30 TAB | Refills: 3 | Status: SHIPPED | OUTPATIENT
Start: 2017-03-08 | End: 2017-04-07 | Stop reason: SDUPTHER

## 2017-03-08 ASSESSMENT — PAIN SCALES - GENERAL: PAINLEVEL: 9=SEVERE PAIN

## 2017-03-08 NOTE — PROGRESS NOTES
Follow Up Note: Oncology     Date: 3/8/17  Time:10:00 am     Primary Care Physician: None  Neurosurgery: Dr. Tala Hancock  Radiation Oncology: Dr. Madrigal     Diagnosis: Anaplastic Astrocytoma, Grade III with recurrence with GBM    Chief compliant: He is here for a follow up visit.    History of presenting illness:  Mr. Ricci is a 46-year-old male with history of grade 3 astrocytoma recurrence with glioblastoma. 11/2014 he was diagnosed with grade 3 astrocytoma secondary to his syncopal episode. He was found to have a right temporal mass and uncal herniation at diagnosis. He underwent craniotomy and resection of the tumor which was positive for anaplastic astrocytoma, grade 3 with a low to high Ki-67 and IDH1-R132H (E5) negative.  He completed chemoradiation with temozolomide.  He was then placed on Ms. temozolomide. Patient had issues with nausea and vomiting which were fairly well controlled when he was compliant with his medications. Patient has had multiple issues with compliance and had been noted clear noncompliant. He also had significant issues with tobacco use and alcohol use during that time. Continued on maintenance therapy until 3/2016 when to follow-up. He was unreachable despite multiple attempts. He then presented to the emergency room in 12/2016 with increased headaches. MRI brain showed progression of disease and he was seen by Dr. Edgar. He underwent biopsy of the mass pathology consistent with glioblastoma. He was seen by Dr. Madrigal during hospitalization was also started back on dexamethasone and Keppra and was to start him on chemoradiation. Patient was discharged however was readmitted shortly after. He received chemoradiation during his hospitalization and completed therapy in 15 fractions of radiation on 2/16/17. He tolerated the treatment fairly well. He was continued on Keppra and a very slow taper of dexamethasone. He is followed closely as outpatient and has been compliant thus  far.    Interval History:  He is here for follow-up visit and is accompanied by his brother who was present in the room. He has been fairly stable since his last visit. He is currently on dexamethasone 4 mg once a day for the past one week. He has noticed headaches mostly in the morning time for which she is taking Dilaudid. He apparently is taking Dilaudid only once or twice a day as needed. He continues to have intermittent dizziness with his orthostatic in nature. Falls since his last visit. He did have an episode of nausea but no vomiting. He did take his antiemetics with well-controlled symptoms. He has noticed increased reflux/GERD since his last visit which is bothering him. He does have some photophobia which has been persistent. He is also noticed some right sided dental pain which was approximately 1-2 weeks ago and pain has resolved. He has noticed periodic swelling of the left maxillary area. He has continued to abstain from alcohol. He is currently chewing tobacco and is doing approximately half a can a day. He denies any fevers, chills or night sweats.    Past Medical History:    1. GBM  2. History of Anaplastic Astrocytoma, Grade III  3. GERD  4. Depression  5. Anxiety     Allergies:  Pcn      Medications:    Current Outpatient Prescriptions on File Prior to Visit   Medication Sig Dispense Refill   • HYDROmorphone (DILAUDID) 2 MG Tab Take 1-2 Tabs by mouth every four hours as needed for Severe Pain. 45 Tab 0   • gabapentin (NEURONTIN) 300 MG Cap Take 1 Cap by mouth 3 times a day. 90 Cap 1   • methocarbamol (ROBAXIN) 750 MG Tab Take 2 Tabs by mouth every 6 hours as needed. 45 Tab 0   • acetaminophen (TYLENOL) 500 MG Tab Take 1 Tab by mouth every 6 hours as needed. Indications: Pain     • sertraline (ZOLOFT) 25 MG tablet Take 1 Tab by mouth every evening. 30 Tab 11   • dexamethasone (DECADRON) 4 MG Tab Take 1 Tab by mouth 3 times a day. 120 Tab 0   • levetiracetam (KEPPRA) 1000 MG tablet Take 1 Tab by  "mouth 2 Times a Day. 60 Tab 0   • ondansetron (ZOFRAN ODT) 4 MG TABLET DISPERSIBLE Take 1 Tab by mouth every four hours as needed for Nausea/Vomiting (give PO if no IV route available). 30 Tab 0   • sodium chloride (SALT) 1 GM Tab Take 1 Tab by mouth 2 times a day, with meals. 60 Tab 0   • Temozolomide 140 MG Cap Take 1 Cap by mouth every bedtime. 4 Cap 0   • temozolomide (TEMODAR) 5 MG Cap Take 1 Cap by mouth every bedtime. 4 Cap 0   • Pseudoephedrine-APAP-DM (QC DAYTIME COLD MEDICINE PO) Take 2 Caps by mouth as needed (For cold symptoms).     • diphenhydrAMINE (BENADRYL) 25 MG Tab Take 25 mg by mouth every 6 hours as needed. Indications: Cough       No current facility-administered medications on file prior to visit.       Review of systems:    All other review of systems are negative except what was mentioned above in the HPI.  Constitutional: Negative for fever, chills. Positive for mild fatigue    HENT: Negative for ear pain and nosebleeds.  right-sided gum pain resolved.    Eyes: Negative for blurred vision. Positive for photophobia.    Respiratory: Negative for cough, sputum production, and shortness of breath.     Cardiovascular: Negative for chest pain.    Gastrointestinal: As listed for intermittent nausea. Negative for vomiting and abdominal pain.    Genitourinary: Negative for dysuria.    Musculoskeletal: Negative for myalgias.    Skin: Negative for rash.    Neurological: Positive for intermittent headaches, S2 for intermittent orthostatic dizziness, positive for sensory changes stable, negative for focal weakness   Endo/Heme/Allergies: No bruise/bleed easily.    Psychiatric/Behavioral: positive for anxiety.      Physical Exam:  Vitals:    Filed Vitals:    03/08/17 0952   BP: 124/82   Pulse: 83   Temp: 37.7 °C (99.9 °F)   Resp: 12   Height: 1.753 m (5' 9.02\")   Weight: 83.5 kg (184 lb 1.4 oz)   SpO2: 97%       General: patient is in moderate distress, alert and oriented    HEENT: Post op changes and " healing well, extra ocular muscles intact, moist oral mucus membranes, and oral cavity without any lesions.  Neck: Supple neck, full range of motion  Lymph nodes: No palpable cervical, supraclavicular lymphadenopathy.     CVS: regular rate and rhythm  RESP: Clear to auscultation bilaterally.    ABD: Soft, non tender, non distended.     EXT: No edema   CNS: Alert and oriented, cranial nerves grossly intact, and muscle strength grossly intact.       Assessment and Plan:  1. GBM, stage IV: Street of grade 3 astrocytoma underwent chemoradiation followed by maintenance therapy but unable to complete therapy secondary to noncompliance. Presented with increased headaches 12/2016. On repeat imaging is found to have recurrence of tumor and biopsy consistent with GBM. He completed salvage chemoradiation on 2/16/17. He tolerated therapy fairly well. He has been more compliant since his recent hospitalization. Plan is to start him on maintenance temozolomide shortly. I will order repeat MRI brain for reassessment to be done next week.  2. Plan is to start him on maintenance therapy on 3/20/17. He is continued on a slow taper of dexamethasone. He has been on 4 mg once a day. I will cut that down to 2 mg once a day. Plan is to titrate down weekly with a very slow taper.  3. Social issues: Patient is more stable at present. He is currently living with his brother in a motel and has been fairly compliant S4.  4. Pain medications: Patient is on Dilaudid to be taken as needed for headaches. He will be given refills and Dilaudid as well as robaxin with only 3 prescriptions.  5. History of alcoholic abuse: Patient has abstained from alcohol. I reiterated the importance.  6. Tobacco use: Patient has stopped tobacco smoking however is chewing tobacco. He is now down to half a can a day. He is again only advised against any tobacco products.  7. He is to get labs today. MRI brain has been ordered. Dexamethasone dose reduced. He is to  follow-up again in one week percent as needed.    He agreed and verbalized his agreement and understanding with the current plan.  I answered all questions and concerns he has at this time and advised him to call at any time in the interim with questions or concerns in regards to his care.  Thank you for allowing me to participate in his care, I will continue to follow.    Please note that this dictation was created using voice recognition software. I have made every reasonable attempt to correct obvious errors, but I expect that there are errors of grammar and possibly content that I did not discover before finalizing the note.

## 2017-03-08 NOTE — MR AVS SNAPSHOT
"        Fortino Ricci JrIrving   3/8/2017 10:00 AM   Office Visit   MRN: [id number]    Department:  [facility]   Dept Phone:  [phone]    Description:  Male : 1970   Provider:  Ashley Lee M.D.           Reason for Visit     Follow-Up           Allergies as of 3/8/2017     Allergen Noted Reactions    Pcn [Penicillins] 12/10/2014       Reaction was as a child.  Pt stated tolerated Augmentin and Amoxicillin       You were diagnosed with     GBM (glioblastoma multiforme) (CMS-HCC)   [347987]       Glioblastoma (CMS-HCC)   [921427]         Vital Signs     Blood Pressure Pulse Temperature Respirations Height Weight    124/82 mmHg 83 37.7 °C (99.9 °F) 12 1.753 m (5' 9.02\") 83.5 kg (184 lb 1.4 oz)    Body Mass Index Oxygen Saturation Smoking Status             27.17 kg/m2 97% Current Every Day Smoker         Basic Information     Date Of Birth Sex Race Ethnicity Preferred Language    1970 Male White Non- English      Your appointments     Mar 15, 2017  5:15 PM   MR HEAD 60 with [address] MRI 2   [facility] - MRI - [address]    [address]  [place] [id number] [phone]            Mar 17, 2017 11:30 AM   ONCOLOGY EST PATIENT 30 MIN with AUBREY HarperPAUBREY   [facility] (--)    [address], [address]  [place] [address] [phone]              Problem List              ICD-10-CM Priority Class Noted - Resolved    Cerebral edema (HCC) G93.6 Medium  12/10/2014 - Present    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   12/10/2014 - Present    GERD (gastroesophageal reflux disease) K21.9   2014 - Present    Singultus R06.6   2014 - Present    Headache R51   2014 - Present    Anaplastic astrocytoma of brain (CMS-HCC) C71.9   2015 - Present    Intractable nausea and vomiting R11.2   4/10/2015 - Present    Nausea & vomiting R11.2   2015 - Present    Astrocytoma brain tumor (CMS-HCC) C71.9   2015 - Present   " Hypokalemia E87.6   6/2/2015 - Present    Hx of brain cancer Z85.841   6/2/2015 - Present    Glioblastoma (CMS-HCC) C71.9 High  12/19/2016 - Present    Brain mass G93.9   12/19/2016 - Present    Midline shift of brain G93.9   12/20/2016 - Present    Tobacco abuse Z72.0 Low  12/20/2016 - Present    Alcohol abuse F10.10 Low  12/20/2016 - Present    Steroid-induced hyperglycemia R73.9 Medium  12/20/2016 - Present    Acute viral conjunctivitis of right eye B30.9   12/23/2016 - Present    GBM (glioblastoma multiforme) (CMS-HCC) C71.9 High  12/26/2016 - Present    Insomnia disorder G47.00   12/26/2016 - Present    Seizure (CMS-HCC) R56.9 Medium  1/25/2017 - Present    Depression F32.9 Low  1/25/2017 - Present    Leukocytosis D72.829 Medium  2/7/2017 - Present    Hyponatremia E87.1 Medium  2/7/2017 - Present      Health Maintenance        Date Due Completion Dates    IMM DTaP/Tdap/Td Vaccine (1 - Tdap) 10/14/1989 ---    IMM PNEUMOCOCCAL 19-64 (ADULT) MEDIUM RISK SERIES (1 of 1 - PPSV23) 3/22/2017 1/25/2017            Current Immunizations     13-VALENT PCV PREVNAR 1/25/2017  8:16 PM    Influenza TIV (IM) 11/10/2014    Influenza Vaccine Quad Inj (Pf) 1/25/2017  8:14 PM    Pneumococcal Vaccine (UF)Historical Data 11/10/2014      Below and/or attached are the medications your provider expects you to take. Review all of your home medications and newly ordered medications with your provider and/or pharmacist. Follow medication instructions as directed by your provider and/or pharmacist. Please keep your medication list with you and share with your provider. Update the information when medications are discontinued, doses are changed, or new medications (including over-the-counter products) are added; and carry medication information at all times in the event of emergency situations     Allergies:  PCN - (reactions not documented)               Medications  Valid as of: March 08, 2017 - 10:40 AM    Generic Name Brand Name Tablet  Size Instructions for use    Acetaminophen (Tab) TYLENOL 500 MG Take 1 Tab by mouth every 6 hours as needed. Indications: Pain        Dexamethasone (Tab) DECADRON 4 MG Take 1 Tab by mouth 3 times a day.        DiphenhydrAMINE HCl (Tab) BENADRYL 25 MG Take 25 mg by mouth every 6 hours as needed. Indications: Cough        Famotidine (Tab) PEPCID 20 MG Take 1 Tab by mouth 2 times a day.        Gabapentin (Cap) NEURONTIN 300 MG Take 1 Cap by mouth 3 times a day.        HYDROmorphone HCl (Tab) DILAUDID 2 MG Take 1-2 Tabs by mouth every four hours as needed for Severe Pain.        LevETIRAcetam (Tab) KEPPRA 1000 MG Take 1 Tab by mouth 2 Times a Day.        Methocarbamol (Tab) ROBAXIN 750 MG Take 1 Tab by mouth every 6 hours as needed.        Ondansetron (TABLET DISPERSIBLE) ZOFRAN ODT 4 MG Take 1 Tab by mouth every four hours as needed for Nausea/Vomiting (give PO if no IV route available).        Prochlorperazine Maleate (Tab) COMPAZINE 10 MG Take 1 Tab by mouth every 6 hours as needed.        Pseudoephedrine-APAP-DM   Take 2 Caps by mouth as needed (For cold symptoms).        Sertraline HCl (Tab) ZOLOFT 25 MG Take 1 Tab by mouth every evening.        Sodium Chloride (Tab) SALT 1 GM Take 1 Tab by mouth 2 times a day, with meals.        Temozolomide (Cap) Temozolomide 140 MG Take 1 Cap by mouth every bedtime.        Temozolomide (Cap) TEMODAR 5 MG Take 1 Cap by mouth every bedtime.        .                 Medicines prescribed today were sent to:     [facility] [address] [address]    Phone: [phone] Fax: [phone]    Open 24 Hours?: No      Medication refill instructions:       If your prescription bottle indicates you have medication refills left, it is not necessary to call your provider’s office. Please contact your pharmacy and they will refill your medication.    If your prescription bottle indicates you do not have any refills left, you may request  refills at any time through one of the following ways: The online KAL system (except Urgent Care), by calling your provider’s office, or by asking your pharmacy to contact your provider’s office with a refill request. Medication refills are processed only during regular business hours and may not be available until the next business day. Your provider may request additional information or to have a follow-up visit with you prior to refilling your medication.   *Please Note: Medication refills are assigned a new Rx number when refilled electronically. Your pharmacy may indicate that no refills were authorized even though a new prescription for the same medication is available at the pharmacy. Please request the medicine by name with the pharmacy before contacting your provider for a refill.        Your To Do List     Future Labs/Procedures Complete By Expires    MR-BRAIN-WITH & W/O  3/13/2017 3/8/2018    CBC WITH DIFFERENTIAL  As directed 3/8/2018    COMP METABOLIC PANEL  As directed 3/8/2018      Referral     A referral request has been sent to our patient care coordination department. Please allow 3-5 business days for us to process this request and contact you either by phone or mail. If you do not hear from us by the 5th business day, please call us at [phone].           KAL Access Code: [email or web address]  Expires: 3/30/2017 12:15 PM    KAL  A secure, online tool to manage your health information     [employer]’s KAL® is a secure, online tool that connects you to your personalized health information from the privacy of your home -- day or night - making it very easy for you to manage your healthcare. Once the activation process is completed, you can even access your medical information using the KAL logan, which is available for free in the Apple Logan store or Google Play store.     KAL provides the following levels of access (as shown below):   My Chart Features   [facility]  Care Doctor [facility]  Specialists [facility]  Urgent  Care Non-[facility]  Primary Care  Doctor   Email your healthcare team securely and privately 24/7 X X X    Manage appointments: schedule your next appointment; view details of past/upcoming appointments X      Request prescription refills. X      View recent personal medical records, including lab and immunizations X X X X   View health record, including health history, allergies, medications X X X X   Read reports about your outpatient visits, procedures, consult and ER notes X X X X   See your discharge summary, which is a recap of your hospital and/or ER visit that includes your diagnosis, lab results, and care plan. X X       How to register for [facility]:  1. Go to  [email or web address].  2. Click on the Sign Up Now box, which takes you to the New Member Sign Up page. You will need to provide the following information:  a. Enter your Hantele Access Code exactly as it appears at the top of this page. (You will not need to use this code after you’ve completed the sign-up process. If you do not sign up before the expiration date, you must request a new code.)   b. Enter your date of birth.   c. Enter your home email address.   d. Click Submit, and follow the next screen’s instructions.  3. Create a Hantele ID. This will be your Hantele login ID and cannot be changed, so think of one that is secure and easy to remember.  4. Create a Hantele password. You can change your password at any time.  5. Enter your Password Reset Question and Answer. This can be used at a later time if you forget your password.   6. Enter your e-mail address. This allows you to receive e-mail notifications when new information is available in Hantele.  7. Click Sign Up. You can now view your health information.    For assistance activating your Hantele account, call [phone]        Quit Tobacco Information     Do you want to quit using tobacco?    Quitting tobacco decreases risks of  cancer, heart and lung disease, increases life expectancy, improves sense of taste and smell, and increases spending money, among other benefits.    If you are thinking about quitting, we can help.  • [facility] Quit Tobacco Program: 942.133.2417  o Program occurs weekly for four weeks and includes pharmacist consultation on products to support quitting smoking or chewing tobacco. A provider referral is needed for pharmacist consultation.  • Tobacco Users Help Hotline: 3-396-QUIT-NOW (116-4914) or https://[place].quitlogix.org/  o Free, confidential telephone and online coaching for [place] residents. Sessions are designed on a schedule that is convenient for you. Eligible clients receive free nicotine replacement therapy.  • Nationally: www.smokefree.gov  o Information and professional assistance to support both immediate and long-term needs as you become, and remain, a non-smoker. Smokefree.gov allows you to choose the help that best fits your needs.

## 2017-03-15 ENCOUNTER — HOSPITAL ENCOUNTER (OUTPATIENT)
Dept: LAB | Facility: MEDICAL CENTER | Age: 47
End: 2017-03-15
Attending: INTERNAL MEDICINE

## 2017-03-15 ENCOUNTER — HOSPITAL ENCOUNTER (OUTPATIENT)
Dept: RADIOLOGY | Facility: MEDICAL CENTER | Age: 47
End: 2017-03-15
Attending: INTERNAL MEDICINE

## 2017-03-15 DIAGNOSIS — C71.9 ASTROCYTOMA BRAIN TUMOR (HCC): ICD-10-CM

## 2017-03-15 DIAGNOSIS — C71.9 GBM (GLIOBLASTOMA MULTIFORME) (HCC): ICD-10-CM

## 2017-03-15 DIAGNOSIS — C71.9 GLIOBLASTOMA (HCC): ICD-10-CM

## 2017-03-15 LAB
ALBUMIN SERPL BCP-MCNC: 4.5 G/DL (ref 3.2–4.9)
ALBUMIN/GLOB SERPL: 1.7 G/DL
ALP SERPL-CCNC: 52 U/L (ref 30–99)
ALT SERPL-CCNC: 51 U/L (ref 2–50)
ANION GAP SERPL CALC-SCNC: 5 MMOL/L (ref 0–11.9)
AST SERPL-CCNC: 20 U/L (ref 12–45)
BASOPHILS # BLD AUTO: 0.4 % (ref 0–1.8)
BASOPHILS # BLD: 0.04 K/UL (ref 0–0.12)
BILIRUB SERPL-MCNC: 0.4 MG/DL (ref 0.1–1.5)
BUN SERPL-MCNC: 8 MG/DL (ref 8–22)
CALCIUM SERPL-MCNC: 9.3 MG/DL (ref 8.5–10.5)
CHLORIDE SERPL-SCNC: 102 MMOL/L (ref 96–112)
CO2 SERPL-SCNC: 30 MMOL/L (ref 20–33)
CREAT SERPL-MCNC: 0.57 MG/DL (ref 0.5–1.4)
EOSINOPHIL # BLD AUTO: 0.04 K/UL (ref 0–0.51)
EOSINOPHIL NFR BLD: 0.4 % (ref 0–6.9)
ERYTHROCYTE [DISTWIDTH] IN BLOOD BY AUTOMATED COUNT: 49.9 FL (ref 35.9–50)
GLOBULIN SER CALC-MCNC: 2.6 G/DL (ref 1.9–3.5)
GLUCOSE SERPL-MCNC: 91 MG/DL (ref 65–99)
HCT VFR BLD AUTO: 44.7 % (ref 42–52)
HGB BLD-MCNC: 15.1 G/DL (ref 14–18)
IMM GRANULOCYTES # BLD AUTO: 0.17 K/UL (ref 0–0.11)
IMM GRANULOCYTES NFR BLD AUTO: 1.5 % (ref 0–0.9)
LYMPHOCYTES # BLD AUTO: 1.21 K/UL (ref 1–4.8)
LYMPHOCYTES NFR BLD: 10.6 % (ref 22–41)
MCH RBC QN AUTO: 32.5 PG (ref 27–33)
MCHC RBC AUTO-ENTMCNC: 33.8 G/DL (ref 33.7–35.3)
MCV RBC AUTO: 96.1 FL (ref 81.4–97.8)
MONOCYTES # BLD AUTO: 1.01 K/UL (ref 0–0.85)
MONOCYTES NFR BLD AUTO: 8.9 % (ref 0–13.4)
NEUTROPHILS # BLD AUTO: 8.94 K/UL (ref 1.82–7.42)
NEUTROPHILS NFR BLD: 78.2 % (ref 44–72)
NRBC # BLD AUTO: 0 K/UL
NRBC BLD AUTO-RTO: 0 /100 WBC
PLATELET # BLD AUTO: 206 K/UL (ref 164–446)
PMV BLD AUTO: 9.1 FL (ref 9–12.9)
POTASSIUM SERPL-SCNC: 4.2 MMOL/L (ref 3.6–5.5)
PROT SERPL-MCNC: 7.1 G/DL (ref 6–8.2)
RBC # BLD AUTO: 4.65 M/UL (ref 4.7–6.1)
SODIUM SERPL-SCNC: 137 MMOL/L (ref 135–145)
WBC # BLD AUTO: 11.4 K/UL (ref 4.8–10.8)

## 2017-03-15 PROCEDURE — 70553 MRI BRAIN STEM W/O & W/DYE: CPT

## 2017-03-15 PROCEDURE — A9579 GAD-BASE MR CONTRAST NOS,1ML: HCPCS | Performed by: INTERNAL MEDICINE

## 2017-03-15 PROCEDURE — 85025 COMPLETE CBC W/AUTO DIFF WBC: CPT

## 2017-03-15 PROCEDURE — 700117 HCHG RX CONTRAST REV CODE 255: Performed by: INTERNAL MEDICINE

## 2017-03-15 PROCEDURE — 80053 COMPREHEN METABOLIC PANEL: CPT

## 2017-03-15 PROCEDURE — 36415 COLL VENOUS BLD VENIPUNCTURE: CPT

## 2017-03-15 RX ORDER — HYDROMORPHONE HYDROCHLORIDE 2 MG/1
2-4 TABLET ORAL EVERY 4 HOURS PRN
Qty: 45 TAB | Refills: 0 | Status: SHIPPED | OUTPATIENT
Start: 2017-03-15 | End: 2017-03-22 | Stop reason: SDUPTHER

## 2017-03-15 RX ORDER — METHOCARBAMOL 750 MG/1
750 TABLET, FILM COATED ORAL EVERY 6 HOURS PRN
Qty: 45 TAB | Refills: 0 | Status: SHIPPED | OUTPATIENT
Start: 2017-03-15 | End: 2017-03-22 | Stop reason: SDUPTHER

## 2017-03-15 RX ADMIN — GADODIAMIDE 20 ML: 287 INJECTION INTRAVENOUS at 16:22

## 2017-03-17 ENCOUNTER — TELEPHONE (OUTPATIENT)
Dept: HEMATOLOGY ONCOLOGY | Facility: MEDICAL CENTER | Age: 47
End: 2017-03-17

## 2017-03-17 ENCOUNTER — OFFICE VISIT (OUTPATIENT)
Dept: HEMATOLOGY ONCOLOGY | Facility: MEDICAL CENTER | Age: 47
End: 2017-03-17

## 2017-03-17 VITALS
OXYGEN SATURATION: 94 % | SYSTOLIC BLOOD PRESSURE: 118 MMHG | TEMPERATURE: 99.3 F | DIASTOLIC BLOOD PRESSURE: 80 MMHG | BODY MASS INDEX: 28.02 KG/M2 | WEIGHT: 189.15 LBS | RESPIRATION RATE: 16 BRPM | HEART RATE: 88 BPM | HEIGHT: 69 IN

## 2017-03-17 DIAGNOSIS — C71.9 GLIOBLASTOMA (HCC): ICD-10-CM

## 2017-03-17 PROCEDURE — 99213 OFFICE O/P EST LOW 20 MIN: CPT | Performed by: NURSE PRACTITIONER

## 2017-03-17 ASSESSMENT — ENCOUNTER SYMPTOMS
VOMITING: 0
SHORTNESS OF BREATH: 0
DIARRHEA: 0
NAUSEA: 0
CHILLS: 0
FEVER: 0
PALPITATIONS: 0
COUGH: 0
HEADACHES: 1
SEIZURES: 0
MYALGIAS: 0
CONSTIPATION: 0
WEIGHT LOSS: 0
DIZZINESS: 0

## 2017-03-17 ASSESSMENT — PAIN SCALES - GENERAL: PAINLEVEL: 10=SEVERE PAIN

## 2017-03-17 NOTE — MR AVS SNAPSHOT
"        Fortino Ricci JrIrving   3/17/2017 11:30 AM   Office Visit   MRN: [id number]    Department:  [facility]   Dept Phone:  [phone]    Description:  Male : 1970   Provider:  Debbie Glynn AIrvingPYASMANI.           Reason for Visit     Follow-Up 1 week      Allergies as of 3/17/2017     Allergen Noted Reactions    Pcn [Penicillins] 12/10/2014       Reaction was as a child.  Pt stated tolerated Augmentin and Amoxicillin       You were diagnosed with     Glioblastoma (CMS-HCC)   [365149]         Vital Signs     Blood Pressure Pulse Temperature Respirations Height Weight    118/80 mmHg 88 37.4 °C (99.3 °F) 16 1.753 m (5' 9\") 85.8 kg (189 lb 2.5 oz)    Body Mass Index Oxygen Saturation Smoking Status             27.92 kg/m2 94% Current Every Day Smoker         Basic Information     Date Of Birth Sex Race Ethnicity Preferred Language    1970 Male White Non- English      Your appointments     Mar 31, 2017  9:40 AM   ONCOLOGY EST PATIENT 30 MIN with Ashley Lee M.D.   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   2015 - Present    Intractable nausea and vomiting R11.2   4/10/2015 - Present    Nausea & vomiting R11.2   2015 - Present    Astrocytoma brain tumor (CMS-HCC) C71.9   2015 - Present    Hypokalemia E87.6   2015 - Present    Hx of brain cancer Z85.841   2015 - Present    Glioblastoma (CMS-HCC) C71.9 High  2016 - Present    Brain mass G93.9   2016 - Present    Midline shift of brain G93.9   " 12/20/2016 - Present    Tobacco abuse Z72.0 Low  12/20/2016 - Present    Alcohol abuse F10.10 Low  12/20/2016 - Present    Steroid-induced hyperglycemia R73.9 Medium  12/20/2016 - Present    Acute viral conjunctivitis of right eye B30.9   12/23/2016 - Present    GBM (glioblastoma multiforme) (CMS-HCC) C71.9 High  12/26/2016 - Present    Insomnia disorder G47.00   12/26/2016 - Present    Seizure (CMS-HCC) R56.9 Medium  1/25/2017 - Present    Depression F32.9 Low  1/25/2017 - Present    Leukocytosis D72.829 Medium  2/7/2017 - Present    Hyponatremia E87.1 Medium  2/7/2017 - Present      Health Maintenance        Date Due Completion Dates    IMM DTaP/Tdap/Td Vaccine (1 - Tdap) 10/14/1989 ---    IMM PNEUMOCOCCAL 19-64 (ADULT) MEDIUM RISK SERIES (1 of 1 - PPSV23) 3/22/2017 1/25/2017            Current Immunizations     13-VALENT PCV PREVNAR 1/25/2017  8:16 PM    Influenza TIV (IM) 11/10/2014    Influenza Vaccine Quad Inj (Pf) 1/25/2017  8:14 PM    Pneumococcal Vaccine (UF)Historical Data 11/10/2014      Below and/or attached are the medications your provider expects you to take. Review all of your home medications and newly ordered medications with your provider and/or pharmacist. Follow medication instructions as directed by your provider and/or pharmacist. Please keep your medication list with you and share with your provider. Update the information when medications are discontinued, doses are changed, or new medications (including over-the-counter products) are added; and carry medication information at all times in the event of emergency situations     Allergies:  PCN - (reactions not documented)               Medications  Valid as of: March 17, 2017 - 11:42 AM    Generic Name Brand Name Tablet Size Instructions for use    Acetaminophen (Tab) TYLENOL 500 MG Take 1 Tab by mouth every 6 hours as needed. Indications: Pain        Dexamethasone (Tab) DECADRON 4 MG Take 1 Tab by mouth 3 times a day.        DiphenhydrAMINE  HCl (Tab) BENADRYL 25 MG Take 25 mg by mouth every 6 hours as needed. Indications: Cough        Famotidine (Tab) PEPCID 20 MG Take 1 Tab by mouth 2 times a day.        Gabapentin (Cap) NEURONTIN 300 MG Take 1 Cap by mouth 3 times a day.        HYDROmorphone HCl (Tab) DILAUDID 2 MG Take 1-2 Tabs by mouth every four hours as needed for Severe Pain.        LevETIRAcetam (Tab) KEPPRA 1000 MG Take 1 Tab by mouth 2 Times a Day.        Methocarbamol (Tab) ROBAXIN 750 MG Take 1 Tab by mouth every 6 hours as needed.        Ondansetron (TABLET DISPERSIBLE) ZOFRAN ODT 4 MG Take 1 Tab by mouth every four hours as needed for Nausea/Vomiting (give PO if no IV route available).        Prochlorperazine Maleate (Tab) COMPAZINE 10 MG Take 1 Tab by mouth every 6 hours as needed.        Pseudoephedrine-APAP-DM   Take 2 Caps by mouth as needed (For cold symptoms).        Sertraline HCl (Tab) ZOLOFT 25 MG Take 1 Tab by mouth every evening.        Sodium Chloride (Tab) SALT 1 GM Take 1 Tab by mouth 2 times a day, with meals.        Temozolomide (Cap) Temozolomide 140 MG Take 1 Cap by mouth every bedtime.        Temozolomide (Cap) TEMODAR 5 MG Take 1 Cap by mouth every bedtime.        .                 Medicines prescribed today were sent to:     [facility] [address] [address]    Phone: [phone] Fax: [phone]    Open 24 Hours?: No      Medication refill instructions:       If your prescription bottle indicates you have medication refills left, it is not necessary to call your provider’s office. Please contact your pharmacy and they will refill your medication.    If your prescription bottle indicates you do not have any refills left, you may request refills at any time through one of the following ways: The online [employer] group system (except Urgent Care), by calling your provider’s office, or by asking your pharmacy to contact your provider’s office with a refill request. Medication  refills are processed only during regular business hours and may not be available until the next business day. Your provider may request additional information or to have a follow-up visit with you prior to refilling your medication.   *Please Note: Medication refills are assigned a new Rx number when refilled electronically. Your pharmacy may indicate that no refills were authorized even though a new prescription for the same medication is available at the pharmacy. Please request the medicine by name with the pharmacy before contacting your provider for a refill.           [facility] Access Code: [email or web address]  Expires: 3/30/2017  1:15 PM    [facility]  A secure, online tool to manage your health information     [employer]’s [facility]® is a secure, online tool that connects you to your personalized health information from the privacy of your home -- day or night - making it very easy for you to manage your healthcare. Once the activation process is completed, you can even access your medical information using the [facility] logan, which is available for free in the [employer] store or Google Play store.     [facility] provides the following levels of access (as shown below):   My Chart Features   [facility] Doctor [facility]  Specialists [facility]  Urgent  Care Non-[facility]  Primary Care  Doctor   Email your healthcare team securely and privately 24/7 X X X    Manage appointments: schedule your next appointment; view details of past/upcoming appointments X      Request prescription refills. X      View recent personal medical records, including lab and immunizations X X X X   View health record, including health history, allergies, medications X X X X   Read reports about your outpatient visits, procedures, consult and ER notes X X X X   See your discharge summary, which is a recap of your hospital and/or ER visit that includes your diagnosis, lab results, and care plan. X X       How to register for [facility]:  1. Go to   [email or web address].  2. Click on the Sign Up Now box, which takes you to the New Member Sign Up page. You will need to provide the following information:  a. Enter your [employer] Access Code exactly as it appears at the top of this page. (You will not need to use this code after you’ve completed the sign-up process. If you do not sign up before the expiration date, you must request a new code.)   b. Enter your date of birth.   c. Enter your home email address.   d. Click Submit, and follow the next screen’s instructions.  3. Create a Transave ID. This will be your [employer] login ID and cannot be changed, so think of one that is secure and easy to remember.  4. Create a [employer] password. You can change your password at any time.  5. Enter your Password Reset Question and Answer. This can be used at a later time if you forget your password.   6. Enter your e-mail address. This allows you to receive e-mail notifications when new information is available in [employer].  7. Click Sign Up. You can now view your health information.    For assistance activating your [employer] account, call [phone]        Quit Tobacco Information     Do you want to quit using tobacco?    Quitting tobacco decreases risks of cancer, heart and lung disease, increases life expectancy, improves sense of taste and smell, and increases spending money, among other benefits.    If you are thinking about quitting, we can help.  • [facility] Quit Tobacco Program: [phone]  o Program occurs weekly for four weeks and includes pharmacist consultation on products to support quitting smoking or chewing tobacco. A provider referral is needed for pharmacist consultation.  • Tobacco Users Help Hotline: 8-800-QUIT-NOW (417-6525) or https://[place].quitlogix.org/  o Free, confidential telephone and online coaching for [place] residents. Sessions are designed on a schedule that is convenient for you. Eligible clients receive free nicotine replacement  therapy.  • Nationally: www.smokefree.gov  o Information and professional assistance to support both immediate and long-term needs as you become, and remain, a non-smoker. Smokefree.gov allows you to choose the help that best fits your needs.

## 2017-03-17 NOTE — TELEPHONE ENCOUNTER
Attempted to reach patient to remind him to  maintenance dose of Temodar at the [facility] pharmacy.  RN was made aware that it is ready for .  Unable to reach patient at phone number listed.  He does have a follow-up appointment today with ISABEL Glynn.  Will remind patient at time of appointment to  medication to start therapy on Monday, 3/20.

## 2017-03-17 NOTE — PROGRESS NOTES
Subjective:      Fortino Ricci Jr. is a 46 y.o. male who presents for Follow-Up for GBM.        HPI    Patient seen today in follow up for GBM.  He is about to start cycle 1 of maintenance today.  Patient is accompanied by his brother for today's visit.     Fever - None  Chills - None  Appetite - He has a good appetite which is probably d/t steroids. He is on 2 mg a day right now. He has tolerated the slow taper.   Fatigue - He is doing well and sleeping well at home. Fatigue is mild and tolerable.   N/V - None  Constipation/Diarrhea - Normal BMs per patient's routine. Last BM was this morning.  Pain - He does have persistent HA which is relieved with Dilaudid. He is not having a HA everyday but when he does he will take Dilaudid with positive results.   Neuropathy - None noted  Other - Patient appears to be doing well and tolerating steroid taper well. He is currently on 2 mg once a day of his dexamethasone as prescribed by Dr. Lee. Patient stated he is living in a hotel where he feels safe and is a clean environment. He denies any dizziness or falls. He is taking his antiseizure medications daily as prescribed and has not had any seizures recently. Patient did have an MRI of the brain which he did complete as well as blood work.    Allergies   Allergen Reactions   • Pcn [Penicillins]      Reaction was as a child.  Pt stated tolerated Augmentin and Amoxicillin      Current Outpatient Prescriptions on File Prior to Visit   Medication Sig Dispense Refill   • HYDROmorphone (DILAUDID) 2 MG Tab Take 1-2 Tabs by mouth every four hours as needed for Severe Pain. 45 Tab 0   • methocarbamol (ROBAXIN) 750 MG Tab Take 1 Tab by mouth every 6 hours as needed. 45 Tab 0   • levetiracetam (KEPPRA) 1000 MG tablet Take 1 Tab by mouth 2 Times a Day. 60 Tab 3   • prochlorperazine (COMPAZINE) 10 MG Tab Take 1 Tab by mouth every 6 hours as needed. 30 Tab 3   • gabapentin (NEURONTIN) 300 MG Cap Take 1 Cap by mouth 3 times a  "day. 90 Cap 3   • ondansetron (ZOFRAN ODT) 4 MG TABLET DISPERSIBLE Take 1 Tab by mouth every four hours as needed for Nausea/Vomiting (give PO if no IV route available). 30 Tab 0   • sodium chloride (SALT) 1 GM Tab Take 1 Tab by mouth 2 times a day, with meals. 60 Tab 0   • famotidine (PEPCID) 20 MG Tab Take 1 Tab by mouth 2 times a day. 60 Tab 3   • acetaminophen (TYLENOL) 500 MG Tab Take 1 Tab by mouth every 6 hours as needed. Indications: Pain     • sertraline (ZOLOFT) 25 MG tablet Take 1 Tab by mouth every evening. 30 Tab 11   • dexamethasone (DECADRON) 4 MG Tab Take 1 Tab by mouth 3 times a day. 120 Tab 0   • Temozolomide 140 MG Cap Take 1 Cap by mouth every bedtime. 4 Cap 0   • temozolomide (TEMODAR) 5 MG Cap Take 1 Cap by mouth every bedtime. 4 Cap 0   • diphenhydrAMINE (BENADRYL) 25 MG Tab Take 25 mg by mouth every 6 hours as needed. Indications: Cough     • Pseudoephedrine-APAP-DM (QC DAYTIME COLD MEDICINE PO) Take 2 Caps by mouth as needed (For cold symptoms).       No current facility-administered medications on file prior to visit.         Review of Systems   Constitutional: Negative for fever, chills, weight loss and malaise/fatigue.   Respiratory: Negative for cough and shortness of breath.    Cardiovascular: Negative for chest pain, palpitations and leg swelling.   Gastrointestinal: Negative for nausea, vomiting, diarrhea and constipation.   Genitourinary: Negative for dysuria.   Musculoskeletal: Negative for myalgias.   Neurological: Positive for headaches. Negative for dizziness and seizures.          Objective:     /80 mmHg  Pulse 88  Temp(Src) 37.4 °C (99.3 °F)  Resp 16  Ht 1.753 m (5' 9\")  Wt 85.8 kg (189 lb 2.5 oz)  BMI 27.92 kg/m2  SpO2 94%     Physical Exam   Constitutional: He is oriented to person, place, and time. He appears well-developed and well-nourished. No distress.   HENT:   Head: Normocephalic and atraumatic.   Mouth/Throat: Oropharynx is clear and moist. No " oropharyngeal exudate.   Surgical scars noted to head   Eyes: Conjunctivae and EOM are normal. Pupils are equal, round, and reactive to light. Right eye exhibits no discharge. Left eye exhibits no discharge. No scleral icterus.   Cardiovascular: Normal rate, regular rhythm, normal heart sounds and intact distal pulses.  Exam reveals no gallop and no friction rub.    No murmur heard.  Pulmonary/Chest: Effort normal and breath sounds normal. No respiratory distress. He has no wheezes.   Abdominal: Soft. Bowel sounds are normal. He exhibits no distension. There is no tenderness.   Musculoskeletal: Normal range of motion. He exhibits no edema or tenderness.   Neurological: He is alert and oriented to person, place, and time.   Skin: Skin is warm and dry. No rash noted. He is not diaphoretic. No erythema. No pallor.   Psychiatric: He has a normal mood and affect. His behavior is normal.   Vitals reviewed.      Mr-brain-with & W/o    3/15/2017  3/15/2017 3:50 PM HISTORY/REASON FOR EXAM:  46-year-old man with glioblastoma. TECHNIQUE/EXAM DESCRIPTION:   MRI of the brain without and with contrast. T1 sagittal, T2 fast spin-echo axial, T1 coronal, FLAIR coronal, Diffusion weighted and Apparent Diffusion Coefficient (ADC map) axial images were obtained of the whole brain. T1 post-contrast axial and T1 post-contrast coronal images were obtained. The study was performed on a [employer]a 1.5 Radha MRI scanner. 20 mL Omniscan contrast was administered intravenously. COMPARISON:  12/27/2016; 12/19/2016 FINDINGS: Changes of RIGHT temporoparietal craniotomy and RIGHT anterior temporal and high RIGHT parietal mass resection are redemonstrated. There is faint blooming artifact on the gradient sequence at the margin of the RIGHT parietal lobe resection bed, as before. There is redemonstration of extensive vasogenic edema throughout the remaining RIGHT cerebellar hemisphere with some sparing of the RIGHT frontal lobe near the vertex.  There is heterogenous T2 signal in the remainder RIGHT temporal, parietal and occipital periventricular white matter with some associated peripheral and nodular enhancement which extends across the splenium of the corpus callosum with vasogenic edema extending into the adjacent white matter of the LEFT occipital and parietal lobes. Overall the area of enhancement which is inclusive of the resection bed in the parietal lobe measures 5 x 7.1 cm on axial image 14 of series 10 and 6.4 cm in craniocaudal dimension on coronal T1 postcontrast image 8 of series 9. On the prior study this area measured approximately 6.7 x 4.7 cm x 5.8 cm though the enhancement was less distinctly thickened and nodular. No other intracranial mass is demonstrated. There is no hydrocephalus. There is no herniation. There are no other hemorrhagic lesions.  The diffusion weighted axial images show no evidence of acute cerebral infarction. The postcontrast images show no other areas of abnormal parenchymal or meningeal enhancement. The brainstem and posterior fossa structures are unremarkable. Vascular flow voids in the vertebrobasilar and carotid arteries, Elim IRA of Baker, and dural venous sinuses are intact. The paranasal sinuses and mastoids in the field of view are unremarkable.     3/15/2017  1.  Increased size and enhancement of residual glioblastoma centered in the RIGHT parietal lobe, now extending across the splenium of the corpus callosum 2.  Extensive white matter fluid signal in the RIGHT cerebral hemisphere likely due to combination of post radiation changes and vasogenic edema    No visits with results within 1 Day(s) from this visit.  Latest known visit with results is:    Hospital Outpatient Visit on 03/15/2017   Component Date Value Ref Range Status   • Sodium 03/15/2017 137  135 - 145 mmol/L Final   • Potassium 03/15/2017 4.2  3.6 - 5.5 mmol/L Final   • Chloride 03/15/2017 102  96 - 112 mmol/L Final   • Co2 03/15/2017 30  20 -  33 mmol/L Final   • Anion Gap 03/15/2017 5.0  0.0 - 11.9 Final   • Glucose 03/15/2017 91  65 - 99 mg/dL Final   • Bun 03/15/2017 8  8 - 22 mg/dL Final   • Creatinine 03/15/2017 0.57  0.50 - 1.40 mg/dL Final   • Calcium 03/15/2017 9.3  8.5 - 10.5 mg/dL Final   • AST(SGOT) 03/15/2017 20  12 - 45 U/L Final   • ALT(SGPT) 03/15/2017 51* 2 - 50 U/L Final   • Alkaline Phosphatase 03/15/2017 52  30 - 99 U/L Final   • Total Bilirubin 03/15/2017 0.4  0.1 - 1.5 mg/dL Final   • Albumin 03/15/2017 4.5  3.2 - 4.9 g/dL Final   • Total Protein 03/15/2017 7.1  6.0 - 8.2 g/dL Final   • Globulin 03/15/2017 2.6  1.9 - 3.5 g/dL Final   • A-G Ratio 03/15/2017 1.7   Final   • WBC 03/15/2017 11.4* 4.8 - 10.8 K/uL Final   • RBC 03/15/2017 4.65* 4.70 - 6.10 M/uL Final   • Hemoglobin 03/15/2017 15.1  14.0 - 18.0 g/dL Final   • Hematocrit 03/15/2017 44.7  42.0 - 52.0 % Final   • MCV 03/15/2017 96.1  81.4 - 97.8 fL Final   • MCH 03/15/2017 32.5  27.0 - 33.0 pg Final   • MCHC 03/15/2017 33.8  33.7 - 35.3 g/dL Final   • RDW 03/15/2017 49.9  35.9 - 50.0 fL Final   • Platelet Count 03/15/2017 206  164 - 446 K/uL Final   • MPV 03/15/2017 9.1  9.0 - 12.9 fL Final   • Neutrophils-Polys 03/15/2017 78.20* 44.00 - 72.00 % Final   • Lymphocytes 03/15/2017 10.60* 22.00 - 41.00 % Final   • Monocytes 03/15/2017 8.90  0.00 - 13.40 % Final   • Eosinophils 03/15/2017 0.40  0.00 - 6.90 % Final   • Basophils 03/15/2017 0.40  0.00 - 1.80 % Final   • Immature Granulocytes 03/15/2017 1.50* 0.00 - 0.90 % Final   • Nucleated RBC 03/15/2017 0.00   Final   • Neutrophils (Absolute) 03/15/2017 8.94* 1.82 - 7.42 K/uL Final    Includes immature neutrophils, if present.   • Lymphs (Absolute) 03/15/2017 1.21  1.00 - 4.80 K/uL Final   • Monos (Absolute) 03/15/2017 1.01* 0.00 - 0.85 K/uL Final   • Eos (Absolute) 03/15/2017 0.04  0.00 - 0.51 K/uL Final   • Baso (Absolute) 03/15/2017 0.04  0.00 - 0.12 K/uL Final   • Immature Granulocytes (abs) 03/15/2017 0.17* 0.00 - 0.11 K/uL  Final   • NRBC (Absolute) 03/15/2017 0.00   Final   • GFR If  03/15/2017 >60  >60 mL/min/1.73 m 2 Final   • GFR If Non  03/15/2017 >60  >60 mL/min/1.73 m 2 Final          Assessment/Plan:     1. Glioblastoma (CMS-HCC)       Plan  1. Patient is doing well since completing chemotherapy and radiation therapy. He is due to start his maintenance Temodar, cycle one on Monday, March 20. Patient has multiple significant social issues including financial issues with the social security. Patient stated he cannot afford to buy any medications such as his anti-emetics before starting his next cycle of chemotherapy. He gets pain every month at the beginning of the month and will be able to afford his antiemetics at that time. He does have refills for his antinausea medications and I did instruct him and his brother that they can call this prescription refills and and he has to pick them up. His next cycle of medication is ready for patient to  at the [facility]. Patient requesting to start his maintenance Temodar at the beginning of the month on April 3. I did discuss with Dr. Lee and she is in agreement to have him started the beginning of each month now which will be beneficial for patient as that's when he is able to afford medications.    2. Patient did have a recent MRI completed following his radiation and chemotherapy. Unfortunately it does show an increased size and enhancement of the residual glioblastoma in the right parietal lobe, which now extends across the splenium of the corpus callosum. I discussed the results with Dr. Lee and she is aware of the MRI results. Questioning whether this is pseudo-progression from recent radiation therapy versus true progression of disease. Dr. Lee stated she would like to monitor with monthly MRIs of the brain at this time. Patient clinically is doing well and is not having any significant symptoms or changes  clinically. I did discuss the results with the patient today who was quite upset with the results but is understandable that he is doing what he needs to do at this time.    3. Patient has begun a slow taper of his dexamethasone. He currently is taking 2 mg once a day as discussed with Dr. Lee at the last visit. Unfortunately patient would need a new prescription for 1 mg tablets of dexamethasone to continue tapering off the steroids, which he cannot afford at this time. Dr. Lee stated she would like for him to continue on the 2 mg as prescribed and we will send him a prescription for 1 mg tablets at his next visit to continue the steroid taper.    4. I discussed with the oral chemotherapy nurse who is aware patient will be starting on April 3. I also discussed with the patient and his brother that he can  his medications at the [facility]. He does have extra medication in place from previous prescription and I did ask that he bring them to the [facility] for them to destroy. Patient has also been maintaining a log of his daily activities which he brought today as well.    5. He is to follow-up in 2 weeks on March 31 with Dr. Lee or sooner if needed. We will see him prior to starting his maintenance Temodar.

## 2017-03-22 DIAGNOSIS — C71.9 GLIOBLASTOMA (HCC): ICD-10-CM

## 2017-03-22 RX ORDER — METHOCARBAMOL 750 MG/1
750 TABLET, FILM COATED ORAL EVERY 6 HOURS PRN
Qty: 45 TAB | Refills: 0 | Status: SHIPPED | OUTPATIENT
Start: 2017-03-22 | End: 2017-04-03 | Stop reason: SDUPTHER

## 2017-03-22 RX ORDER — HYDROMORPHONE HYDROCHLORIDE 2 MG/1
2-4 TABLET ORAL EVERY 4 HOURS PRN
Qty: 45 TAB | Refills: 0 | Status: SHIPPED | OUTPATIENT
Start: 2017-03-22 | End: 2017-04-03 | Stop reason: SDUPTHER

## 2017-03-22 NOTE — TELEPHONE ENCOUNTER
Was the patient seen in the last year in this department? Yes     Does patient have an active prescription for medications requested? No     Received Request Via: Patient       Thank You !

## 2017-03-23 ENCOUNTER — TELEPHONE (OUTPATIENT)
Dept: OTHER | Facility: MEDICAL CENTER | Age: 47
End: 2017-03-23

## 2017-03-23 NOTE — TELEPHONE ENCOUNTER
"Call placed to patient to check in with.  Pt stating that he is \"hanging in there\".  Talked about resting and doing a little better since getting medication.  States was having bad headaches and nausea.  Encouraged him to contact physician sooner when symptoms get worse.  Pt saying \"they won't be able to do anything\", talked about tumor continuing to grow.  Provided education that he does not know that unless he calls and lets physician know what is going on.  Pt stating \"I'll try to remember\".  Provided support and validation.  Pt is aware of follow up with Dr Lee next week and that he will restart temodar on April 3rd.  Pt states does have navigators number.  Again repeated that patient needs to call if symptoms worsen.  "

## 2017-03-31 ENCOUNTER — APPOINTMENT (OUTPATIENT)
Dept: HEMATOLOGY ONCOLOGY | Facility: MEDICAL CENTER | Age: 47
End: 2017-03-31

## 2017-03-31 ENCOUNTER — TELEPHONE (OUTPATIENT)
Dept: HEMATOLOGY ONCOLOGY | Facility: MEDICAL CENTER | Age: 47
End: 2017-03-31

## 2017-03-31 NOTE — TELEPHONE ENCOUNTER
Spoke with patient this morning to verify the plan to start him back on Temodar on Monday, 4/3.  He does have the Temodar medication but was waiting to receive his check in order to  Keppra, Compazine, Gabapentin, and Pepcid.  He is planning to receive his check on 3/1 and start Temodar on Monday.  I verified the dose and schedule with the patient, as he does not have a current calendar to go by.  We will see him in clinic on 4/6.

## 2017-04-03 ENCOUNTER — TELEPHONE (OUTPATIENT)
Dept: HEMATOLOGY ONCOLOGY | Facility: MEDICAL CENTER | Age: 47
End: 2017-04-03

## 2017-04-03 DIAGNOSIS — C71.9 GLIOBLASTOMA (HCC): ICD-10-CM

## 2017-04-03 RX ORDER — HYDROMORPHONE HYDROCHLORIDE 2 MG/1
2-4 TABLET ORAL EVERY 4 HOURS PRN
Qty: 45 TAB | Refills: 0 | Status: SHIPPED | OUTPATIENT
Start: 2017-04-03 | End: 2017-04-10 | Stop reason: SDUPTHER

## 2017-04-03 RX ORDER — METHOCARBAMOL 750 MG/1
750 TABLET, FILM COATED ORAL EVERY 6 HOURS PRN
Qty: 45 TAB | Refills: 0 | Status: SHIPPED | OUTPATIENT
Start: 2017-04-03 | End: 2017-04-10 | Stop reason: SDUPTHER

## 2017-04-03 NOTE — PROGRESS NOTES
Patient's brother here with a prescription refill request for Dilaudid and methocarbamol. I reviewed the [place] prescription monitoring program and he had his prescription last filled on March 22. We are filling his prescriptions on a weekly basis as patient is required to come in and bring us his old pill bottles. Both the Dilaudid and methocarbamol pill bottles were delivered in person today. Prescription refills were given. Patient is scheduled to be seen by Dr. Lee on April 6 this week.

## 2017-04-06 ENCOUNTER — TELEPHONE (OUTPATIENT)
Dept: HEMATOLOGY ONCOLOGY | Facility: MEDICAL CENTER | Age: 47
End: 2017-04-06

## 2017-04-06 NOTE — TELEPHONE ENCOUNTER
Pt was a no-show to his appointment today.  Spoke with patient regarding missed appointment.  He states he fell asleep and is sorry that he missed appointment.  He would like to reschedule to see Dr. Lee, was able to schedule for tomorrow. He has not started Temodar yet.

## 2017-04-07 ENCOUNTER — OFFICE VISIT (OUTPATIENT)
Dept: HEMATOLOGY ONCOLOGY | Facility: MEDICAL CENTER | Age: 47
End: 2017-04-07

## 2017-04-07 VITALS
RESPIRATION RATE: 16 BRPM | BODY MASS INDEX: 27.79 KG/M2 | HEART RATE: 86 BPM | SYSTOLIC BLOOD PRESSURE: 122 MMHG | DIASTOLIC BLOOD PRESSURE: 88 MMHG | TEMPERATURE: 98.2 F | OXYGEN SATURATION: 94 % | HEIGHT: 69 IN | WEIGHT: 187.61 LBS

## 2017-04-07 DIAGNOSIS — C71.9 GBM (GLIOBLASTOMA MULTIFORME) (HCC): ICD-10-CM

## 2017-04-07 DIAGNOSIS — C71.9 GLIOBLASTOMA (HCC): ICD-10-CM

## 2017-04-07 PROCEDURE — 99214 OFFICE O/P EST MOD 30 MIN: CPT | Performed by: INTERNAL MEDICINE

## 2017-04-07 RX ORDER — PROCHLORPERAZINE MALEATE 10 MG
10 TABLET ORAL EVERY 6 HOURS PRN
Qty: 30 TAB | Refills: 3 | Status: SHIPPED | OUTPATIENT
Start: 2017-04-07 | End: 2017-04-24

## 2017-04-07 RX ORDER — DEXAMETHASONE 4 MG/1
4 TABLET ORAL 2 TIMES DAILY
Qty: 30 TAB | Refills: 1 | Status: SHIPPED | OUTPATIENT
Start: 2017-04-07 | End: 2017-04-24

## 2017-04-07 RX ORDER — ONDANSETRON 4 MG/1
4 TABLET, ORALLY DISINTEGRATING ORAL EVERY 4 HOURS PRN
Qty: 30 TAB | Refills: 3 | Status: SHIPPED | OUTPATIENT
Start: 2017-04-07 | End: 2017-04-24

## 2017-04-07 ASSESSMENT — PAIN SCALES - GENERAL: PAINLEVEL: 10=SEVERE PAIN

## 2017-04-07 NOTE — PROGRESS NOTES
Follow Up Note: Oncology     Date: 4/7/17  Time: 1:40 pm     Primary Care Physician: None  Neurosurgery: Dr. Tala Hancock  Radiation Oncology: Dr. Madrigal     Diagnosis: Anaplastic Astrocytoma, Grade III with recurrence with GBM    Chief compliant: He is here for a follow up visit.    History of presenting illness:  Mr. Ricci is a 46-year-old male with GBM.  He has history of grade 3 astrocytoma which has reoccured with glioblastoma. 11/2014 he was diagnosed with grade 3 astrocytoma secondary to his syncopal episode. He was found to have a right temporal mass and uncal herniation at diagnosis. He had a craniotomy and resection of the tumor which was positive for anaplastic astrocytoma, grade 3 with a low to high Ki-67 and [id number] (E5) negative.  He completed chemoradiation with temozolomide.  He was then placed on maintenance temozolomide. Patient had issues with nausea and vomiting which were fairly well controlled when he was compliant with his medications. Patient has had multiple issues with compliance. He also had significant issues with tobacco use and alcohol use during that time. He was continued on maintenance therapy until 3/2016 when he was lost to follow-up. He was unreachable despite multiple attempts by my office. He then presented to the emergency room in 12/2016 with increased headaches. MRI brain showed progression of disease and he was seen by Dr. Edgar. He underwent biopsy of the mass pathology consistent with glioblastoma. He was seen by Dr. Madrigal during hospitalization was also started back on dexamethasone and Keppra and was to start him on chemoradiation. Patient was discharged however was readmitted shortly after. He received chemoradiation during his hospitalization and completed therapy in 15 fractions of radiation on 2/16/17. He tolerated the treatment fairly well. He was continued on Keppra and a very slow taper of dexamethasone. He is followed closely as outpatient and has  been compliant for now.  His last MRI brain showed a disc concerning for progression versus pseudo-progression. He was continued on his regiment and was slowly tapered on the dexamethasone.      Interval History:  He is here for follow-up visit and is accompanied by his brother who was present in the room.  He has been having dizziness and weakness of his legs are the past 2 weeks. He has been having intermittent fall which were protected. This morning he has a fall and hit is head on the right.  He did not have syncope. He has cut down on the dexamethasone 2 mg a day 2 weeks ago. He is having headaches intermittently.  He vision has been stable.  Intermittent nausea but no vomiting.   He has noticed a decrease in his appetite has been able to eat and has a stable weight. He denies any events, chills or night sweats.     Past Medical History:    1. GBM  2. History of Anaplastic Astrocytoma, Grade III  3. GERD  4. Depression  5. Anxiety     Allergies:  Pcn      Medications:    Current Outpatient Prescriptions on File Prior to Visit   Medication Sig Dispense Refill   • HYDROmorphone (DILAUDID) 2 MG Tab Take 1-2 Tabs by mouth every four hours as needed for Severe Pain. 45 Tab 0   • methocarbamol (ROBAXIN) 750 MG Tab Take 1 Tab by mouth every 6 hours as needed. 45 Tab 0   • gabapentin (NEURONTIN) 300 MG Cap Take 1 Cap by mouth 3 times a day. 90 Cap 3   • famotidine (PEPCID) 20 MG Tab Take 1 Tab by mouth 2 times a day. 60 Tab 3   • acetaminophen (TYLENOL) 500 MG Tab Take 1 Tab by mouth every 6 hours as needed. Indications: Pain     • dexamethasone (DECADRON) 4 MG Tab Take 1 Tab by mouth 3 times a day. 120 Tab 0   • sodium chloride (SALT) 1 GM Tab Take 1 Tab by mouth 2 times a day, with meals. 60 Tab 0   • diphenhydrAMINE (BENADRYL) 25 MG Tab Take 25 mg by mouth every 6 hours as needed. Indications: Cough     • levetiracetam (KEPPRA) 1000 MG tablet Take 1 Tab by mouth 2 Times a Day. 60 Tab 3   • prochlorperazine  "(COMPAZINE) 10 MG Tab Take 1 Tab by mouth every 6 hours as needed. 30 Tab 3   • sertraline (ZOLOFT) 25 MG tablet Take 1 Tab by mouth every evening. 30 Tab 11   • ondansetron (ZOFRAN ODT) 4 MG TABLET DISPERSIBLE Take 1 Tab by mouth every four hours as needed for Nausea/Vomiting (give PO if no IV route available). 30 Tab 0   • Temozolomide 140 MG Cap Take 1 Cap by mouth every bedtime. 4 Cap 0   • temozolomide (TEMODAR) 5 MG Cap Take 1 Cap by mouth every bedtime. 4 Cap 0   • Pseudoephedrine-APAP-DM (QC DAYTIME COLD MEDICINE PO) Take 2 Caps by mouth as needed (For cold symptoms).       No current facility-administered medications on file prior to visit.       Review of systems:    All other review of systems are negative except what was mentioned above in the HPI.  Constitutional: Negative for fever and chills. Positive for mild fatigue    HENT: Negative for ear pain and nosebleeds.    Eyes: Stable vision. Positive for photophobia.    Respiratory: Negative for cough, sputum production, and shortness of breath.     Cardiovascular: Negative for chest pain.    Gastrointestinal: Positive for  nausea. Negative for vomiting and abdominal pain.    Genitourinary: Negative for dysuria.    Musculoskeletal: Negative for myalgias.    Skin: Negative for rash.    Neurological: Positive for intermittent headaches, Positive for intermittent orthostatic dizziness and positive for sensory changes stable. Positive for diffuse weakness and unsteady gait.  Endo/Heme/Allergies: No bruise/bleed easily.    Psychiatric/Behavioral: Positive for anxiety. Stable memory      Physical Exam:  Vitals:    Filed Vitals:    04/07/17 1330   BP: 122/88   Pulse: 86   Temp: 36.8 °C (98.2 °F)   Resp: 16   Height: 1.753 m (5' 9.02\")   Weight: 83.915 kg (185 lb)   SpO2: 94%       General: patient is in moderate distress, alert and oriented    HEENT: Post op changes and healing well, extra ocular muscles intact, pupils are equally reactive bilaterally, moist " oral mucus membranes, and oral cavity without any lesions.  Neck: Supple neck and full range of motion  Lymph nodes: No palpable cervical and supraclavicular lymphadenopathy.     CVS: regular rate and rhythm  RESP: Clear to auscultation bilaterally.    ABD: Soft, non tender, non distended.     EXT: No edema   CNS: Alert and oriented, cranial nerves grossly intact, and muscle strength weakness .     Labs:  No visits with results within 1 Day(s) from this visit.  Latest known visit with results is:    Hospital Outpatient Visit on 03/15/2017   Component Date Value Ref Range Status   • Sodium 03/15/2017 137  135 - 145 mmol/L Final   • Potassium 03/15/2017 4.2  3.6 - 5.5 mmol/L Final   • Chloride 03/15/2017 102  96 - 112 mmol/L Final   • Co2 03/15/2017 30  20 - 33 mmol/L Final   • Anion Gap 03/15/2017 5.0  0.0 - 11.9 Final   • Glucose 03/15/2017 91  65 - 99 mg/dL Final   • Bun 03/15/2017 8  8 - 22 mg/dL Final   • Creatinine 03/15/2017 0.57  0.50 - 1.40 mg/dL Final   • Calcium 03/15/2017 9.3  8.5 - 10.5 mg/dL Final   • AST(SGOT) 03/15/2017 20  12 - 45 U/L Final   • ALT(SGPT) 03/15/2017 51* 2 - 50 U/L Final   • Alkaline Phosphatase 03/15/2017 52  30 - 99 U/L Final   • Total Bilirubin 03/15/2017 0.4  0.1 - 1.5 mg/dL Final   • Albumin 03/15/2017 4.5  3.2 - 4.9 g/dL Final   • Total Protein 03/15/2017 7.1  6.0 - 8.2 g/dL Final   • Globulin 03/15/2017 2.6  1.9 - 3.5 g/dL Final   • A-G Ratio 03/15/2017 1.7   Final   • WBC 03/15/2017 11.4* 4.8 - 10.8 K/uL Final   • RBC 03/15/2017 4.65* 4.70 - 6.10 M/uL Final   • Hemoglobin 03/15/2017 15.1  14.0 - 18.0 g/dL Final   • Hematocrit 03/15/2017 44.7  42.0 - 52.0 % Final   • MCV 03/15/2017 96.1  81.4 - 97.8 fL Final   • MCH 03/15/2017 32.5  27.0 - 33.0 pg Final   • MCHC 03/15/2017 33.8  33.7 - 35.3 g/dL Final   • RDW 03/15/2017 49.9  35.9 - 50.0 fL Final   • Platelet Count 03/15/2017 206  164 - 446 K/uL Final   • MPV 03/15/2017 9.1  9.0 - 12.9 fL Final   • Neutrophils-Polys 03/15/2017  78.20* 44.00 - 72.00 % Final   • Lymphocytes 03/15/2017 10.60* 22.00 - 41.00 % Final   • Monocytes 03/15/2017 8.90  0.00 - 13.40 % Final   • Eosinophils 03/15/2017 0.40  0.00 - 6.90 % Final   • Basophils 03/15/2017 0.40  0.00 - 1.80 % Final   • Immature Granulocytes 03/15/2017 1.50* 0.00 - 0.90 % Final   • Nucleated RBC 03/15/2017 0.00   Final   • Neutrophils (Absolute) 03/15/2017 8.94* 1.82 - 7.42 K/uL Final    Includes immature neutrophils, if present.   • Lymphs (Absolute) 03/15/2017 1.21  1.00 - 4.80 K/uL Final   • Monos (Absolute) 03/15/2017 1.01* 0.00 - 0.85 K/uL Final   • Eos (Absolute) 03/15/2017 0.04  0.00 - 0.51 K/uL Final   • Baso (Absolute) 03/15/2017 0.04  0.00 - 0.12 K/uL Final   • Immature Granulocytes (abs) 03/15/2017 0.17* 0.00 - 0.11 K/uL Final   • NRBC (Absolute) 03/15/2017 0.00   Final   • GFR If  03/15/2017 >60  >60 mL/min/1.73 m 2 Final   • GFR If Non  03/15/2017 >60  >60 mL/min/1.73 m 2 Final   ]    Imagin. 3/15/17 MRI brain: Increased size and enhancement of residual glioblastoma centered in the RIGHT parietal lobe, now extending across the splenium of the corpus callosum.  Extensive white matter fluid signal in the RIGHT cerebral hemisphere likely due to combination of post radiation changes and vasogenic edema      Assessment and Plan:  1. GBM, stage IV: History of grade 3 astrocytomas/p chemoradiation followed by maintenance therapy but unable to complete therapy secondary to noncompliance. 2016 he was found to have recurrent with GBM. He is s/p salvage chemoradiation on 17. He was started on maintenance temozolomide in 3/2017 and tolerated it well.  MRI showed changes concerning for progression versus progression. He has been continued on maintenance temozolomide and has been compliant.  2. Frequent fall: Patient has been having frequent falls and unsteady gait since his dexamethasone dose was decreased. He had a fall this morning and hit his  head. I strongly recommend a CT scan without contrast to rule out a bleed. Patient stated that he is unable to afford this at present. Patient was then advised to go to the emergency room if he were to have any worsening of his symptoms. I will start him back on higher doses of dexamethasone at 4 mg BID.   3. Social issues: Patient is more stable at present. He is currently living with his brother in a motel.  4. Pain medications: Patient is on Dilaudid to be taken as needed for headaches. He will be given refills and Dilaudid and robaxin weekly.  5. History of alcoholic abuse: Patient has abstained from alcohol. I reiterated the importance.  6. Tobacco use: He has cut down on chewing tobacco to half a can every 2 weeks. He has started to smoke a little bit more and now is smoking approximately 2-3 cigarettes/a day. I went over the importance of tobacco cessation. Patient states that he understands and let him to cut down.  7. He is to follow-up again in 2 weeks or sooner as needed.      I informed the patient that I will be leaving the [facility] shortly. I went over options for transition of care. He will be scheduled to see Dr. Iniguez     He agreed and verbalized his agreement and understanding with the current plan.  I answered all questions and concerns he has at this time and advised him to call at any time in the interim with questions or concerns in regards to his care.  Thank you for allowing me to participate in his care.    Please note that this dictation was created using voice recognition software. I have made every reasonable attempt to correct obvious errors, but I expect that there are errors of grammar and possibly content that I did not discover before finalizing the note.

## 2017-04-07 NOTE — MR AVS SNAPSHOT
"        Fortino Ricci JrIrving   2017 1:40 PM   Office Visit   MRN: [id number]    Department:  [facility]   Dept Phone:  [phone]    Description:  Male : 1970   Provider:  Ashley Lee M.D.           Reason for Visit     Follow-Up 2wk      Allergies as of 2017     Allergen Noted Reactions    Pcn [Penicillins] 12/10/2014       Reaction was as a child.  Pt stated tolerated Augmentin and Amoxicillin       You were diagnosed with     GBM (glioblastoma multiforme) (CMS-HCC)   [527002]         Vital Signs     Blood Pressure Pulse Temperature Respirations Height Weight    122/88 mmHg 86 36.8 °C (98.2 °F) 16 1.753 m (5' 9.02\") 85.1 kg (187 lb 9.8 oz)    Body Mass Index Oxygen Saturation Smoking Status             27.69 kg/m2 94% Current Every Day Smoker         Basic Information     Date Of Birth Sex Race Ethnicity Preferred Language    1970 Male White Non- English      Your appointments     2017  2:00 PM   ONCOLOGY EST PATIENT 30 MIN with TEJ Harper   [facility] (--)    [address], [address]  [facility] [id number] [phone]            May 11, 2017  3:00 PM   ONCOLOGY NEW PATIENT 60 MIN with Vel Iniguez M.D.   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   12/10/2014 - Present    GERD (gastroesophageal reflux disease) K21.9   2014 - Present    Singultus R06.6   2014 - Present    Headache R51   2014 - Present    Anaplastic astrocytoma of brain (CMS-HCC) C71.9   2015 - Present    Intractable nausea and vomiting R11.2   4/10/2015 - Present    Nausea & vomiting R11.2   2015 - Present    Astrocytoma brain tumor (CMS-HCC) C71.9   2015 - Present    Hypokalemia E87.6   2015 " - Present    Hx of brain cancer Z85.841   6/2/2015 - Present    Glioblastoma (CMS-HCC) C71.9 High  12/19/2016 - Present    Brain mass G93.9   12/19/2016 - Present    Midline shift of brain G93.9   12/20/2016 - Present    Tobacco abuse Z72.0 Low  12/20/2016 - Present    Alcohol abuse F10.10 Low  12/20/2016 - Present    Steroid-induced hyperglycemia R73.9 Medium  12/20/2016 - Present    Acute viral conjunctivitis of right eye B30.9   12/23/2016 - Present    GBM (glioblastoma multiforme) (CMS-HCC) C71.9 High  12/26/2016 - Present    Insomnia disorder G47.00   12/26/2016 - Present    Seizure (CMS-HCC) R56.9 Medium  1/25/2017 - Present    Depression F32.9 Low  1/25/2017 - Present    Leukocytosis D72.829 Medium  2/7/2017 - Present    Hyponatremia E87.1 Medium  2/7/2017 - Present      Health Maintenance        Date Due Completion Dates    IMM DTaP/Tdap/Td Vaccine (1 - Tdap) 10/14/1989 ---    IMM PNEUMOCOCCAL 19-64 (ADULT) MEDIUM RISK SERIES (1 of 1 - PPSV23) 3/22/2017 1/25/2017            Current Immunizations     13-VALENT PCV PREVNAR 1/25/2017  8:16 PM    Influenza TIV (IM) 11/10/2014    Influenza Vaccine Quad Inj (Pf) 1/25/2017  8:14 PM    Pneumococcal Vaccine (UF)Historical Data 11/10/2014      Below and/or attached are the medications your provider expects you to take. Review all of your home medications and newly ordered medications with your provider and/or pharmacist. Follow medication instructions as directed by your provider and/or pharmacist. Please keep your medication list with you and share with your provider. Update the information when medications are discontinued, doses are changed, or new medications (including over-the-counter products) are added; and carry medication information at all times in the event of emergency situations     Allergies:  PCN - (reactions not documented)               Medications  Valid as of: April 07, 2017 -  2:07 PM    Generic Name Brand Name Tablet Size Instructions for use     Acetaminophen (Tab) TYLENOL 500 MG Take 1 Tab by mouth every 6 hours as needed. Indications: Pain        Dexamethasone (Tab) DECADRON 4 MG Take 1 Tab by mouth 3 times a day.        Dexamethasone (Tab) DECADRON 4 MG Take 1 Tab by mouth 2 times a day.        DiphenhydrAMINE HCl (Tab) BENADRYL 25 MG Take 25 mg by mouth every 6 hours as needed. Indications: Cough        Famotidine (Tab) PEPCID 20 MG Take 1 Tab by mouth 2 times a day.        Gabapentin (Cap) NEURONTIN 300 MG Take 1 Cap by mouth 3 times a day.        HYDROmorphone HCl (Tab) DILAUDID 2 MG Take 1-2 Tabs by mouth every four hours as needed for Severe Pain.        LevETIRAcetam (Tab) KEPPRA 1000 MG Take 1 Tab by mouth 2 Times a Day.        Methocarbamol (Tab) ROBAXIN 750 MG Take 1 Tab by mouth every 6 hours as needed.        Ondansetron (TABLET DISPERSIBLE) ZOFRAN ODT 4 MG Take 1 Tab by mouth every four hours as needed for Nausea/Vomiting (give PO if no IV route available).        Prochlorperazine Maleate (Tab) COMPAZINE 10 MG Take 1 Tab by mouth every 6 hours as needed.        Pseudoephedrine-APAP-DM   Take 2 Caps by mouth as needed (For cold symptoms).        Sertraline HCl (Tab) ZOLOFT 25 MG Take 1 Tab by mouth every evening.        Sodium Chloride (Tab) SALT 1 GM Take 1 Tab by mouth 2 times a day, with meals.        Temozolomide (Cap) Temozolomide 140 MG Take 1 Cap by mouth every bedtime.        Temozolomide (Cap) TEMODAR 5 MG Take 1 Cap by mouth every bedtime.        .                 Medicines prescribed today were sent to:     [facility] [address] [address]    Phone: [phone] Fax: [phone]    Open 24 Hours?: No      Medication refill instructions:       If your prescription bottle indicates you have medication refills left, it is not necessary to call your provider’s office. Please contact your pharmacy and they will refill your medication.    If your prescription bottle indicates you do  not have any refills left, you may request refills at any time through one of the following ways: The online Gradwell system (except Urgent Care), by calling your provider’s office, or by asking your pharmacy to contact your provider’s office with a refill request. Medication refills are processed only during regular business hours and may not be available until the next business day. Your provider may request additional information or to have a follow-up visit with you prior to refilling your medication.   *Please Note: Medication refills are assigned a new Rx number when refilled electronically. Your pharmacy may indicate that no refills were authorized even though a new prescription for the same medication is available at the pharmacy. Please request the medicine by name with the pharmacy before contacting your provider for a refill.        Your To Do List     Future Labs/Procedures Complete By Expires    CT-HEAD W/O  As directed 4/7/2018         Gradwell Access Code: [email or web address]  Expires: 5/7/2017  2:07 PM    Gradwell  A secure, online tool to manage your health information     [employer]’s Gradwell® is a secure, online tool that connects you to your personalized health information from the privacy of your home -- day or night - making it very easy for you to manage your healthcare. Once the activation process is completed, you can even access your medical information using the Gradwell logan, which is available for free in the Apple Logan store or Google Play store.     Gradwell provides the following levels of access (as shown below):   My Chart Features   [facility] Doctor [facility]  Specialists [facility]  Urgent  Care Non-[facility]  Primary Care  Doctor   Email your healthcare team securely and privately 24/7 X X X    Manage appointments: schedule your next appointment; view details of past/upcoming appointments X      Request prescription refills. X      View recent personal medical records, including lab  and immunizations X X X X   View health record, including health history, allergies, medications X X X X   Read reports about your outpatient visits, procedures, consult and ER notes X X X X   See your discharge summary, which is a recap of your hospital and/or ER visit that includes your diagnosis, lab results, and care plan. X X       How to register for [employer]:  1. Go to  [email or web address].  2. Click on the Sign Up Now box, which takes you to the New Member Sign Up page. You will need to provide the following information:  a. Enter your [employer] Access Code exactly as it appears at the top of this page. (You will not need to use this code after you’ve completed the sign-up process. If you do not sign up before the expiration date, you must request a new code.)   b. Enter your date of birth.   c. Enter your home email address.   d. Click Submit, and follow the next screen’s instructions.  3. Create a [employer] Intelligence ID. This will be your [employer] Intelligence login ID and cannot be changed, so think of one that is secure and easy to remember.  4. Create a [employer] password. You can change your password at any time.  5. Enter your Password Reset Question and Answer. This can be used at a later time if you forget your password.   6. Enter your e-mail address. This allows you to receive e-mail notifications when new information is available in [employer].  7. Click Sign Up. You can now view your health information.    For assistance activating your [employer] account, call [phone]        Quit Tobacco Information     Do you want to quit using tobacco?    Quitting tobacco decreases risks of cancer, heart and lung disease, increases life expectancy, improves sense of taste and smell, and increases spending money, among other benefits.    If you are thinking about quitting, we can help.  • [facility] Quit Tobacco Program: [phone]  o Program occurs weekly for four weeks and includes pharmacist consultation on products to support  quitting smoking or chewing tobacco. A provider referral is needed for pharmacist consultation.  • Tobacco Users Help Hotline: 1-262-QUIT-NOW (576-2130) or https://[place].quitlogix.org/  o Free, confidential telephone and online coaching for [place] residents. Sessions are designed on a schedule that is convenient for you. Eligible clients receive free nicotine replacement therapy.  • Nationally: www.smokefree.gov  o Information and professional assistance to support both immediate and long-term needs as you become, and remain, a non-smoker. Smokefree.gov allows you to choose the help that best fits your needs.

## 2017-04-10 RX ORDER — METHOCARBAMOL 750 MG/1
750 TABLET, FILM COATED ORAL EVERY 6 HOURS PRN
Qty: 45 TAB | Refills: 0 | Status: SHIPPED | OUTPATIENT
Start: 2017-04-10 | End: 2017-04-24

## 2017-04-10 RX ORDER — HYDROMORPHONE HYDROCHLORIDE 2 MG/1
2-4 TABLET ORAL EVERY 4 HOURS PRN
Qty: 45 TAB | Refills: 0 | Status: SHIPPED | OUTPATIENT
Start: 2017-04-10 | End: 2017-04-24

## 2017-04-21 ENCOUNTER — TELEPHONE (OUTPATIENT)
Dept: HEMATOLOGY ONCOLOGY | Facility: MEDICAL CENTER | Age: 47
End: 2017-04-21

## 2017-04-21 ENCOUNTER — APPOINTMENT (OUTPATIENT)
Dept: HEMATOLOGY ONCOLOGY | Facility: MEDICAL CENTER | Age: 47
End: 2017-04-21

## 2017-04-21 NOTE — TELEPHONE ENCOUNTER
"Patient called to cancel his appointment due to a \"bad headache\". He states he does not think it is necessary to speak to one of our nurses. He would like to reschedule for an appointment after 5/03/2017 after he gets paid. I informed him I have an available appointment with Debbie HALL on 5/08/2017 but he is scheduled to see Dr. Iniguez on 5/11/2017 that week. Patient decided to wait to see Geetha. He also informs me he must wait to get paid so he is able to pay for his medications.   "

## 2017-04-24 ENCOUNTER — APPOINTMENT (OUTPATIENT)
Dept: RADIOLOGY | Facility: MEDICAL CENTER | Age: 47
DRG: 947 | End: 2017-04-24
Attending: EMERGENCY MEDICINE

## 2017-04-24 ENCOUNTER — RESOLUTE PROFESSIONAL BILLING HOSPITAL PROF FEE (OUTPATIENT)
Dept: HOSPITALIST | Facility: MEDICAL CENTER | Age: 47
End: 2017-04-24

## 2017-04-24 ENCOUNTER — HOSPITAL ENCOUNTER (INPATIENT)
Facility: MEDICAL CENTER | Age: 47
LOS: 7 days | DRG: 947 | End: 2017-05-01
Attending: EMERGENCY MEDICINE | Admitting: HOSPITALIST
Payer: MEDICARE

## 2017-04-24 DIAGNOSIS — C71.9 GLIOBLASTOMA (HCC): ICD-10-CM

## 2017-04-24 DIAGNOSIS — C71.9 ANAPLASTIC ASTROCYTOMA OF BRAIN (HCC): Chronic | ICD-10-CM

## 2017-04-24 DIAGNOSIS — R51.9 INTRACTABLE HEADACHE, UNSPECIFIED CHRONICITY PATTERN, UNSPECIFIED HEADACHE TYPE: ICD-10-CM

## 2017-04-24 PROBLEM — R32 URINARY INCONTINENCE: Status: ACTIVE | Noted: 2017-04-24

## 2017-04-24 LAB
ALBUMIN SERPL BCP-MCNC: 3.4 G/DL (ref 3.2–4.9)
ALBUMIN/GLOB SERPL: 1.4 G/DL
ALP SERPL-CCNC: 46 U/L (ref 30–99)
ALT SERPL-CCNC: 86 U/L (ref 2–50)
ANION GAP SERPL CALC-SCNC: 6 MMOL/L (ref 0–11.9)
APTT PPP: 20.1 SEC (ref 24.7–36)
AST SERPL-CCNC: 25 U/L (ref 12–45)
BASOPHILS # BLD AUTO: 0.1 % (ref 0–1.8)
BASOPHILS # BLD: 0.01 K/UL (ref 0–0.12)
BILIRUB SERPL-MCNC: 1 MG/DL (ref 0.1–1.5)
BUN SERPL-MCNC: 19 MG/DL (ref 8–22)
CALCIUM SERPL-MCNC: 8.5 MG/DL (ref 8.4–10.2)
CHLORIDE SERPL-SCNC: 104 MMOL/L (ref 96–112)
CO2 SERPL-SCNC: 28 MMOL/L (ref 20–33)
CREAT SERPL-MCNC: 0.68 MG/DL (ref 0.5–1.4)
EOSINOPHIL # BLD AUTO: 0.02 K/UL (ref 0–0.51)
EOSINOPHIL NFR BLD: 0.2 % (ref 0–6.9)
ERYTHROCYTE [DISTWIDTH] IN BLOOD BY AUTOMATED COUNT: 46.2 FL (ref 35.9–50)
GFR SERPL CREATININE-BSD FRML MDRD: >60 ML/MIN/1.73 M 2
GLOBULIN SER CALC-MCNC: 2.4 G/DL (ref 1.9–3.5)
GLUCOSE SERPL-MCNC: 98 MG/DL (ref 65–99)
HCT VFR BLD AUTO: 44.4 % (ref 42–52)
HGB BLD-MCNC: 15.6 G/DL (ref 14–18)
IMM GRANULOCYTES # BLD AUTO: 0.1 K/UL (ref 0–0.11)
IMM GRANULOCYTES NFR BLD AUTO: 0.9 % (ref 0–0.9)
INR PPP: 0.86 (ref 0.87–1.13)
LYMPHOCYTES # BLD AUTO: 0.7 K/UL (ref 1–4.8)
LYMPHOCYTES NFR BLD: 6 % (ref 22–41)
MCH RBC QN AUTO: 32.4 PG (ref 27–33)
MCHC RBC AUTO-ENTMCNC: 35.1 G/DL (ref 33.7–35.3)
MCV RBC AUTO: 92.3 FL (ref 81.4–97.8)
MONOCYTES # BLD AUTO: 1.71 K/UL (ref 0–0.85)
MONOCYTES NFR BLD AUTO: 14.5 % (ref 0–13.4)
NEUTROPHILS # BLD AUTO: 9.22 K/UL (ref 1.82–7.42)
NEUTROPHILS NFR BLD: 78.3 % (ref 44–72)
NRBC # BLD AUTO: 0 K/UL
NRBC BLD AUTO-RTO: 0 /100 WBC
PLATELET # BLD AUTO: 179 K/UL (ref 164–446)
PMV BLD AUTO: 8.7 FL (ref 9–12.9)
POTASSIUM SERPL-SCNC: 3.8 MMOL/L (ref 3.6–5.5)
PROT SERPL-MCNC: 5.8 G/DL (ref 6–8.2)
PROTHROMBIN TIME: 12 SEC (ref 12–14.6)
RBC # BLD AUTO: 4.81 M/UL (ref 4.7–6.1)
SODIUM SERPL-SCNC: 138 MMOL/L (ref 135–145)
WBC # BLD AUTO: 11.8 K/UL (ref 4.8–10.8)

## 2017-04-24 PROCEDURE — 80053 COMPREHEN METABOLIC PANEL: CPT

## 2017-04-24 PROCEDURE — 96376 TX/PRO/DX INJ SAME DRUG ADON: CPT

## 2017-04-24 PROCEDURE — 700102 HCHG RX REV CODE 250 W/ 637 OVERRIDE(OP): Performed by: EMERGENCY MEDICINE

## 2017-04-24 PROCEDURE — 51798 US URINE CAPACITY MEASURE: CPT

## 2017-04-24 PROCEDURE — 70450 CT HEAD/BRAIN W/O DYE: CPT

## 2017-04-24 PROCEDURE — 85730 THROMBOPLASTIN TIME PARTIAL: CPT

## 2017-04-24 PROCEDURE — 700111 HCHG RX REV CODE 636 W/ 250 OVERRIDE (IP): Performed by: EMERGENCY MEDICINE

## 2017-04-24 PROCEDURE — 99285 EMERGENCY DEPT VISIT HI MDM: CPT

## 2017-04-24 PROCEDURE — 700111 HCHG RX REV CODE 636 W/ 250 OVERRIDE (IP): Performed by: HOSPITALIST

## 2017-04-24 PROCEDURE — 85025 COMPLETE CBC W/AUTO DIFF WBC: CPT

## 2017-04-24 PROCEDURE — 770006 HCHG ROOM/CARE - MED/SURG/GYN SEMI*

## 2017-04-24 PROCEDURE — 96374 THER/PROPH/DIAG INJ IV PUSH: CPT

## 2017-04-24 PROCEDURE — A9270 NON-COVERED ITEM OR SERVICE: HCPCS | Performed by: EMERGENCY MEDICINE

## 2017-04-24 PROCEDURE — 96375 TX/PRO/DX INJ NEW DRUG ADDON: CPT

## 2017-04-24 PROCEDURE — 99223 1ST HOSP IP/OBS HIGH 75: CPT | Performed by: HOSPITALIST

## 2017-04-24 PROCEDURE — A9270 NON-COVERED ITEM OR SERVICE: HCPCS | Performed by: HOSPITALIST

## 2017-04-24 PROCEDURE — 85610 PROTHROMBIN TIME: CPT

## 2017-04-24 PROCEDURE — 700102 HCHG RX REV CODE 250 W/ 637 OVERRIDE(OP): Performed by: HOSPITALIST

## 2017-04-24 RX ORDER — AMOXICILLIN 250 MG
2 CAPSULE ORAL 2 TIMES DAILY
Status: DISCONTINUED | OUTPATIENT
Start: 2017-04-25 | End: 2017-05-01 | Stop reason: HOSPADM

## 2017-04-24 RX ORDER — DEXAMETHASONE SODIUM PHOSPHATE 10 MG/ML
5 INJECTION, SOLUTION INTRAMUSCULAR; INTRAVENOUS ONCE
Status: DISCONTINUED | OUTPATIENT
Start: 2017-04-24 | End: 2017-04-24

## 2017-04-24 RX ORDER — ONDANSETRON 2 MG/ML
4 INJECTION INTRAMUSCULAR; INTRAVENOUS ONCE
Status: COMPLETED | OUTPATIENT
Start: 2017-04-24 | End: 2017-04-24

## 2017-04-24 RX ORDER — NICOTINE 21 MG/24HR
14 PATCH, TRANSDERMAL 24 HOURS TRANSDERMAL
Status: DISCONTINUED | OUTPATIENT
Start: 2017-04-24 | End: 2017-05-01 | Stop reason: HOSPADM

## 2017-04-24 RX ORDER — OXYCODONE HYDROCHLORIDE 5 MG/1
5 TABLET ORAL
Status: DISCONTINUED | OUTPATIENT
Start: 2017-04-24 | End: 2017-05-01 | Stop reason: HOSPADM

## 2017-04-24 RX ORDER — DEXAMETHASONE SODIUM PHOSPHATE 4 MG/ML
4 INJECTION, SOLUTION INTRA-ARTICULAR; INTRALESIONAL; INTRAMUSCULAR; INTRAVENOUS; SOFT TISSUE EVERY 6 HOURS
Status: DISCONTINUED | OUTPATIENT
Start: 2017-04-25 | End: 2017-04-28

## 2017-04-24 RX ORDER — BISACODYL 10 MG
10 SUPPOSITORY, RECTAL RECTAL
Status: DISCONTINUED | OUTPATIENT
Start: 2017-04-24 | End: 2017-05-01 | Stop reason: HOSPADM

## 2017-04-24 RX ORDER — ACETAMINOPHEN 325 MG/1
650 TABLET ORAL EVERY 6 HOURS PRN
Status: DISCONTINUED | OUTPATIENT
Start: 2017-04-24 | End: 2017-05-01 | Stop reason: HOSPADM

## 2017-04-24 RX ORDER — HYDROCODONE BITARTRATE AND ACETAMINOPHEN 5; 325 MG/1; MG/1
1 TABLET ORAL ONCE
Status: COMPLETED | OUTPATIENT
Start: 2017-04-24 | End: 2017-04-24

## 2017-04-24 RX ORDER — DEXAMETHASONE SODIUM PHOSPHATE 4 MG/ML
5 INJECTION, SOLUTION INTRA-ARTICULAR; INTRALESIONAL; INTRAMUSCULAR; INTRAVENOUS; SOFT TISSUE ONCE
Status: COMPLETED | OUTPATIENT
Start: 2017-04-24 | End: 2017-04-24

## 2017-04-24 RX ORDER — OXYCODONE HYDROCHLORIDE 5 MG/1
10 TABLET ORAL
Status: CANCELLED | OUTPATIENT
Start: 2017-04-24

## 2017-04-24 RX ORDER — OXYCODONE HYDROCHLORIDE 5 MG/1
5 TABLET ORAL
Status: CANCELLED | OUTPATIENT
Start: 2017-04-24

## 2017-04-24 RX ORDER — OXYCODONE HYDROCHLORIDE 10 MG/1
10 TABLET ORAL
Status: DISCONTINUED | OUTPATIENT
Start: 2017-04-24 | End: 2017-05-01 | Stop reason: HOSPADM

## 2017-04-24 RX ORDER — MORPHINE SULFATE 4 MG/ML
4 INJECTION, SOLUTION INTRAMUSCULAR; INTRAVENOUS
Status: CANCELLED | OUTPATIENT
Start: 2017-04-24

## 2017-04-24 RX ORDER — POLYETHYLENE GLYCOL 3350 17 G/17G
1 POWDER, FOR SOLUTION ORAL
Status: DISCONTINUED | OUTPATIENT
Start: 2017-04-24 | End: 2017-05-01 | Stop reason: HOSPADM

## 2017-04-24 RX ADMIN — DEXAMETHASONE SODIUM PHOSPHATE 5 MG: 4 INJECTION, SOLUTION INTRAMUSCULAR; INTRAVENOUS at 17:50

## 2017-04-24 RX ADMIN — NICOTINE 14 MG: 14 PATCH, EXTENDED RELEASE TRANSDERMAL at 22:35

## 2017-04-24 RX ADMIN — HYDROMORPHONE HYDROCHLORIDE 0.5 MG: 1 INJECTION, SOLUTION INTRAMUSCULAR; INTRAVENOUS; SUBCUTANEOUS at 17:51

## 2017-04-24 RX ADMIN — HYDROCODONE BITARTRATE AND ACETAMINOPHEN 1 TABLET: 5; 325 TABLET ORAL at 15:10

## 2017-04-24 RX ADMIN — HYDROMORPHONE HYDROCHLORIDE 1 MG: 1 INJECTION, SOLUTION INTRAMUSCULAR; INTRAVENOUS; SUBCUTANEOUS at 15:20

## 2017-04-24 RX ADMIN — ONDANSETRON 4 MG: 2 INJECTION INTRAMUSCULAR; INTRAVENOUS at 15:10

## 2017-04-24 RX ADMIN — HYDROMORPHONE HYDROCHLORIDE 1 MG: 1 INJECTION, SOLUTION INTRAMUSCULAR; INTRAVENOUS; SUBCUTANEOUS at 22:35

## 2017-04-24 ASSESSMENT — ENCOUNTER SYMPTOMS
SPEECH CHANGE: 1
NECK PAIN: 0
DOUBLE VISION: 0
BACK PAIN: 0
VOMITING: 0
DIZZINESS: 0
HEADACHES: 1
BLURRED VISION: 0
NAUSEA: 1

## 2017-04-24 ASSESSMENT — PAIN SCALES - GENERAL
PAINLEVEL_OUTOF10: 9
PAINLEVEL_OUTOF10: 10
PAINLEVEL_OUTOF10: 10

## 2017-04-24 ASSESSMENT — LIFESTYLE VARIABLES: EVER_SMOKED: UNABLE TO EVALUATE AT THIS TIME - NEEDS ASSESSMENT PRIOR TO DISCHARGE

## 2017-04-24 NOTE — IP AVS SNAPSHOT
" Home Care Instructions                                                                                                                  Name:Fortino Ricci Jr.  Medical Record Number:[id number]  CSN: [id number]    YOB: 1970   Age: 46 y.o.  Sex: male  HT:1.753 m (5' 9.02\") WT: 79.379 kg (175 lb)          Admit Date: 4/24/2017     Discharge Date:   Today's Date: 5/1/2017  Attending Doctor:  Emanuel Godinez M.D.                  Allergies:  Pcn            Discharge Instructions       Discharge Instructions    Discharged to home by taxi with self. Discharged via walking, hospital escort: Yes.  Special equipment needed: Not Applicable    Be sure to schedule a follow-up appointment with your primary care doctor or any specialists as instructed.     Discharge Plan:   Diet Plan: Discussed  Activity Level: Discussed  Confirmed Follow up Appointment: Patient to Call and Schedule Appointment  Confirmed Symptoms Management: Discussed  Medication Reconciliation Updated: Yes  Influenza Vaccine Indication: Not indicated: Previously immunized this influenza season and > 8 years of age    I understand that a diet low in cholesterol, fat, and sodium is recommended for good health. Unless I have been given specific instructions below for another diet, I accept this instruction as my diet prescription.   Other diet: Regular diet    Special Instructions:      Hydromorphone tablets  What is this medicine?  HYDROMORPHONE (mary droe MOR fone) is a pain reliever. It is used to treat moderate to severe pain.  This medicine may be used for other purposes; ask your health care provider or pharmacist if you have questions.  COMMON BRAND NAME(S): Dilaudid  What should I tell my health care provider before I take this medicine?  They need to know if you have any of these conditions:  -brain tumor  -drug abuse or addiction  -head injury  -heart disease  -frequently drink alcohol containing drinks  -kidney disease or problems " going to the bathroom  -liver disease  -lung disease, asthma, or breathing problems  -mental problems  -an allergic or unusual reaction to lactose, hydromorphone, other opioid analgesics, other medicines, sulfites, foods, dyes, or preservatives  -pregnant or trying to get pregnant  -breast-feeding  How should I use this medicine?  Take this medicine by mouth with a glass of water. If the medicine upsets your stomach, take it with food or milk. Follow the directions on the prescription label. Do not take more medicine than you are told to take.  Talk to your pediatrician regarding the use of this medicine in children. Special care may be needed.  Overdosage: If you think you have taken too much of this medicine contact a poison control center or emergency room at once.  NOTE: This medicine is only for you. Do not share this medicine with others.  What if I miss a dose?  If you miss a dose, take it as soon as you can. If it is almost time for your next dose, take only that dose. Do not take double or extra doses.  What may interact with this medicine?  -alcohol  -antihistamines for allergy, cough and cold  -medicines for anesthesia  -medicines for depression, anxiety, or psychotic disturbances  -medicines for sleep  -muscle relaxants  -naltrexone  -narcotic medicines (opiates) for pain  -phenothiazines like chlorpromazine, mesoridazine, prochlorperazine, thioridazine  -tramadol  This list may not describe all possible interactions. Give your health care provider a list of all the medicines, herbs, non-prescription drugs, or dietary supplements you use. Also tell them if you smoke, drink alcohol, or use illegal drugs. Some items may interact with your medicine.  What should I watch for while using this medicine?  Tell your doctor or health care professional if your pain does not go away, if it gets worse, or if you have new or a different type of pain. You may develop tolerance to the medicine. Tolerance means that you  will need a higher dose of the medicine for pain relief. Tolerance is normal and is expected if you take this medicine for a long time.  Do not suddenly stop taking your medicine because you may develop a severe reaction. Your body becomes used to the medicine. This does NOT mean you are addicted. Addiction is a behavior related to getting and using a drug for a non-medical reason. If you have pain, you have a medical reason to take pain medicine. Your doctor will tell you how much medicine to take. If your doctor wants you to stop the medicine, the dose will be slowly lowered over time to avoid any side effects.  You may get drowsy or dizzy. Do not drive, use machinery, or do anything that needs mental alertness until you know how this medicine affects you. Do not stand or sit up quickly, especially if you are an older patient. This reduces the risk of dizzy or fainting spells. Alcohol may interfere with the effect of this medicine. Avoid alcoholic drinks.  There are different types of narcotic medicines (opiates) for pain. If you take more than one type at the same time, you may have more side effects. Give your health care provider a list of all medicines you use. Your doctor will tell you how much medicine to take. Do not take more medicine than directed. Call emergency for help if you have problems breathing.  This medicine will cause constipation. Try to have a bowel movement at least every 2 to 3 days. If you do not have a bowel movement for 3 days, call your doctor or health care professional.  Your mouth may get dry. Chewing sugarless gum or sucking hard candy, and drinking plenty of water may help. Contact your doctor if the problem does not go away or is severe.  What side effects may I notice from receiving this medicine?  Side effects that you should report to your doctor or health care professional as soon as possible:  -allergic reactions like skin rash, itching or hives, swelling of the face, lips,  or tongue  -breathing problems  -changes in vision  -confusion  -feeling faint or lightheaded, falls  -seizures  -slow or fast heartbeat  -trouble passing urine or change in the amount of urine  -trouble with balance, talking, walking  Side effects that usually do not require medical attention (report to your doctor or health care professional if they continue or are bothersome):  -difficulty sleeping  -drowsiness  -dry mouth  -flushing  -headache  -itching  -loss of appetite  -nausea, vomiting  This list may not describe all possible side effects. Call your doctor for medical advice about side effects. You may report side effects to FDA at 4-168-FDA-6373.  Where should I keep my medicine?  Keep out of the reach of children. This medicine can be abused. Keep your medicine in a safe place to protect it from theft. Do not share this medicine with anyone. Selling or giving away this medicine is dangerous and against the law.  Store at room temperature between 15 and 30 degrees C (59 and 86 degrees F). Keep container tightly closed. Protect from light.  Discard unused medicine and used packaging carefully. Pets and children can be harmed if they find used or lost packages. Flush any unused medicines down the toilet. Do not use the medicine after the expiration date.  NOTE: This sheet is a summary. It may not cover all possible information. If you have questions about this medicine, talk to your doctor, pharmacist, or health care provider.  © 2014, [employer]/Gold Standard. (8/24/2012 3:08:53 PM)        · Is patient discharged on Warfarin / Coumadin?   No     · Is patient Post Blood Transfusion?  No    Depression / Suicide Risk    As you are discharged from this [facility], it is important to learn how to keep safe from harming yourself.    Recognize the warning signs:  · Abrupt changes in personality, positive or negative- including increase in energy   · Giving away possessions  · Change in eating patterns-  significant weight changes-  positive or negative  · Change in sleeping patterns- unable to sleep or sleeping all the time   · Unwillingness or inability to communicate  · Depression  · Unusual sadness, discouragement and loneliness  · Talk of wanting to die  · Neglect of personal appearance   · Rebelliousness- reckless behavior  · Withdrawal from people/activities they love  · Confusion- inability to concentrate     If you or a loved one observes any of these behaviors or has concerns about self-harm, here's what you can do:  · Talk about it- your feelings and reasons for harming yourself  · Remove any means that you might use to hurt yourself (examples: pills, rope, extension cords, firearm)  · Get professional help from the community (Mental Health, Substance Abuse, psychological counseling)  · Do not be alone:Call your Safe Contact- someone whom you trust who will be there for you.  · Call your local CRISIS HOTLINE 879-9151 or 916-592-4047  · Call your local Children's Mobile Crisis Response Team [place] (760) 810-5957 or www.Healthcare Interactive  · Call the toll free National Suicide Prevention Hotlines   · National Suicide Prevention Lifeline 748-853-ITAW (2120)  · National Hope Line Network 800-SUICIDE (464-4203)        Your appointments     May 11, 2017  3:00 PM   ONCOLOGY NEW PATIENT 60 MIN with Vel Iniguez M.D.   [facility] (--)    [address], [address]  [facility] [address] [phone]              Follow-up Information     1. Follow up with DONTAE Hussein. Go on 5/3/2017.    Why:  Please arrive at 9am for your appointment. Please bring 2 months' pay stubs, photo ID, proof of address, and list of medications.    Contact information    [facility] [facility] [address] [phone]        2. Follow up with Vel Iniguez M.D.. Go on 5/11/2017.    Specialty:  Oncology    Why:  For oncology care    Contact information    [address]  [address]  [facility]  [address]  [phone]           Discharge Medication Instructions:    Below are the medications your physician expects you to take upon discharge:    Review all your home medications and newly ordered medications with your doctor and/or pharmacist. Follow medication instructions as directed by your doctor and/or pharmacist.    Please keep your medication list with you and share with your physician.               Medication List      START taking these medications        Instructions    Morning Afternoon Evening Bedtime    * HYDROmorphone 2 MG Tabs   Last time this was given:  4 mg on 5/1/2017 11:44 AM   Commonly known as:  DILAUDID        Take 1-2 Tabs by mouth every four hours as needed for Severe Pain.   Dose:  2-4 mg                        * HYDROmorphone 4 MG Tabs   Last time this was given:  4 mg on 5/1/2017 11:44 AM   Commonly known as:  DILAUDID   Next Dose Due:  3:45 pm        Take 1 Tab by mouth every four hours as needed for Severe Pain.   Dose:  4 mg                        * Notice:  This list has 2 medication(s) that are the same as other medications prescribed for you. Read the directions carefully, and ask your doctor or other care provider to review them with you.      CONTINUE taking these medications        Instructions    Morning Afternoon Evening Bedtime    diphenhydrAMINE 25 MG Tabs   Last time this was given:  50 mg on 4/30/2017  7:39 PM   Commonly known as:  BENADRYL        Take 50 mg by mouth every 6 hours as needed for Sleep.   Dose:  50 mg                             Where to Get Your Medications      Information about where to get these medications is not yet available     ! Ask your nurse or doctor about these medications    - HYDROmorphone 2 MG Tabs  - HYDROmorphone 4 MG Tabs            Orders for after discharge     REFERRAL TO HOSPICE    Complete by:  As directed              Instructions           Diet / Nutrition:    Follow any diet instructions given to you by your doctor or the  dietician, including how much salt (sodium) you are allowed each day.    If you are overweight, talk to your doctor about a weight reduction plan.    Activity:    Remain physically active following your doctor's instructions about exercise and activity.    Rest often.     Any time you become even a little tired or short of breath, SIT DOWN and rest.    Worsening Symptoms:    Report any of the following signs and symptoms to the doctor's office immediately:    *Pain of jaw, arm, or neck  *Chest pain not relieved by medication                               *Dizziness or loss of consciousness  *Difficulty breathing even when at rest   *More tired than usual                                       *Bleeding drainage or swelling of surgical site  *Swelling of feet, ankles, legs or stomach                 *Fever (>100ºF)  *Pink or blood tinged sputum  *Weight gain (3lbs/day or 5lbs /week)           *Shock from internal defibrillator (if applicable)  *Palpitations or irregular heartbeats                *Cool and/or numb extremities    Stroke Awareness    Common Risk Factors for Stroke include:    Age  Atrial Fibrillation  Carotid Artery Stenosis  Diabetes Mellitus  Excessive alcohol consumption  High blood pressure  Overweight   Physical inactivity  Smoking    Warning signs and symptoms of a stroke include:    *Sudden numbness or weakness of the face, arm or leg (especially on one side of the body).  *Sudden confusion, trouble speaking or understanding.  *Sudden trouble seeing in one or both eyes.  *Sudden trouble walking, dizziness, loss of balance or coordination.Sudden severe headache with no known cause.    It is very important to get treatment quickly when a stroke occurs. If you experience any of the above warning signs, call 911 immediately.                   Disclaimer         Quit Smoking / Tobacco Use:    I understand the use of any tobacco products increases my chance of suffering from future heart disease or  stroke and could cause other illnesses which may shorten my life. Quitting the use of tobacco products is the single most important thing I can do to improve my health. For further information on smoking / tobacco cessation call a Toll Free Quit Line at 1-283.557.6378 (*National Cancer [place]) or 1-315.932.3985 (American Lung Association) or you can access the web based program at www.lungusa.org.    [place] Tobacco Users Help Line:  (778) 578-8149       Toll Free: 1-221.973.7820  Quit Tobacco Program [facility] beds at the [facility] [phone]    Crisis Hotline:    [place] Crisis Hotline:  8-409-GXHPJPR or 1-631.851.5689    [place] Crisis Hotline:    1-764.418.9945 or 645-153-0720    Discharge Survey:   Thank you for choosing [facility] include [facility] beds at the [facility]. We hope we did everything we could to make your hospital stay a pleasant one. You may be receiving a phone survey and we would appreciate your time and participation in answering the questions. Your input is very valuable to us in our efforts to improve our service to our patients and their families.        My signature on this form indicates that:    1. I have reviewed and understand the above information.  2. My questions regarding this information have been answered to my satisfaction.  3. I have formulated a plan with my discharge nurse to obtain my prescribed medications for home.                  Disclaimer         __________________________________                     __________       ________                       Patient Signature                                                 Date                    Time

## 2017-04-24 NOTE — IP AVS SNAPSHOT
5/1/2017    Fortino Ricci Jr.  [address] (hotel) \  [address]   [facility] [address]    Dear Fortino:    [facility] wants to ensure your discharge home is safe and you or your loved ones have had all of your questions answered regarding your care after you leave the hospital.    Below is a list of resources and contact information should you have any questions regarding your hospital stay, follow-up instructions, or active medical symptoms.    Questions or Concerns Regarding… Contact   Medical Questions Related to Your Discharge  (7 days a week, 8am-5pm) Contact a Nurse Care Coordinator   [phone]   Medical Questions Not Related to Your Discharge  (24 hours a day / 7 days a week)  Contact the Nurse Health Line   [phone]    Medications or Discharge Instructions Refer to your discharge packet   or contact your [facility] Primary Care Provider   [phone]   Follow-up Appointment(s) Schedule your appointment via [employer]   or contact Scheduling [phone]   Billing Review your statement via [employer]  or contact Billing [phone]   Medical Records Review your records via [employer]   or contact Medical Records [phone]     You may receive a telephone call within two days of discharge. This call is to make certain you understand your discharge instructions and have the opportunity to have any questions answered. You can also easily access your medical information, test results and upcoming appointments via the [employer] free online health management tool. You can learn more and sign up at [employer]. For assistance setting up your [employer] account, please call [phone].    Once again, we want to ensure your discharge home is safe and that you have a clear understanding of any next steps in your care. If you have any questions or concerns, please do not hesitate to contact us, we are here for you. Thank you for choosing [facility] for your healthcare needs.    Sincerely,    Your [facility]  Healthcare Team

## 2017-04-24 NOTE — ED NOTES
Damir olivares, report pt c/o ha, hx of brain CA w/ tumors removed, pt c/o incontinence, states is out of pain meds.

## 2017-04-24 NOTE — IP AVS SNAPSHOT
" <p align=\"LEFT\"><IMG SRC=\"//[email or web address]$/[email or web address]\" alt=\"Image\" WIDTH=\"50%\" HEIGHT=\"200\" BORDER=\"\"></p>                   Name:Fortino Ricci Jr.  Medical Record Number:[id number]  CSN: [id number]    YOB: 1970   Age: 46 y.o.  Sex: male  HT:1.753 m (5' 9.02\") WT: 79.379 kg (175 lb)          Admit Date: 4/24/2017     Discharge Date:   Today's Date: 5/1/2017  Attending Doctor:  Emanuel Godinez M.D.                  Allergies:  Pcn          Your appointments     May 11, 2017  3:00 PM   ONCOLOGY NEW PATIENT 60 MIN with Vel Iniguez M.D.   [facility] (--)    [address] [place] [address]  [facility] [address] [phone]              Follow-up Information     1. Follow up with DONTAE Hussein. Go on 5/3/2017.    Why:  Please arrive at 9am for your appointment. Please bring 2 months' pay stubs, photo ID, proof of address, and list of medications.    Contact information    [facility] [place] [address] [phone]        2. Follow up with Vel Iniguez M.D.. Go on 5/11/2017.    Specialty:  Oncology    Why:  For oncology care    Contact information    [address]   [address]  [facility] [address] [phone]           Medication List      Take these Medications        Instructions    diphenhydrAMINE 25 MG Tabs   Commonly known as:  BENADRYL    Take 50 mg by mouth every 6 hours as needed for Sleep.   Dose:  50 mg       * HYDROmorphone 2 MG Tabs   Commonly known as:  DILAUDID    Take 1-2 Tabs by mouth every four hours as needed for Severe Pain.   Dose:  2-4 mg       * HYDROmorphone 4 MG Tabs   Commonly known as:  DILAUDID    Take 1 Tab by mouth every four hours as needed for Severe Pain.   Dose:  4 mg       * Notice:  This list has 2 medication(s) that are the same as other medications prescribed for you. Read the directions carefully, and ask your doctor or other care provider to review them with you.      "

## 2017-04-24 NOTE — ED PROVIDER NOTES
ED Provider Note    Scribed for Tripp Retana M.D. by Gael Alba. 4/24/2017, 2:43 PM.    Primary care provider: Pcp Pt States None  Means of arrival: Ambulance  History obtained from: Patient  History limited by: None    CHIEF COMPLAINT  Chief Complaint   Patient presents with   • Headache     hx of brain CA       HPI  Fortino Ricci Jr. is a 46 y.o. Male with history of brain cancer who presents to the Emergency Department by ambulance for severe headache. Patient states pain is located all over his head. Denies blurred vision or double vision. Patient reports associated nausea but no vomiting. Denies numbness or tingling to extremities. Patient states he has experienced headache of similar severity in the past but never experienced incontinence. Patient notes incontinence which began this morning.  Per patient has has noticed increased difficulty speaking. Patient currently takes Dialudid for pain relief and states last dose was 1 week ago. Denies neck and back pain. Patient is established with Dr. Edgar and has had multiple tumors removed. Patient is established with Dr. Lee, Oncology.     REVIEW OF SYSTEMS  Review of Systems   Eyes: Negative for blurred vision and double vision.   Gastrointestinal: Positive for nausea. Negative for vomiting.   Genitourinary:        + Incontinence    Musculoskeletal: Negative for back pain and neck pain.   Neurological: Positive for speech change and headaches. Negative for dizziness.   All other systems reviewed and are negative.  C.    PAST MEDICAL HISTORY   has a past medical history of Cancer (CMS-HCC) and Brain neoplasm malignant (CMS-HCC).    SURGICAL HISTORY   has past surgical history that includes craniotomy tumor (12/7/14); craniotomy (11/214); and craniotomy stealth (Right, 12/23/2016).    SOCIAL HISTORY  Social History   Substance Use Topics   • Smoking status: Current Every Day Smoker -- 1.00 packs/day for 6 years     Types: Cigarettes     Start  date: 01/01/1986   • Smokeless tobacco: Former User     Types: Chew     Quit date: 01/01/2014   • Alcohol Use: 3.5 oz/week     7 Cans of beer per week      Comment: occassional      History   Drug Use No       FAMILY HISTORY  Family History   Problem Relation Age of Onset   • Cancer Mother        CURRENT MEDICATIONS  No current facility-administered medications on file prior to encounter.     Current Outpatient Prescriptions on File Prior to Encounter   Medication Sig Dispense Refill   • HYDROmorphone (DILAUDID) 2 MG Tab Take 1-2 Tabs by mouth every four hours as needed for Severe Pain. 45 Tab 0   • methocarbamol (ROBAXIN) 750 MG Tab Take 1 Tab by mouth every 6 hours as needed. 45 Tab 0   • dexamethasone (DECADRON) 4 MG Tab Take 1 Tab by mouth 2 times a day. 30 Tab 1   • ondansetron (ZOFRAN ODT) 4 MG TABLET DISPERSIBLE Take 1 Tab by mouth every four hours as needed for Nausea/Vomiting (give PO if no IV route available). 30 Tab 3   • prochlorperazine (COMPAZINE) 10 MG Tab Take 1 Tab by mouth every 6 hours as needed. 30 Tab 3   • levetiracetam (KEPPRA) 1000 MG tablet Take 1 Tab by mouth 2 Times a Day. 60 Tab 3   • gabapentin (NEURONTIN) 300 MG Cap Take 1 Cap by mouth 3 times a day. 90 Cap 3   • famotidine (PEPCID) 20 MG Tab Take 1 Tab by mouth 2 times a day. 60 Tab 3   • acetaminophen (TYLENOL) 500 MG Tab Take 1 Tab by mouth every 6 hours as needed. Indications: Pain     • sertraline (ZOLOFT) 25 MG tablet Take 1 Tab by mouth every evening. 30 Tab 11   • dexamethasone (DECADRON) 4 MG Tab Take 1 Tab by mouth 3 times a day. 120 Tab 0   • sodium chloride (SALT) 1 GM Tab Take 1 Tab by mouth 2 times a day, with meals. 60 Tab 0   • Temozolomide 140 MG Cap Take 1 Cap by mouth every bedtime. 4 Cap 0   • temozolomide (TEMODAR) 5 MG Cap Take 1 Cap by mouth every bedtime. 4 Cap 0   • diphenhydrAMINE (BENADRYL) 25 MG Tab Take 25 mg by mouth every 6 hours as needed. Indications: Cough     • Pseudoephedrine-APAP-DM (QC DAYTIME COLD  "MEDICINE PO) Take 2 Caps by mouth as needed (For cold symptoms).         ALLERGIES  Allergies   Allergen Reactions   • Pcn [Penicillins]      Reaction was as a child.  Pt stated tolerated Augmentin and Amoxicillin        PHYSICAL EXAM  VITAL SIGNS: /81 mmHg  Pulse 99  Temp(Src) 37 °C (98.6 °F)  Resp 16  Ht 1.753 m (5' 9.02\")  Wt 79.379 kg (175 lb)  BMI 25.83 kg/m2    Constitutional:  Moderate distress  HENT:  Moist mucous membranes  Eyes:  No conjunctivitis or icterus  Neck:  trachea is midline, no palpable thyroid  Lymphatic:  No cervical lymphadenopathy  Cardiovascular:  Regular rate and rhythm, no murmurs  Thorax & Lungs:  Normal breath sounds, no rhonchi  Abdomen:  Soft, Non-tender  Skin:.  no rash  Back:  Non-tender, no CVA tenderness  Extremities:   no edema  Vascular:  symmetric radial pulse  Neurologic: Alert and oriented. Cranial nerves II-XII intact, EOMs intact, no tongue deviation, PERRL, no facial asymmetry to motor or sensation, symmetric palate, normal finger-to-nose test, no pronator drift. Diffuse non focal weakness. Symmetric reflexes.      LABS  Labs Reviewed   CBC WITH DIFFERENTIAL - Abnormal; Notable for the following:     WBC 11.8 (*)     MPV 8.7 (*)     Neutrophils-Polys 78.30 (*)     Lymphocytes 6.00 (*)     Monocytes 14.50 (*)     Neutrophils (Absolute) 9.22 (*)     Lymphs (Absolute) 0.70 (*)     Monos (Absolute) 1.71 (*)     All other components within normal limits    Narrative:     Indicate which anticoagulants the patient is on:->UNKNOWN   COMP METABOLIC PANEL - Abnormal; Notable for the following:     ALT(SGPT) 86 (*)     Total Protein 5.8 (*)     All other components within normal limits    Narrative:     Indicate which anticoagulants the patient is on:->UNKNOWN   APTT - Abnormal; Notable for the following:     APTT 20.1 (*)     All other components within normal limits    Narrative:     Indicate which anticoagulants the patient is on:->UNKNOWN   PROTHROMBIN TIME - " Abnormal; Notable for the following:     INR 0.86 (*)     All other components within normal limits    Narrative:     Indicate which anticoagulants the patient is on:->UNKNOWN   ESTIMATED GFR    Narrative:     Indicate which anticoagulants the patient is on:->UNKNOWN     All labs reviewed by me.    RADIOLOGY  MR-BRAIN-WITH & W/O   Final Result         1.  Previous subtotal right temporal lobectomy.      2.  Large irregular enhancing mass surrounding the postsurgical cavity in the right posterior temporal/anterior occipital lobe. This mass extends and expands the splenium of the corpus callosum and extends into the right posterior temporal, right    parietal, right parietal-occipital, and right occipital lobes. This enhancing mass is consistent with residual/recurrent glioblastoma it has not changed significantly in size or appearance since previous exam.      3.  Extensive vasogenic edema and/or postirradiation changes noted throughout the right frontal, parietal, residual temporal, and occipital white matter. There is also marked effacement of the posterior body and atrium of the right lateral ventricle and    this is unchanged since previous exam.      CT-HEAD W/O   Final Result      1.  Postoperative changes as described with encephalomalacia in the RIGHT anterior temporal and medial occipital regions.   2.  Edema and/or gliosis again present in the RIGHT frontal, parietal and temporal white matter.   3.  Ventriculomegaly, increased from prior exam, consistent with developing hydrocephalus.   4.  No acute intracranial hemorrhage or territorial infarct.        The radiologist's interpretation of all radiological studies have been reviewed by me.    COURSE & MEDICAL DECISION MAKING  Pertinent Labs & Imaging studies reviewed. (See chart for details)    2:43 PM - Patient seen and examined at bedside. Patient will be treated with 1 5-325 mg Norco tablet, 4 mg Zofran injection. Ordered CBC with differential, CMP, APTT,  PTT/INT to evaluate his symptoms. The differential diagnoses include but are not limited to: Vasogenic edema Patient presents with astrocytoma stage 4 with incontinence and will order CT scan to evaluate.     3:18 PM - Informed patient is still complaining of severe pain. Ordered 1 mg Dilaudid injection.     4:55 PM - Recheck patient. Patient states he was feeling nauseated. Patient advised I am awaiting CT results.    4:57 PM - Independently reviewed CT radiology image.       Medical Decision Making:  I did contact neurosurgery. Patient will be admitted to medicine. Given Decadron in the emergency department. He is admittedly guarded condition        FINAL IMPRESSION  1. Intractable headache, unspecified chronicity pattern, unspecified headache type    2. Glioblastoma (CMS-HCC)    3. Anaplastic astrocytoma of brain (CMS-HCC)          Gael CALDWELL (Scribe), am scribing for, and in the presence of, Tripp Retana M.D..    Electronically signed by: Gael Alba (Scribe), 4/24/2017    Tripp CALDWELL M.D. personally performed the services described in this documentation, as scribed by Gael Alba in my presence, and it is both accurate and complete.    The note accurately reflects work and decisions made by me.  Tripp Retana  4/28/2017  9:10 PM

## 2017-04-24 NOTE — IP AVS SNAPSHOT
WordSentry Access Code: [email or web address]  Expires: 5/7/2017  2:07 PM    Your email address is not on file at [facility].  Email Addresses are required for you to sign up for WordSentry, please contact [phone] to verify your personal information and to provide your email address prior to attempting to register for WordSentry.    Fortino Graffflora Ricci Jr.  [address] \  [address]   [place] [address]    WordSentry  A secure, online tool to manage your health information     [facility]’s WordSentry® is a secure, online tool that connects you to your personalized health information from the privacy of your home -- day or night - making it very easy for you to manage your healthcare. Once the activation process is completed, you can even access your medical information using the WordSentry logan, which is available for free in the Apple Logan store or Google Play store.     To learn more about WordSentry, visit [email or web address]    There are two levels of access available (as shown below):   My Chart Features  [facility] Primary Care Doctor [facility]  Specialists [facility]  Urgent  Care Non-[facility] Primary Care Doctor   Email your healthcare team securely and privately 24/7 X X X    Manage appointments: schedule your next appointment; view details of past/upcoming appointments X      Request prescription refills. X      View recent personal medical records, including lab and immunizations X X X X   View health record, including health history, allergies, medications X X X X   Read reports about your outpatient visits, procedures, consult and ER notes X X X X   See your discharge summary, which is a recap of your hospital and/or ER visit that includes your diagnosis, lab results, and care plan X X  X     How to register for WordSentry:  Once your e-mail address has been verified, follow the following steps to sign up for WordSentry.     1. Go to  [email or web address]  2. Click on the Sign Up Now box, which takes you to the New Member  Sign Up page. You will need to provide the following information:  a. Enter your [employer] Access Code exactly as it appears at the top of this page. (You will not need to use this code after you’ve completed the sign-up process. If you do not sign up before the expiration date, you must request a new code.)   b. Enter your date of birth.   c. Enter your home email address.   d. Click Submit, and follow the next screen’s instructions.  3. Create a Ox[employer]is ID. This will be your Oxsensis login ID and cannot be changed, so think of one that is secure and easy to remember.  4. Create a [employer] password. You can change your password at any time.  5. Enter your Password Reset Question and Answer. This can be used at a later time if you forget your password.   6. Enter your e-mail address. This allows you to receive e-mail notifications when new information is available in [employer]is.  7. Click Sign Up. You can now view your health information.    For assistance activating your [employer] account, call [phone]

## 2017-04-25 ENCOUNTER — APPOINTMENT (OUTPATIENT)
Dept: RADIOLOGY | Facility: MEDICAL CENTER | Age: 47
DRG: 947 | End: 2017-04-25
Attending: INTERNAL MEDICINE

## 2017-04-25 PROBLEM — R73.9 HYPERGLYCEMIA: Status: ACTIVE | Noted: 2017-04-25

## 2017-04-25 PROBLEM — R47.1 DYSARTHRIA: Status: ACTIVE | Noted: 2017-04-25

## 2017-04-25 LAB
ALBUMIN SERPL BCP-MCNC: 3.7 G/DL (ref 3.2–4.9)
BASOPHILS # BLD AUTO: 0.1 % (ref 0–1.8)
BASOPHILS # BLD: 0.02 K/UL (ref 0–0.12)
BUN SERPL-MCNC: 23 MG/DL (ref 8–22)
CALCIUM SERPL-MCNC: 8.4 MG/DL (ref 8.5–10.5)
CHLORIDE SERPL-SCNC: 108 MMOL/L (ref 96–112)
CO2 SERPL-SCNC: 24 MMOL/L (ref 20–33)
CREAT SERPL-MCNC: 0.56 MG/DL (ref 0.5–1.4)
EOSINOPHIL # BLD AUTO: 0.01 K/UL (ref 0–0.51)
EOSINOPHIL NFR BLD: 0.1 % (ref 0–6.9)
ERYTHROCYTE [DISTWIDTH] IN BLOOD BY AUTOMATED COUNT: 47.3 FL (ref 35.9–50)
EST. AVERAGE GLUCOSE BLD GHB EST-MCNC: 123 MG/DL
GFR SERPL CREATININE-BSD FRML MDRD: >60 ML/MIN/1.73 M 2
GLUCOSE BLD-MCNC: 116 MG/DL (ref 65–99)
GLUCOSE BLD-MCNC: 137 MG/DL (ref 65–99)
GLUCOSE BLD-MCNC: 169 MG/DL (ref 65–99)
GLUCOSE SERPL-MCNC: 209 MG/DL (ref 65–99)
HBA1C MFR BLD: 5.9 % (ref 0–5.6)
HCT VFR BLD AUTO: 43.5 % (ref 42–52)
HGB BLD-MCNC: 15.2 G/DL (ref 14–18)
IMM GRANULOCYTES # BLD AUTO: 0.17 K/UL (ref 0–0.11)
IMM GRANULOCYTES NFR BLD AUTO: 1.2 % (ref 0–0.9)
LYMPHOCYTES # BLD AUTO: 0.52 K/UL (ref 1–4.8)
LYMPHOCYTES NFR BLD: 3.7 % (ref 22–41)
MCH RBC QN AUTO: 32.4 PG (ref 27–33)
MCHC RBC AUTO-ENTMCNC: 34.9 G/DL (ref 33.7–35.3)
MCV RBC AUTO: 92.8 FL (ref 81.4–97.8)
MONOCYTES # BLD AUTO: 1.22 K/UL (ref 0–0.85)
MONOCYTES NFR BLD AUTO: 8.6 % (ref 0–13.4)
NEUTROPHILS # BLD AUTO: 12.28 K/UL (ref 1.82–7.42)
NEUTROPHILS NFR BLD: 86.3 % (ref 44–72)
NRBC # BLD AUTO: 0 K/UL
NRBC BLD AUTO-RTO: 0 /100 WBC
PHOSPHATE SERPL-MCNC: 2.9 MG/DL (ref 2.5–4.5)
PLATELET # BLD AUTO: 191 K/UL (ref 164–446)
PMV BLD AUTO: 9.1 FL (ref 9–12.9)
POTASSIUM SERPL-SCNC: 4 MMOL/L (ref 3.6–5.5)
RBC # BLD AUTO: 4.69 M/UL (ref 4.7–6.1)
SODIUM SERPL-SCNC: 138 MMOL/L (ref 135–145)
WBC # BLD AUTO: 14.2 K/UL (ref 4.8–10.8)

## 2017-04-25 PROCEDURE — 700102 HCHG RX REV CODE 250 W/ 637 OVERRIDE(OP): Performed by: NURSE PRACTITIONER

## 2017-04-25 PROCEDURE — 36415 COLL VENOUS BLD VENIPUNCTURE: CPT

## 2017-04-25 PROCEDURE — A9270 NON-COVERED ITEM OR SERVICE: HCPCS | Performed by: INTERNAL MEDICINE

## 2017-04-25 PROCEDURE — 700111 HCHG RX REV CODE 636 W/ 250 OVERRIDE (IP): Performed by: HOSPITALIST

## 2017-04-25 PROCEDURE — 700111 HCHG RX REV CODE 636 W/ 250 OVERRIDE (IP): Performed by: NURSE PRACTITIONER

## 2017-04-25 PROCEDURE — 82962 GLUCOSE BLOOD TEST: CPT | Mod: 91

## 2017-04-25 PROCEDURE — A9270 NON-COVERED ITEM OR SERVICE: HCPCS | Performed by: NURSE PRACTITIONER

## 2017-04-25 PROCEDURE — 83036 HEMOGLOBIN GLYCOSYLATED A1C: CPT

## 2017-04-25 PROCEDURE — 700117 HCHG RX CONTRAST REV CODE 255: Performed by: INTERNAL MEDICINE

## 2017-04-25 PROCEDURE — 770006 HCHG ROOM/CARE - MED/SURG/GYN SEMI*

## 2017-04-25 PROCEDURE — 70553 MRI BRAIN STEM W/O & W/DYE: CPT

## 2017-04-25 PROCEDURE — 80069 RENAL FUNCTION PANEL: CPT

## 2017-04-25 PROCEDURE — 85025 COMPLETE CBC W/AUTO DIFF WBC: CPT

## 2017-04-25 PROCEDURE — A9579 GAD-BASE MR CONTRAST NOS,1ML: HCPCS | Performed by: INTERNAL MEDICINE

## 2017-04-25 PROCEDURE — 700102 HCHG RX REV CODE 250 W/ 637 OVERRIDE(OP): Performed by: INTERNAL MEDICINE

## 2017-04-25 PROCEDURE — 700102 HCHG RX REV CODE 250 W/ 637 OVERRIDE(OP): Performed by: HOSPITALIST

## 2017-04-25 PROCEDURE — 99231 SBSQ HOSP IP/OBS SF/LOW 25: CPT | Performed by: HOSPITALIST

## 2017-04-25 PROCEDURE — A9270 NON-COVERED ITEM OR SERVICE: HCPCS | Performed by: HOSPITALIST

## 2017-04-25 RX ORDER — DEXTROSE MONOHYDRATE 25 G/50ML
25 INJECTION, SOLUTION INTRAVENOUS
Status: DISCONTINUED | OUTPATIENT
Start: 2017-04-25 | End: 2017-05-01 | Stop reason: HOSPADM

## 2017-04-25 RX ORDER — HYDROMORPHONE HYDROCHLORIDE 2 MG/1
2 TABLET ORAL EVERY 4 HOURS PRN
Status: DISCONTINUED | OUTPATIENT
Start: 2017-04-25 | End: 2017-05-01 | Stop reason: HOSPADM

## 2017-04-25 RX ORDER — GABAPENTIN 100 MG/1
200 CAPSULE ORAL 3 TIMES DAILY
Status: DISCONTINUED | OUTPATIENT
Start: 2017-04-25 | End: 2017-04-29

## 2017-04-25 RX ORDER — LEVETIRACETAM 500 MG/1
1000 TABLET ORAL 2 TIMES DAILY
Status: DISCONTINUED | OUTPATIENT
Start: 2017-04-25 | End: 2017-04-30

## 2017-04-25 RX ORDER — LORAZEPAM 2 MG/ML
0.5 INJECTION INTRAMUSCULAR
Status: DISCONTINUED | OUTPATIENT
Start: 2017-04-25 | End: 2017-04-27

## 2017-04-25 RX ORDER — HYDROMORPHONE HYDROCHLORIDE 2 MG/1
2-4 TABLET ORAL EVERY 4 HOURS PRN
Status: DISCONTINUED | OUTPATIENT
Start: 2017-04-25 | End: 2017-04-25

## 2017-04-25 RX ORDER — LORAZEPAM 2 MG/ML
0.5 INJECTION INTRAMUSCULAR ONCE
Status: DISCONTINUED | OUTPATIENT
Start: 2017-04-25 | End: 2017-04-25

## 2017-04-25 RX ORDER — HYDROMORPHONE HYDROCHLORIDE 4 MG/1
4 TABLET ORAL EVERY 4 HOURS PRN
Status: DISCONTINUED | OUTPATIENT
Start: 2017-04-25 | End: 2017-05-01 | Stop reason: HOSPADM

## 2017-04-25 RX ADMIN — LORAZEPAM 0.5 MG: 2 INJECTION INTRAMUSCULAR; INTRAVENOUS at 17:53

## 2017-04-25 RX ADMIN — GABAPENTIN 200 MG: 100 CAPSULE ORAL at 21:01

## 2017-04-25 RX ADMIN — GABAPENTIN 200 MG: 100 CAPSULE ORAL at 15:37

## 2017-04-25 RX ADMIN — OXYCODONE HYDROCHLORIDE 10 MG: 10 TABLET ORAL at 21:02

## 2017-04-25 RX ADMIN — HYDROMORPHONE HYDROCHLORIDE 1 MG: 1 INJECTION, SOLUTION INTRAMUSCULAR; INTRAVENOUS; SUBCUTANEOUS at 09:29

## 2017-04-25 RX ADMIN — HYDROMORPHONE HYDROCHLORIDE 2 MG: 2 TABLET ORAL at 15:37

## 2017-04-25 RX ADMIN — STANDARDIZED SENNA CONCENTRATE AND DOCUSATE SODIUM 2 TABLET: 8.6; 5 TABLET, FILM COATED ORAL at 07:07

## 2017-04-25 RX ADMIN — LEVETIRACETAM 1000 MG: 500 TABLET, FILM COATED ORAL at 14:08

## 2017-04-25 RX ADMIN — DEXAMETHASONE SODIUM PHOSPHATE 4 MG: 4 INJECTION, SOLUTION INTRAMUSCULAR; INTRAVENOUS at 05:45

## 2017-04-25 RX ADMIN — HYDROMORPHONE HYDROCHLORIDE 2 MG: 2 TABLET ORAL at 14:08

## 2017-04-25 RX ADMIN — GADODIAMIDE 20 ML: 287 INJECTION INTRAVENOUS at 19:19

## 2017-04-25 RX ADMIN — OXYCODONE HYDROCHLORIDE 10 MG: 10 TABLET ORAL at 11:57

## 2017-04-25 RX ADMIN — DEXAMETHASONE SODIUM PHOSPHATE 4 MG: 4 INJECTION, SOLUTION INTRAMUSCULAR; INTRAVENOUS at 00:27

## 2017-04-25 RX ADMIN — OXYCODONE HYDROCHLORIDE 10 MG: 10 TABLET ORAL at 17:53

## 2017-04-25 RX ADMIN — ACETAMINOPHEN 650 MG: 325 TABLET, FILM COATED ORAL at 07:06

## 2017-04-25 RX ADMIN — DEXAMETHASONE SODIUM PHOSPHATE 4 MG: 4 INJECTION, SOLUTION INTRAMUSCULAR; INTRAVENOUS at 11:57

## 2017-04-25 RX ADMIN — DEXAMETHASONE SODIUM PHOSPHATE 4 MG: 4 INJECTION, SOLUTION INTRAMUSCULAR; INTRAVENOUS at 17:53

## 2017-04-25 RX ADMIN — NICOTINE POLACRILEX 2 MG: 2 GUM, CHEWING BUCCAL at 09:29

## 2017-04-25 RX ADMIN — INSULIN LISPRO 1 UNITS: 100 INJECTION, SOLUTION INTRAVENOUS; SUBCUTANEOUS at 17:53

## 2017-04-25 RX ADMIN — HYDROMORPHONE HYDROCHLORIDE 1 MG: 1 INJECTION, SOLUTION INTRAMUSCULAR; INTRAVENOUS; SUBCUTANEOUS at 05:45

## 2017-04-25 RX ADMIN — LEVETIRACETAM 1000 MG: 500 TABLET, FILM COATED ORAL at 21:02

## 2017-04-25 RX ADMIN — OXYCODONE HYDROCHLORIDE 10 MG: 10 TABLET ORAL at 00:26

## 2017-04-25 RX ADMIN — STANDARDIZED SENNA CONCENTRATE AND DOCUSATE SODIUM 2 TABLET: 8.6; 5 TABLET, FILM COATED ORAL at 21:02

## 2017-04-25 RX ADMIN — OXYCODONE HYDROCHLORIDE 10 MG: 10 TABLET ORAL at 07:07

## 2017-04-25 ASSESSMENT — ENCOUNTER SYMPTOMS
NECK PAIN: 0
VOMITING: 0
FEVER: 0
ABDOMINAL PAIN: 0
DOUBLE VISION: 0
DIZZINESS: 0
HEADACHES: 1
CHILLS: 0
SPUTUM PRODUCTION: 0
SHORTNESS OF BREATH: 0
NAUSEA: 0
PALPITATIONS: 0
WEAKNESS: 1
BACK PAIN: 0
COUGH: 0

## 2017-04-25 ASSESSMENT — PAIN SCALES - GENERAL
PAINLEVEL_OUTOF10: 9
PAINLEVEL_OUTOF10: 8
PAINLEVEL_OUTOF10: 10
PAINLEVEL_OUTOF10: 9

## 2017-04-25 NOTE — H&P
HOSPITAL MEDICINE HISTORY/ PHYSICAL    Date & Time note created:    4/24/2017   9:02 PM       Patient ID:   Name: Fortino Ricci. YOB: 1970. Age: 46 y.o. male. MRN: [id number]    Admitting Attending:  Damian Mendoza     PCP : Pcp Pt States None    Outpatient nsg: Dr. Edgar    Outpatient oncologist: Dr. Lee        Chief Complaint:       Headache and urinary incontinence    History of Present Illness:    Radhames is a 46 y.o. 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 Patient is status post chemoradiation, craniotomy and resection of the tumor. Patient started having progressive headache. It is a diffuse headache. He also noticed some increased difficulty in speaking. Patient also noticed some urinary incontinence. It happens mostly at night.    Review of Systems:    Has chills, nausea, vomiting, constant/lightheadedness  Please see HPI, all other systems were reviewed and are negative (AMA/CMS criteria)              Past Medical/ Family / Social history (PFSH):   Past Medical History   Diagnosis Date   • Cancer (CMS-HCC)      brain   • Brain neoplasm malignant (CMS-HCC)      Past Surgical History   Procedure Laterality Date   • Craniotomy tumor  12/7/14     [facility]   • Craniotomy  11/214   • Craniotomy stealth Right 12/23/2016     Procedure: CRANIOTOMY STEALTH  RIGHT PARIETAL OCCIPITAL FOR BRAIN TUMOR;  Surgeon: Girma Edgar M.D.;  Location: SURGERY [facility];  Service:      Current Outpatient Medications:  No current facility-administered medications on file prior to encounter.     No current outpatient prescriptions on file prior to encounter.     Medication Allergy/Sensitivities:  Allergies   Allergen Reactions   • Pcn [Penicillins]      Reaction was as a child.  Pt stated tolerated Augmentin and Amoxicillin      Family History:  Family History   Problem Relation Age of Onset   • Cancer Mother      "  Social History:  Social History   Substance Use Topics   • Smoking status: Current Every Day Smoker -- 1.00 packs/day for 6 years     Types: Cigarettes     Start date: 01/01/1986   • Smokeless tobacco: Former User     Types: Chew     Quit date: 01/01/2014   • Alcohol Use: 3.5 oz/week     7 Cans of beer per week      Comment: occassional     #################################################################  Physical Exam:   Vitals/ General Appearance:   Weight/BMI: Body mass index is 25.83 kg/(m^2).  Blood pressure 126/81, pulse 61, temperature 37 °C (98.6 °F), resp. rate 16, height 1.753 m (5' 9.02\"), weight 79.379 kg (175 lb), SpO2 94 %.   Filed Vitals:    04/24/17 1730 04/24/17 1802 04/24/17 1831 04/24/17 1900   BP:       Pulse: 51 55 67 61   Temp:       Resp:       Height:       Weight:       SpO2: 94% 96% 96% 94%    Oxygen Therapy:  Pulse Oximetry: 94 %, O2 Delivery: None (Room Air)    Constitutional:  well developed, well nourished  HENMT: Normocephalic, atraumatic, b/l ears normal, nose normal  Eyes:  EOMI, conjunctiva normal, no discharge  Neck: no tracheal deviation, supple  Cardiovascular: normal heart rate, normal rhythm, no murmurs, no rubs or gallops; no cyanosis, clubbing or edema  Lungs: Respiratory effort is normal, normal breath sounds, breath sounds clear to auscultation b/l, no rales, rhonchi or wheezing  Abdomen: soft, non-tender, no guarding or rebound  Skin: warm, dry, no erythema, no rash  Neurologic: Alert and mostly oriented  Psychiatric: Some anxiety or depression    #################################################################  Lab Data Review:    Objective  Recent Results (from the past 24 hour(s))   CBC WITH DIFFERENTIAL    Collection Time: 04/24/17  3:14 PM   Result Value Ref Range    WBC 11.8 (H) 4.8 - 10.8 K/uL    RBC 4.81 4.70 - 6.10 M/uL    Hemoglobin 15.6 14.0 - 18.0 g/dL    Hematocrit 44.4 42.0 - 52.0 %    MCV 92.3 81.4 - 97.8 fL    MCH 32.4 27.0 - 33.0 pg    MCHC 35.1 33.7 - " 35.3 g/dL    RDW 46.2 35.9 - 50.0 fL    Platelet Count 179 164 - 446 K/uL    MPV 8.7 (L) 9.0 - 12.9 fL    Neutrophils-Polys 78.30 (H) 44.00 - 72.00 %    Lymphocytes 6.00 (L) 22.00 - 41.00 %    Monocytes 14.50 (H) 0.00 - 13.40 %    Eosinophils 0.20 0.00 - 6.90 %    Basophils 0.10 0.00 - 1.80 %    Immature Granulocytes 0.90 0.00 - 0.90 %    Nucleated RBC 0.00 /100 WBC    Neutrophils (Absolute) 9.22 (H) 1.82 - 7.42 K/uL    Lymphs (Absolute) 0.70 (L) 1.00 - 4.80 K/uL    Monos (Absolute) 1.71 (H) 0.00 - 0.85 K/uL    Eos (Absolute) 0.02 0.00 - 0.51 K/uL    Baso (Absolute) 0.01 0.00 - 0.12 K/uL    Immature Granulocytes (abs) 0.10 0.00 - 0.11 K/uL    NRBC (Absolute) 0.00 K/uL   COMP METABOLIC PANEL    Collection Time: 04/24/17  3:14 PM   Result Value Ref Range    Sodium 138 135 - 145 mmol/L    Potassium 3.8 3.6 - 5.5 mmol/L    Chloride 104 96 - 112 mmol/L    Co2 28 20 - 33 mmol/L    Anion Gap 6.0 0.0 - 11.9    Glucose 98 65 - 99 mg/dL    Bun 19 8 - 22 mg/dL    Creatinine 0.68 0.50 - 1.40 mg/dL    Calcium 8.5 8.4 - 10.2 mg/dL    AST(SGOT) 25 12 - 45 U/L    ALT(SGPT) 86 (H) 2 - 50 U/L    Alkaline Phosphatase 46 30 - 99 U/L    Total Bilirubin 1.0 0.1 - 1.5 mg/dL    Albumin 3.4 3.2 - 4.9 g/dL    Total Protein 5.8 (L) 6.0 - 8.2 g/dL    Globulin 2.4 1.9 - 3.5 g/dL    A-G Ratio 1.4 g/dL   APTT    Collection Time: 04/24/17  3:14 PM   Result Value Ref Range    APTT 20.1 (L) 24.7 - 36.0 sec   PROTHROMBIN TIME (INR)    Collection Time: 04/24/17  3:14 PM   Result Value Ref Range    PT 12.0 12.0 - 14.6 sec    INR 0.86 (L) 0.87 - 1.13   ESTIMATED GFR    Collection Time: 04/24/17  3:14 PM   Result Value Ref Range    GFR If African American >60 >60 mL/min/1.73 m 2    GFR If Non African American >60 >60 mL/min/1.73 m 2       (click the triangle to expand results)  My interpretation of lab results:   ALT 86, WBC 11.8, INR 0.86    Imaging/Procedures Review:    CT-HEAD W/O   Final Result      1.  Postoperative changes as described with  encephalomalacia in the RIGHT anterior temporal and medial occipital regions.   2.  Edema and/or gliosis again present in the RIGHT frontal, parietal and temporal white matter.   3.  Ventriculomegaly, increased from prior exam, consistent with developing hydrocephalus.   4.  No acute intracranial hemorrhage or territorial infarct.        Assessment and Plan:      1. 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 pain medications  2. Urinary incontinence  - Treating as above  - Bladder scan shows 250  3. Tobacco dependence  - Nicotine patch and gum  4.  Leukocytosis  - Likely reactive, no signs of overt infection  - Urinalysis and chest x-ray unremarkable  - Defer antibiotics for now  - Continue to monitor   5. Prophylaxis: SCDs  6. Code: DNR per patient   7. Dispo: He will be admitted to inpatient for management that is expected to take greater than 2 midnights

## 2017-04-25 NOTE — PROGRESS NOTES
Pt arrived from ER to Neuro 194-1 at 2040. C/O H.A., wants food and pain meds. AAOX4, ambulates with standby assist, bed rails up x2, bed alarm on, call light at bedside, safety precautions in place. Will continue to monitor closely.

## 2017-04-25 NOTE — ED NOTES
Med rec updated and complete.  Allergies reviewed.  Pt stated that she has been out of his medications   For over 1 week.  Pt has taken   LEVETIRACETAM  GABAPENTIN  DEXAMETHASONE  HYDROMORPHONE  SERTRALINE  TEMOZOLOMIDE  SALT TABLETS.

## 2017-04-25 NOTE — CONSULTS
"Reason for PC Consult: Advance Care Planning    Consulted by: Nerissa Jaramillo, APRN    Assessment:  General: 45yo gentleman DANIELLA ALFONSO and admitted through ED 4/24 with c/o HA and urinary incontinence, s/p GLF, out of pain meds; CT - possible developing hydrocephalus, MRI brain pending.  PMH: GBM, stage IV: History of grade 3 astrocytoma s/p chemoradiation followed by maintenance therapy but unable to complete therapy 2/2 noncompliance (appears financially based). 12/2016 - recurrent GBM, 12/23 craniotomy ([facility]), s/p salvage chemoradiation on 2/16/17 ([facility]), started on maintenance temozolomide in 3/2017, 3/15 MRI showed changes concerning for progression, ETOH, chewing tobacco    Dyspnea: No-    Last BM: 04/24/17-    Pain: Yes- 8/10 temporal pain one hour after dilaudid PRN, RN aware  Depression: Yes-      Spiritual:  Is Oriental orthodox or spirituality important for coping with this illness? Yes- [employer], had been feeling support of local Jehov[employer]'s witness but unable to go recently  Has a  or spiritual provider visit been requested? No    Palliative Performance Scale: 50    Advanced Directive: None-    DPOA:  -    POLST: -      Code Status: DNR-      Outcome:  Introduced self and role of Palliative Care.  Pt oriented to self, year, hospital, (stated March and corrected to April for month). Dr. Shaikh completed his encounter.  Assessed pt's understanding of his current medical status, overall health picture, and options for future care.  Pt soft spoken -- HAs with audio and visual sensitivity, denies visual changes.  Pt had been utilizing diluadid 2mg 1-2 PO Q4 PRN at home but had run out weeks ago.  Pt had also been prescribed compazine, Robaxin, and gabapentin 300TID.  The latter pt stopped taking, because, \"I don't think it worked.\"  Educated on it's use for nerve pain.  Pt stated he would be willing to try again, \"I'll take anything, I live in pain everyday.\"  Pt tearful, expressed anxiety about his " "ordered MRI, and we discussed pre-medicating.    Explored pt's values, beliefs, and preferences in order to identify GOC.  Pt was  until initial diagnosis in late .  He also enjoyed being an uncle, dirt biking, and spinner fishing, \"But I can't do any of that now.\"  Pt lives with his brother Jose in a hotel on ground level room \"which is good because I have been falling\" -- pt described feeling anxious, getting shaky and then falling if he did not sit in time.  Pt's father and aunt also live locally, but reports no other support system.  Pt's mother  from breast to lymphoma cancer in .  Pt stated that he had completed AD naming his brother Jose Ricci as POA at a previous admission \"a long time ago,\" but none in EPIC.  Pt expressed feeling too tired and in too much pain to discuss at this time stating, \"I'm sorry, it's just too hard right now.\"  Validated pt's thoughts and feelings of being overwhelmed.  Briefly described coping skills such as visualization, writing, deep breathing.    Active listening, reflection, reminiscing, empathic support and therapeutic touch utilized throughout this encounter.  All questions answered.  PC contact information given.     Updated: ISABEL Jaramillo, pt CHERELLE Ureña    Plan: f/u after MRI results with goal of documenting EoL wishes on AD/POLST.  Pt would benefit from knowing oncology's recommendations after MRI in order to discuss POC/GOC/EoL    Thank you for allowing Palliative Care to participate in this patient's care. Please feel free to call [phone] with any questions or concerns.  "

## 2017-04-25 NOTE — PROGRESS NOTES
Hospital Medicine Progress Note, Adult, Complex               Author: Nerissa Jaramillo Date & Time created: 4/25/2017  2:42 PM     Interval History:  Hospital Course:  Fortino Ricci Jr. is a 46 y.o. male w/ h/o astrocytoma with glioblastoma stage III admitted 4/24/2017 for increased headaches, fall and urinary incontinence. He ran out of his pain medication as he could not afford to refill.     Today, the patients headache has improved. His urinary incontinence has resolved. He ran out of money to fill his dilaudid prescription, and the pain became so severe that he presented to the ER. He does not get paid until 5/3 is worried about having pain medication for home. Denies vision changes, unilateral weakness.     Review of Systems:  Review of Systems   Constitutional: Positive for malaise/fatigue. Negative for fever and chills.   Eyes: Negative for double vision.   Respiratory: Negative for cough, sputum production and shortness of breath.    Cardiovascular: Negative for chest pain and palpitations.   Gastrointestinal: Negative for nausea, vomiting and abdominal pain.   Genitourinary: Negative for dysuria.        Urinary incontinence resolved   Musculoskeletal: Negative for back pain and neck pain (denies neck pain/stiffness).   Neurological: Positive for weakness and headaches. Negative for dizziness.   All other systems reviewed and are negative.      Physical Exam:  Physical Exam   Constitutional: He is oriented to person, place, and time. He appears well-developed and well-nourished. No distress.   HENT:   Head: Normocephalic and atraumatic.   Eyes: Conjunctivae are normal. No scleral icterus.   Neck: Normal range of motion. Neck supple.   Cardiovascular: Normal rate and regular rhythm.    No murmur heard.  Pulmonary/Chest: Effort normal and breath sounds normal. No respiratory distress.   Abdominal: Soft. Bowel sounds are normal. He exhibits no distension. There is no tenderness.   Musculoskeletal: Normal  range of motion. He exhibits no edema.   Neurological: He is alert and oriented to person, place, and time.   Skin: Skin is warm and dry. No rash noted.   Vitals reviewed.      Labs:        Invalid input(s): [id number]      Recent Labs      17   15117   0200   SODIUM  138  138   POTASSIUM  3.8  4.0   CHLORIDE  104  108   CO2  28  24   BUN  19  23*   CREATININE  0.68  0.56   PHOSPHORUS   --   2.9   CALCIUM  8.5  8.4*     Recent Labs      17   15117   0200   ALTSGPT  86*   --    ASTSGOT  25   --    ALKPHOSPHAT  46   --    TBILIRUBIN  1.0   --    GLUCOSE  98  209*     Recent Labs      17   15117   0200   RBC  4.81  4.69*   HEMOGLOBIN  15.6  15.2   HEMATOCRIT  44.4  43.5   PLATELETCT  179  191   PROTHROMBTM  12.0   --    APTT  20.1*   --    INR  0.86*   --      Recent Labs      17   0200   WBC  11.8*  14.2*   NEUTSPOLYS  78.30*  86.30*   LYMPHOCYTES  6.00*  3.70*   MONOCYTES  14.50*  8.60   EOSINOPHILS  0.20  0.10   BASOPHILS  0.10  0.10   ASTSGOT  25   --    ALTSGPT  86*   --    ALKPHOSPHAT  46   --    TBILIRUBIN  1.0   --            Hemodynamics:  Temp (24hrs), Av.9 °C (98.5 °F), Min:36.5 °C (97.7 °F), Max:37.3 °C (99.2 °F)  Temperature: 37.2 °C (98.9 °F)  Pulse  Av.5  Min: 51  Max: 99   Blood Pressure: 108/85 mmHg, NIBP: 120/78 mmHg     Respiratory:    Respiration: 16, Pulse Oximetry: 93 %           Fluids:  No intake or output data in the 24 hours ending 17 1442     GI/Nutrition:  Orders Placed This Encounter   Procedures   • Diet Order     Standing Status: Standing      Number of Occurrences: 1      Standing Expiration Date:      Order Specific Question:  Diet:     Answer:  Regular [1]     Medical Decision Making, by Problem:  Active Hospital Problems    Diagnosis   • *Headache [R51]  -Pain management- resume home dosing  -Cont decadron, with slow taper per onc  -Appreciate oncology consult Dr Lee  -CT brain- edema/gliosis  in right frontal, parietal, temporal white matter; post op changes; ventriculomegaly  -MRI brain pending   • Dysarthria [R47.1]- resolved, suspect 2/2 pain  -SLP eval   • Hyperglycemia [R73.9]- suspect 2/2 steroid  -Glycohemoglobin pending   • Urinary incontinence [R32]- resolved   • Leukocytosis [D72.829]- suspect reactive, no e/o infection or SIRS  -No nuchal rigidity, consider meningitis if HA remains   • Tobacco abuse [Z72.0]- encourage cessation  -Nicotine patch   • 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

## 2017-04-25 NOTE — PROGRESS NOTES
Oncology/Hematology Progress Note               Author: Ashley Lee Date & Time created: 4/25/2017  12:54 PM     Diagnosis: Anaplastic Astrocytoma, Grade III with recurrence with GBM    Chief compliant: Increase headaches, s/p fall and urinary incontinence.     History of presenting illness:  Mr. Ricci is a 46-year-old male wiht history of grade 3 astrocytoma which has reoccured with glioblastoma. 11/2014 he was diagnosed with grade 3 astrocytoma with right temporal mass and uncal herniation at diagnosis. He is s/p craniotomy and resection  which was positive for anaplastic astrocytoma, grade 3 with a low to high Ki-67 and IDH1-R132H (E5) negative.  He is s/p chemoradiation with temozolomide.  He was on maintenance temozolomide. Patient has had multiple issues with compliance, tobacco use and alcohol use during that time. He was continued on maintenance therapy until 3/2016 when he was lost to follow-up. e then presented to the emergency room in 12/2016 with increased headaches. MRI brain showed progression of disease. He is s/p biopsy of the mass which was positive for glioblastoma. He started back on dexamethasone and Keppra. He received chemoradiation during his hospitalization and completed therapy in 15 fractions of radiation on 2/16/17. He was continued on Keppra and a very slow taper of dexamethasone. He been more compliant.  Started on maintenance temozolomide.  His last MRI brain showed a disc concerning for progression versus pseudo-progression. He was continued on his regiment and was slowly tapered on the dexamethasone. He is been having intermittent issues with 8, headache immobility. His dexamethasone was chested accordingly.    Interval History:  Present to the emergency room secondary to increasing headaches. Patient also had multiple falls recently and did hit his head prior to coming to the emergency room.  He also has been having syncope sufficient and difficulty with gait. Patient stated  that he has been off his medications as he was unable to afford a refill. He had been off of the Keppra but denies any seizure episodes. Patient states that he was taking the dexamethasone but could not recall how much of the dexamethasone he had been taking. On admission he was started on dexamethasone with some improvement clinically.  He has noticed improvement in his headaches. He continues to have blurred vision bilaterally. He has noted some 8 weakness in his lower extremities and has been having intermittent urinary incontinence.    Past Medical History:      1. GBM  2. History of Anaplastic Astrocytoma, Grade III  3. GERD  4. Depression  5. Anxiety     Review of Systems:  ROS   All other review of systems are negative except what was mentioned above in the HPI.  Constitutional: Negative for fever and chills. Positive for fatigue    HENT: Negative for ear pain and nosebleeds.    Eyes: Positive for bilateral blurred vision. Positive for photophobia.    Respiratory: Negative for cough, sputum production, and shortness of breath.     Cardiovascular: Negative for chest pain.    Gastrointestinal: Positive for  nausea. Negative for vomiting and abdominal pain.    Genitourinary: Positive for intermittent urinary incontinence.    Musculoskeletal: Negative for myalgias.    Skin: Negative for rash.    Neurological: Positive for headaches, Positive for intermittent orthostatic dizziness and positive for sensory changes. Positive for diffuse weakness and unsteady gait.  Endo/Heme/Allergies: No bruise/bleed easily.    Psychiatric/Behavioral: Positive for anxiety. Stable memory      Physical Exam:  Physical Exam   General: patient is in moderate distress, alert and oriented    HEENT:  extra ocular muscles intact and moist oral mucus membranes.  Neck: Supple neck and full range of motion   CVS: regular rate and rhythm  RESP: Clear to auscultation bilaterally.    ABD: Soft, non tender, non distended.     EXT: No edema     CNS: Alert and oriented and muscle strength weakness .    Labs:        Invalid input(s): [id number]      Recent Labs      17   1514  17   0200   SODIUM  138  138   POTASSIUM  3.8  4.0   CHLORIDE  104  108   CO2  28  24   BUN  19  23*   CREATININE  0.68  0.56   PHOSPHORUS   --   2.9   CALCIUM  8.5  8.4*     Recent Labs      17   1514  17   0200   ALTSGPT  86*   --    ASTSGOT  25   --    ALKPHOSPHAT  46   --    TBILIRUBIN  1.0   --    GLUCOSE  98  209*     Recent Labs      17   1514  17   0200   RBC  4.81  4.69*   HEMOGLOBIN  15.6  15.2   HEMATOCRIT  44.4  43.5   PLATELETCT  179  191   PROTHROMBTM  12.0   --    APTT  20.1*   --    INR  0.86*   --      Recent Labs      17   1514  17   0200   WBC  11.8*  14.2*   NEUTSPOLYS  78.30*  86.30*   LYMPHOCYTES  6.00*  3.70*   MONOCYTES  14.50*  8.60   EOSINOPHILS  0.20  0.10   BASOPHILS  0.10  0.10   ASTSGOT  25   --    ALTSGPT  86*   --    ALKPHOSPHAT  46   --    TBILIRUBIN  1.0   --      Recent Labs      17   1514  17   0200   SODIUM  138  138   POTASSIUM  3.8  4.0   CHLORIDE  104  108   CO2  28  24   GLUCOSE  98  209*   BUN  19  23*   CREATININE  0.68  0.56   CALCIUM  8.5  8.4*     Hemodynamics:  Temp (24hrs), Av.9 °C (98.5 °F), Min:36.5 °C (97.7 °F), Max:37.3 °C (99.2 °F)  Temperature: 37.2 °C (98.9 °F)  Pulse  Av.5  Min: 51  Max: 99   Blood Pressure: 108/85 mmHg, NIBP: 120/78 mmHg     Respiratory:    Respiration: 16, Pulse Oximetry: 93 %           Fluids:  No intake or output data in the 24 hours ending 17 1254  Weight: 79.379 kg (175 lb)  GI/Nutrition:  Orders Placed This Encounter   Procedures   • Diet Order     Standing Status: Standing      Number of Occurrences: 1      Standing Expiration Date:      Order Specific Question:  Diet:     Answer:  Regular [1]     Medical Decision Making, by Problem:  Active Hospital Problems    Diagnosis   • *Headache [R51]   • Dysarthria [R47.1]   •  Hyperglycemia [R73.9]   • Urinary incontinence [R32]   • Leukocytosis [D72.829]   • Tobacco abuse [Z72.0]   • 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 17 CT head w/o: Postoperative changes as described with encephalomalacia in the RIGHT anterior temporal and medial occipital regions.  Edema and/or gliosis again present in the RIGHT frontal, parietal and temporal white matter. Ventriculomegaly, increased from prior exam, consistent with developing hydrocephalus.  No acute intracranial hemorrhage or territorial infarct.    Assessment and Plan:    1. GBM, stage IV: History of grade 3 astrocytomas/p chemoradiation followed by maintenance therapy but unable to complete therapy secondary to noncompliance. 2016 he had recurrent with GBM. He is s/p salvage chemoradiation on 17. He was started on maintenance temozolomide in 3/2017.  His last MRI showed changes concerning for progression versus progression. He now presents with CNS symptoms. He however has also been physical medications as he was unable to afford them. He also failed to inform the clinic in regards to this. CT of the head without contrast showed edema last Sheldon cysts in the right frontal parietal and temporal area with ventricle megaly and possible developing hydrocephalus. I recommend MRI of brain for reassessment. If patient is found to have hydrocephalus then he will need to have neurosurgery consulted. Recommend restarting Keppra.    2. Leukocytosis: Likely reactive. Patient does not appear to have any evidence of infection at present. Continue to monitor closely. On dexamethasone which can explain the elevated white count. Monitor his neurological status closely. If any worsening of his neurological status may need to rule out meningitis.    Thank you for allowing me to participate in his care. Please feel free to contact me with questions or concerns in regards to his care.    Please note that this dictation was  created using voice recognition software. I have made every reasonable attempt to correct obvious errors, but I expect that there are errors of grammar and possibly content that I did not discover before finalizing the note.      Core Measures

## 2017-04-26 LAB
ANION GAP SERPL CALC-SCNC: 5 MMOL/L (ref 0–11.9)
BUN SERPL-MCNC: 21 MG/DL (ref 8–22)
CALCIUM SERPL-MCNC: 9.1 MG/DL (ref 8.5–10.5)
CHLORIDE SERPL-SCNC: 105 MMOL/L (ref 96–112)
CO2 SERPL-SCNC: 28 MMOL/L (ref 20–33)
CREAT SERPL-MCNC: 0.62 MG/DL (ref 0.5–1.4)
ERYTHROCYTE [DISTWIDTH] IN BLOOD BY AUTOMATED COUNT: 46.5 FL (ref 35.9–50)
GFR SERPL CREATININE-BSD FRML MDRD: >60 ML/MIN/1.73 M 2
GLUCOSE BLD-MCNC: 132 MG/DL (ref 65–99)
GLUCOSE BLD-MCNC: 141 MG/DL (ref 65–99)
GLUCOSE BLD-MCNC: 149 MG/DL (ref 65–99)
GLUCOSE BLD-MCNC: 154 MG/DL (ref 65–99)
GLUCOSE BLD-MCNC: 168 MG/DL (ref 65–99)
GLUCOSE SERPL-MCNC: 156 MG/DL (ref 65–99)
HCT VFR BLD AUTO: 44.2 % (ref 42–52)
HGB BLD-MCNC: 14.9 G/DL (ref 14–18)
MCH RBC QN AUTO: 31.6 PG (ref 27–33)
MCHC RBC AUTO-ENTMCNC: 33.7 G/DL (ref 33.7–35.3)
MCV RBC AUTO: 93.8 FL (ref 81.4–97.8)
PLATELET # BLD AUTO: 186 K/UL (ref 164–446)
PMV BLD AUTO: 9 FL (ref 9–12.9)
POTASSIUM SERPL-SCNC: 4.4 MMOL/L (ref 3.6–5.5)
RBC # BLD AUTO: 4.71 M/UL (ref 4.7–6.1)
SODIUM SERPL-SCNC: 138 MMOL/L (ref 135–145)
WBC # BLD AUTO: 11.7 K/UL (ref 4.8–10.8)

## 2017-04-26 PROCEDURE — 80048 BASIC METABOLIC PNL TOTAL CA: CPT

## 2017-04-26 PROCEDURE — 92610 EVALUATE SWALLOWING FUNCTION: CPT

## 2017-04-26 PROCEDURE — 700102 HCHG RX REV CODE 250 W/ 637 OVERRIDE(OP): Performed by: HOSPITALIST

## 2017-04-26 PROCEDURE — G8987 SELF CARE CURRENT STATUS: HCPCS | Mod: CJ

## 2017-04-26 PROCEDURE — 97166 OT EVAL MOD COMPLEX 45 MIN: CPT

## 2017-04-26 PROCEDURE — G8980 MOBILITY D/C STATUS: HCPCS | Mod: CI

## 2017-04-26 PROCEDURE — G8988 SELF CARE GOAL STATUS: HCPCS | Mod: CI

## 2017-04-26 PROCEDURE — 99231 SBSQ HOSP IP/OBS SF/LOW 25: CPT | Performed by: HOSPITALIST

## 2017-04-26 PROCEDURE — 36415 COLL VENOUS BLD VENIPUNCTURE: CPT

## 2017-04-26 PROCEDURE — G8998 SWALLOW D/C STATUS: HCPCS | Mod: CH

## 2017-04-26 PROCEDURE — G8997 SWALLOW GOAL STATUS: HCPCS | Mod: CH

## 2017-04-26 PROCEDURE — G8979 MOBILITY GOAL STATUS: HCPCS | Mod: CI

## 2017-04-26 PROCEDURE — 85027 COMPLETE CBC AUTOMATED: CPT

## 2017-04-26 PROCEDURE — A9270 NON-COVERED ITEM OR SERVICE: HCPCS | Performed by: NURSE PRACTITIONER

## 2017-04-26 PROCEDURE — A9270 NON-COVERED ITEM OR SERVICE: HCPCS | Performed by: HOSPITALIST

## 2017-04-26 PROCEDURE — A9270 NON-COVERED ITEM OR SERVICE: HCPCS | Performed by: INTERNAL MEDICINE

## 2017-04-26 PROCEDURE — 700102 HCHG RX REV CODE 250 W/ 637 OVERRIDE(OP): Performed by: INTERNAL MEDICINE

## 2017-04-26 PROCEDURE — 770006 HCHG ROOM/CARE - MED/SURG/GYN SEMI*

## 2017-04-26 PROCEDURE — 700111 HCHG RX REV CODE 636 W/ 250 OVERRIDE (IP): Performed by: HOSPITALIST

## 2017-04-26 PROCEDURE — 700102 HCHG RX REV CODE 250 W/ 637 OVERRIDE(OP): Performed by: NURSE PRACTITIONER

## 2017-04-26 PROCEDURE — 82962 GLUCOSE BLOOD TEST: CPT | Mod: 91

## 2017-04-26 PROCEDURE — 97162 PT EVAL MOD COMPLEX 30 MIN: CPT

## 2017-04-26 PROCEDURE — G8978 MOBILITY CURRENT STATUS: HCPCS | Mod: CI

## 2017-04-26 PROCEDURE — G8996 SWALLOW CURRENT STATUS: HCPCS | Mod: CH

## 2017-04-26 RX ORDER — HYDROMORPHONE HYDROCHLORIDE 2 MG/1
2-4 TABLET ORAL EVERY 4 HOURS PRN
Qty: 42 TAB | Refills: 0 | Status: SHIPPED | OUTPATIENT
Start: 2017-04-26 | End: 2017-06-03

## 2017-04-26 RX ADMIN — HYDROMORPHONE HYDROCHLORIDE 4 MG: 4 TABLET ORAL at 14:11

## 2017-04-26 RX ADMIN — HYDROMORPHONE HYDROCHLORIDE 4 MG: 4 TABLET ORAL at 09:55

## 2017-04-26 RX ADMIN — DEXAMETHASONE SODIUM PHOSPHATE 4 MG: 4 INJECTION, SOLUTION INTRAMUSCULAR; INTRAVENOUS at 05:42

## 2017-04-26 RX ADMIN — ACETAMINOPHEN 650 MG: 325 TABLET, FILM COATED ORAL at 00:13

## 2017-04-26 RX ADMIN — OXYCODONE HYDROCHLORIDE 10 MG: 10 TABLET ORAL at 20:34

## 2017-04-26 RX ADMIN — GABAPENTIN 200 MG: 100 CAPSULE ORAL at 20:31

## 2017-04-26 RX ADMIN — OXYCODONE HYDROCHLORIDE 10 MG: 10 TABLET ORAL at 11:55

## 2017-04-26 RX ADMIN — NICOTINE 14 MG: 14 PATCH, EXTENDED RELEASE TRANSDERMAL at 05:41

## 2017-04-26 RX ADMIN — LEVETIRACETAM 1000 MG: 500 TABLET, FILM COATED ORAL at 20:31

## 2017-04-26 RX ADMIN — DEXAMETHASONE SODIUM PHOSPHATE 4 MG: 4 INJECTION, SOLUTION INTRAMUSCULAR; INTRAVENOUS at 11:55

## 2017-04-26 RX ADMIN — HYDROMORPHONE HYDROCHLORIDE 4 MG: 4 TABLET ORAL at 00:14

## 2017-04-26 RX ADMIN — OXYCODONE HYDROCHLORIDE 10 MG: 10 TABLET ORAL at 23:38

## 2017-04-26 RX ADMIN — DEXAMETHASONE SODIUM PHOSPHATE 4 MG: 4 INJECTION, SOLUTION INTRAMUSCULAR; INTRAVENOUS at 00:14

## 2017-04-26 RX ADMIN — LEVETIRACETAM 1000 MG: 500 TABLET, FILM COATED ORAL at 08:06

## 2017-04-26 RX ADMIN — STANDARDIZED SENNA CONCENTRATE AND DOCUSATE SODIUM 2 TABLET: 8.6; 5 TABLET, FILM COATED ORAL at 20:31

## 2017-04-26 RX ADMIN — HYDROMORPHONE HYDROCHLORIDE 4 MG: 4 TABLET ORAL at 05:41

## 2017-04-26 RX ADMIN — OXYCODONE HYDROCHLORIDE 10 MG: 10 TABLET ORAL at 17:21

## 2017-04-26 RX ADMIN — OXYCODONE HYDROCHLORIDE 10 MG: 10 TABLET ORAL at 08:06

## 2017-04-26 RX ADMIN — STANDARDIZED SENNA CONCENTRATE AND DOCUSATE SODIUM 2 TABLET: 8.6; 5 TABLET, FILM COATED ORAL at 08:06

## 2017-04-26 RX ADMIN — DEXAMETHASONE SODIUM PHOSPHATE 4 MG: 4 INJECTION, SOLUTION INTRAMUSCULAR; INTRAVENOUS at 23:39

## 2017-04-26 RX ADMIN — GABAPENTIN 200 MG: 100 CAPSULE ORAL at 14:11

## 2017-04-26 RX ADMIN — GABAPENTIN 200 MG: 100 CAPSULE ORAL at 08:06

## 2017-04-26 RX ADMIN — DEXAMETHASONE SODIUM PHOSPHATE 4 MG: 4 INJECTION, SOLUTION INTRAMUSCULAR; INTRAVENOUS at 17:21

## 2017-04-26 ASSESSMENT — ENCOUNTER SYMPTOMS
NAUSEA: 1
FEVER: 0
COUGH: 0
CHILLS: 0
VOMITING: 0
SENSORY CHANGE: 0
DIARRHEA: 0
CONSTIPATION: 0
NECK PAIN: 0
FOCAL WEAKNESS: 0
SHORTNESS OF BREATH: 0
PALPITATIONS: 0
WEAKNESS: 1
HEADACHES: 1
NAUSEA: 0
SPEECH CHANGE: 0
BACK PAIN: 0
ABDOMINAL PAIN: 0
DIZZINESS: 0
DOUBLE VISION: 0

## 2017-04-26 ASSESSMENT — PAIN SCALES - GENERAL
PAINLEVEL_OUTOF10: 0
PAINLEVEL_OUTOF10: 7
PAINLEVEL_OUTOF10: 9
PAINLEVEL_OUTOF10: 8
PAINLEVEL_OUTOF10: 7
PAINLEVEL_OUTOF10: 10

## 2017-04-26 ASSESSMENT — GAIT ASSESSMENTS
DEVIATION: BRADYKINETIC;DECREASED HEEL STRIKE;DECREASED TOE OFF
ASSISTIVE DEVICE: FRONT WHEEL WALKER
GAIT LEVEL OF ASSIST: STAND BY ASSIST
DISTANCE (FEET): 150

## 2017-04-26 ASSESSMENT — ACTIVITIES OF DAILY LIVING (ADL): TOILETING: INDEPENDENT

## 2017-04-26 ASSESSMENT — LIFESTYLE VARIABLES: DO YOU DRINK ALCOHOL: NO

## 2017-04-26 NOTE — THERAPY
"Physical Therapy Evaluation completed.   Bed Mobility:  Supine to Sit: Stand by Assist  Transfers: Sit to Stand: Stand by Assist  Gait: Level Of Assist: Stand by Assist with Front-Wheel Walker       Plan of Care: Patient with no further skilled PT needs in the acute care setting at this time  Discharge Recommendations: Equipment: No Equipment Needed. Post-acute therapy Currently anticipate no further skilled therapy needs once patient is discharged from the inpatient setting.    See \"Rehab Therapy-Acute\" Patient Summary Report for complete documentation.     "

## 2017-04-26 NOTE — THERAPY
"Occupational Therapy Evaluation completed.   Functional Status:  Mod I w/bed mobility, CGA w/LB dressing ambulating w/fww and close SBA c/o dizziness vision loss and occ unsteady slow pace, easily distracted/slow processing of information, spv w/grooming seated c/o fatigue and pain, returned to bed alarm on call light w/in reach.   Plan of Care: Will benefit from Occupational Therapy 3 times per week  Discharge Recommendations:  Equipment: Will Continue to Assess for Equipment Needs. Post-acute therapy Discharge to home with outpatient or home health for additional skilled therapy services.    46 yr old male dx w/ h/o astrocytoma with glioblastoma stage III w/increasing h/a, falls and urinary incontinence, pt also ran out of pain medications, pt reports his brother \"occasioanlly assists w/cooking and shopping and medication management\" pt does use a fww at home pt appears to be at/near baseline, concern for fall risk and potential decline of ADL's related to disease progression as well as on going financial issues impacting medication and pain management recommend additional  resources unclear re: long term care if pts brother is able to provide appropriate/adequate assistance at home recommend  for follow up w/home safety     See \"Rehab Therapy-Acute\" Patient Summary Report for complete documentation.    "

## 2017-04-26 NOTE — PROGRESS NOTES
Progress Note               Author: Fortino Shaikh Date & Time created: 2017  3:35 PM     Interval History:  GBM admitted with headaches    Patient states headaches have improved.  MRI demonstrates significant tumor recurrence crossing midline.  Ventricles stable.    Review of Systems:  Review of Systems   Constitutional: Negative for fever and chills.   Neurological: Positive for headaches. Negative for sensory change, speech change and focal weakness.       Physical Exam:  Physical Exam   Neurological: He is alert. He has normal strength. No cranial nerve deficit or sensory deficit.       Labs:        Invalid input(s): [id number]      Recent Labs      17   SODIUM  138  138  138   POTASSIUM  3.8  4.0  4.4   CHLORIDE  104  108  105   CO2  28  24  28   BUN  19  23*  21   CREATININE  0.68  0.56  0.62   PHOSPHORUS   --   2.9   --    CALCIUM  8.5  8.4*  9.1     Recent Labs      17   ALTSGPT  86*   --    --    ASTSGOT  25   --    --    ALKPHOSPHAT  46   --    --    TBILIRUBIN  1.0   --    --    GLUCOSE  98  209*  156*     Recent Labs      17   RBC  4.81  4.69*  4.71   HEMOGLOBIN  15.6  15.2  14.9   HEMATOCRIT  44.4  43.5  44.2   PLATELETCT  179  191  186   PROTHROMBTM  12.0   --    --    APTT  20.1*   --    --    INR  0.86*   --    --      Recent Labs      17   WBC  11.8*  14.2*  11.7*   NEUTSPOLYS  78.30*  86.30*   --    LYMPHOCYTES  6.00*  3.70*   --    MONOCYTES  14.50*  8.60   --    EOSINOPHILS  0.20  0.10   --    BASOPHILS  0.10  0.10   --    ASTSGOT  25   --    --    ALTSGPT  86*   --    --    ALKPHOSPHAT  46   --    --    TBILIRUBIN  1.0   --    --      Hemodynamics:  Temp (24hrs), Av °C (98.6 °F), Min:36.5 °C (97.7 °F), Max:37.2 °C (99 °F)  Temperature: 37.2 °C (99 °F)  Pulse  Av.6  Min: 51  Max: 99   Blood  Pressure: 116/84 mmHg     Respiratory:    Respiration: 16, Pulse Oximetry: 94 %           Fluids:    Intake/Output Summary (Last 24 hours) at 04/26/17 1535  Last data filed at 04/26/17 0400   Gross per 24 hour   Intake    860 ml   Output   1000 ml   Net   -140 ml        GI/Nutrition:  Orders Placed This Encounter   Procedures   • Diet Order     Standing Status: Standing      Number of Occurrences: 1      Standing Expiration Date:      Order Specific Question:  Diet:     Answer:  Regular [1]      Comments:  please cut up pancakes, Sami toast, meat, etc due to vision     Order Specific Question:  Texture/Fiber modifications:     Answer:  Chopped Meat [5]     Medical Decision Making, by Problem:  Active Hospital Problems    Diagnosis   • *Headache [R51]   • Dysarthria [R47.1]   • Hyperglycemia [R73.9]   • Urinary incontinence [R32]   • Leukocytosis [D72.829]   • Tobacco abuse [Z72.0]   • 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  This is inoperable.  His headaches have improved on steroids.  I recommend continuing steroids and attempt to wean in a week; he may require high dose Decadron indefinitely.      Labs reviewed, Radiology images reviewed and Medications reviewed

## 2017-04-26 NOTE — PROGRESS NOTES
Hospital Medicine Progress Note, Adult, Complex               Author: Nerissa Jaramillo Date & Time created: 4/26/2017  12:43 PM     Interval History:  Hospital Course:  Fortino Ricci Jr. is a 46 y.o. male w/ h/o astrocytoma with glioblastoma stage III admitted 4/24/2017 for increased headaches, fall and urinary incontinence. He ran out of his pain medication as he could not afford to refill.     Today, the patients headache has improved and he is on his home regimen for pain control. Denies urinary incontinence. Denies new complaint.      Review of Systems:  Review of Systems   Constitutional: Positive for malaise/fatigue. Negative for fever and chills.   Eyes: Negative for double vision.   Respiratory: Negative for cough and shortness of breath.    Cardiovascular: Negative for chest pain, palpitations and leg swelling.   Gastrointestinal: Negative for nausea, vomiting, abdominal pain and constipation.   Genitourinary: Negative for dysuria.        Urinary incontinence resolved   Musculoskeletal: Negative for back pain and neck pain (denies neck pain/stiffness).   Neurological: Positive for weakness and headaches. Negative for dizziness.   All other systems reviewed and are negative.      Physical Exam:  Physical Exam   Constitutional: He appears well-developed and well-nourished. No distress.   HENT:   Head: Normocephalic and atraumatic.   Eyes: Conjunctivae are normal. No scleral icterus.   Neck: Normal range of motion. Neck supple.   Cardiovascular: Normal rate and regular rhythm.    No murmur heard.  Pulmonary/Chest: Effort normal and breath sounds normal. No respiratory distress. He has no rales.   Abdominal: Soft. Bowel sounds are normal. He exhibits no distension. There is no tenderness. There is no rebound.   Musculoskeletal: Normal range of motion. He exhibits no edema.   Neurological: He is alert.   Confused to time, oriented to self and situation  Strength LUE 3/5, RUE 4/5, BLE 4/5   Skin: Skin is  warm and dry. No rash noted.   Vitals reviewed.      Labs:        Invalid input(s): [id number]      Recent Labs      17   SODIUM  138  138  138   POTASSIUM  3.8  4.0  4.4   CHLORIDE  104  108  105   CO2  28  24  28   BUN  19  23*  21   CREATININE  0.68  0.56  0.62   PHOSPHORUS   --   2.9   --    CALCIUM  8.5  8.4*  9.1     Recent Labs      17   ALTSGPT  86*   --    --    ASTSGOT  25   --    --    ALKPHOSPHAT  46   --    --    TBILIRUBIN  1.0   --    --    GLUCOSE  98  209*  156*     Recent Labs      17   RBC  4.81  4.69*  4.71   HEMOGLOBIN  15.6  15.2  14.9   HEMATOCRIT  44.4  43.5  44.2   PLATELETCT  179  191  186   PROTHROMBTM  12.0   --    --    APTT  20.1*   --    --    INR  0.86*   --    --      Recent Labs      17   WBC  11.8*  14.2*  11.7*   NEUTSPOLYS  78.30*  86.30*   --    LYMPHOCYTES  6.00*  3.70*   --    MONOCYTES  14.50*  8.60   --    EOSINOPHILS  0.20  0.10   --    BASOPHILS  0.10  0.10   --    ASTSGOT  25   --    --    ALTSGPT  86*   --    --    ALKPHOSPHAT  46   --    --    TBILIRUBIN  1.0   --    --            Hemodynamics:  Temp (24hrs), Av °C (98.6 °F), Min:36.5 °C (97.7 °F), Max:37.2 °C (99 °F)  Temperature: 37.2 °C (99 °F)  Pulse  Av.6  Min: 51  Max: 99   Blood Pressure: 116/84 mmHg     Respiratory:    Respiration: 16, Pulse Oximetry: 94 %           Fluids:    Intake/Output Summary (Last 24 hours) at 17 1243  Last data filed at 17 0400   Gross per 24 hour   Intake    860 ml   Output   1000 ml   Net   -140 ml        GI/Nutrition:  Orders Placed This Encounter   Procedures   • Diet Order     Standing Status: Standing      Number of Occurrences: 1      Standing Expiration Date:      Order Specific Question:  Diet:     Answer:  Regular [1]      Comments:  please cut up pancakes, [place]  toast, meat, etc due to vision     Order Specific Question:  Texture/Fiber modifications:     Answer:  Chopped Meat [5]     Medical Decision Making, by Problem:  Active Hospital Problems    Diagnosis   • *Headache [R51]  -Pain management- resume home dosing  -Cont decadron, with slow taper per onc  -Appreciate oncology consult Dr Lee  -Appreciate NS consult, Dr Shaikh  -CT brain- edema/gliosis in right frontal, parietal, temporal white matter; post op changes; ventriculomegaly  -MRI brain - unchanged from prior   • Dysarthria [R47.1]- resolved, suspect 2/2 pain  -SLP eval- no dysphagia, no diet/liquid restriction   • Hyperglycemia [R73.9]- suspect 2/2 steroid- trending down  -Glycohemoglobin 5.9   • Urinary incontinence [R32]- resolved   • Leukocytosis [D72.829]- suspect reactive, no e/o infection or SIRS- trending down  -No nuchal rigidity, consider meningitis if HA remains   • Tobacco abuse [Z72.0]- encourage cessation  -Nicotine patch   • 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

## 2017-04-26 NOTE — PROGRESS NOTES
Oncology/Hematology Progress Note               Author: Debbie Glynn Date & Time created: 4/26/2017  10:44 AM     Interval History:  46-year-old male patient of Dr. Lee with GBM, admitted for headache.    Patient still with significant headache requiring pain medications.  He does complain of slight nausea but is eating food as long as it is not too sweet.  He denies any incontinence of urine last night.  He is currently on dexamethasone and stated his headache is a little bit better than it has been.  Patient is anxious for the MRI results.    Review of Systems:  Review of Systems   Constitutional: Positive for malaise/fatigue. Negative for fever and chills.   Gastrointestinal: Positive for nausea. Negative for vomiting, diarrhea and constipation.   Genitourinary: Negative for dysuria.   Neurological: Positive for headaches.       Physical Exam:  Physical Exam   Constitutional: He is oriented to person, place, and time. No distress.   HENT:   Previous craniotomy procedure scars noted   Cardiovascular: Normal rate, regular rhythm and normal heart sounds.    Pulmonary/Chest: Effort normal and breath sounds normal. No respiratory distress. He has no wheezes.   Abdominal: Soft. Bowel sounds are normal. He exhibits no distension. There is no tenderness.   Neurological: He is alert and oriented to person, place, and time.   Skin: Skin is warm and dry. No rash noted. He is not diaphoretic. No erythema. No pallor.   Vitals reviewed.      Labs:        Invalid input(s): [id number]      Recent Labs      04/24/17   1514  04/25/17   0200  04/26/17   0311   SODIUM  138  138  138   POTASSIUM  3.8  4.0  4.4   CHLORIDE  104  108  105   CO2  28  24  28   BUN  19  23*  21   CREATININE  0.68  0.56  0.62   PHOSPHORUS   --   2.9   --    CALCIUM  8.5  8.4*  9.1     Recent Labs      04/24/17   1514  04/25/17   0200  04/26/17   0311   ALTSGPT  86*   --    --    ASTSGOT  25   --    --    ALKPHOSPHAT  46   --    --     TBILIRUBIN  1.0   --    --    GLUCOSE  98  209*  156*     Recent Labs      17   1514  17   0200  17   0311   RBC  4.81  4.69*  4.71   HEMOGLOBIN  15.6  15.2  14.9   HEMATOCRIT  44.4  43.5  44.2   PLATELETCT  179  191  186   PROTHROMBTM  12.0   --    --    APTT  20.1*   --    --    INR  0.86*   --    --      Recent Labs      170  17   WBC  11.8*  14.2*  11.7*   NEUTSPOLYS  78.30*  86.30*   --    LYMPHOCYTES  6.00*  3.70*   --    MONOCYTES  14.50*  8.60   --    EOSINOPHILS  0.20  0.10   --    BASOPHILS  0.10  0.10   --    ASTSGOT  25   --    --    ALTSGPT  86*   --    --    ALKPHOSPHAT  46   --    --    TBILIRUBIN  1.0   --    --      Recent Labs      170  17   SODIUM  138  138  138   POTASSIUM  3.8  4.0  4.4   CHLORIDE  104  108  105   CO2  28  24  28   GLUCOSE  98  209*  156*   BUN  19  23*  21   CREATININE  0.68  0.56  0.62   CALCIUM  8.5  8.4*  9.1     Hemodynamics:  Temp (24hrs), Av °C (98.6 °F), Min:36.5 °C (97.7 °F), Max:37.2 °C (99 °F)  Temperature: 37.2 °C (99 °F)  Pulse  Av.6  Min: 51  Max: 99   Blood Pressure: 116/84 mmHg     Respiratory:    Respiration: 16, Pulse Oximetry: 94 %           Fluids:    Intake/Output Summary (Last 24 hours) at 17 1044  Last data filed at 17 0400   Gross per 24 hour   Intake    860 ml   Output   1000 ml   Net   -140 ml        GI/Nutrition:  Orders Placed This Encounter   Procedures   • Diet Order     Standing Status: Standing      Number of Occurrences: 1      Standing Expiration Date:      Order Specific Question:  Diet:     Answer:  Regular [1]      Comments:  please cut up pancakes, Citizen of [place] toast, meat, etc due to vision     Order Specific Question:  Texture/Fiber modifications:     Answer:  Chopped Meat [5]     Medical Decision Making, by Problem:  Active Hospital Problems    Diagnosis   • *Headache [R51]   • Dysarthria [R47.1]   • Hyperglycemia [R73.9]    • Urinary incontinence [R32]   • Leukocytosis [D72.829]   • Tobacco abuse [Z72.0]   • 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 GBM, stage IV - patient has been on maintenance temozolomide since February 2017. He did complete salvage chemoradiation on February 16, 2017. His last MRI did show progression versus pseudo-progression, which was completed in mid March 2017. He has been having some complaints issues with the ability to afford his medications. He has been more compliant with his appointments and being followed up in the office every 2 weeks. He did recently miss his last appointment however. Patient stated he is been having significant headaches and it has been a little bit better since admission and since starting on the steroids. CT scan of the head showed edema possible development of hydrocephalus. MRI brain was ordered and completed last night. MRI shows stable mass consistent with GBM. There is extensive severe vasogenic edema noted as well. No hydrocephalus was seen on MRI. Neurosurgery was consulted yesterday and is awaiting MRI results as well. We will await recommendations from neurosurgery today. Patient has been restarted on Keppra.    2. Leukocytosis - patient with leukocytosis. It does appear to have come down slightly from yesterday. He is on dexamethasone which is likely explains the elevated white count. Continue to monitor closely.    3. Advanced care planning/goals of care - patient was visited by palliative care yesterday. He is a DO NOT RESUSCITATE at the patient's request. Appreciate input from palliative care team in discussing further advanced care planning and goals of care. Dr. Lee will be available tomorrow to discuss further plan of care with patient as well.    Labs reviewed and Medications reviewed  Anand catheter: No Anand      DVT Prophylaxis: Not indicated at this time, ambulatory  DVT prophylaxis - mechanical: Not indicated at this time,  ambulatory  Ulcer prophylaxis: Not indicated

## 2017-04-26 NOTE — CARE PLAN
Problem: Safety  Goal: Will remain free from injury  Outcome: PROGRESSING AS EXPECTED    Problem: Knowledge Deficit  Goal: Knowledge of the prescribed therapeutic regimen will improve  Outcome: PROGRESSING SLOWER THAN EXPECTED  Patient needs frequent reiteration of POC    Problem: Pain Management  Goal: Pain level will decrease to patient’s comfort goal  Outcome: PROGRESSING AS EXPECTED

## 2017-04-26 NOTE — DISCHARGE PLANNING
"Medical Social Work    SW notified that pt is unable to afford his Dilauded Rx until the 3rd and is requesting assistance.   does not provide assistance for narcotics.  Hospitalist RN notified that pt has a glioblastoma and this is pt's 3rd admission this year for pain control.  SW received pt's previous admissions and pt was given Rx assistance on last admission.  BECKY notified Hospitalist RN and pt that patient assistance is only offered once and  will not be able to assist with pain medication.  BECKY contacted [facility] who provided cash pay price quote for 42 tablets of Dilauded 2mg, as $18.68.      SW spoke with pt at bedside to inform of above information.  Pt states he has absolutely NO money until 5/3.  SW asked pt what happens next month when he runs out of funds and can't afford his Rx.  Pt states, \"well I will have insurance by then.\"  Pt states PFA met with pt at bedside and completed Medicaid application.  Per PFA notes, pt is over income for Medicaid and does not qualify.  Pt makes $1700/month.  SW informed pt of this.  Pt states his brother lives with him in the [facility] and it costs them each about $750/month in rent.  SW informed pt that this medication costs $18.00.  SW informed pt that our schedulers can make him an appointment at [facility] where he can see a PCP and use their services for medication assistance.  Pt agreeable to plan.  BECKY contacted ER Schedulers and requested appt be scheduled ASAP.  Awaiting call back with appt date and time.   "

## 2017-04-26 NOTE — CARE PLAN
Problem: Pain Management  Goal: Pain level will decrease to patient’s comfort goal  Outcome: PROGRESSING AS EXPECTED    Problem: Urinary Elimination:  Goal: Ability to reestablish a normal urinary elimination pattern will improve  Outcome: MET Date Met:  04/25/17  Patient voiding in bathroom appropriately, no incontinence t/o shift

## 2017-04-27 ENCOUNTER — HOME CARE VISIT (OUTPATIENT)
Dept: HOSPICE | Facility: HOSPICE | Age: 47
End: 2017-04-27
Payer: COMMERCIAL

## 2017-04-27 ENCOUNTER — HOSPICE ADMISSION (OUTPATIENT)
Dept: HOSPICE | Facility: HOSPICE | Age: 47
End: 2017-04-27
Payer: COMMERCIAL

## 2017-04-27 ENCOUNTER — PATIENT OUTREACH (OUTPATIENT)
Dept: HEALTH INFORMATION MANAGEMENT | Facility: OTHER | Age: 47
End: 2017-04-27

## 2017-04-27 LAB
GLUCOSE BLD-MCNC: 107 MG/DL (ref 65–99)
GLUCOSE BLD-MCNC: 142 MG/DL (ref 65–99)
GLUCOSE BLD-MCNC: 142 MG/DL (ref 65–99)
GLUCOSE BLD-MCNC: 145 MG/DL (ref 65–99)

## 2017-04-27 PROCEDURE — A9270 NON-COVERED ITEM OR SERVICE: HCPCS | Performed by: NURSE PRACTITIONER

## 2017-04-27 PROCEDURE — A9270 NON-COVERED ITEM OR SERVICE: HCPCS | Performed by: HOSPITALIST

## 2017-04-27 PROCEDURE — 770006 HCHG ROOM/CARE - MED/SURG/GYN SEMI*

## 2017-04-27 PROCEDURE — 82962 GLUCOSE BLOOD TEST: CPT | Mod: 91

## 2017-04-27 PROCEDURE — 99231 SBSQ HOSP IP/OBS SF/LOW 25: CPT | Performed by: HOSPITALIST

## 2017-04-27 PROCEDURE — 700102 HCHG RX REV CODE 250 W/ 637 OVERRIDE(OP): Performed by: INTERNAL MEDICINE

## 2017-04-27 PROCEDURE — A9270 NON-COVERED ITEM OR SERVICE: HCPCS | Performed by: INTERNAL MEDICINE

## 2017-04-27 PROCEDURE — 700111 HCHG RX REV CODE 636 W/ 250 OVERRIDE (IP): Performed by: NURSE PRACTITIONER

## 2017-04-27 PROCEDURE — 700111 HCHG RX REV CODE 636 W/ 250 OVERRIDE (IP): Performed by: HOSPITALIST

## 2017-04-27 PROCEDURE — 700102 HCHG RX REV CODE 250 W/ 637 OVERRIDE(OP): Performed by: NURSE PRACTITIONER

## 2017-04-27 PROCEDURE — 700102 HCHG RX REV CODE 250 W/ 637 OVERRIDE(OP): Performed by: HOSPITALIST

## 2017-04-27 RX ORDER — DIPHENHYDRAMINE HCL 25 MG
50 TABLET ORAL EVERY 6 HOURS PRN
COMMUNITY
End: 2017-06-03

## 2017-04-27 RX ORDER — DIPHENHYDRAMINE HCL 25 MG
50 TABLET ORAL EVERY 6 HOURS PRN
Status: DISCONTINUED | OUTPATIENT
Start: 2017-04-27 | End: 2017-05-01 | Stop reason: HOSPADM

## 2017-04-27 RX ORDER — DIPHENHYDRAMINE HCL 25 MG
50 TABLET ORAL NIGHTLY PRN
Status: DISCONTINUED | OUTPATIENT
Start: 2017-04-27 | End: 2017-05-01 | Stop reason: HOSPADM

## 2017-04-27 RX ORDER — LORAZEPAM 1 MG/1
0.5 TABLET ORAL EVERY 4 HOURS PRN
Status: DISCONTINUED | OUTPATIENT
Start: 2017-04-27 | End: 2017-05-01 | Stop reason: HOSPADM

## 2017-04-27 RX ORDER — LORAZEPAM 2 MG/ML
0.5 INJECTION INTRAMUSCULAR ONCE
Status: COMPLETED | OUTPATIENT
Start: 2017-04-27 | End: 2017-04-27

## 2017-04-27 RX ADMIN — DEXAMETHASONE SODIUM PHOSPHATE 4 MG: 4 INJECTION, SOLUTION INTRAMUSCULAR; INTRAVENOUS at 12:15

## 2017-04-27 RX ADMIN — HYDROMORPHONE HYDROCHLORIDE 4 MG: 4 TABLET ORAL at 17:35

## 2017-04-27 RX ADMIN — LEVETIRACETAM 1000 MG: 500 TABLET, FILM COATED ORAL at 19:22

## 2017-04-27 RX ADMIN — HYDROMORPHONE HYDROCHLORIDE 4 MG: 4 TABLET ORAL at 12:25

## 2017-04-27 RX ADMIN — LORAZEPAM 0.5 MG: 1 TABLET ORAL at 17:59

## 2017-04-27 RX ADMIN — GABAPENTIN 200 MG: 100 CAPSULE ORAL at 16:24

## 2017-04-27 RX ADMIN — NICOTINE POLACRILEX 2 MG: 2 GUM, CHEWING BUCCAL at 05:36

## 2017-04-27 RX ADMIN — DEXAMETHASONE SODIUM PHOSPHATE 4 MG: 4 INJECTION, SOLUTION INTRAMUSCULAR; INTRAVENOUS at 23:16

## 2017-04-27 RX ADMIN — DIPHENHYDRAMINE HCL 50 MG: 25 TABLET ORAL at 20:56

## 2017-04-27 RX ADMIN — OXYCODONE HYDROCHLORIDE 10 MG: 10 TABLET ORAL at 09:10

## 2017-04-27 RX ADMIN — DEXAMETHASONE SODIUM PHOSPHATE 4 MG: 4 INJECTION, SOLUTION INTRAMUSCULAR; INTRAVENOUS at 17:53

## 2017-04-27 RX ADMIN — STANDARDIZED SENNA CONCENTRATE AND DOCUSATE SODIUM 2 TABLET: 8.6; 5 TABLET, FILM COATED ORAL at 19:24

## 2017-04-27 RX ADMIN — LORAZEPAM 0.5 MG: 2 INJECTION INTRAMUSCULAR; INTRAVENOUS at 14:48

## 2017-04-27 RX ADMIN — HYDROMORPHONE HYDROCHLORIDE 4 MG: 4 TABLET ORAL at 23:20

## 2017-04-27 RX ADMIN — GABAPENTIN 200 MG: 100 CAPSULE ORAL at 19:23

## 2017-04-27 RX ADMIN — OXYCODONE HYDROCHLORIDE 10 MG: 10 TABLET ORAL at 04:11

## 2017-04-27 RX ADMIN — NICOTINE 14 MG: 14 PATCH, EXTENDED RELEASE TRANSDERMAL at 05:31

## 2017-04-27 RX ADMIN — LEVETIRACETAM 1000 MG: 500 TABLET, FILM COATED ORAL at 09:10

## 2017-04-27 RX ADMIN — OXYCODONE HYDROCHLORIDE 10 MG: 10 TABLET ORAL at 19:23

## 2017-04-27 RX ADMIN — DEXAMETHASONE SODIUM PHOSPHATE 4 MG: 4 INJECTION, SOLUTION INTRAMUSCULAR; INTRAVENOUS at 05:31

## 2017-04-27 RX ADMIN — ACETAMINOPHEN 650 MG: 325 TABLET, FILM COATED ORAL at 19:23

## 2017-04-27 RX ADMIN — GABAPENTIN 200 MG: 100 CAPSULE ORAL at 09:11

## 2017-04-27 ASSESSMENT — ENCOUNTER SYMPTOMS
DEPRESSION: 1
VOMITING: 0
FEVER: 0
COUGH: 0
SHORTNESS OF BREATH: 0
CHILLS: 0
CONSTIPATION: 0
DIZZINESS: 0
SENSORY CHANGE: 0
DOUBLE VISION: 0
ABDOMINAL PAIN: 0
HEADACHES: 1
NAUSEA: 0
SPUTUM PRODUCTION: 0
WEAKNESS: 1
PALPITATIONS: 0
NECK PAIN: 0

## 2017-04-27 ASSESSMENT — PAIN SCALES - GENERAL
PAINLEVEL_OUTOF10: 8
PAINLEVEL_OUTOF10: 7
PAINLEVEL_OUTOF10: 8
PAINLEVEL_OUTOF10: 8

## 2017-04-27 NOTE — PROGRESS NOTES
"Hospital Medicine Progress Note, Adult, Complex               Author: Nerissa Jaramillo Date & Time created: 4/27/2017  1:43 PM     Interval History:  Hospital Course:  Fortino Ricci Jr. is a 46 y.o. male w/ h/o astrocytoma with glioblastoma stage III admitted 4/24/2017 for increased headaches, fall and urinary incontinence. He ran out of his pain medication as he could not afford to refill.     Today, the patients headache is to baseline. Wants to hear about hospice care, knows that \"this will kill me\" and wants to have better pain control. Denies N/V, vision changes.    Review of Systems:  Review of Systems   Constitutional: Positive for malaise/fatigue. Negative for fever and chills.   Eyes: Negative for double vision.   Respiratory: Negative for cough, sputum production and shortness of breath.    Cardiovascular: Negative for chest pain, palpitations and leg swelling.   Gastrointestinal: Negative for nausea, vomiting, abdominal pain and constipation.   Genitourinary: Negative for dysuria.        Urinary incontinence resolved   Musculoskeletal: Negative for neck pain (denies neck pain/stiffness).   Neurological: Positive for weakness and headaches. Negative for dizziness and sensory change.   Psychiatric/Behavioral: Positive for depression.   All other systems reviewed and are negative.      Physical Exam:  Physical Exam   Constitutional: He appears well-developed and well-nourished. No distress.   HENT:   Head: Normocephalic and atraumatic.   Eyes: Conjunctivae are normal.   Neck: Normal range of motion. Neck supple.   Cardiovascular: Normal rate and regular rhythm.    Pulmonary/Chest: Effort normal and breath sounds normal. No respiratory distress. He has no wheezes. He has no rales.   Abdominal: Soft. Bowel sounds are normal. He exhibits no distension. There is no tenderness. There is no rebound.   Musculoskeletal: Normal range of motion. He exhibits no edema.   Neurological: He is alert.   Confused to " time, oriented to self and situation  Strength LUE 3/5, RUE 4/5, BLE 4/5   Skin: Skin is warm and dry.   Vitals reviewed.      Labs:        Invalid input(s): [id number]      Recent Labs      17   SODIUM  138  138  138   POTASSIUM  3.8  4.0  4.4   CHLORIDE  104  108  105   CO2  28  24  28   BUN  19  23*  21   CREATININE  0.68  0.56  0.62   PHOSPHORUS   --   2.9   --    CALCIUM  8.5  8.4*  9.1     Recent Labs      17   ALTSGPT  86*   --    --    ASTSGOT  25   --    --    ALKPHOSPHAT  46   --    --    TBILIRUBIN  1.0   --    --    GLUCOSE  98  209*  156*     Recent Labs      17   RBC  4.81  4.69*  4.71   HEMOGLOBIN  15.6  15.2  14.9   HEMATOCRIT  44.4  43.5  44.2   PLATELETCT  179  191  186   PROTHROMBTM  12.0   --    --    APTT  20.1*   --    --    INR  0.86*   --    --      Recent Labs      17   WBC  11.8*  14.2*  11.7*   NEUTSPOLYS  78.30*  86.30*   --    LYMPHOCYTES  6.00*  3.70*   --    MONOCYTES  14.50*  8.60   --    EOSINOPHILS  0.20  0.10   --    BASOPHILS  0.10  0.10   --    ASTSGOT  25   --    --    ALTSGPT  86*   --    --    ALKPHOSPHAT  46   --    --    TBILIRUBIN  1.0   --    --            Hemodynamics:  Temp (24hrs), Av.2 °C (99 °F), Min:36.7 °C (98.1 °F), Max:37.6 °C (99.7 °F)  Temperature: 37.3 °C (99.2 °F)  Pulse  Av.3  Min: 51  Max: 99   Blood Pressure: 113/88 mmHg     Respiratory:    Respiration: 16, Pulse Oximetry: 94 %           Fluids:    Intake/Output Summary (Last 24 hours) at 17 1343  Last data filed at 17 0200   Gross per 24 hour   Intake   1000 ml   Output      0 ml   Net   1000 ml        GI/Nutrition:  Orders Placed This Encounter   Procedures   • Diet Order     Standing Status: Standing      Number of Occurrences: 1      Standing Expiration Date:      Order Specific Question:   Diet:     Answer:  Regular [1]      Comments:  please cut up pancakes, Mexican toast, meat, etc due to vision     Order Specific Question:  Texture/Fiber modifications:     Answer:  Chopped Meat [5]     Medical Decision Making, by Problem:  Active Hospital Problems    Diagnosis   • *Headache [R51]  -Pain management- resume home dosing  -Cont decadron, with slow taper per onc  -Appreciate oncology consult Dr Lee  -Appreciate NS consult, Dr Shaikh  -CT brain- edema/gliosis in right frontal, parietal, temporal white matter; post op changes; ventriculomegaly  -MRI brain - unchanged from prior  -Palliative care consult- considering hospice care   • Dysarthria [R47.1]- resolved, suspect 2/2 pain  -SLP eval- no dysphagia, no diet/liquid restriction   • Hyperglycemia [R73.9]- suspect 2/2 steroid- trending down  -Glycohemoglobin 5.9   • Urinary incontinence [R32]- resolved   • Leukocytosis [D72.829]- suspect reactive, no e/o infection or SIRS- trending down  -No nuchal rigidity, consider meningitis if HA remains   • Tobacco abuse [Z72.0]- encourage cessation  -Nicotine patch   • 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

## 2017-04-27 NOTE — PALLIATIVE CARE
"PALLIATIVE CARE FOLLOW-UP:  Discussed with ERIC Marcus, [facility], hospice RN.    Pt tearful about his pain and situation, stating, \"I just want to sleep.\"  Pt expressed concern about ability to have money for food and pain medications on d/c.  Pt spoke with Dr. Lee this morning and his understanding is that there aren't any aggressive interventions remaining -- \"just the steroids and pain medicine.\"  Detailed hospice support for pt to which pt responded, \"They would come see me at the hotel?  I would like that.\"      Plan:  Pt to be evaluated by [facility].      Thank you for allowing Palliative Care to support this pt.  Contact [phone] for additional assistance, questions or concerns.  "

## 2017-04-27 NOTE — CARE PLAN
Problem: Safety  Goal: Will remain free from injury  Outcome: PROGRESSING AS EXPECTED  Bed in lowest position and call light within reach.  Frequent rounding implemented.    Problem: Pain Management  Goal: Pain level will decrease to patient’s comfort goal  Outcome: PROGRESSING AS EXPECTED  Pain controlled with current pain medication.

## 2017-04-27 NOTE — PROGRESS NOTES
Oncology/Hematology Progress Note               Author: Ashley R Jesus Date & Time created: 4/27/2017  9:28 AM     Diagnosis: Anaplastic Astrocytoma, Grade III with recurrence with GBM    Chief compliant: Increase headaches, s/p fall and urinary incontinence.       Interval History:  He is resting in bed comfortably.  He was able to ambulate yesterday  He has noticed improvement in his headaches  His vision is also better today  He denies any nausea or vomiting    Review of Systems:  ROS   All other review of systems are negative except what was mentioned above in the HPI.  Constitutional: Negative for fever and chills. Positive for fatigue with some improvement.   HENT: Negative for ear pain and nosebleeds.    Eyes: Positive for bilateral blurred vision improving.    Respiratory: Negative for cough and shortness of breath.     Cardiovascular: Negative for chest pain.    Gastrointestinal: Positive for intermittent nausea. Negative for vomiting and abdominal pain.    Genitourinary: Positive for intermittent urinary incontinence.    Musculoskeletal: Negative for myalgias.    Skin: Negative for rash.    Neurological: Positive for headaches improving  Positive for intermittent orthostatic dizziness   positive for sensory changes.   Positive for diffuse weakness and unsteady gait with some improvement.   Endo/Heme/Allergies: No bruise/bleed easily.    Psychiatric/Behavioral: Stable memory      Physical Exam:  Physical Exam   General: patient not in acute distress.  He is alert and oriented    HEENT:  extra ocular muscles intact and moist oral mucus membranes.  CVS: regular rate and rhythm  RESP: Clear to auscultation bilaterally.    ABD: Soft, non tender, non distended.     EXT: No edema    CNS: Alert and oriented and muscle strength weakness .    Labs:        Invalid input(s): [id number]      Recent Labs      04/24/17   1514  04/25/17   0200  04/26/17   0311   SODIUM  138  138  138   POTASSIUM  3.8  4.0  4.4    CHLORIDE  104  108  105   CO2  28  24  28   BUN  19  23*  21   CREATININE  0.68  0.56  0.62   PHOSPHORUS   --   2.9   --    CALCIUM  8.5  8.4*  9.1     Recent Labs      17   ALTSGPT  86*   --    --    ASTSGOT  25   --    --    ALKPHOSPHAT  46   --    --    TBILIRUBIN  1.0   --    --    GLUCOSE  98  209*  156*     Recent Labs      17   RBC  4.81  4.69*  4.71   HEMOGLOBIN  15.6  15.2  14.9   HEMATOCRIT  44.4  43.5  44.2   PLATELETCT  179  191  186   PROTHROMBTM  12.0   --    --    APTT  20.1*   --    --    INR  0.86*   --    --      Recent Labs      17   WBC  11.8*  14.2*  11.7*   NEUTSPOLYS  78.30*  86.30*   --    LYMPHOCYTES  6.00*  3.70*   --    MONOCYTES  14.50*  8.60   --    EOSINOPHILS  0.20  0.10   --    BASOPHILS  0.10  0.10   --    ASTSGOT  25   --    --    ALTSGPT  86*   --    --    ALKPHOSPHAT  46   --    --    TBILIRUBIN  1.0   --    --      Recent Labs      17   SODIUM  138  138  138   POTASSIUM  3.8  4.0  4.4   CHLORIDE  104  108  105   CO2  28  24  28   GLUCOSE  98  209*  156*   BUN  19  23*  21   CREATININE  0.68  0.56  0.62   CALCIUM  8.5  8.4*  9.1     Hemodynamics:  Temp (24hrs), Av.2 °C (99 °F), Min:36.7 °C (98.1 °F), Max:37.6 °C (99.7 °F)  Temperature: 37.3 °C (99.2 °F)  Pulse  Av.3  Min: 51  Max: 99   Blood Pressure: 113/88 mmHg     Respiratory:    Respiration: 16, Pulse Oximetry: 94 %           Fluids:  No intake or output data in the 24 hours ending 17 1254     GI/Nutrition:  Orders Placed This Encounter   Procedures   • Diet Order     Standing Status: Standing      Number of Occurrences: 1      Standing Expiration Date:      Order Specific Question:  Diet:     Answer:  Regular [1]      Comments:  please cut up pancakes, Citizen of [place] toast, meat, etc due to vision     Order Specific Question:   Texture/Fiber modifications:     Answer:  Chopped Meat [5]     Medical Decision Making, by Problem:  Active Hospital Problems    Diagnosis   • *Headache [R51]   • Dysarthria [R47.1]   • Hyperglycemia [R73.9]   • Urinary incontinence [R32]   • Leukocytosis [D72.829]   • Tobacco abuse [Z72.0]   • 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 17 MRI brain: Previous subtotal right temporal lobectomy.  Large irregular enhancing mass surrounding the postsurgical cavity in the right posterior temporal/anterior occipital lobe. This mass extends and expands the splenium of the corpus callosum and extends into the right posterior temporal, right   parietal, right parietal-occipital, and right occipital lobes. This enhancing mass is consistent with residual/recurrent glioblastoma it has not changed significantly in size or appearance since previous exam.  Extensive vasogenic edema and/or postirradiation changes noted throughout the right frontal, parietal, residual temporal, and occipital white matter. There is also marked effacement of the posterior body and atrium of the right lateral ventricle and   this is unchanged since previous exam.    Assessment and Plan:    1. GBM, stage IV: Diagnosed in 2016.  He is S/P salvage chemoradiation and has been on maintenance temozolomide. Patient presented to the ER secondary to increased symptoms. He also stopped taking his medications as he ran out. He was restarted on dexamethasone and Keppra. With higher doses of dexamethasone he has noticed some improvement in his symptoms. He will need a very slow taper of his steroids. MRI brain showed significant vasogenic edema and post radiation changes.    2. History of grade 3 astrocytomas/p chemoradiation followed by maintenance therapy but unable to complete therapy secondary to noncompliance.     3. Leukocytosis: Likely reactive. No signs of infection. Continue to monitor.    4. Recommend continuation of  PT/OT.    Thank you for allowing me to participate in his care. Please feel free to contact me with questions or concerns in regards to his care.    Please note that this dictation was created using voice recognition software. I have made every reasonable attempt to correct obvious errors, but I expect that there are errors of grammar and possibly content that I did not discover before finalizing the note.      Core Measures

## 2017-04-28 ENCOUNTER — HOME CARE VISIT (OUTPATIENT)
Dept: HOSPICE | Facility: HOSPICE | Age: 47
End: 2017-04-28
Payer: COMMERCIAL

## 2017-04-28 LAB
GLUCOSE BLD-MCNC: 125 MG/DL (ref 65–99)
GLUCOSE BLD-MCNC: 171 MG/DL (ref 65–99)
GLUCOSE BLD-MCNC: 196 MG/DL (ref 65–99)

## 2017-04-28 PROCEDURE — 700102 HCHG RX REV CODE 250 W/ 637 OVERRIDE(OP): Performed by: INTERNAL MEDICINE

## 2017-04-28 PROCEDURE — A9270 NON-COVERED ITEM OR SERVICE: HCPCS | Performed by: NURSE PRACTITIONER

## 2017-04-28 PROCEDURE — 700111 HCHG RX REV CODE 636 W/ 250 OVERRIDE (IP): Performed by: NURSE PRACTITIONER

## 2017-04-28 PROCEDURE — 99231 SBSQ HOSP IP/OBS SF/LOW 25: CPT | Performed by: HOSPITALIST

## 2017-04-28 PROCEDURE — 700102 HCHG RX REV CODE 250 W/ 637 OVERRIDE(OP): Performed by: NURSE PRACTITIONER

## 2017-04-28 PROCEDURE — A9270 NON-COVERED ITEM OR SERVICE: HCPCS | Performed by: HOSPITALIST

## 2017-04-28 PROCEDURE — 82962 GLUCOSE BLOOD TEST: CPT

## 2017-04-28 PROCEDURE — 700102 HCHG RX REV CODE 250 W/ 637 OVERRIDE(OP): Performed by: HOSPITALIST

## 2017-04-28 PROCEDURE — 700111 HCHG RX REV CODE 636 W/ 250 OVERRIDE (IP): Performed by: HOSPITALIST

## 2017-04-28 PROCEDURE — 770006 HCHG ROOM/CARE - MED/SURG/GYN SEMI*

## 2017-04-28 PROCEDURE — A9270 NON-COVERED ITEM OR SERVICE: HCPCS | Performed by: INTERNAL MEDICINE

## 2017-04-28 RX ORDER — DEXAMETHASONE SODIUM PHOSPHATE 4 MG/ML
4 INJECTION, SOLUTION INTRA-ARTICULAR; INTRALESIONAL; INTRAMUSCULAR; INTRAVENOUS; SOFT TISSUE EVERY 12 HOURS
Status: DISCONTINUED | OUTPATIENT
Start: 2017-04-28 | End: 2017-04-29

## 2017-04-28 RX ADMIN — HYDROMORPHONE HYDROCHLORIDE 4 MG: 4 TABLET ORAL at 23:50

## 2017-04-28 RX ADMIN — ACETAMINOPHEN 650 MG: 325 TABLET, FILM COATED ORAL at 04:07

## 2017-04-28 RX ADMIN — HYDROMORPHONE HYDROCHLORIDE 4 MG: 4 TABLET ORAL at 19:09

## 2017-04-28 RX ADMIN — GABAPENTIN 200 MG: 100 CAPSULE ORAL at 15:09

## 2017-04-28 RX ADMIN — OXYCODONE HYDROCHLORIDE 10 MG: 10 TABLET ORAL at 12:25

## 2017-04-28 RX ADMIN — OXYCODONE HYDROCHLORIDE 10 MG: 10 TABLET ORAL at 19:32

## 2017-04-28 RX ADMIN — HYDROMORPHONE HYDROCHLORIDE 4 MG: 4 TABLET ORAL at 09:25

## 2017-04-28 RX ADMIN — STANDARDIZED SENNA CONCENTRATE AND DOCUSATE SODIUM 2 TABLET: 8.6; 5 TABLET, FILM COATED ORAL at 19:33

## 2017-04-28 RX ADMIN — NICOTINE POLACRILEX 2 MG: 2 GUM, CHEWING BUCCAL at 12:38

## 2017-04-28 RX ADMIN — OXYCODONE HYDROCHLORIDE 10 MG: 10 TABLET ORAL at 04:07

## 2017-04-28 RX ADMIN — GABAPENTIN 200 MG: 100 CAPSULE ORAL at 19:32

## 2017-04-28 RX ADMIN — NICOTINE POLACRILEX 2 MG: 2 GUM, CHEWING BUCCAL at 16:04

## 2017-04-28 RX ADMIN — ACETAMINOPHEN 650 MG: 325 TABLET, FILM COATED ORAL at 12:25

## 2017-04-28 RX ADMIN — DEXAMETHASONE SODIUM PHOSPHATE 4 MG: 4 INJECTION, SOLUTION INTRAMUSCULAR; INTRAVENOUS at 19:33

## 2017-04-28 RX ADMIN — OXYCODONE HYDROCHLORIDE 10 MG: 10 TABLET ORAL at 16:31

## 2017-04-28 RX ADMIN — ACETAMINOPHEN 650 MG: 325 TABLET, FILM COATED ORAL at 23:49

## 2017-04-28 RX ADMIN — GABAPENTIN 200 MG: 100 CAPSULE ORAL at 09:23

## 2017-04-28 RX ADMIN — STANDARDIZED SENNA CONCENTRATE AND DOCUSATE SODIUM 2 TABLET: 8.6; 5 TABLET, FILM COATED ORAL at 09:24

## 2017-04-28 RX ADMIN — LEVETIRACETAM 1000 MG: 500 TABLET, FILM COATED ORAL at 09:24

## 2017-04-28 RX ADMIN — DIPHENHYDRAMINE HCL 50 MG: 25 TABLET ORAL at 19:37

## 2017-04-28 RX ADMIN — DEXAMETHASONE SODIUM PHOSPHATE 4 MG: 4 INJECTION, SOLUTION INTRAMUSCULAR; INTRAVENOUS at 05:14

## 2017-04-28 RX ADMIN — LEVETIRACETAM 1000 MG: 500 TABLET, FILM COATED ORAL at 19:33

## 2017-04-28 RX ADMIN — DEXAMETHASONE SODIUM PHOSPHATE 4 MG: 4 INJECTION, SOLUTION INTRAMUSCULAR; INTRAVENOUS at 12:26

## 2017-04-28 RX ADMIN — HYDROMORPHONE HYDROCHLORIDE 4 MG: 4 TABLET ORAL at 15:09

## 2017-04-28 RX ADMIN — LORAZEPAM 0.5 MG: 1 TABLET ORAL at 16:31

## 2017-04-28 RX ADMIN — NICOTINE 14 MG: 14 PATCH, EXTENDED RELEASE TRANSDERMAL at 05:19

## 2017-04-28 RX ADMIN — HYDROMORPHONE HYDROCHLORIDE 4 MG: 4 TABLET ORAL at 05:17

## 2017-04-28 ASSESSMENT — PAIN SCALES - GENERAL
PAINLEVEL_OUTOF10: 8
PAINLEVEL_OUTOF10: 4
PAINLEVEL_OUTOF10: 7
PAINLEVEL_OUTOF10: 4
PAINLEVEL_OUTOF10: 8
PAINLEVEL_OUTOF10: 9
PAINLEVEL_OUTOF10: 9
PAINLEVEL_OUTOF10: 8

## 2017-04-28 ASSESSMENT — ENCOUNTER SYMPTOMS
SENSORY CHANGE: 0
ABDOMINAL PAIN: 0
DOUBLE VISION: 0
VOMITING: 0
CHILLS: 0
COUGH: 0
WEAKNESS: 1
PALPITATIONS: 0
CONSTIPATION: 0
NECK PAIN: 0
HALLUCINATIONS: 0
SHORTNESS OF BREATH: 0
DEPRESSION: 1
DIZZINESS: 0
NAUSEA: 0
FEVER: 0
HEADACHES: 1

## 2017-04-28 ASSESSMENT — ACTIVITIES OF DAILY LIVING (ADL)
CONTINENCE_REQUIRES_ASSISTANCE: 1
PHYSICAL_TRANSFER_REQUIRES_ASSISTANCE: 1

## 2017-04-28 NOTE — PROGRESS NOTES
Patient medicated for pain PRN, taken outside via wheelchair due to restlessness, craving cigarette, given nicotine gum PRN. Ambulating to BR with unsteady gait and stand by assist. Bed alarm on, bed locked in low position with call bell in reach.

## 2017-04-28 NOTE — PROGRESS NOTES
Hospital Medicine Progress Note, Adult, Complex               Author: Nerissa Jaramillo Date & Time created: 4/28/2017  3:54 PM     Interval History:  Hospital Course:  Fortino Ricci Jr. is a 46 y.o. male w/ h/o astrocytoma with glioblastoma stage III admitted 4/24/2017 for increased headaches, fall and urinary incontinence. He ran out of his pain medication as he could not afford to refill.     Today, the patients headache is to controlled. Discussed hospice, he wants them to come help him with his pain so he can be comfortable. He denies new complaint.     Review of Systems:  Review of Systems   Constitutional: Positive for malaise/fatigue. Negative for fever and chills.   Eyes: Negative for double vision.   Respiratory: Negative for cough and shortness of breath.    Cardiovascular: Negative for chest pain and palpitations.   Gastrointestinal: Negative for nausea, vomiting, abdominal pain and constipation.   Genitourinary: Negative for dysuria.        Urinary incontinence resolved   Musculoskeletal: Negative for neck pain (denies neck pain/stiffness).   Neurological: Positive for weakness and headaches. Negative for dizziness and sensory change.   Psychiatric/Behavioral: Positive for depression. Negative for hallucinations.   All other systems reviewed and are negative.      Physical Exam:  Physical Exam   Constitutional: He appears well-developed and well-nourished. No distress.   HENT:   Head: Normocephalic and atraumatic.   Eyes: Conjunctivae are normal. No scleral icterus.   Neck: Normal range of motion. Neck supple.   Cardiovascular: Normal rate and regular rhythm.    No murmur heard.  Pulmonary/Chest: Effort normal and breath sounds normal. No respiratory distress. He has no wheezes.   Abdominal: Soft. Bowel sounds are normal. There is no tenderness.   Musculoskeletal: Normal range of motion. He exhibits no edema.   Neurological: He is alert.   Confused to time, oriented to self and situation  Strength  LUE 3/5, RUE 4/5, BLE 4/5   Skin: Skin is warm and dry.   Vitals reviewed.      Labs:        Invalid input(s): [id number]      Recent Labs      17   031   SODIUM  138   POTASSIUM  4.4   CHLORIDE  105   CO2  28   BUN  21   CREATININE  0.62   CALCIUM  9.1     Recent Labs      17   GLUCOSE  156*     Recent Labs      17   RBC  4.71   HEMOGLOBIN  14.9   HEMATOCRIT  44.2   PLATELETCT  186     Recent Labs      17   WBC  11.7*           Hemodynamics:  Temp (24hrs), Av.7 °C (98 °F), Min:36 °C (96.8 °F), Max:37.2 °C (98.9 °F)  Temperature: 36.8 °C (98.2 °F)  Pulse  Av.9  Min: 51  Max: 100   Blood Pressure: 103/88 mmHg     Respiratory:    Respiration: 18, Pulse Oximetry: 94 %           Fluids:    Intake/Output Summary (Last 24 hours) at 17 1554  Last data filed at 17 0500   Gross per 24 hour   Intake   1400 ml   Output      0 ml   Net   1400 ml        GI/Nutrition:  Orders Placed This Encounter   Procedures   • Diet Order     Standing Status: Standing      Number of Occurrences: 1      Standing Expiration Date:      Order Specific Question:  Diet:     Answer:  Regular [1]      Comments:  please cut up pancakes, Lithuanian toast, meat, etc due to vision     Order Specific Question:  Texture/Fiber modifications:     Answer:  Chopped Meat [5]     Medical Decision Making, by Problem:  Active Hospital Problems    Diagnosis   • *Headache [R51]  -Pain management- resume home dosing  -Cont decadron, slow taper- taper to BID  -Appreciate oncology consult Dr Lee  -Appreciate NS consult, Dr Shaikh  -CT brain- edema/gliosis in right frontal, parietal, temporal white matter; post op changes; ventriculomegaly  -MRI brain - unchanged from prior  -Palliative care consult- hopsice consult   • Dysarthria [R47.1]- resolved, suspect 2/2 pain  -SLP eval- no dysphagia, no diet/liquid restriction   • Hyperglycemia [R73.9]- suspect 2/2 steroid- trending down, stop FSBS monitor  BMP  -Glycohemoglobin 5.9   • Urinary incontinence [R32]- resolved   • Leukocytosis [D72.829]- suspect reactive, no e/o infection or SIRS- trending down  -No nuchal rigidity, consider meningitis if HA remains   • Tobacco abuse [Z72.0]- encourage cessation  -Nicotine patch   • 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

## 2017-04-28 NOTE — CARE PLAN
Problem: Safety  Goal: Will remain free from injury  Outcome: PROGRESSING AS EXPECTED  Patient compliant with fall precautions - mobilizing with assistance, safe transfers from bed/chair/wheelchair during shift.

## 2017-04-28 NOTE — PROGRESS NOTES
Pt resting. Good hrs this shift. Nad noted and n/ c voiced. Denies any needs or concerns at this time. Bed low, locked, side rails up x 2, call light in reach.

## 2017-04-28 NOTE — PROGRESS NOTES
Oncology/Hematology Progress Note               Author: Ashley Lee Date & Time created: 4/28/2017  9:49 AM     Diagnosis: Anaplastic Astrocytoma, Grade III with recurrence with GBM    Chief compliant: Increase headaches, s/p fall and urinary incontinence.       Interval History:  He has been stable overnight  He continues to have headaches which are controlled with her current regiment  He continues to have bilateral blurred vision  He is noticed increase appetite  He denies nausea and vomiting      Review of Systems:  ROS   All other review of systems are negative except what was mentioned above in the HPI.  Constitutional: Negative for fever and chills. Positive for fatigue.   HENT: Negative for ear pain and nosebleeds.    Eyes: Positive for bilateral blurred vision.    Respiratory: Negative for cough and shortness of breath.     Cardiovascular: Negative for chest pain.    Gastrointestinal: Negative for nausea, vomiting and abdominal pain.    Genitourinary: Positive for intermittent urinary incontinence.    Musculoskeletal: Negative for myalgias.    Skin: Negative for rash.    Neurological: Positive for headaches   Positive for diffuse weakness with some improvement.   Endo/Heme/Allergies: No bruise/bleed easily.    Psychiatric/Behavioral: Stable memory      Physical Exam:  Physical Exam   General: Not in acute distress.  He is alert and oriented x 3  HEENT:  extra ocular muscles intact and moist oral mucus membranes.  CVS: regular rate and rhythm  RESP: Clear to auscultation bilaterally.    ABD: Soft, non tender, non distended.     EXT: No edema    CNS: Alert and oriented and muscle strength weakness .    Labs:        Invalid input(s): [id number]      Recent Labs      04/26/17   0311   SODIUM  138   POTASSIUM  4.4   CHLORIDE  105   CO2  28   BUN  21   CREATININE  0.62   CALCIUM  9.1     Recent Labs      04/26/17   0311   GLUCOSE  156*     Recent Labs      04/26/17   0311   RBC  4.71   HEMOGLOBIN  14.9    HEMATOCRIT  44.2   PLATELETCT  186     Recent Labs      17   0311   WBC  11.7*     Recent Labs      17   0311   SODIUM  138   POTASSIUM  4.4   CHLORIDE  105   CO2  28   GLUCOSE  156*   BUN  21   CREATININE  0.62   CALCIUM  9.1     Hemodynamics:  Temp (24hrs), Av.7 °C (98.1 °F), Min:36 °C (96.8 °F), Max:37.2 °C (98.9 °F)  Temperature: 36.6 °C (97.9 °F)  Pulse  Av.4  Min: 51  Max: 100   Blood Pressure: 112/83 mmHg     Respiratory:    Respiration: 18, Pulse Oximetry: 92 %           Fluids:  No intake or output data in the 24 hours ending 17 1254     GI/Nutrition:  Orders Placed This Encounter   Procedures   • Diet Order     Standing Status: Standing      Number of Occurrences: 1      Standing Expiration Date:      Order Specific Question:  Diet:     Answer:  Regular [1]      Comments:  please cut up pancakes, [place]ian toast, meat, etc due to vision     Order Specific Question:  Texture/Fiber modifications:     Answer:  Chopped Meat [5]     Medical Decision Making, by Problem:  Active Hospital Problems    Diagnosis   • *Headache [R51]   • Dysarthria [R47.1]   • Hyperglycemia [R73.9]   • Urinary incontinence [R32]   • Leukocytosis [D72.829]   • Tobacco abuse [Z72.0]   • 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 GBM, grade IV: Diagnosed in 2016.  He underwent salvage chemoradiation.  He is currently on maintenance temozolomide. He was on slow taper of dexamethasone and has been on keppra.  He ran out of his med and presented tot he ER due to increase symptoms.  He was started back on gil dose dexamethasone of 4 mg q 6 hours. He has noticed some improvement but continues to have blurred vision.  He was seen by palliative care and there was a discussion about hospice. This morning I had went over this again with the patient. I did explain to him that if he goes into hospice then we will be stopping the temozolomide. He got very concerned and stated  that he does not want to stop his treatment although he understands that the benefits may be limited. Patient is due to be seen by hospice today. He may not agree to hospice as he wants to continue on treatment. He is unable to afford any of his medications still early next month.    2. History of grade 3 astrocytomas/p chemoradiation followed by maintenance therapy but unable to complete therapy secondary to noncompliance in the past.     3. Leukocytosis: Likely reactive. Recommend periodic cbc w/ diff.    4. Continuation PT/OT.    Thank you for allowing me to participate in his care. Please feel free to contact me with questions or concerns in regards to his care.    Please note that this dictation was created using voice recognition software. I have made every reasonable attempt to correct obvious errors, but I expect that there are errors of grammar and possibly content that I did not discover before finalizing the note.      Core Measures

## 2017-04-28 NOTE — CARE PLAN
Problem: Pain Management  Goal: Pain level will decrease to patient’s comfort goal  Outcome: PROGRESSING AS EXPECTED  Patient states severe headache still present but current regimen making activity tolerable

## 2017-04-28 NOTE — CARE PLAN
Problem: Safety  Goal: Will remain free from falls  Outcome: PROGRESSING AS EXPECTED  Bed alarm on, fall precautions in place     Problem: Pain Management  Goal: Pain level will decrease to patient’s comfort goal  Outcome: PROGRESSING AS EXPECTED  Pain asssed q4hrs and medicated per MAR

## 2017-04-28 NOTE — PROGRESS NOTES
Pt AAOX4, DIXON. C/o of HA, medicated per MAR. Pt requesting PRNs for sleep, hospitalist paged. Pt up to BR w/ SBA. Bed alarm on, call light in reach.

## 2017-04-29 PROBLEM — D72.829 LEUKOCYTOSIS: Status: RESOLVED | Noted: 2017-02-07 | Resolved: 2017-04-29

## 2017-04-29 PROBLEM — R73.9 HYPERGLYCEMIA: Status: RESOLVED | Noted: 2017-04-25 | Resolved: 2017-04-29

## 2017-04-29 PROBLEM — R47.1 DYSARTHRIA: Status: RESOLVED | Noted: 2017-04-25 | Resolved: 2017-04-29

## 2017-04-29 PROBLEM — R32 URINARY INCONTINENCE: Status: RESOLVED | Noted: 2017-04-24 | Resolved: 2017-04-29

## 2017-04-29 PROCEDURE — A9270 NON-COVERED ITEM OR SERVICE: HCPCS | Performed by: NURSE PRACTITIONER

## 2017-04-29 PROCEDURE — 700111 HCHG RX REV CODE 636 W/ 250 OVERRIDE (IP): Performed by: NURSE PRACTITIONER

## 2017-04-29 PROCEDURE — 99231 SBSQ HOSP IP/OBS SF/LOW 25: CPT | Performed by: HOSPITALIST

## 2017-04-29 PROCEDURE — 700102 HCHG RX REV CODE 250 W/ 637 OVERRIDE(OP): Performed by: INTERNAL MEDICINE

## 2017-04-29 PROCEDURE — 700102 HCHG RX REV CODE 250 W/ 637 OVERRIDE(OP): Performed by: NURSE PRACTITIONER

## 2017-04-29 PROCEDURE — A9270 NON-COVERED ITEM OR SERVICE: HCPCS | Performed by: INTERNAL MEDICINE

## 2017-04-29 PROCEDURE — 770006 HCHG ROOM/CARE - MED/SURG/GYN SEMI*

## 2017-04-29 PROCEDURE — 700102 HCHG RX REV CODE 250 W/ 637 OVERRIDE(OP): Performed by: HOSPITALIST

## 2017-04-29 PROCEDURE — A9270 NON-COVERED ITEM OR SERVICE: HCPCS | Performed by: HOSPITALIST

## 2017-04-29 RX ORDER — DEXAMETHASONE SODIUM PHOSPHATE 4 MG/ML
4 INJECTION, SOLUTION INTRA-ARTICULAR; INTRALESIONAL; INTRAMUSCULAR; INTRAVENOUS; SOFT TISSUE EVERY 12 HOURS
Status: COMPLETED | OUTPATIENT
Start: 2017-04-29 | End: 2017-04-29

## 2017-04-29 RX ORDER — GABAPENTIN 300 MG/1
300 CAPSULE ORAL 3 TIMES DAILY
Status: DISCONTINUED | OUTPATIENT
Start: 2017-04-29 | End: 2017-05-01 | Stop reason: HOSPADM

## 2017-04-29 RX ORDER — HYDROMORPHONE HYDROCHLORIDE 4 MG/1
4 TABLET ORAL EVERY 4 HOURS PRN
Qty: 42 TAB | Refills: 0 | Status: SHIPPED | OUTPATIENT
Start: 2017-04-29 | End: 2017-06-03

## 2017-04-29 RX ORDER — DEXAMETHASONE 1 MG
2 TABLET ORAL 2 TIMES DAILY
Status: DISCONTINUED | OUTPATIENT
Start: 2017-04-30 | End: 2017-04-30

## 2017-04-29 RX ADMIN — GABAPENTIN 300 MG: 300 CAPSULE ORAL at 15:33

## 2017-04-29 RX ADMIN — OXYCODONE HYDROCHLORIDE 10 MG: 10 TABLET ORAL at 12:47

## 2017-04-29 RX ADMIN — LEVETIRACETAM 1000 MG: 500 TABLET, FILM COATED ORAL at 21:01

## 2017-04-29 RX ADMIN — HYDROMORPHONE HYDROCHLORIDE 4 MG: 4 TABLET ORAL at 21:04

## 2017-04-29 RX ADMIN — HYDROMORPHONE HYDROCHLORIDE 4 MG: 4 TABLET ORAL at 04:04

## 2017-04-29 RX ADMIN — ACETAMINOPHEN 650 MG: 325 TABLET, FILM COATED ORAL at 07:10

## 2017-04-29 RX ADMIN — GABAPENTIN 200 MG: 100 CAPSULE ORAL at 08:18

## 2017-04-29 RX ADMIN — LEVETIRACETAM 1000 MG: 500 TABLET, FILM COATED ORAL at 08:18

## 2017-04-29 RX ADMIN — GABAPENTIN 300 MG: 300 CAPSULE ORAL at 21:01

## 2017-04-29 RX ADMIN — HYDROMORPHONE HYDROCHLORIDE 4 MG: 4 TABLET ORAL at 15:33

## 2017-04-29 RX ADMIN — LORAZEPAM 0.5 MG: 1 TABLET ORAL at 15:36

## 2017-04-29 RX ADMIN — OXYCODONE HYDROCHLORIDE 10 MG: 10 TABLET ORAL at 01:12

## 2017-04-29 RX ADMIN — DEXAMETHASONE SODIUM PHOSPHATE 4 MG: 4 INJECTION, SOLUTION INTRAMUSCULAR; INTRAVENOUS at 21:02

## 2017-04-29 RX ADMIN — OXYCODONE HYDROCHLORIDE 10 MG: 10 TABLET ORAL at 07:10

## 2017-04-29 RX ADMIN — NICOTINE 14 MG: 14 PATCH, EXTENDED RELEASE TRANSDERMAL at 05:37

## 2017-04-29 RX ADMIN — DIPHENHYDRAMINE HCL 50 MG: 25 TABLET ORAL at 21:06

## 2017-04-29 RX ADMIN — STANDARDIZED SENNA CONCENTRATE AND DOCUSATE SODIUM 2 TABLET: 8.6; 5 TABLET, FILM COATED ORAL at 08:18

## 2017-04-29 RX ADMIN — DEXAMETHASONE SODIUM PHOSPHATE 4 MG: 4 INJECTION, SOLUTION INTRAMUSCULAR; INTRAVENOUS at 08:17

## 2017-04-29 RX ADMIN — STANDARDIZED SENNA CONCENTRATE AND DOCUSATE SODIUM 2 TABLET: 8.6; 5 TABLET, FILM COATED ORAL at 21:01

## 2017-04-29 RX ADMIN — HYDROMORPHONE HYDROCHLORIDE 4 MG: 4 TABLET ORAL at 11:22

## 2017-04-29 RX ADMIN — OXYCODONE HYDROCHLORIDE 10 MG: 10 TABLET ORAL at 18:31

## 2017-04-29 ASSESSMENT — ENCOUNTER SYMPTOMS
HALLUCINATIONS: 0
PALPITATIONS: 0
SHORTNESS OF BREATH: 0
DEPRESSION: 1
VOMITING: 0
ABDOMINAL PAIN: 0
CHILLS: 0
FEVER: 0
DIZZINESS: 0
WEAKNESS: 1
DOUBLE VISION: 0
HEADACHES: 1
SENSORY CHANGE: 0
NAUSEA: 0
NECK PAIN: 0
PHOTOPHOBIA: 0

## 2017-04-29 ASSESSMENT — PAIN SCALES - WONG BAKER
WONGBAKER_NUMERICALRESPONSE: HURTS JUST A LITTLE BIT

## 2017-04-29 ASSESSMENT — PAIN SCALES - GENERAL
PAINLEVEL_OUTOF10: 4
PAINLEVEL_OUTOF10: 8
PAINLEVEL_OUTOF10: 4
PAINLEVEL_OUTOF10: 4
PAINLEVEL_OUTOF10: 7
PAINLEVEL_OUTOF10: 4
PAINLEVEL_OUTOF10: 8

## 2017-04-29 NOTE — PROGRESS NOTES
Pt AAOX4, DIXON. C/o of severe HA, medicated per MAR. Pt up to BR w/ SBA. Bed alarm on, call light in reach. Denies further needs at this time.

## 2017-04-29 NOTE — DISCHARGE PLANNING
SW spoke to [employer], cost for Dilaudid is $35.09. BECKY sent request to [facility]. Awaiting response.

## 2017-04-29 NOTE — CARE PLAN
Problem: Bowel/Gastric:  Goal: Will not experience complications related to bowel motility  Outcome: PROGRESSING AS EXPECTED  Scheduled bowel medications given for regular bowel pattern.     Problem: Psychosocial Needs:  Goal: Level of anxiety will decrease  Outcome: PROGRESSING AS EXPECTED  Anxiolytics given per MAR. Reassurance provided

## 2017-04-29 NOTE — DISCHARGE PLANNING
BECKY spoke to APN regarding pt's prescription. APN reports she does not feel comfortable discharging the patient without his pain medicine as he is very likely to return to the hospital right away. BECKY followed up with  Supervisor Kelly, who stated that we will not pay for narcotics and asked if APN would consider changing to a non-narcotic. SS Supervisor Kelly spoke to APN, then advised this SW to get a prescription for pt's pain medication only through 5/3, and send to [employer] for cash price. Once BECKY has this nunez, SW to send request to [facility] for approval, as Kelly does not feel comfortable approving the purchase of narcotics.     Cash price was obtained from [facility] pharmacy previously, however [facility] is the pharmacy we have an agreement with to pay for the meds. Therefore, [facility], awaiting return call with price.

## 2017-04-29 NOTE — DISCHARGE PLANNING
SW received approval for medication and cab voucher. SW placed vouchers with pt's chart. Confirmed pt's address as the [facility] [place] [address]

## 2017-04-30 PROCEDURE — A9270 NON-COVERED ITEM OR SERVICE: HCPCS | Performed by: NURSE PRACTITIONER

## 2017-04-30 PROCEDURE — 700102 HCHG RX REV CODE 250 W/ 637 OVERRIDE(OP): Performed by: NURSE PRACTITIONER

## 2017-04-30 PROCEDURE — 770006 HCHG ROOM/CARE - MED/SURG/GYN SEMI*

## 2017-04-30 PROCEDURE — 99233 SBSQ HOSP IP/OBS HIGH 50: CPT | Performed by: HOSPITALIST

## 2017-04-30 PROCEDURE — 700102 HCHG RX REV CODE 250 W/ 637 OVERRIDE(OP): Performed by: HOSPITALIST

## 2017-04-30 PROCEDURE — A9270 NON-COVERED ITEM OR SERVICE: HCPCS | Performed by: HOSPITALIST

## 2017-04-30 RX ORDER — DEXAMETHASONE 1 MG
1 TABLET ORAL 2 TIMES DAILY
Status: DISCONTINUED | OUTPATIENT
Start: 2017-04-30 | End: 2017-04-30

## 2017-04-30 RX ORDER — DEXAMETHASONE 1 MG
2 TABLET ORAL 2 TIMES DAILY
Status: DISCONTINUED | OUTPATIENT
Start: 2017-04-30 | End: 2017-05-01 | Stop reason: HOSPADM

## 2017-04-30 RX ORDER — LEVETIRACETAM 500 MG/1
500 TABLET ORAL 2 TIMES DAILY
Status: DISCONTINUED | OUTPATIENT
Start: 2017-04-30 | End: 2017-05-01 | Stop reason: HOSPADM

## 2017-04-30 RX ADMIN — HYDROMORPHONE HYDROCHLORIDE 4 MG: 4 TABLET ORAL at 01:56

## 2017-04-30 RX ADMIN — HYDROMORPHONE HYDROCHLORIDE 4 MG: 4 TABLET ORAL at 19:31

## 2017-04-30 RX ADMIN — DEXAMETHASONE 2 MG: 1 TABLET ORAL at 09:13

## 2017-04-30 RX ADMIN — HYDROMORPHONE HYDROCHLORIDE 4 MG: 4 TABLET ORAL at 06:10

## 2017-04-30 RX ADMIN — NICOTINE 14 MG: 14 PATCH, EXTENDED RELEASE TRANSDERMAL at 06:09

## 2017-04-30 RX ADMIN — LEVETIRACETAM 500 MG: 500 TABLET, FILM COATED ORAL at 09:13

## 2017-04-30 RX ADMIN — GABAPENTIN 300 MG: 300 CAPSULE ORAL at 09:13

## 2017-04-30 RX ADMIN — GABAPENTIN 300 MG: 300 CAPSULE ORAL at 15:22

## 2017-04-30 RX ADMIN — LEVETIRACETAM 500 MG: 500 TABLET, FILM COATED ORAL at 19:31

## 2017-04-30 RX ADMIN — HYDROMORPHONE HYDROCHLORIDE 4 MG: 4 TABLET ORAL at 11:35

## 2017-04-30 RX ADMIN — STANDARDIZED SENNA CONCENTRATE AND DOCUSATE SODIUM 2 TABLET: 8.6; 5 TABLET, FILM COATED ORAL at 09:13

## 2017-04-30 RX ADMIN — GABAPENTIN 300 MG: 300 CAPSULE ORAL at 19:31

## 2017-04-30 RX ADMIN — DEXAMETHASONE 2 MG: 1 TABLET ORAL at 19:32

## 2017-04-30 RX ADMIN — ACETAMINOPHEN 650 MG: 325 TABLET, FILM COATED ORAL at 01:56

## 2017-04-30 RX ADMIN — HYDROMORPHONE HYDROCHLORIDE 4 MG: 4 TABLET ORAL at 15:22

## 2017-04-30 RX ADMIN — OXYCODONE HYDROCHLORIDE 10 MG: 10 TABLET ORAL at 03:31

## 2017-04-30 RX ADMIN — DIPHENHYDRAMINE HCL 50 MG: 25 TABLET ORAL at 19:39

## 2017-04-30 RX ADMIN — STANDARDIZED SENNA CONCENTRATE AND DOCUSATE SODIUM 2 TABLET: 8.6; 5 TABLET, FILM COATED ORAL at 19:31

## 2017-04-30 ASSESSMENT — ENCOUNTER SYMPTOMS
PALPITATIONS: 0
PHOTOPHOBIA: 0
CHILLS: 0
SENSORY CHANGE: 0
DIZZINESS: 0
FEVER: 0
DEPRESSION: 1
HALLUCINATIONS: 0
DOUBLE VISION: 0
ABDOMINAL PAIN: 0
NECK PAIN: 0
NAUSEA: 0
HEADACHES: 1
WEAKNESS: 1
SHORTNESS OF BREATH: 0
BLURRED VISION: 0
VOMITING: 0

## 2017-04-30 ASSESSMENT — PAIN SCALES - GENERAL
PAINLEVEL_OUTOF10: 7
PAINLEVEL_OUTOF10: 10

## 2017-04-30 ASSESSMENT — PAIN SCALES - WONG BAKER: WONGBAKER_NUMERICALRESPONSE: HURTS JUST A LITTLE BIT

## 2017-04-30 NOTE — DISCHARGE PLANNING
Received Hospice order for pt. Met face to face with the pt. Pt states he is not interested in pursuing hospice. Informed bedside RN. Pt is set to dc home later on today.

## 2017-04-30 NOTE — PROGRESS NOTES
Hospital Medicine Progress Note, Adult, Complex               Author: Joy Jacinto  Date & Time created: 4/30/2017  3:17 PM     Interval History:  Fortino Ricci Jr. is a 46 y.o. male w/ h/o astrocytoma with glioblastoma stage III admitted 4/24/2017 for increased headaches, fall and urinary incontinence. He ran out of his pain medication as he could not afford to refill. Social work has provided voucher for pain medication and cab ride. However pharmacy is not open today and patient cant see so he needs to call his brother.       Review of Systems:  Review of Systems   Constitutional: Positive for malaise/fatigue. Negative for fever and chills.   Eyes: Negative for blurred vision, double vision and photophobia.   Respiratory: Negative for shortness of breath.    Cardiovascular: Negative for chest pain and palpitations.   Gastrointestinal: Negative for nausea, vomiting and abdominal pain.   Genitourinary: Negative for dysuria.        Urinary incontinence resolved   Musculoskeletal: Negative for neck pain (denies neck pain/stiffness).   Neurological: Positive for weakness and headaches. Negative for dizziness and sensory change.   Psychiatric/Behavioral: Positive for depression. Negative for hallucinations.   All other systems reviewed and are negative.      Physical Exam:  Physical Exam   Constitutional: He appears well-developed and well-nourished. No distress.   HENT:   Head: Normocephalic and atraumatic.   Eyes: Conjunctivae are normal.   Neck: Normal range of motion. Neck supple.   Cardiovascular: Normal rate and regular rhythm.    No murmur heard.  Pulmonary/Chest: Effort normal and breath sounds normal. No respiratory distress. He has no wheezes.   Abdominal: Soft. Bowel sounds are normal. He exhibits no distension.   Musculoskeletal: Normal range of motion.   Neurological: He is alert.   Confused to time, oriented to self and situation  Strength LUE 3/5, RUE 4/5, BLE 4/5   Skin: Skin is warm and dry.  He is not diaphoretic.   Vitals reviewed.      Labs:        Invalid input(s): [id number]      No results for input(s): SODIUM, POTASSIUM, CHLORIDE, CO2, BUN, CREATININE, MAGNESIUM, PHOSPHORUS, CALCIUM in the last 72 hours.  No results for input(s): ALTSGPT, ASTSGOT, ALKPHOSPHAT, TBILIRUBIN, DBILIRUBIN, GAMMAGT, AMYLASE, LIPASE, ALB, PREALBUMIN, GLUCOSE in the last 72 hours.  No results for input(s): RBC, HEMOGLOBIN, HEMATOCRIT, PLATELETCT, PROTHROMBTM, APTT, INR, IRON, FERRITIN, TOTIRONBC in the last 72 hours.            Hemodynamics:  Temp (24hrs), Av.3 °C (99.2 °F), Min:36.8 °C (98.2 °F), Max:37.8 °C (100.1 °F)  Temperature: 36.8 °C (98.2 °F)  Pulse  Av.8  Min: 51  Max: 100   Blood Pressure: 106/58 mmHg     Respiratory:    Respiration: 20, Pulse Oximetry: 96 %           Fluids:  No intake or output data in the 24 hours ending 17 1517     GI/Nutrition:  Orders Placed This Encounter   Procedures   • Diet Order     Standing Status: Standing      Number of Occurrences: 1      Standing Expiration Date:      Order Specific Question:  Diet:     Answer:  Regular [1]      Comments:  please cut up pancakes, [place]er toast, meat, etc due to vision     Order Specific Question:  Texture/Fiber modifications:     Answer:  Chopped Meat [5]     Medical Decision Making, by Problem:  Active Hospital Problems    Diagnosis   • *Headache [R51]  -Pain management- resume home dosing  -Cont decadron- will keep at 2g BID as his HA is present. Taper slowly in future.   -Appreciate NS consult, Dr Shaikh- recommending high dose steroids with attempts to taper next week. May need high dose steroids indefinitely.   -CT brain- edema/gliosis in right frontal, parietal, temporal white matter; post op changes; ventriculomegaly  -MRI brain - unchanged from prior  -Palliative care consult- hopsice consult- pt not ready for hospice   • Dysarthria [R47.1]- resolved, suspect 2/2 pain  -SLP eval- no dysphagia, no diet/liquid restriction    • Hyperglycemia [R73.9]- suspect 2/2 steroid- trending down  -Glycohemoglobin 5.9   • Leukocytosis [D72.829]- suspect reactive, no e/o infection or SIRS- trending down  -No nuchal rigidity, consider meningitis if HA remains   • Tobacco abuse [Z72.0]- encourage cessation  -Nicotine patch   • 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 I have tried to call pharmacy and the patient's brother. Will plan to d/c tomorrow with vouchers    Labs reviewed and Medications reviewed  Anand catheter: No Anand      DVT Prophylaxis: Enoxaparin (Lovenox)    Ulcer prophylaxis: Not indicated    Assessed for rehab: Patient was assess for and/or received rehabilitation services during this hospitalization

## 2017-05-01 VITALS
BODY MASS INDEX: 25.92 KG/M2 | TEMPERATURE: 98.6 F | SYSTOLIC BLOOD PRESSURE: 108 MMHG | WEIGHT: 175 LBS | DIASTOLIC BLOOD PRESSURE: 78 MMHG | RESPIRATION RATE: 16 BRPM | HEIGHT: 69 IN | HEART RATE: 85 BPM | OXYGEN SATURATION: 96 %

## 2017-05-01 PROCEDURE — 700102 HCHG RX REV CODE 250 W/ 637 OVERRIDE(OP): Performed by: HOSPITALIST

## 2017-05-01 PROCEDURE — 700102 HCHG RX REV CODE 250 W/ 637 OVERRIDE(OP): Performed by: NURSE PRACTITIONER

## 2017-05-01 PROCEDURE — A9270 NON-COVERED ITEM OR SERVICE: HCPCS | Performed by: HOSPITALIST

## 2017-05-01 PROCEDURE — 99239 HOSP IP/OBS DSCHRG MGMT >30: CPT | Performed by: HOSPITALIST

## 2017-05-01 PROCEDURE — A9270 NON-COVERED ITEM OR SERVICE: HCPCS | Performed by: NURSE PRACTITIONER

## 2017-05-01 RX ORDER — DEXAMETHASONE 1 MG
2 TABLET ORAL 2 TIMES DAILY
Qty: 28 TAB | Refills: 0 | Status: SHIPPED | OUTPATIENT
Start: 2017-05-01 | End: 2017-05-08

## 2017-05-01 RX ADMIN — GABAPENTIN 300 MG: 300 CAPSULE ORAL at 07:27

## 2017-05-01 RX ADMIN — DEXAMETHASONE 2 MG: 1 TABLET ORAL at 07:29

## 2017-05-01 RX ADMIN — LEVETIRACETAM 500 MG: 500 TABLET, FILM COATED ORAL at 07:27

## 2017-05-01 RX ADMIN — HYDROMORPHONE HYDROCHLORIDE 4 MG: 4 TABLET ORAL at 02:54

## 2017-05-01 RX ADMIN — HYDROMORPHONE HYDROCHLORIDE 4 MG: 4 TABLET ORAL at 07:27

## 2017-05-01 RX ADMIN — STANDARDIZED SENNA CONCENTRATE AND DOCUSATE SODIUM 2 TABLET: 8.6; 5 TABLET, FILM COATED ORAL at 07:27

## 2017-05-01 RX ADMIN — NICOTINE 14 MG: 14 PATCH, EXTENDED RELEASE TRANSDERMAL at 05:20

## 2017-05-01 RX ADMIN — OXYCODONE HYDROCHLORIDE 5 MG: 5 TABLET ORAL at 05:26

## 2017-05-01 RX ADMIN — OXYCODONE HYDROCHLORIDE 10 MG: 10 TABLET ORAL at 14:10

## 2017-05-01 RX ADMIN — GABAPENTIN 300 MG: 300 CAPSULE ORAL at 14:10

## 2017-05-01 RX ADMIN — HYDROMORPHONE HYDROCHLORIDE 4 MG: 4 TABLET ORAL at 11:44

## 2017-05-01 ASSESSMENT — PAIN SCALES - GENERAL
PAINLEVEL_OUTOF10: 10
PAINLEVEL_OUTOF10: 8
PAINLEVEL_OUTOF10: 10
PAINLEVEL_OUTOF10: 6
PAINLEVEL_OUTOF10: 10
PAINLEVEL_OUTOF10: 10

## 2017-05-01 NOTE — DISCHARGE SUMMARY
CHIEF COMPLAINT ON ADMISSION  Chief Complaint   Patient presents with   • Headache     hx of brain CA       CODE STATUS  DNR    HPI & HOSPITAL COURSE  This is a 46 y.o. year old male here with history of anaplastic astrocytoma grade 3 with recurrence with GBM who presents with headache and urinary incontinence. Patient is status post chemoradiation, craniotomy and resection of the tumor. Patient started having progressive headache, difficulty speaking, and urinary incontinence. He was admitted for pain control and evaluation of his headaches. Oncology was consulted and recommended an MRI and steroids. MRI did show recurrent GBM with extension across the midline. Dr. Shaikh of Neurosurgery was consulted and per his recommendations this is inoperable and the patient may need to be on steroids indefinitely. I will provide a week of decadron and he can see his Oncologist for follow up and refill. He stayed in the hospital because he could not afford his pain medication therefore social work assisted with vouchers for Dilaudid prescription and taxi cabs. He is stable for discharge with all appropriate follow up.     Therefore, he is discharged in good and stable condition with close outpatient follow-up.    SPECIFIC OUTPATIENT FOLLOW-UP  Oncology 1-2 weeks    DISCHARGE PROBLEM LIST  Principal Problem:    Headache POA: Yes  Active Problems:    Tobacco abuse (Chronic) POA: Yes    Anaplastic astrocytoma of brain (CMS-HCC) (Chronic) POA: Yes  Resolved Problems:    Leukocytosis POA: Yes    Urinary incontinence POA: Yes    Dysarthria POA: Yes    Hyperglycemia POA: Yes      FOLLOW UP  Future Appointments  Date Time Provider Department Center   5/11/2017 3:00 PM Vel Iniguez M.D. [facility] None     Orlando Adame, P.A.  [address] [place] [address]  [phone]  Go on 5/3/2017  Please arrive at 9am for your appointment. Please bring 2 months' pay stubs, photo ID, proof of address, and list of medications.    Vel  RHONDA Iniguez  [address]   [address]  [facility] [address]  [phone]    Go on 5/11/2017  For oncology care      MEDICATIONS ON DISCHARGE   Fortino Ricci Jr.   Home Medication Instructions JOSE LUIS:[id number]    Printed on:05/01/17 1416   Medication Information                      dexamethasone (DECADRON) 1 MG Tab  Take 2 Tabs by mouth 2 Times a Day for 7 days.   F/u for refills or extension of script as needed          diphenhydrAMINE (BENADRYL) 25 MG Tab  Take 50 mg by mouth every 6 hours as needed for Sleep.             HYDROmorphone (DILAUDID) 2 MG Tab  Take 1-2 Tabs by mouth every four hours as needed for Severe Pain.             HYDROmorphone (DILAUDID) 4 MG Tab  Take 1 Tab by mouth every four hours as needed for Severe Pain.               DIET  Orders Placed This Encounter   Procedures   • Diet Order     Standing Status: Standing      Number of Occurrences: 1      Standing Expiration Date:      Order Specific Question:  Diet:     Answer:  Regular [1]      Comments:  please cut up pancakes, french toast, meat, etc due to vision     Order Specific Question:  Texture/Fiber modifications:     Answer:  Chopped Meat [5]       ACTIVITY  As tolerated.    CONSULTATIONS  Oncology   Neurosurgery     PROCEDURES  None    LABORATORY  Lab Results   Component Value Date/Time    SODIUM 138 04/26/2017 03:11 AM    POTASSIUM 4.4 04/26/2017 03:11 AM    CHLORIDE 105 04/26/2017 03:11 AM    CO2 28 04/26/2017 03:11 AM    GLUCOSE 156* 04/26/2017 03:11 AM    BUN 21 04/26/2017 03:11 AM    CREATININE 0.62 04/26/2017 03:11 AM        Lab Results   Component Value Date/Time    WBC 11.7* 04/26/2017 03:11 AM    HEMOGLOBIN 14.9 04/26/2017 03:11 AM    HEMATOCRIT 44.2 04/26/2017 03:11 AM    PLATELET COUNT 186 04/26/2017 03:11 AM        Total time of the discharge process exceeds 38 minutes.

## 2017-05-01 NOTE — PROGRESS NOTES
Received report from night nurse at the bedside. Assume care of Pt at 0700. Assessment completed Pt A&O X 4 . Pt denies numbness and tingling, Pt complains of headache and right knee pain, medicated per mar with dilaudid. Pt does not know what he did to know knee, says he probably just twisted it. Assist of one. Pt in bed, bed in lowest position, call light in place, bed alarm is on, treaded slipper socks on.

## 2017-05-01 NOTE — DISCHARGE INSTRUCTIONS
Discharge Instructions    Discharged to home by taxi with self. Discharged via walking, hospital escort: Yes.  Special equipment needed: Not Applicable    Be sure to schedule a follow-up appointment with your primary care doctor or any specialists as instructed.     Discharge Plan:   Diet Plan: Discussed  Activity Level: Discussed  Confirmed Follow up Appointment: Patient to Call and Schedule Appointment  Confirmed Symptoms Management: Discussed  Medication Reconciliation Updated: Yes  Influenza Vaccine Indication: Not indicated: Previously immunized this influenza season and > 8 years of age    I understand that a diet low in cholesterol, fat, and sodium is recommended for good health. Unless I have been given specific instructions below for another diet, I accept this instruction as my diet prescription.   Other diet: Regular diet    Special Instructions:      Hydromorphone tablets  What is this medicine?  HYDROMORPHONE (mary droe MOR fone) is a pain reliever. It is used to treat moderate to severe pain.  This medicine may be used for other purposes; ask your health care provider or pharmacist if you have questions.  COMMON BRAND NAME(S): Dilaudid  What should I tell my health care provider before I take this medicine?  They need to know if you have any of these conditions:  -brain tumor  -drug abuse or addiction  -head injury  -heart disease  -frequently drink alcohol containing drinks  -kidney disease or problems going to the bathroom  -liver disease  -lung disease, asthma, or breathing problems  -mental problems  -an allergic or unusual reaction to lactose, hydromorphone, other opioid analgesics, other medicines, sulfites, foods, dyes, or preservatives  -pregnant or trying to get pregnant  -breast-feeding  How should I use this medicine?  Take this medicine by mouth with a glass of water. If the medicine upsets your stomach, take it with food or milk. Follow the directions on the prescription label. Do not take  more medicine than you are told to take.  Talk to your pediatrician regarding the use of this medicine in children. Special care may be needed.  Overdosage: If you think you have taken too much of this medicine contact a poison control center or emergency room at once.  NOTE: This medicine is only for you. Do not share this medicine with others.  What if I miss a dose?  If you miss a dose, take it as soon as you can. If it is almost time for your next dose, take only that dose. Do not take double or extra doses.  What may interact with this medicine?  -alcohol  -antihistamines for allergy, cough and cold  -medicines for anesthesia  -medicines for depression, anxiety, or psychotic disturbances  -medicines for sleep  -muscle relaxants  -naltrexone  -narcotic medicines (opiates) for pain  -phenothiazines like chlorpromazine, mesoridazine, prochlorperazine, thioridazine  -tramadol  This list may not describe all possible interactions. Give your health care provider a list of all the medicines, herbs, non-prescription drugs, or dietary supplements you use. Also tell them if you smoke, drink alcohol, or use illegal drugs. Some items may interact with your medicine.  What should I watch for while using this medicine?  Tell your doctor or health care professional if your pain does not go away, if it gets worse, or if you have new or a different type of pain. You may develop tolerance to the medicine. Tolerance means that you will need a higher dose of the medicine for pain relief. Tolerance is normal and is expected if you take this medicine for a long time.  Do not suddenly stop taking your medicine because you may develop a severe reaction. Your body becomes used to the medicine. This does NOT mean you are addicted. Addiction is a behavior related to getting and using a drug for a non-medical reason. If you have pain, you have a medical reason to take pain medicine. Your doctor will tell you how much medicine to take. If  your doctor wants you to stop the medicine, the dose will be slowly lowered over time to avoid any side effects.  You may get drowsy or dizzy. Do not drive, use machinery, or do anything that needs mental alertness until you know how this medicine affects you. Do not stand or sit up quickly, especially if you are an older patient. This reduces the risk of dizzy or fainting spells. Alcohol may interfere with the effect of this medicine. Avoid alcoholic drinks.  There are different types of narcotic medicines (opiates) for pain. If you take more than one type at the same time, you may have more side effects. Give your health care provider a list of all medicines you use. Your doctor will tell you how much medicine to take. Do not take more medicine than directed. Call emergency for help if you have problems breathing.  This medicine will cause constipation. Try to have a bowel movement at least every 2 to 3 days. If you do not have a bowel movement for 3 days, call your doctor or health care professional.  Your mouth may get dry. Chewing sugarless gum or sucking hard candy, and drinking plenty of water may help. Contact your doctor if the problem does not go away or is severe.  What side effects may I notice from receiving this medicine?  Side effects that you should report to your doctor or health care professional as soon as possible:  -allergic reactions like skin rash, itching or hives, swelling of the face, lips, or tongue  -breathing problems  -changes in vision  -confusion  -feeling faint or lightheaded, falls  -seizures  -slow or fast heartbeat  -trouble passing urine or change in the amount of urine  -trouble with balance, talking, walking  Side effects that usually do not require medical attention (report to your doctor or health care professional if they continue or are bothersome):  -difficulty sleeping  -drowsiness  -dry mouth  -flushing  -headache  -itching  -loss of appetite  -nausea, vomiting  This  list may not describe all possible side effects. Call your doctor for medical advice about side effects. You may report side effects to FDA at 2-787-TLT-4952.  Where should I keep my medicine?  Keep out of the reach of children. This medicine can be abused. Keep your medicine in a safe place to protect it from theft. Do not share this medicine with anyone. Selling or giving away this medicine is dangerous and against the law.  Store at room temperature between 15 and 30 degrees C (59 and 86 degrees F). Keep container tightly closed. Protect from light.  Discard unused medicine and used packaging carefully. Pets and children can be harmed if they find used or lost packages. Flush any unused medicines down the toilet. Do not use the medicine after the expiration date.  NOTE: This sheet is a summary. It may not cover all possible information. If you have questions about this medicine, talk to your doctor, pharmacist, or health care provider.  © 2014, [employer]/Gold Standard. (8/24/2012 3:08:53 PM)        · Is patient discharged on Warfarin / Coumadin?   No     · Is patient Post Blood Transfusion?  No    Depression / Suicide Risk    As you are discharged from this [facility] facility, it is important to learn how to keep safe from harming yourself.    Recognize the warning signs:  · Abrupt changes in personality, positive or negative- including increase in energy   · Giving away possessions  · Change in eating patterns- significant weight changes-  positive or negative  · Change in sleeping patterns- unable to sleep or sleeping all the time   · Unwillingness or inability to communicate  · Depression  · Unusual sadness, discouragement and loneliness  · Talk of wanting to die  · Neglect of personal appearance   · Rebelliousness- reckless behavior  · Withdrawal from people/activities they love  · Confusion- inability to concentrate     If you or a loved one observes any of these behaviors or has concerns about self-harm,  here's what you can do:  · Talk about it- your feelings and reasons for harming yourself  · Remove any means that you might use to hurt yourself (examples: pills, rope, extension cords, firearm)  · Get professional help from the community (Mental Health, Substance Abuse, psychological counseling)  · Do not be alone:Call your Safe Contact- someone whom you trust who will be there for you.  · Call your local CRISIS HOTLINE 021-8801 or 874-148-9432  · Call your local Children's Mobile Crisis Response Team [place] (928) 432-6542 or www.[facility] [facility]  · Call the toll free National Suicide Prevention Hotlines   · National Suicide Prevention Lifeline 467-269-SUHS (7943)  · National Hope Line Network 800-SUICIDE (895-4291)

## 2017-05-01 NOTE — DISCHARGE PLANNING
Pt to DC to home. Verified with bedside RN that vouchers are on the hard chart and pt is ready for DC, updated Hospitalist ISABEL.

## 2017-05-01 NOTE — DISCHARGE PLANNING
BECKY received a call from RN stating that pt was dc'd earlier today and went to  his medications and was informed that the pharmacy does not fill controlled medications. Per RN, pt was given a cab voucher and medication voucher for dilaudid and [facility]. Pt had a cab voucher to [facility] and from [facility].     SW call to [facility] at [address] who verified that they do have dilaudid on hand in both strengths. BECKY provided  with new vouchers to the [facility] and from the [facility] to [place]. Pt already had [facility] voucher for medications. Previous cab vouchers attached and turned in to  office.

## 2017-05-01 NOTE — CARE PLAN
Problem: Pain Management  Goal: Pain level will decrease to patient’s comfort goal  Outcome: PROGRESSING SLOWER THAN EXPECTED  Will medicate per mar with Dilaudid and oxycodone as needed. For pain in knee and head.     Problem: Mobility  Goal: Risk for activity intolerance will decrease  Outcome: PROGRESSING AS EXPECTED  Will encourage ambulation.   Intervention: Encourage patient to increase activity level in collaboration with Interdisciplinary Team  Pt declining DVT prophylaxis, encouraged to get up and ambulate around unit.

## 2017-05-01 NOTE — PROGRESS NOTES
Oncology/Hematology Progress Note               Author: Ashley Lee Date & Time created: 5/1/2017  11:11 AM     Diagnosis: Anaplastic Astrocytoma, Grade III with recurrence with GBM    Chief compliant: Increase headaches, s/p fall and urinary incontinence.       Interval History:  He has been stable over the weekend  He continues to have intermittent headaches   She continues to have blurred vision which is stable  He has noticed increase appetite  He is able to ambulate well  He denies any nausea and vomiting    Review of Systems:  ROS   All other review of systems are negative except what was mentioned above in the HPI.  Constitutional: Negative for fever and chills. Positive for fatigue.   HENT: Negative for ear pain and nosebleeds.    Eyes: Positive for bilateral blurred vision which is stable.    Respiratory: Negative for cough and shortness of breath.     Cardiovascular: Negative for chest pain.    Gastrointestinal: Negative for nausea, vomiting and abdominal pain.    Musculoskeletal: Negative for myalgias.      Neurological: Positive for intermittent headaches and neck pain.  Positive for weakness.   Endo/Heme/Allergies: No bruise/bleed easily.    Psychiatric/Behavioral: Stable memory      Physical Exam:  Physical Exam   General: Not in acute distress.  He is alert and oriented x 3  HEENT:  extra ocular muscles intact and moist oral mucus membranes.  CVS: regular rate and rhythm  RESP: Clear to auscultation bilaterally.    ABD: Soft, non tender, non distended.     EXT: No edema    CNS: Alert and oriented and muscle strength weakness .    Labs:        Invalid input(s): [id number]      No results for input(s): SODIUM, POTASSIUM, CHLORIDE, CO2, BUN, CREATININE, MAGNESIUM, PHOSPHORUS, CALCIUM in the last 72 hours.  No results for input(s): ALTSGPT, ASTSGOT, ALKPHOSPHAT, TBILIRUBIN, DBILIRUBIN, GAMMAGT, AMYLASE, LIPASE, ALB, PREALBUMIN, GLUCOSE in the last 72 hours.  No results for input(s): RBC,  HEMOGLOBIN, HEMATOCRIT, PLATELETCT, PROTHROMBTM, APTT, INR, IRON, FERRITIN, TOTIRONBC in the last 72 hours.          Hemodynamics:  Temp (24hrs), Av.7 °C (98.1 °F), Min:36.4 °C (97.5 °F), Max:37 °C (98.6 °F)  Temperature: 37 °C (98.6 °F)  Pulse  Av.3  Min: 51  Max: 100   Blood Pressure: 108/78 mmHg     Respiratory:    Respiration: 16, Pulse Oximetry: 96 %           Fluids:  No intake or output data in the 24 hours ending 17 1254     GI/Nutrition:  Orders Placed This Encounter   Procedures   • Diet Order     Standing Status: Standing      Number of Occurrences: 1      Standing Expiration Date:      Order Specific Question:  Diet:     Answer:  Regular [1]      Comments:  please cut up pancakes, Czech toast, meat, etc due to vision     Order Specific Question:  Texture/Fiber modifications:     Answer:  Chopped Meat [5]     Medical Decision Making, by Problem:  Active Hospital Problems    Diagnosis   • *Headache [R51]   • Dysarthria [R47.1]   • Hyperglycemia [R73.9]   • Urinary incontinence [R32]   • Leukocytosis [D72.829]   • Tobacco abuse [Z72.0]   • 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 GBM, grade IV: Diagnosed in 2016.  He is currently on maintenance temozolomide. He ran out of his medications and presented tot he ER due to increase symptoms.  He was started back on gil dose dexamethasone wiht some improvement in his symptome's.  He continues to have blurred vision.  He is continued and Keppra. He has been on a slow taper of dexamethasone.   He is to get his disability check on 5/3. Patient will need slow tapering of the dexamethasone as outpatient.    2. History of grade 3 astrocytomas/p chemoradiation followed by maintenance therapy but unable to complete therapy secondary to noncompliance in the past.     3. Leukocytosis: Likely reactive. Recommend periodic cbc w/ diff.    4. Continuation PT/OT.  Okay from oncology prespectively for discharge once  he is able to for medications.  He is advised to continue slow taper of dexamethasone and is recommended to continue Keppra. He is scheduled to establish with Dr. Iniguez on 5/11/17.     Thank you for allowing me to participate in his care. Please feel free to contact me with questions or concerns in regards to his care.    Please note that this dictation was created using voice recognition software. I have made every reasonable attempt to correct obvious errors, but I expect that there are errors of grammar and possibly content that I did not discover before finalizing the note.      Core Measures

## 2017-05-01 NOTE — CARE PLAN
Problem: Safety  Goal: Will remain free from injury  Fall precautions in place. Provided education on using call light before getting up.

## 2017-05-01 NOTE — PROGRESS NOTES
PT alert and oriented x4. Reported headache 10/10 and treated pain per MAR. Educated pt on using call light. Call light within reach. Bed alarm on.

## 2017-05-01 NOTE — PROGRESS NOTES
Pt. Discharged home with taxi voucher and presciption for dilaudid. Discharge instructions and explanation of medicaiton received by pt. Pt  verbalized understanding. Prescriptions provided. RN to escort Pt to taxi.  PIV line removed. Pt tolerated well dressing applied. Pt has all belongings. All questions are answered.     Box lunch provided for dinner tonight along with a few snacks.    Taxi voucher given to  and RN explained to go to pharmacy first then to hotel.

## 2017-05-01 NOTE — CARE PLAN
Problem: Pain Management  Goal: Pain level will decrease to patient’s comfort goal  Intervention: Follow pain managment plan developed in collaboration with patient and Interdisciplinary Team  Treating pain per MAR.

## 2017-05-02 ENCOUNTER — PATIENT OUTREACH (OUTPATIENT)
Dept: HEALTH INFORMATION MANAGEMENT | Facility: OTHER | Age: 47
End: 2017-05-02

## 2017-05-02 NOTE — DISCHARGE PLANNING
BECKY received a call from [facility] pharmacy who advised that the the person who prescribed the dilaudid is not an approved prescriber for the [employer] Voucher.     BECKY page to Dr. Andersen who states he will call the prescription in to [phone].

## 2017-05-03 ENCOUNTER — TELEPHONE (OUTPATIENT)
Dept: HEMATOLOGY ONCOLOGY | Facility: MEDICAL CENTER | Age: 47
End: 2017-05-03

## 2017-05-04 NOTE — TELEPHONE ENCOUNTER
Pt called clinic stating that he ran out of his medications and needs refills.  He received his check today and will be able to pay for them.  Reiterated to patient notes from inpatient  that prescriptions are ready for his to  at the [facility].  Pt verbalized understanding.  He agrees to call if there are issues filling necessary prescriptions.

## 2017-05-11 ENCOUNTER — TELEPHONE (OUTPATIENT)
Dept: HEMATOLOGY ONCOLOGY | Facility: MEDICAL CENTER | Age: 47
End: 2017-05-11

## 2017-05-11 NOTE — TELEPHONE ENCOUNTER
Left voicemail for patient asking him to return my call in medical oncology. He was a no-show to his 3:00 appointment with Dr. Iniguez. I left a VM on the home phone number provided (which is also his father's phone number- listed as an emergency contact). The mobile number on file did not go through. New office phone number was provided.

## 2017-05-12 ENCOUNTER — TELEPHONE (OUTPATIENT)
Dept: HEMATOLOGY ONCOLOGY | Facility: MEDICAL CENTER | Age: 47
End: 2017-05-12

## 2017-05-12 NOTE — TELEPHONE ENCOUNTER
Called the patient's father again and obtained a phone number for the patient's brother, Jose. I left Jose a voicemail letting him know to have Fortino to call our office back as soon as possible as he was a no-show to his appointment yesterday with Dr. Iniguez. Office phone number was provided.

## 2017-06-02 DIAGNOSIS — C71.9 GLIOBLASTOMA MULTIFORME (HCC): ICD-10-CM

## 2017-06-02 RX ORDER — HYDROMORPHONE HYDROCHLORIDE 2 MG/1
2-4 TABLET ORAL EVERY 4 HOURS PRN
Qty: 42 TAB | Status: CANCELLED | OUTPATIENT
Start: 2017-06-02

## 2017-06-02 RX ORDER — HYDROMORPHONE HYDROCHLORIDE 4 MG/1
4 TABLET ORAL EVERY 4 HOURS PRN
Qty: 42 TAB | Status: CANCELLED | OUTPATIENT
Start: 2017-06-02

## 2017-06-02 RX ORDER — DIPHENHYDRAMINE HCL 25 MG
50 TABLET ORAL EVERY 6 HOURS PRN
Qty: 30 TAB | Status: CANCELLED | OUTPATIENT
Start: 2017-06-02

## 2017-06-02 NOTE — TELEPHONE ENCOUNTER
Left message on every phone number available for the patient to inform him that I have schedule an appointment for him to establish care with Dr. Iniguez. He is scheduled for Thursday June 8th at 1000. It is imperative that he make it to this appointment otherwise we will not be able to fill any prescriptions and he is at risk for being discharged from the clinic.

## 2017-06-03 ENCOUNTER — APPOINTMENT (OUTPATIENT)
Dept: RADIOLOGY | Facility: MEDICAL CENTER | Age: 47
DRG: 054 | End: 2017-06-03
Attending: EMERGENCY MEDICINE
Payer: MEDICARE

## 2017-06-03 ENCOUNTER — HOSPITAL ENCOUNTER (INPATIENT)
Facility: MEDICAL CENTER | Age: 47
LOS: 7 days | DRG: 054 | End: 2017-06-10
Attending: EMERGENCY MEDICINE | Admitting: INTERNAL MEDICINE
Payer: MEDICARE

## 2017-06-03 ENCOUNTER — RESOLUTE PROFESSIONAL BILLING HOSPITAL PROF FEE (OUTPATIENT)
Dept: HOSPITALIST | Facility: MEDICAL CENTER | Age: 47
End: 2017-06-03
Payer: MEDICARE

## 2017-06-03 DIAGNOSIS — G93.89 BRAIN MASS: ICD-10-CM

## 2017-06-03 PROBLEM — I61.9 ICH (INTRACEREBRAL HEMORRHAGE) (HCC): Status: ACTIVE | Noted: 2017-06-03

## 2017-06-03 PROBLEM — R51 HEADACHE(784.0): Status: ACTIVE | Noted: 2017-06-03

## 2017-06-03 LAB
ALBUMIN SERPL BCP-MCNC: 3.9 G/DL (ref 3.2–4.9)
ALBUMIN/GLOB SERPL: 1.6 G/DL
ALP SERPL-CCNC: 79 U/L (ref 30–99)
ALT SERPL-CCNC: 74 U/L (ref 2–50)
ANION GAP SERPL CALC-SCNC: 11 MMOL/L (ref 0–11.9)
APTT PPP: 24.7 SEC (ref 24.7–36)
AST SERPL-CCNC: 40 U/L (ref 12–45)
BASOPHILS # BLD AUTO: 0.8 % (ref 0–1.8)
BASOPHILS # BLD: 0.08 K/UL (ref 0–0.12)
BILIRUB SERPL-MCNC: 0.5 MG/DL (ref 0.1–1.5)
BUN SERPL-MCNC: 5 MG/DL (ref 8–22)
CALCIUM SERPL-MCNC: 9.1 MG/DL (ref 8.5–10.5)
CHLORIDE SERPL-SCNC: 106 MMOL/L (ref 96–112)
CO2 SERPL-SCNC: 22 MMOL/L (ref 20–33)
CREAT SERPL-MCNC: 0.51 MG/DL (ref 0.5–1.4)
EOSINOPHIL # BLD AUTO: 0.15 K/UL (ref 0–0.51)
EOSINOPHIL NFR BLD: 1.5 % (ref 0–6.9)
ERYTHROCYTE [DISTWIDTH] IN BLOOD BY AUTOMATED COUNT: 44.5 FL (ref 35.9–50)
EST. AVERAGE GLUCOSE BLD GHB EST-MCNC: 108 MG/DL
GFR SERPL CREATININE-BSD FRML MDRD: >60 ML/MIN/1.73 M 2
GLOBULIN SER CALC-MCNC: 2.5 G/DL (ref 1.9–3.5)
GLUCOSE BLD-MCNC: 89 MG/DL (ref 65–99)
GLUCOSE SERPL-MCNC: 108 MG/DL (ref 65–99)
HBA1C MFR BLD: 5.4 % (ref 0–5.6)
HCT VFR BLD AUTO: 47.3 % (ref 42–52)
HGB BLD-MCNC: 16.1 G/DL (ref 14–18)
IMM GRANULOCYTES # BLD AUTO: 0.09 K/UL (ref 0–0.11)
IMM GRANULOCYTES NFR BLD AUTO: 0.9 % (ref 0–0.9)
INR PPP: 0.91 (ref 0.87–1.13)
LACTATE BLD-SCNC: 2.5 MMOL/L (ref 0.5–2)
LYMPHOCYTES # BLD AUTO: 1.24 K/UL (ref 1–4.8)
LYMPHOCYTES NFR BLD: 12.1 % (ref 22–41)
MAGNESIUM SERPL-MCNC: 1.9 MG/DL (ref 1.5–2.5)
MCH RBC QN AUTO: 31.8 PG (ref 27–33)
MCHC RBC AUTO-ENTMCNC: 34 G/DL (ref 33.7–35.3)
MCV RBC AUTO: 93.5 FL (ref 81.4–97.8)
MONOCYTES # BLD AUTO: 0.96 K/UL (ref 0–0.85)
MONOCYTES NFR BLD AUTO: 9.4 % (ref 0–13.4)
NEUTROPHILS # BLD AUTO: 7.7 K/UL (ref 1.82–7.42)
NEUTROPHILS NFR BLD: 75.3 % (ref 44–72)
NRBC # BLD AUTO: 0 K/UL
NRBC BLD AUTO-RTO: 0 /100 WBC
PHOSPHATE SERPL-MCNC: 2.8 MG/DL (ref 2.5–4.5)
PLATELET # BLD AUTO: 232 K/UL (ref 164–446)
PMV BLD AUTO: 9 FL (ref 9–12.9)
POTASSIUM SERPL-SCNC: 3.6 MMOL/L (ref 3.6–5.5)
PROT SERPL-MCNC: 6.4 G/DL (ref 6–8.2)
PROTHROMBIN TIME: 12.5 SEC (ref 12–14.6)
RBC # BLD AUTO: 5.06 M/UL (ref 4.7–6.1)
SODIUM SERPL-SCNC: 139 MMOL/L (ref 135–145)
TSH SERPL DL<=0.005 MIU/L-ACNC: 0.21 UIU/ML (ref 0.3–3.7)
WBC # BLD AUTO: 10.2 K/UL (ref 4.8–10.8)

## 2017-06-03 PROCEDURE — 770022 HCHG ROOM/CARE - ICU (200)

## 2017-06-03 PROCEDURE — 99291 CRITICAL CARE FIRST HOUR: CPT | Performed by: INTERNAL MEDICINE

## 2017-06-03 PROCEDURE — 80053 COMPREHEN METABOLIC PANEL: CPT

## 2017-06-03 PROCEDURE — 85610 PROTHROMBIN TIME: CPT

## 2017-06-03 PROCEDURE — A9270 NON-COVERED ITEM OR SERVICE: HCPCS | Performed by: INTERNAL MEDICINE

## 2017-06-03 PROCEDURE — 85025 COMPLETE CBC W/AUTO DIFF WBC: CPT

## 2017-06-03 PROCEDURE — 700111 HCHG RX REV CODE 636 W/ 250 OVERRIDE (IP): Performed by: EMERGENCY MEDICINE

## 2017-06-03 PROCEDURE — 93010 ELECTROCARDIOGRAM REPORT: CPT | Performed by: INTERNAL MEDICINE

## 2017-06-03 PROCEDURE — 700111 HCHG RX REV CODE 636 W/ 250 OVERRIDE (IP): Performed by: HOSPITALIST

## 2017-06-03 PROCEDURE — 99291 CRITICAL CARE FIRST HOUR: CPT

## 2017-06-03 PROCEDURE — 36415 COLL VENOUS BLD VENIPUNCTURE: CPT

## 2017-06-03 PROCEDURE — 83605 ASSAY OF LACTIC ACID: CPT

## 2017-06-03 PROCEDURE — 70450 CT HEAD/BRAIN W/O DYE: CPT

## 2017-06-03 PROCEDURE — 700102 HCHG RX REV CODE 250 W/ 637 OVERRIDE(OP): Performed by: INTERNAL MEDICINE

## 2017-06-03 PROCEDURE — 96361 HYDRATE IV INFUSION ADD-ON: CPT

## 2017-06-03 PROCEDURE — 700105 HCHG RX REV CODE 258: Performed by: EMERGENCY MEDICINE

## 2017-06-03 PROCEDURE — 84443 ASSAY THYROID STIM HORMONE: CPT

## 2017-06-03 PROCEDURE — 83735 ASSAY OF MAGNESIUM: CPT

## 2017-06-03 PROCEDURE — 83036 HEMOGLOBIN GLYCOSYLATED A1C: CPT

## 2017-06-03 PROCEDURE — 85730 THROMBOPLASTIN TIME PARTIAL: CPT

## 2017-06-03 PROCEDURE — 96374 THER/PROPH/DIAG INJ IV PUSH: CPT

## 2017-06-03 PROCEDURE — 84100 ASSAY OF PHOSPHORUS: CPT

## 2017-06-03 PROCEDURE — 700105 HCHG RX REV CODE 258: Performed by: INTERNAL MEDICINE

## 2017-06-03 PROCEDURE — 93005 ELECTROCARDIOGRAM TRACING: CPT | Performed by: INTERNAL MEDICINE

## 2017-06-03 PROCEDURE — 82962 GLUCOSE BLOOD TEST: CPT

## 2017-06-03 PROCEDURE — 96375 TX/PRO/DX INJ NEW DRUG ADDON: CPT

## 2017-06-03 RX ORDER — ONDANSETRON 2 MG/ML
4 INJECTION INTRAMUSCULAR; INTRAVENOUS EVERY 4 HOURS PRN
Status: DISCONTINUED | OUTPATIENT
Start: 2017-06-03 | End: 2017-06-10 | Stop reason: HOSPADM

## 2017-06-03 RX ORDER — SODIUM CHLORIDE 9 MG/ML
1000 INJECTION, SOLUTION INTRAVENOUS ONCE
Status: COMPLETED | OUTPATIENT
Start: 2017-06-03 | End: 2017-06-03

## 2017-06-03 RX ORDER — HYDRALAZINE HYDROCHLORIDE 20 MG/ML
10-20 INJECTION INTRAMUSCULAR; INTRAVENOUS EVERY 4 HOURS PRN
Status: DISCONTINUED | OUTPATIENT
Start: 2017-06-03 | End: 2017-06-10 | Stop reason: HOSPADM

## 2017-06-03 RX ORDER — MORPHINE SULFATE 4 MG/ML
4 INJECTION, SOLUTION INTRAMUSCULAR; INTRAVENOUS
Status: DISCONTINUED | OUTPATIENT
Start: 2017-06-03 | End: 2017-06-03

## 2017-06-03 RX ORDER — 3% SODIUM CHLORIDE 3 G/100ML
INJECTION, SOLUTION INTRAVENOUS CONTINUOUS
Status: DISCONTINUED | OUTPATIENT
Start: 2017-06-03 | End: 2017-06-04

## 2017-06-03 RX ORDER — ENALAPRILAT 1.25 MG/ML
1.25 INJECTION INTRAVENOUS EVERY 6 HOURS PRN
Status: DISCONTINUED | OUTPATIENT
Start: 2017-06-03 | End: 2017-06-10 | Stop reason: HOSPADM

## 2017-06-03 RX ORDER — DEXAMETHASONE SODIUM PHOSPHATE 4 MG/ML
10 INJECTION, SOLUTION INTRA-ARTICULAR; INTRALESIONAL; INTRAMUSCULAR; INTRAVENOUS; SOFT TISSUE ONCE
Status: COMPLETED | OUTPATIENT
Start: 2017-06-03 | End: 2017-06-03

## 2017-06-03 RX ORDER — ACETAMINOPHEN 325 MG/1
650 TABLET ORAL EVERY 6 HOURS PRN
Status: DISCONTINUED | OUTPATIENT
Start: 2017-06-03 | End: 2017-06-10 | Stop reason: HOSPADM

## 2017-06-03 RX ORDER — 3% SODIUM CHLORIDE 3 G/100ML
500 INJECTION, SOLUTION INTRAVENOUS CONTINUOUS
Status: DISCONTINUED | OUTPATIENT
Start: 2017-06-03 | End: 2017-06-03

## 2017-06-03 RX ORDER — SODIUM CHLORIDE 9 MG/ML
INJECTION, SOLUTION INTRAVENOUS CONTINUOUS
Status: DISCONTINUED | OUTPATIENT
Start: 2017-06-03 | End: 2017-06-05

## 2017-06-03 RX ORDER — DEXAMETHASONE SODIUM PHOSPHATE 4 MG/ML
10 INJECTION, SOLUTION INTRA-ARTICULAR; INTRALESIONAL; INTRAMUSCULAR; INTRAVENOUS; SOFT TISSUE EVERY 4 HOURS
Status: DISCONTINUED | OUTPATIENT
Start: 2017-06-04 | End: 2017-06-04

## 2017-06-03 RX ORDER — AMOXICILLIN 250 MG
2 CAPSULE ORAL 2 TIMES DAILY
Status: DISCONTINUED | OUTPATIENT
Start: 2017-06-04 | End: 2017-06-10 | Stop reason: HOSPADM

## 2017-06-03 RX ORDER — DEXTROSE MONOHYDRATE 25 G/50ML
25 INJECTION, SOLUTION INTRAVENOUS
Status: DISCONTINUED | OUTPATIENT
Start: 2017-06-03 | End: 2017-06-10 | Stop reason: HOSPADM

## 2017-06-03 RX ORDER — FAMOTIDINE 20 MG/1
20 TABLET, FILM COATED ORAL 2 TIMES DAILY
Status: DISCONTINUED | OUTPATIENT
Start: 2017-06-03 | End: 2017-06-10 | Stop reason: HOSPADM

## 2017-06-03 RX ORDER — POTASSIUM CHLORIDE 20 MEQ/1
40 TABLET, EXTENDED RELEASE ORAL ONCE
Status: COMPLETED | OUTPATIENT
Start: 2017-06-03 | End: 2017-06-03

## 2017-06-03 RX ORDER — NICOTINE 21 MG/24HR
14 PATCH, TRANSDERMAL 24 HOURS TRANSDERMAL
Status: DISCONTINUED | OUTPATIENT
Start: 2017-06-04 | End: 2017-06-10 | Stop reason: HOSPADM

## 2017-06-03 RX ORDER — ONDANSETRON 2 MG/ML
4 INJECTION INTRAMUSCULAR; INTRAVENOUS ONCE
Status: COMPLETED | OUTPATIENT
Start: 2017-06-03 | End: 2017-06-03

## 2017-06-03 RX ORDER — POLYETHYLENE GLYCOL 3350 17 G/17G
1 POWDER, FOR SOLUTION ORAL
Status: DISCONTINUED | OUTPATIENT
Start: 2017-06-03 | End: 2017-06-10 | Stop reason: HOSPADM

## 2017-06-03 RX ORDER — ONDANSETRON 4 MG/1
4 TABLET, ORALLY DISINTEGRATING ORAL EVERY 4 HOURS PRN
Status: DISCONTINUED | OUTPATIENT
Start: 2017-06-03 | End: 2017-06-10 | Stop reason: HOSPADM

## 2017-06-03 RX ORDER — LEVETIRACETAM 500 MG/1
500 TABLET ORAL 2 TIMES DAILY
Status: DISCONTINUED | OUTPATIENT
Start: 2017-06-03 | End: 2017-06-10 | Stop reason: HOSPADM

## 2017-06-03 RX ORDER — BISACODYL 10 MG
10 SUPPOSITORY, RECTAL RECTAL
Status: DISCONTINUED | OUTPATIENT
Start: 2017-06-03 | End: 2017-06-10 | Stop reason: HOSPADM

## 2017-06-03 RX ORDER — SODIUM CHLORIDE 9 MG/ML
INJECTION, SOLUTION INTRAVENOUS CONTINUOUS
Status: DISCONTINUED | OUTPATIENT
Start: 2017-06-03 | End: 2017-06-03

## 2017-06-03 RX ADMIN — SODIUM CHLORIDE: 9 INJECTION, SOLUTION INTRAVENOUS at 23:33

## 2017-06-03 RX ADMIN — SODIUM CHLORIDE: 9 INJECTION, SOLUTION INTRAVENOUS at 17:21

## 2017-06-03 RX ADMIN — LEVETIRACETAM 500 MG: 500 TABLET, FILM COATED ORAL at 22:35

## 2017-06-03 RX ADMIN — SODIUM CHLORIDE: 3 INJECTION, SOLUTION INTRAVENOUS at 22:19

## 2017-06-03 RX ADMIN — SODIUM CHLORIDE 1000 ML: 9 INJECTION, SOLUTION INTRAVENOUS at 22:22

## 2017-06-03 RX ADMIN — POTASSIUM CHLORIDE 40 MEQ: 1500 TABLET, EXTENDED RELEASE ORAL at 22:36

## 2017-06-03 RX ADMIN — ONDANSETRON 4 MG: 2 INJECTION INTRAMUSCULAR; INTRAVENOUS at 17:21

## 2017-06-03 RX ADMIN — HYDROMORPHONE HYDROCHLORIDE 1 MG: 1 INJECTION, SOLUTION INTRAMUSCULAR; INTRAVENOUS; SUBCUTANEOUS at 23:33

## 2017-06-03 RX ADMIN — MORPHINE SULFATE 4 MG: 4 INJECTION INTRAVENOUS at 17:21

## 2017-06-03 RX ADMIN — FAMOTIDINE 20 MG: 20 TABLET, FILM COATED ORAL at 22:35

## 2017-06-03 RX ADMIN — DEXAMETHASONE SODIUM PHOSPHATE 10 MG: 4 INJECTION, SOLUTION INTRAMUSCULAR; INTRAVENOUS at 21:25

## 2017-06-03 ASSESSMENT — PAIN SCALES - GENERAL
PAINLEVEL_OUTOF10: 10

## 2017-06-03 ASSESSMENT — LIFESTYLE VARIABLES
ALCOHOL_USE: NO
EVER_SMOKED: YES

## 2017-06-03 NOTE — IP AVS SNAPSHOT
[employer] Access Code: [email or web address]  Expires: 7/5/2017  3:57 AM    Your email address is not on file at [facility].  Email Addresses are required for you to sign up for [employer], please contact [phone] to verify your personal information and to provide your email address prior to attempting to register for [employer].    Fortino Ricci Jr.  [facility] [address]   [place] [address]    [employer]  A secure, online tool to manage your health information     [facility]’s [employer]® is a secure, online tool that connects you to your personalized health information from the privacy of your home -- day or night - making it very easy for you to manage your healthcare. Once the activation process is completed, you can even access your medical information using the [employer] logan, which is available for free in the [employer] store or Google Play store.     To learn more about [employer], visit [email or web address]    There are two levels of access available (as shown below):   My Chart Features  [facility] Primary Care Doctor [facility]  Specialists [facility]  Urgent  Care Non-[facility] Primary Care Doctor   Email your healthcare team securely and privately 24/7 X X X    Manage appointments: schedule your next appointment; view details of past/upcoming appointments X      Request prescription refills. X      View recent personal medical records, including lab and immunizations X X X X   View health record, including health history, allergies, medications X X X X   Read reports about your outpatient visits, procedures, consult and ER notes X X X X   See your discharge summary, which is a recap of your hospital and/or ER visit that includes your diagnosis, lab results, and care plan X X  X     How to register for [employer]:  Once your e-mail address has been verified, follow the following steps to sign up for [employer].     1. Go to  [email or web address]  2. Click on the Sign Up Now box, which takes you to the New Member Sign  Up page. You will need to provide the following information:  a. Enter your [employer] Access Code exactly as it appears at the top of this page. (You will not need to use this code after you’ve completed the sign-up process. If you do not sign up before the expiration date, you must request a new code.)   b. Enter your date of birth.   c. Enter your home email address.   d. Click Submit, and follow the next screen’s instructions.  3. Create a [employer] ID. This will be your [employer] login ID and cannot be changed, so think of one that is secure and easy to remember.  4. Create a [employer] password. You can change your password at any time.  5. Enter your Password Reset Question and Answer. This can be used at a later time if you forget your password.   6. Enter your e-mail address. This allows you to receive e-mail notifications when new information is available in [employer].  7. Click Sign Up. You can now view your health information.    For assistance activating your [employer] account, call [phone]

## 2017-06-03 NOTE — ED NOTES
Pt BIB EMS from home for N/V and headache x 2 days. Denies blood in emesis. Pt currently being treated for brain CA. Pt received 100mq Fentanyl en route. Pt developed chest discomfort after receiving Fentanyl

## 2017-06-03 NOTE — IP AVS SNAPSHOT
" Home Care Instructions                                                                                                                  Name:Fortino Ricci Jr.  Medical Record Number:[id number]  CSN: [id number]    YOB: 1970   Age: 46 y.o.  Sex: male  HT:1.75 m (5' 8.9\") WT: 81.1 kg (178 lb 12.7 oz)          Admit Date: 6/3/2017     Discharge Date:   Today's Date: 6/10/2017  Attending Doctor:  Catie Weathers M.D.                  Allergies:  Pcn            Discharge Instructions       Discharge Instructions    Discharged to home by medical transportation with escort. Discharged via ambulance, hospital escort: Yes.  Special equipment needed: Not Applicable    Be sure to schedule a follow-up appointment with your primary care doctor or any specialists as instructed.     Discharge Plan:   Smoking Cessation Offered: Patient Refused  Influenza Vaccine Indication: Indicated: Not available from distributor/    I understand that a diet low in cholesterol, fat, and sodium is recommended for good health. Unless I have been given specific instructions below for another diet, I accept this instruction as my diet prescription.   Other diet: Regular    Special Instructions: None    · Is patient discharged on Warfarin / Coumadin?   No     · Is patient Post Blood Transfusion?  NoGlioblastoma  Glioblastoma, also called Grade IV astrocytoma, is a type of brain cancer. Glioblastoma tumors are made up of an overgrowth of normal brain cells. A tumor is formed when these cells grow into a mass of tissue. Most people develop glioblastoma between the ages of 45 years and 70 years.   RISK FACTORS  Tumor cells of people who have had a low-grade astrocytoma may progress to this higher-grade type of cell. People who have had radiation exposure or a family cancer syndrome, such as Li-Fraumeni syndrome or Turcot syndrome, are at greater risk of glioblastoma.   SYMPTOMS   Symptoms of glioblastoma may depend on the size " and location of the tumor. Possible symptoms include:   Headache, which may be worse in the morning.    Nausea and vomiting.  Vision changes.    Seizures.    Being unable to walk.  Weakness or numbness on one side of the body or in an arm or leg.    Mood changes.    Problems with memory or thinking.  DIAGNOSIS   Brain tumors can usually be seen on brain imaging, such as CT scan or MRI. A sample of the tumor will need to be studied in a laboratory (biopsy) to confirm the diagnosis of glioblastoma.  TREATMENT   There are several ways that glioblastoma is treated. Often, a person will undergo more than one type of treatment:  Surgery to remove as much of the tumor as possible.  High-energy rays (radiation therapy) to help shrink or kill the tumor.  Chemotherapy to shrink or kill the tumor. Because normal cells may also be killed, chemotherapy has many side effects.  Targeted therapy uses substances that injure or kill cancer cells without affecting normal cells.  Steroid medicine to decrease brain swelling and improve symptoms.  HOME CARE INSTRUCTIONS  Take all medicines as directed by your health care provider.    Go to all of your follow-up appointments.  SEEK MEDICAL CARE IF:  Any symptoms come back.  You have diarrhea, vomiting, or abdominal pain.  You cannot eat or drink what you need.  You are more weak or tired than usual.    You are losing weight without trying.  SEEK IMMEDIATE MEDICAL CARE IF:  Your diarrhea, vomiting, or abdominal pain does not go away.  You have new symptoms, such as vision problems or difficulty walking.    You have a seizure.    You have bleeding that does not stop.    You have trouble breathing.    You have a fever.       This information is not intended to replace advice given to you by your health care provider. Make sure you discuss any questions you have with your health care provider.     Document Released: 12/23/2014 Document Revised: 01/08/2016 Document Reviewed:  12/23/2014  [employer] Interactive Patient Education ©2016 [employer]      Depression / Suicide Risk    As you are discharged from this [facility] facility, it is important to learn how to keep safe from harming yourself.    Recognize the warning signs:  · Abrupt changes in personality, positive or negative- including increase in energy   · Giving away possessions  · Change in eating patterns- significant weight changes-  positive or negative  · Change in sleeping patterns- unable to sleep or sleeping all the time   · Unwillingness or inability to communicate  · Depression  · Unusual sadness, discouragement and loneliness  · Talk of wanting to die  · Neglect of personal appearance   · Rebelliousness- reckless behavior  · Withdrawal from people/activities they love  · Confusion- inability to concentrate     If you or a loved one observes any of these behaviors or has concerns about self-harm, here's what you can do:  · Talk about it- your feelings and reasons for harming yourself  · Remove any means that you might use to hurt yourself (examples: pills, rope, extension cords, firearm)  · Get professional help from the community (Mental Health, Substance Abuse, psychological counseling)  · Do not be alone:Call your Safe Contact- someone whom you trust who will be there for you.  · Call your local CRISIS HOTLINE 800-3137 or 857-009-0058  · Call your local Children's Mobile Crisis Response Team [place] (816) 243-6869 or www.Foodyn  · Call the toll free National Suicide Prevention Hotlines   · National Suicide Prevention Lifeline 972-010-HLTL (6079)  · National Hope Line Network 800-SUICIDE (082-6816)        Your appointments     Jul 05, 2017  8:20 AM   New Patient with TEJ Mcgregor   [facility] [address]    [address]  [address]  [place] [id number] [phone]           Please bring Photo ID, Insurance Cards, All Medication Bottles and copies of any legal  documents (such as Living Will, Power of ) If speaking a language besides English please bring an adult . Please arrive 30 minutes prior for check in and registration. You will be receiving a confirmation call a few days before your appointment from our automated call confirmation system.              Follow-up Information     1. Follow up with [facility] .    Specialty:  Hospice    Contact information    [address]  [facility] [phone]         Discharge Medication Instructions:    Below are the medications your physician expects you to take upon discharge:    Review all your home medications and newly ordered medications with your doctor and/or pharmacist. Follow medication instructions as directed by your doctor and/or pharmacist.    Please keep your medication list with you and share with your physician.               Medication List      START taking these medications        Instructions    Morning Afternoon Evening Bedtime    dexamethasone 0.5 MG Tabs   Commonly known as:  DECADRON        Take 16 Tabs by mouth 3 times a day for 30 days.   Dose:  8 mg                        HYDROmorphone 2 MG Tabs   Commonly known as:  DILAUDID        Take 1-2 Tabs by mouth every 3 hours as needed for Severe Pain for up to 15 days.   Dose:  2-4 mg                        levetiracetam 500 MG Tabs   Last time this was given:  500 mg on 6/10/2017  8:18 AM   Commonly known as:  KEPPRA        Take 1 Tab by mouth 2 Times a Day.   Dose:  500 mg                        omeprazole 20 MG delayed-release capsule   Commonly known as:  PRILOSEC        Take 1 Cap by mouth 2 times a day for 30 days.   Dose:  20 mg                             Where to Get Your Medications      These medications were sent to [facility] - [place] - [address]  [address] [facility] [facility] [address]     Phone:  [phone]    - dexamethasone 0.5 MG Tabs  - levetiracetam 500 MG Tabs  -  omeprazole 20 MG delayed-release capsule      Information about where to get these medications is not yet available     ! Ask your nurse or doctor about these medications    - HYDROmorphone 2 MG Tabs            Orders for after discharge     REFERRAL TO HOSPICE    Complete by:  As directed              Instructions           Diet / Nutrition:    Follow any diet instructions given to you by your doctor or the dietician, including how much salt (sodium) you are allowed each day.    If you are overweight, talk to your doctor about a weight reduction plan.    Activity:    Remain physically active following your doctor's instructions about exercise and activity.    Rest often.     Any time you become even a little tired or short of breath, SIT DOWN and rest.    Worsening Symptoms:    Report any of the following signs and symptoms to the doctor's office immediately:    *Pain of jaw, arm, or neck  *Chest pain not relieved by medication                               *Dizziness or loss of consciousness  *Difficulty breathing even when at rest   *More tired than usual                                       *Bleeding drainage or swelling of surgical site  *Swelling of feet, ankles, legs or stomach                 *Fever (>100ºF)  *Pink or blood tinged sputum  *Weight gain (3lbs/day or 5lbs /week)           *Shock from internal defibrillator (if applicable)  *Palpitations or irregular heartbeats                *Cool and/or numb extremities    Stroke Awareness    Common Risk Factors for Stroke include:    Age  Atrial Fibrillation  Carotid Artery Stenosis  Diabetes Mellitus  Excessive alcohol consumption  High blood pressure  Overweight   Physical inactivity  Smoking    Warning signs and symptoms of a stroke include:    *Sudden numbness or weakness of the face, arm or leg (especially on one side of the body).  *Sudden confusion, trouble speaking or understanding.  *Sudden trouble seeing in one or both eyes.  *Sudden trouble  walking, dizziness, loss of balance or coordination.Sudden severe headache with no known cause.    It is very important to get treatment quickly when a stroke occurs. If you experience any of the above warning signs, call 911 immediately.                   Disclaimer         Quit Smoking / Tobacco Use:    I understand the use of any tobacco products increases my chance of suffering from future heart disease or stroke and could cause other illnesses which may shorten my life. Quitting the use of tobacco products is the single most important thing I can do to improve my health. For further information on smoking / tobacco cessation call a Toll Free Quit Line at 1-785.680.8356 (*[employer]) or 1-311.244.3129 (American Lung Association) or you can access the web based program at www.lungPaperG.org.    [place] Tobacco Users Help Line:  (901) 271-8242       Toll Free: 1-958.704.5853  Quit Tobacco Program [facility] (306)372-8293    Crisis Hotline:    [place] Crisis Hotline:  8-026-EAUDFJK or 1-818.703.2760    [place] Crisis Hotline:    1-859.220.2899 or 720-178-2127    Discharge Survey:   Thank you for choosing [facility]. We hope we did everything we could to make your hospital stay a pleasant one. You may be receiving a phone survey and we would appreciate your time and participation in answering the questions. Your input is very valuable to us in our efforts to improve our service to our patients and their families.        My signature on this form indicates that:    1. I have reviewed and understand the above information.  2. My questions regarding this information have been answered to my satisfaction.  3. I have formulated a plan with my discharge nurse to obtain my prescribed medications for home.                  Disclaimer         __________________________________                     __________       ________                       Patient Signature                                                  Date                    Time

## 2017-06-03 NOTE — IP AVS SNAPSHOT
" <p align=\"LEFT\"><IMG SRC=\"//[email or web address]$/[email or web address]\" alt=\"Image\" WIDTH=\"50%\" HEIGHT=\"200\" BORDER=\"\"></p>                   Name:Fortino Ricci Jr.  Medical Record Number:[id number]  CSN: [id number]    YOB: 1970   Age: 46 y.o.  Sex: male  HT:1.75 m (5' 8.9\") WT: 81.1 kg (178 lb 12.7 oz)          Admit Date: 6/3/2017     Discharge Date:   Today's Date: 6/10/2017  Attending Doctor:  Catie Weathers M.D.                  Allergies:  Pcn          Your appointments     Jul 05, 2017  8:20 AM   New Patient with TEJ Mcgregor   [facility] [address]    [address]  [facility] [address]  [facility] [id number] [phone]           Please bring Photo ID, Insurance Cards, All Medication Bottles and copies of any legal documents (such as Living Will, Power of ) If speaking a language besides English please bring an adult . Please arrive 30 minutes prior for check in and registration. You will be receiving a confirmation call a few days before your appointment from our automated call confirmation system.              Follow-up Information     1. Follow up with [facility] .    Specialty:  Hospice    Contact information    [address] [phone]         Medication List      Take these Medications        Instructions    dexamethasone 0.5 MG Tabs   Commonly known as:  DECADRON    Take 16 Tabs by mouth 3 times a day for 30 days.   Dose:  8 mg       HYDROmorphone 2 MG Tabs   Commonly known as:  DILAUDID    Take 1-2 Tabs by mouth every 3 hours as needed for Severe Pain for up to 15 days.   Dose:  2-4 mg       levetiracetam 500 MG Tabs   Commonly known as:  KEPPRA    Take 1 Tab by mouth 2 Times a Day.   Dose:  500 mg       omeprazole 20 MG delayed-release capsule   Commonly known as:  PRILOSEC    Take 1 Cap by mouth 2 times a day for 30 days.   Dose:  20 mg         "

## 2017-06-03 NOTE — IP AVS SNAPSHOT
6/10/2017    Fortino Ricci Jr.  [address] [address]   [place] [address]    Dear Fortino:    [facility] wants to ensure your discharge home is safe and you or your loved ones have had all of your questions answered regarding your care after you leave the hospital.    Below is a list of resources and contact information should you have any questions regarding your hospital stay, follow-up instructions, or active medical symptoms.    Questions or Concerns Regarding… Contact   Medical Questions Related to Your Discharge  (7 days a week, 8am-5pm) Contact a Nurse Care Coordinator   [phone]   Medical Questions Not Related to Your Discharge  (24 hours a day / 7 days a week)  Contact the Nurse Health Line   [phone]    Medications or Discharge Instructions Refer to your discharge packet   or contact your [facility] Primary Care Provider   [phone]   Follow-up Appointment(s) Schedule your appointment via [employer]   or contact Scheduling [phone]   Billing Review your statement via [employer]  or contact Billing [phone]   Medical Records Review your records via [employer]   or contact Medical Records [phone]     You may receive a telephone call within two days of discharge. This call is to make certain you understand your discharge instructions and have the opportunity to have any questions answered. You can also easily access your medical information, test results and upcoming appointments via the [employer] free online health management tool. You can learn more and sign up at [employer]. For assistance setting up your [employer] account, please call [phone].    Once again, we want to ensure your discharge home is safe and that you have a clear understanding of any next steps in your care. If you have any questions or concerns, please do not hesitate to contact us, we are here for you. Thank you for choosing [facility] for your healthcare needs.    Sincerely,    Your [facility]  Healthcare Team

## 2017-06-04 ENCOUNTER — APPOINTMENT (OUTPATIENT)
Dept: RADIOLOGY | Facility: MEDICAL CENTER | Age: 47
DRG: 054 | End: 2017-06-04
Attending: INTERNAL MEDICINE
Payer: MEDICARE

## 2017-06-04 PROBLEM — H53.9 VISION CHANGES: Status: ACTIVE | Noted: 2017-06-04

## 2017-06-04 LAB
ALBUMIN SERPL BCP-MCNC: 3.8 G/DL (ref 3.2–4.9)
ALBUMIN/GLOB SERPL: 1.6 G/DL
ALP SERPL-CCNC: 85 U/L (ref 30–99)
ALT SERPL-CCNC: 73 U/L (ref 2–50)
ANION GAP SERPL CALC-SCNC: 8 MMOL/L (ref 0–11.9)
AST SERPL-CCNC: 36 U/L (ref 12–45)
BILIRUB SERPL-MCNC: 0.4 MG/DL (ref 0.1–1.5)
BUN SERPL-MCNC: 2 MG/DL (ref 8–22)
CALCIUM SERPL-MCNC: 8.9 MG/DL (ref 8.5–10.5)
CHLORIDE SERPL-SCNC: 116 MMOL/L (ref 96–112)
CHOLEST SERPL-MCNC: 179 MG/DL (ref 100–199)
CO2 SERPL-SCNC: 23 MMOL/L (ref 20–33)
CREAT SERPL-MCNC: 0.5 MG/DL (ref 0.5–1.4)
EKG IMPRESSION: NORMAL
ERYTHROCYTE [DISTWIDTH] IN BLOOD BY AUTOMATED COUNT: 45.5 FL (ref 35.9–50)
GFR SERPL CREATININE-BSD FRML MDRD: >60 ML/MIN/1.73 M 2
GLOBULIN SER CALC-MCNC: 2.4 G/DL (ref 1.9–3.5)
GLUCOSE BLD-MCNC: 126 MG/DL (ref 65–99)
GLUCOSE BLD-MCNC: 130 MG/DL (ref 65–99)
GLUCOSE BLD-MCNC: 137 MG/DL (ref 65–99)
GLUCOSE BLD-MCNC: 152 MG/DL (ref 65–99)
GLUCOSE SERPL-MCNC: 134 MG/DL (ref 65–99)
HCT VFR BLD AUTO: 43.5 % (ref 42–52)
HDLC SERPL-MCNC: 42 MG/DL
HGB BLD-MCNC: 14.8 G/DL (ref 14–18)
LACTATE BLD-SCNC: 2.5 MMOL/L (ref 0.5–2)
LDLC SERPL CALC-MCNC: 106 MG/DL
MAGNESIUM SERPL-MCNC: 2 MG/DL (ref 1.5–2.5)
MCH RBC QN AUTO: 32.5 PG (ref 27–33)
MCHC RBC AUTO-ENTMCNC: 34 G/DL (ref 33.7–35.3)
MCV RBC AUTO: 95.4 FL (ref 81.4–97.8)
PHOSPHATE SERPL-MCNC: 2.2 MG/DL (ref 2.5–4.5)
PLATELET # BLD AUTO: 219 K/UL (ref 164–446)
PMV BLD AUTO: 8.9 FL (ref 9–12.9)
POTASSIUM SERPL-SCNC: 4.4 MMOL/L (ref 3.6–5.5)
PROT SERPL-MCNC: 6.2 G/DL (ref 6–8.2)
RBC # BLD AUTO: 4.56 M/UL (ref 4.7–6.1)
SODIUM SERPL-SCNC: 147 MMOL/L (ref 135–145)
T4 FREE SERPL-MCNC: 0.94 NG/DL (ref 0.53–1.43)
TRIGL SERPL-MCNC: 157 MG/DL (ref 0–149)
WBC # BLD AUTO: 6.2 K/UL (ref 4.8–10.8)

## 2017-06-04 PROCEDURE — 700111 HCHG RX REV CODE 636 W/ 250 OVERRIDE (IP): Performed by: INTERNAL MEDICINE

## 2017-06-04 PROCEDURE — 80053 COMPREHEN METABOLIC PANEL: CPT

## 2017-06-04 PROCEDURE — 84439 ASSAY OF FREE THYROXINE: CPT

## 2017-06-04 PROCEDURE — 80061 LIPID PANEL: CPT

## 2017-06-04 PROCEDURE — 84100 ASSAY OF PHOSPHORUS: CPT

## 2017-06-04 PROCEDURE — 302131 K PAD MOTOR: Performed by: HOSPITALIST

## 2017-06-04 PROCEDURE — 770001 HCHG ROOM/CARE - MED/SURG/GYN PRIV*

## 2017-06-04 PROCEDURE — A9579 GAD-BASE MR CONTRAST NOS,1ML: HCPCS | Performed by: HOSPITALIST

## 2017-06-04 PROCEDURE — 83735 ASSAY OF MAGNESIUM: CPT

## 2017-06-04 PROCEDURE — 700102 HCHG RX REV CODE 250 W/ 637 OVERRIDE(OP): Performed by: INTERNAL MEDICINE

## 2017-06-04 PROCEDURE — 99233 SBSQ HOSP IP/OBS HIGH 50: CPT | Performed by: HOSPITALIST

## 2017-06-04 PROCEDURE — A9270 NON-COVERED ITEM OR SERVICE: HCPCS | Performed by: HOSPITALIST

## 2017-06-04 PROCEDURE — 70553 MRI BRAIN STEM W/O & W/DYE: CPT

## 2017-06-04 PROCEDURE — 85027 COMPLETE CBC AUTOMATED: CPT

## 2017-06-04 PROCEDURE — 302152 K-PAD 12X17: Performed by: HOSPITALIST

## 2017-06-04 PROCEDURE — 83605 ASSAY OF LACTIC ACID: CPT

## 2017-06-04 PROCEDURE — 82962 GLUCOSE BLOOD TEST: CPT

## 2017-06-04 PROCEDURE — A9270 NON-COVERED ITEM OR SERVICE: HCPCS | Performed by: INTERNAL MEDICINE

## 2017-06-04 PROCEDURE — 700111 HCHG RX REV CODE 636 W/ 250 OVERRIDE (IP): Performed by: HOSPITALIST

## 2017-06-04 PROCEDURE — 700117 HCHG RX CONTRAST REV CODE 255: Performed by: HOSPITALIST

## 2017-06-04 PROCEDURE — 700102 HCHG RX REV CODE 250 W/ 637 OVERRIDE(OP): Performed by: HOSPITALIST

## 2017-06-04 RX ORDER — LORAZEPAM 1 MG/1
0.5 TABLET ORAL EVERY 4 HOURS PRN
Status: DISCONTINUED | OUTPATIENT
Start: 2017-06-04 | End: 2017-06-10 | Stop reason: HOSPADM

## 2017-06-04 RX ORDER — SODIUM CHLORIDE 1 G/1
1 TABLET ORAL
Status: DISCONTINUED | OUTPATIENT
Start: 2017-06-04 | End: 2017-06-10 | Stop reason: HOSPADM

## 2017-06-04 RX ORDER — DEXAMETHASONE SODIUM PHOSPHATE 4 MG/ML
8 INJECTION, SOLUTION INTRA-ARTICULAR; INTRALESIONAL; INTRAMUSCULAR; INTRAVENOUS; SOFT TISSUE EVERY 6 HOURS
Status: DISCONTINUED | OUTPATIENT
Start: 2017-06-04 | End: 2017-06-10 | Stop reason: HOSPADM

## 2017-06-04 RX ADMIN — HYDROMORPHONE HYDROCHLORIDE 1 MG: 1 INJECTION, SOLUTION INTRAMUSCULAR; INTRAVENOUS; SUBCUTANEOUS at 03:25

## 2017-06-04 RX ADMIN — DEXAMETHASONE SODIUM PHOSPHATE 10 MG: 4 INJECTION, SOLUTION INTRAMUSCULAR; INTRAVENOUS at 05:41

## 2017-06-04 RX ADMIN — DIBASIC SODIUM PHOSPHATE, MONOBASIC POTASSIUM PHOSPHATE AND MONOBASIC SODIUM PHOSPHATE 1 TABLET: 852; 155; 130 TABLET ORAL at 20:46

## 2017-06-04 RX ADMIN — SODIUM CHLORIDE TAB 1 GM 1 G: 1 TAB at 13:06

## 2017-06-04 RX ADMIN — DEXAMETHASONE SODIUM PHOSPHATE 8 MG: 4 INJECTION, SOLUTION INTRAMUSCULAR; INTRAVENOUS at 23:42

## 2017-06-04 RX ADMIN — SENNOSIDES AND DOCUSATE SODIUM 2 TABLET: 8.6; 5 TABLET ORAL at 20:44

## 2017-06-04 RX ADMIN — INSULIN LISPRO 2 UNITS: 100 INJECTION, SOLUTION INTRAVENOUS; SUBCUTANEOUS at 17:56

## 2017-06-04 RX ADMIN — GADODIAMIDE 20 ML: 287 INJECTION INTRAVENOUS at 12:43

## 2017-06-04 RX ADMIN — DEXAMETHASONE SODIUM PHOSPHATE 8 MG: 4 INJECTION, SOLUTION INTRAMUSCULAR; INTRAVENOUS at 13:00

## 2017-06-04 RX ADMIN — DEXAMETHASONE SODIUM PHOSPHATE 8 MG: 4 INJECTION, SOLUTION INTRAMUSCULAR; INTRAVENOUS at 17:57

## 2017-06-04 RX ADMIN — NICOTINE 14 MG: 14 PATCH, EXTENDED RELEASE TRANSDERMAL at 05:41

## 2017-06-04 RX ADMIN — LEVETIRACETAM 500 MG: 500 TABLET, FILM COATED ORAL at 20:44

## 2017-06-04 RX ADMIN — ACETAMINOPHEN 650 MG: 325 TABLET, FILM COATED ORAL at 20:44

## 2017-06-04 RX ADMIN — SENNOSIDES AND DOCUSATE SODIUM 2 TABLET: 8.6; 5 TABLET ORAL at 09:07

## 2017-06-04 RX ADMIN — DEXAMETHASONE SODIUM PHOSPHATE 10 MG: 4 INJECTION, SOLUTION INTRAMUSCULAR; INTRAVENOUS at 03:15

## 2017-06-04 RX ADMIN — LORAZEPAM 0.5 MG: 1 TABLET ORAL at 20:44

## 2017-06-04 RX ADMIN — FAMOTIDINE 20 MG: 20 TABLET, FILM COATED ORAL at 09:07

## 2017-06-04 RX ADMIN — LEVETIRACETAM 500 MG: 500 TABLET, FILM COATED ORAL at 09:58

## 2017-06-04 RX ADMIN — SODIUM CHLORIDE TAB 1 GM 1 G: 1 TAB at 17:58

## 2017-06-04 RX ADMIN — FAMOTIDINE 20 MG: 20 TABLET, FILM COATED ORAL at 20:44

## 2017-06-04 ASSESSMENT — LIFESTYLE VARIABLES
DO YOU DRINK ALCOHOL: NO
DO YOU DRINK ALCOHOL: NO

## 2017-06-04 ASSESSMENT — ENCOUNTER SYMPTOMS
FEVER: 0
BLURRED VISION: 1
CHILLS: 0
FOCAL WEAKNESS: 1
DIZZINESS: 1
HEADACHES: 1
RESPIRATORY NEGATIVE: 1
DOUBLE VISION: 1
NERVOUS/ANXIOUS: 1
MUSCULOSKELETAL NEGATIVE: 1
CARDIOVASCULAR NEGATIVE: 1
GASTROINTESTINAL NEGATIVE: 1
WEAKNESS: 1

## 2017-06-04 ASSESSMENT — PAIN SCALES - GENERAL
PAINLEVEL_OUTOF10: 10
PAINLEVEL_OUTOF10: 2
PAINLEVEL_OUTOF10: 10
PAINLEVEL_OUTOF10: 2
PAINLEVEL_OUTOF10: 3
PAINLEVEL_OUTOF10: 3
PAINLEVEL_OUTOF10: 4
PAINLEVEL_OUTOF10: 3
PAINLEVEL_OUTOF10: 3
PAINLEVEL_OUTOF10: 5
PAINLEVEL_OUTOF10: 9

## 2017-06-04 NOTE — H&P
DATE OF SERVICE: 06/03/2017    PRIMARY CARE PHYSICIAN:  Unassigned.    ONCOLOGIST:  Ashley Lee MD and Vel Iniguez MD    NEUROSURGEON:  Girma Edgar MD    CHIEF COMPLAINT:  Severe headaches.    HISTORY OF PRESENTING ILLNESS:  This is a 46-year-old male who presents to the   emergency room for further evaluation of severe headaches.  To note, this   patient has a history of underlying glioblastoma multiforme for which he has   undergone two craniotomies, the first one in [place] and subsequent craniotomy   performed by Dr. Edgar.  He was previously followed by Dr. Lee and his   care has been now transitioned to Dr. Iniguez.  He also has undergone   chemotherapy and radiation therapy in the past.  The patient was recently   admitted to the hospital end of April and discharged on 05/01/2017.  Back   then, he presented with headaches.  He was not found to be a surgical   candidate any further for underlying glioblastoma multiforme.  He was advised   ongoing close followup with oncology and he was advised to be on Decadron for   the rest of his life.  The patient had outpatient followup scheduled with Dr. Iniguez but the patient failed to maintain this outpatient followup.  In   addition, the patient reports that he was never taking his medications since   his discharge.  The patient failed to comply with any of the medical   recommendations provided to him.  It appears that Dr. Iniguez's office has been   recurrently trying to reach this patient to establish him in the outpatient   setting, but the patient failed to present in the outpatient setting for   ongoing followup.  He now presents to the emergency room with complaints of   headaches and having nausea and vomiting at home.  Otherwise, he denies any   fevers or chills.  He denies any chest pain, shortness of breath, or cough.    He denies any abdominal pain, diarrhea, constipation, blood in bowel movements   or melena.  He denies any dysuria,  frequency, urgency, or hematuria.  He   denies any lower extremity swelling.  His only complaints are headaches which   are bilateral in his temples, achy in character, 8/10 in intensity and   nonradiating.  He reports that his headache improved after receiving   intravenous Dilaudid, reports worsening with light and noise.  Otherwise, he   denies any new motor weakness or any new numbness, tingling, or paresthesias   of his extremities.  He reports he can walk fine.  Denies any ataxia.  He   otherwise denies any dysarthria or aphasia.  He denies any other neurological   symptoms.    HOME MEDICATIONS:  The patient denies taking any medications.    PAST MEDICAL HISTORY:  1.  Glioblastoma multiforme status post craniotomy twice and chemotherapy and   radiation therapy.  2.  Depression.  3.  History of seizures.  4.  Known medical noncompliance.  5.  History of hypokalemia.  6.  Steroid-induced hyperglycemia.  7.  History of alcohol abuse.  8.  Ongoing nicotine dependence.    PAST SURGICAL HISTORY:  Craniotomy twice for underlying glioblastoma   multiforme.    FAMILY HISTORY:  The patient reports his father had a history of heart   problems.  Mother had a history of breast cancer.    SOCIAL HISTORY:  The patient has ongoing nicotine dependence.  He reports   smoking anywhere from 3-5 cigarettes a day.  He denies any use of alcohol or   drugs of abuse.  He is currently living with his brother.    ALLERGIES:  Patient has reported ALLERGIES TO PENICILLIN.    REVIEW OF SYSTEMS:  A detailed review of system was reviewed with the patient   and negative other than as listed in the history of presenting illness and   past medical history.    PHYSICAL EXAMINATION:  VITAL SIGNS:  On presentation, temperature of 36.9 degrees Celsius, pulse of   96, respiratory rate of 20, blood pressure 118/87, weight of 74.8 kg, and   oxygen saturation of 93% on room air.  GENERAL:  The patient is alert and oriented x4 in no acute  distress.  HEAD, EYES, AND ENT:  Head is normocephalic.  Extraocular movements are   intact.  Bilateral pupils are equal and responsive to light.  No conjunctival   pallor or scleral icterus is noted.  NECK:  No jugular venous distention.  CARDIOVASCULAR:  Regular rate and rhythm.  S1 plus S2.  No murmurs, rubs, or   gallops.  RESPIRATORY:  Clear to auscultation bilaterally, slightly diminished in bases   though.  ABDOMEN:  Soft, nontender, nondistended.  Bowel sounds positive and normal in   frequency.  GENITOURINARY:  Not examined.  MUSCULOSKELETAL:  No joint swelling or tenderness on examination.  SKIN:  No rashes, erythema, or wounds.  NEUROLOGICAL:  Cranial nerves without any gross deficit.  Motor strength of   5/5 in bilateral upper and lower extremities.  No deficits are noted overall   on neurological examination.  EXTREMITIES:  Pulses 2+ in bilateral lower extremities.  No edema, no   cyanosis.    LABORATORY STUDIES:  White blood cell count of 10.2, hemoglobin of 16.1,   platelet count of 232.  Sodium of 139, potassium of 3.6, BUN of 5, creatinine   of 0.51.  Slight elevation of ALT at 74.  Lactic acid level of 2.5, hemoglobin   A1c of 5.4.  INR of 0.91.  Urinalysis negative for any evidence of infection.    TSH of 0.21.    IMAGING STUDIES:  A noncontrasted CT of the head obtained today on   presentation reveals increased vasogenic edema on the right with effacement of   the cortical sulci.  Increased mass effect is seen on the ventricles with 7.5   mm midline shift to the left, which is new.  Right temporal and occipital   lesion is again noted involving the splenium of corpus callosum with internal   curvilinear hyperdensity which has increased which may represent a small   amount of intralesional hemorrhage.  There is increased edema in the medial   left temporal frontoparietal lobes as well.  There is some effacement of   cortical sulci on the left.  When compared to this patient's prior CT of the    head from the April 2017 significantly worsened findings are noted.    IMPRESSION:  1.  Underlying glioblastoma multiforme, which is nonoperative at this point in   time.  2.  Profound vasogenic brain edema with effacement of cortical sulci and   predominant midline shift.  3.  Intracerebral hemorrhage within the tumor bed.  4.  Lactic acidosis without any evidence of infection.  5.  Intractable headache.  6.  Depressed TSH.  7.  History of seizures.  8.  History of depression.  9.  Known medical noncompliance.  10.  Failure to follow up in the outpatient setting.  11.  Status post craniotomy twice.  12.  History of chemotherapy and radiation therapy to the tumor bed.  13.  History of steroid-induced hyperglycemia.  14.  History of alcohol abuse.  15.  Ongoing nicotine dependence.  16.  Do not resuscitate and do not intubate code status.    PLAN:  The patient with known nonsurgical malignancy of the central nervous   system.  Previously, he was advised to maintain outpatient followup and   ongoing use of Decadron, which the patient has failed to comply with.  Dr. Edgar has evaluated this patient and he has recommended hospice care at this   point in time.  The patient evaluated by me and I discussed the terminal   nature of his illness with him.  At this point in time, he wants to proceed   with a do not resuscitate and do not intubate code status, but he wants to   continue ongoing medical treatment at this point in time.  Given this   patient's underlying profound vasogenic edema and effacement of cortical sulci   with the predominant midline shift, he will be admitted in critical condition   to the intensive care unit and ongoing every 1 hour neuro checks will be   maintained.  He will be initiated on an aggressive regimen of intravenous   dexamethasone and he will be initiated on stress ulcer prophylaxis while   steroids are continued and sliding scale insulin will be initiated to prevent   steroid-induced  hyperglycemia.  In addition, we will initiate this patient   back on Keppra for his known history of seizures likely secondary to   underlying malignancy.  In addition, acutely I would recommend patient be   initiated on a continuous hypertonic saline drip so his sodium levels can be   maintained at least greater than 140s in the short term while steroids work to   reduce the underlying edema.  Once his sodium levels are greater than 140, he   can be potentially initiated on salt tablets and transferred out of the   intensive care unit.  Recommend consultation from palliative team for advanced   care planning with this patient, which has been requested by me.  Otherwise,   if the patient wants to continue further treatment, I would recommend   discussing his case with oncology and radiation oncology in the morning to see   if the patient would be a candidate for any further chemotherapy or radiation   therapy.  Meanwhile, recommend ongoing monitoring of sodium levels every 6   hours while patient is being maintained on hypertonic saline.  Recommend   checking a urine drug screen.  The patient is noted to have lactic acidosis   without any evidence of infection.  Recommend repeating a lactic acid level   during patient to ICU stay.  A depressed TSH level is noted for which I would   recommend obtaining a free thyroxine levels.  Otherwise, he has known   underlying medical noncompliance.  I have counseled and educated the patient   regarding this.  He has been counseled and educated regarding smoking   cessation.  Otherwise, minimal intracerebral hemorrhage is noted within the   tumor bed for which she will be admitted with cerebral hemorrhage protocol   with ongoing close hemodynamic monitoring within the intensive care unit.    Otherwise, DVT preventive prophylaxis with sequential compression devices has   been ordered.  Subcutaneous heparin or Lovenox remains contraindicated given   underlying intracranial  hemorrhage.  Stress ulcer prophylaxis and sliding   scale insulin therapy has been initiated.  The patient wants to proceed with a   do not resuscitate and do not intubate code status, which has been updated in   the electronic medical record system to reflect the patient's wishes and   patient has verbalized understanding of his code status.  Further palliative   team consultation has been requested for advance care planning.    This patient has critical/life threatening illness as discussed above   requiring my direct presence, involvement and maximal preparedness.  I have   personally spent 45 minutes providing critical care to this patient.    Time spent on critical care excludes time spent on procedures if any.       ____________________________________     MD NADIRA WOLF / JOVITA    DD:  06/04/2017 01:20:01  DT:  06/04/2017 01:58:38    D#:  [id number]  Job#:  [id number]

## 2017-06-04 NOTE — CONSULTS
DATE OF SERVICE:  06/03/2017    HISTORY OF PRESENT ILLNESS:  A 46-year-old male who is status post right-sided   craniotomy for near gross total excision of a glioblastoma multiforme on   12/23/2016.  The patient had prolonged hospital stay afterwards and he has had   subsequent readmissions to the hospital.  He has been able to undergo some   postoperative radiation and chemotherapy.  In spite of this, patient has had   progressive disease, readmitted for headache, nausea and vomiting.  New CT   scan is reviewed.  This shows diffuse disease in the right hemisphere with   midline shift.  There is involvement across the corpus callosum posteriorly   via the splenium to the opposite hemisphere representing diffuse bihemispheric   disease.    PHYSICAL EXAMINATION:  GENERAL:  The patient is awake and following commands.    IMPRESSION:  End-stage recurrent glioblastoma multiforme.  The patient is not   a candidate for any further surgical intervention as this will not improve or   prolong his survival or quality of life.  At this point in time, it is   recommended that the patient be treated for comfort care and possible hospice   placement.       ____________________________________     MD ANG BASS / NTS    DD:  06/03/2017 22:40:50  DT:  06/03/2017 23:02:08    D#:  [id number]  Job#:  [id number]

## 2017-06-04 NOTE — ED NOTES
Pt is alert, but sleepy. Pt states he feels better with the light off. Pt medicated per MD orders.

## 2017-06-04 NOTE — CARE PLAN
Problem: Pain Management  Goal: Pain level will decrease to patient’s comfort goal  Intervention: Follow pain managment plan developed in collaboration with patient and Interdisciplinary Team  Assess patient frequently for pain and administer medication as needed for comfort      Problem: Skin Integrity  Goal: Risk for impaired skin integrity will decrease  Intervention: Implement precautions to protect skin integrity in collaboration with the interdisciplinary team  Encourage and assist patient in moving frequently while in bed

## 2017-06-04 NOTE — ED NOTES
When this RN returned patient is resting with eyes closed. Pt shows no signs of discomfort or distress. Respirations equal and unlabored. VSS. Continue to monitor.

## 2017-06-04 NOTE — PROGRESS NOTES
Progress Note               Author: Girma Edgar Date & Time created: 6/3/2017  10:32 PM     Interval History: Post op 16 Rt crani for GBM.  New CT c/w diffuse recurrent disease Rt hemisphere plus contralateral spread via the splenium to the Lt hemisohere.    Review of Systems:  ROS    Physical Exam:  Physical Exam  Alert.  FC    Labs:        Invalid input(s): [id number]      Recent Labs      17   1630   SODIUM  139   POTASSIUM  3.6   CHLORIDE  106   CO2  22   BUN  5*   CREATININE  0.51   MAGNESIUM  1.9   PHOSPHORUS  2.8   CALCIUM  9.1     Recent Labs      17   1630   ALTSGPT  74*   ASTSGOT  40   ALKPHOSPHAT  79   TBILIRUBIN  0.5   GLUCOSE  108*     Recent Labs      17   1630   RBC  5.06   HEMOGLOBIN  16.1   HEMATOCRIT  47.3   PLATELETCT  232   PROTHROMBTM  12.5   APTT  24.7   INR  0.91     Recent Labs      17   1630   WBC  10.2   NEUTSPOLYS  75.30*   LYMPHOCYTES  12.10*   MONOCYTES  9.40   EOSINOPHILS  1.50   BASOPHILS  0.80   ASTSGOT  40   ALTSGPT  74*   ALKPHOSPHAT  79   TBILIRUBIN  0.5     Hemodynamics:  Temp (24hrs), Av.9 °C (98.5 °F), Min:36.9 °C (98.5 °F), Max:36.9 °C (98.5 °F)  Temperature: 36.9 °C (98.5 °F)  Pulse  Av.4  Min: 72  Max: 106Heart Rate (Monitored): 73  NIBP: 116/89 mmHg     Respiratory:    Respiration: 12, Pulse Oximetry: 96 %, O2 Daily Delivery Respiratory : Room Air with O2 Available           Fluids:  No intake or output data in the 24 hours ending 172  Weight: 74.844 kg (165 lb)  GI/Nutrition:  Orders Placed This Encounter   Procedures   • Diet NPO     Standing Status: Standing      Number of Occurrences: 1      Standing Expiration Date:      Order Specific Question:  Restrict to:     Answer:  Sips with Medications [3]     Medical Decision Making, by Problem:  Active Hospital Problems    Diagnosis   • GBM (glioblastoma multiforme) (CMS-HCC) [C71.9]   • Headache [R51]   • ICH (intracerebral hemorrhage) (CMS-HCC) [I61.9]        Plan:  Terminal end stage GBM.  Pt not a candidate for any further surgical intervention. Recommend comfort/hospice care.    Core Measures

## 2017-06-04 NOTE — CARE PLAN
Problem: Safety  Goal: Will remain free from injury  Outcome: PROGRESSING AS EXPECTED  Reminded patient to use call light, and not get out of bed without nurse present.    Problem: Pain Management  Goal: Pain level will decrease to patient’s comfort goal  Outcome: PROGRESSING AS EXPECTED  Administering pain medication per MAR.

## 2017-06-04 NOTE — PROGRESS NOTES
Patient complaining of extreme thirst, output of 1L within an hour.  Doctor called and updated.  O.K. To start diet on diet.

## 2017-06-04 NOTE — ED PROVIDER NOTES
ED Provider Note    CHIEF COMPLAINT  Chief Complaint   Patient presents with   • Head Ache   • N/V       HPI  Fortnio Ricci Jr. is a 46 y.o. male with a history of glioblastoma multiformed, status post craniotomy, status post chemoradiation therapy, who presents completely severe headache for the past 2 days. The patient has had nausea and vomiting. He has not no saline in focal numbness or weakness. He denies any fever, chills, sore throat, cough, congestion, or any difficulty breathing.    REVIEW OF SYSTEMS  See HPI for further details. All other systems are negative.     PAST MEDICAL HISTORY  Past Medical History   Diagnosis Date   • Cancer (CMS-HCC)      brain   • Brain neoplasm malignant (CMS-HCC)        FAMILY HISTORY  Family History   Problem Relation Age of Onset   • Cancer Mother        SOCIAL HISTORY  Social History     Social History   • Marital Status: Single     Spouse Name: N/A   • Number of Children: N/A   • Years of Education: N/A     Social History Main Topics   • Smoking status: Current Every Day Smoker -- 1.00 packs/day for 6 years     Types: Cigarettes     Start date: 01/01/1986   • Smokeless tobacco: Former User     Types: Chew     Quit date: 01/01/2014   • Alcohol Use: 3.5 oz/week     7 Cans of beer per week      Comment: occassional   • Drug Use: No   • Sexual Activity: No     Other Topics Concern   • None     Social History Narrative       SURGICAL HISTORY  Past Surgical History   Procedure Laterality Date   • Craniotomy tumor  12/7/14     [facility]   • Craniotomy  11/214   • Craniotomy stealth Right 12/23/2016     Procedure: CRANIOTOMY STEALTH  RIGHT PARIETAL OCCIPITAL FOR BRAIN TUMOR;  Surgeon: Girma Edgar M.D.;  Location: SURGERY [facility];  Service:        CURRENT MEDICATIONS  Home Medications     **Home medications have not yet been reviewed for this encounter**          ALLERGIES  Allergies   Allergen Reactions   • Pcn [Penicillins]      Reaction was as a  "child.  Pt stated tolerated Augmentin and Amoxicillin        PHYSICAL EXAM  VITAL SIGNS: Pulse 96  Temp(Src) 36.9 °C (98.5 °F)  Resp 20  Ht 1.753 m (5' 9\")  Wt 74.844 kg (165 lb)  BMI 24.36 kg/m2  Constitutional: Awake, alert, in no acute distress, Non-toxic appearance. Lying in a somewhat darkened room.  HENT: Atraumatic. Bilateral external ears normal, mucous membranes mildly dry, throat nonerythematous without exudates, nose is normal.  Eyes: PERRL, EOMI, conjunctiva moist, noninjected. No nystagmus or photophobia.  Neck: Nontender, Normal range of motion, No nuchal rigidity, No stridor.   Lymphatic: No lymphadenopathy noted.   Cardiovascular: Regular rate and rhythm, no murmurs, rubs, gallops.  Thorax & Lungs:  Good breath sounds bilaterally, no wheezes, rales, or retractions.  No chest tenderness.  Abdomen: Bowel sounds normal, Soft, nontender, nondistended, no rebound, guarding, masses.  Back: No CVA or spinal tenderness.  Extremities: Intact distal pulses, No edema, No tenderness.   Skin: Warm, Dry, No rashes.   Musculoskeletal: No joint swelling or tenderness.  Neurologic: Alert & oriented x 2, cranial nerves II through XII show some right eye ptosis, slight right facial droop and speech is fluent, vision appears intact, sensory intact to light touch, and motor function normal shows 5/5 strength to the upper extremities, 5/5 strength in the right lower extremity, there is a foot drop on the left, patient is able to do straight leg raises bilaterally.  Psychiatric: Affect normal, Judgment normal, Mood normal.         RADIOLOGY/PROCEDURES  CT-HEAD W/O   Final Result      Increased vasogenic edema on the right with effacement of cortical sulci. Increased mass effect is seen on the ventricles with 7.5 mm midline shift to the left which is new.      Right temporal and occipital lesion is again noted involving the splenium of the corpus callosum with internal curvilinear hyperdensity which is increased which " may represent a small amount of intralesional hemorrhage.      There is increased edema in the medial left frontoparietal lobes as well. There is some effacement of cortical sulci on the left.      Findings discussed with Dr. Adams at 6:35 PM            COURSE & MEDICAL DECISION MAKING  Pertinent Labs & Imaging studies reviewed. (See chart for details)  The patient presents to the above complaints. IV is placed, he was given normal saline, morphine, and Zofran.  CBC shows a white count 10,200, hemoglobin 16.1, 75% polys, lactic acid elevated 2.5, chemistry glucose 108, SGPT 74, otherwise normal.  CT scan of the head without contrast was obtained which was above findings and codeine increased vasogenic edema with increased mass effect with a 7.5 mm midline shift to the left which is new per radiology. Patient was given Decadron 10 mg IV. Case was discussed with Dr. Dumas for admission. Dr. Edgar of neurosurgery was consulted and will see the patient. He does not think the patient is a candidate for any further surgery.    FINAL IMPRESSION  1.  Right brain mass with mass effect  3.         Electronically signed by: Abel Adams, 6/3/2017 5:14 PM

## 2017-06-04 NOTE — ED NOTES
Med rec updated and complete.  Allergies reviewed.  Pt stated he is currently out of his medications.

## 2017-06-05 LAB
ALBUMIN SERPL BCP-MCNC: 3.3 G/DL (ref 3.2–4.9)
ALBUMIN/GLOB SERPL: 1.3 G/DL
ALP SERPL-CCNC: 70 U/L (ref 30–99)
ALT SERPL-CCNC: 70 U/L (ref 2–50)
ANION GAP SERPL CALC-SCNC: 9 MMOL/L (ref 0–11.9)
AST SERPL-CCNC: 41 U/L (ref 12–45)
BASOPHILS # BLD AUTO: 0.2 % (ref 0–1.8)
BASOPHILS # BLD: 0.03 K/UL (ref 0–0.12)
BILIRUB SERPL-MCNC: 0.3 MG/DL (ref 0.1–1.5)
BUN SERPL-MCNC: 6 MG/DL (ref 8–22)
CALCIUM SERPL-MCNC: 9 MG/DL (ref 8.5–10.5)
CHLORIDE SERPL-SCNC: 113 MMOL/L (ref 96–112)
CO2 SERPL-SCNC: 23 MMOL/L (ref 20–33)
CREAT SERPL-MCNC: 0.43 MG/DL (ref 0.5–1.4)
EOSINOPHIL # BLD AUTO: 0 K/UL (ref 0–0.51)
EOSINOPHIL NFR BLD: 0 % (ref 0–6.9)
ERYTHROCYTE [DISTWIDTH] IN BLOOD BY AUTOMATED COUNT: 45.1 FL (ref 35.9–50)
ERYTHROCYTE [DISTWIDTH] IN BLOOD BY AUTOMATED COUNT: 46 FL (ref 35.9–50)
GFR SERPL CREATININE-BSD FRML MDRD: >60 ML/MIN/1.73 M 2
GLOBULIN SER CALC-MCNC: 2.5 G/DL (ref 1.9–3.5)
GLUCOSE BLD-MCNC: 120 MG/DL (ref 65–99)
GLUCOSE BLD-MCNC: 129 MG/DL (ref 65–99)
GLUCOSE SERPL-MCNC: 131 MG/DL (ref 65–99)
HCT VFR BLD AUTO: 40.4 % (ref 42–52)
HCT VFR BLD AUTO: 43.7 % (ref 42–52)
HGB BLD-MCNC: 13.8 G/DL (ref 14–18)
HGB BLD-MCNC: 14.8 G/DL (ref 14–18)
IMM GRANULOCYTES # BLD AUTO: 0.21 K/UL (ref 0–0.11)
IMM GRANULOCYTES NFR BLD AUTO: 1.2 % (ref 0–0.9)
LYMPHOCYTES # BLD AUTO: 0.71 K/UL (ref 1–4.8)
LYMPHOCYTES NFR BLD: 4.1 % (ref 22–41)
MAGNESIUM SERPL-MCNC: 2.1 MG/DL (ref 1.5–2.5)
MCH RBC QN AUTO: 32.2 PG (ref 27–33)
MCH RBC QN AUTO: 32.3 PG (ref 27–33)
MCHC RBC AUTO-ENTMCNC: 33.9 G/DL (ref 33.7–35.3)
MCHC RBC AUTO-ENTMCNC: 34.2 G/DL (ref 33.7–35.3)
MCV RBC AUTO: 94.4 FL (ref 81.4–97.8)
MCV RBC AUTO: 95.4 FL (ref 81.4–97.8)
MONOCYTES # BLD AUTO: 0.66 K/UL (ref 0–0.85)
MONOCYTES NFR BLD AUTO: 3.8 % (ref 0–13.4)
NEUTROPHILS # BLD AUTO: 15.71 K/UL (ref 1.82–7.42)
NEUTROPHILS NFR BLD: 90.7 % (ref 44–72)
NRBC # BLD AUTO: 0 K/UL
NRBC BLD AUTO-RTO: 0 /100 WBC
PHOSPHATE SERPL-MCNC: 3.3 MG/DL (ref 2.5–4.5)
PLATELET # BLD AUTO: 191 K/UL (ref 164–446)
PLATELET # BLD AUTO: 213 K/UL (ref 164–446)
PMV BLD AUTO: 9.3 FL (ref 9–12.9)
PMV BLD AUTO: 9.3 FL (ref 9–12.9)
POTASSIUM SERPL-SCNC: 3.9 MMOL/L (ref 3.6–5.5)
PROT SERPL-MCNC: 5.8 G/DL (ref 6–8.2)
RBC # BLD AUTO: 4.28 M/UL (ref 4.7–6.1)
RBC # BLD AUTO: 4.58 M/UL (ref 4.7–6.1)
SODIUM SERPL-SCNC: 145 MMOL/L (ref 135–145)
WBC # BLD AUTO: 17.3 K/UL (ref 4.8–10.8)
WBC # BLD AUTO: 17.8 K/UL (ref 4.8–10.8)

## 2017-06-05 PROCEDURE — 83735 ASSAY OF MAGNESIUM: CPT

## 2017-06-05 PROCEDURE — 85027 COMPLETE CBC AUTOMATED: CPT

## 2017-06-05 PROCEDURE — 84100 ASSAY OF PHOSPHORUS: CPT

## 2017-06-05 PROCEDURE — 85025 COMPLETE CBC W/AUTO DIFF WBC: CPT

## 2017-06-05 PROCEDURE — A9270 NON-COVERED ITEM OR SERVICE: HCPCS | Performed by: INTERNAL MEDICINE

## 2017-06-05 PROCEDURE — 700102 HCHG RX REV CODE 250 W/ 637 OVERRIDE(OP): Performed by: INTERNAL MEDICINE

## 2017-06-05 PROCEDURE — A9270 NON-COVERED ITEM OR SERVICE: HCPCS | Performed by: HOSPITALIST

## 2017-06-05 PROCEDURE — 700111 HCHG RX REV CODE 636 W/ 250 OVERRIDE (IP): Performed by: HOSPITALIST

## 2017-06-05 PROCEDURE — 82962 GLUCOSE BLOOD TEST: CPT

## 2017-06-05 PROCEDURE — 302255 BARRIER CREAM MOISTURE BAZA PROTECT: Performed by: HOSPITALIST

## 2017-06-05 PROCEDURE — 770001 HCHG ROOM/CARE - MED/SURG/GYN PRIV*

## 2017-06-05 PROCEDURE — 80053 COMPREHEN METABOLIC PANEL: CPT

## 2017-06-05 PROCEDURE — 99233 SBSQ HOSP IP/OBS HIGH 50: CPT | Performed by: HOSPITALIST

## 2017-06-05 PROCEDURE — 700102 HCHG RX REV CODE 250 W/ 637 OVERRIDE(OP): Performed by: HOSPITALIST

## 2017-06-05 RX ADMIN — DEXAMETHASONE SODIUM PHOSPHATE 8 MG: 4 INJECTION, SOLUTION INTRAMUSCULAR; INTRAVENOUS at 11:38

## 2017-06-05 RX ADMIN — SENNOSIDES AND DOCUSATE SODIUM 2 TABLET: 8.6; 5 TABLET ORAL at 21:19

## 2017-06-05 RX ADMIN — SODIUM CHLORIDE TAB 1 GM 1 G: 1 TAB at 09:05

## 2017-06-05 RX ADMIN — SODIUM CHLORIDE TAB 1 GM 1 G: 1 TAB at 18:10

## 2017-06-05 RX ADMIN — DEXAMETHASONE SODIUM PHOSPHATE 8 MG: 4 INJECTION, SOLUTION INTRAMUSCULAR; INTRAVENOUS at 18:11

## 2017-06-05 RX ADMIN — DIBASIC SODIUM PHOSPHATE, MONOBASIC POTASSIUM PHOSPHATE AND MONOBASIC SODIUM PHOSPHATE 1 TABLET: 852; 155; 130 TABLET ORAL at 09:05

## 2017-06-05 RX ADMIN — DIBASIC SODIUM PHOSPHATE, MONOBASIC POTASSIUM PHOSPHATE AND MONOBASIC SODIUM PHOSPHATE 1 TABLET: 852; 155; 130 TABLET ORAL at 15:03

## 2017-06-05 RX ADMIN — SODIUM CHLORIDE TAB 1 GM 1 G: 1 TAB at 11:38

## 2017-06-05 RX ADMIN — LEVETIRACETAM 500 MG: 500 TABLET, FILM COATED ORAL at 21:19

## 2017-06-05 RX ADMIN — ACETAMINOPHEN 650 MG: 325 TABLET, FILM COATED ORAL at 05:52

## 2017-06-05 RX ADMIN — HYDROMORPHONE HYDROCHLORIDE 1 MG: 1 INJECTION, SOLUTION INTRAMUSCULAR; INTRAVENOUS; SUBCUTANEOUS at 23:13

## 2017-06-05 RX ADMIN — DIBASIC SODIUM PHOSPHATE, MONOBASIC POTASSIUM PHOSPHATE AND MONOBASIC SODIUM PHOSPHATE 1 TABLET: 852; 155; 130 TABLET ORAL at 21:19

## 2017-06-05 RX ADMIN — NICOTINE 14 MG: 14 PATCH, EXTENDED RELEASE TRANSDERMAL at 05:40

## 2017-06-05 RX ADMIN — SENNOSIDES AND DOCUSATE SODIUM 2 TABLET: 8.6; 5 TABLET ORAL at 09:05

## 2017-06-05 RX ADMIN — FAMOTIDINE 20 MG: 20 TABLET, FILM COATED ORAL at 09:05

## 2017-06-05 RX ADMIN — DEXAMETHASONE SODIUM PHOSPHATE 8 MG: 4 INJECTION, SOLUTION INTRAMUSCULAR; INTRAVENOUS at 23:13

## 2017-06-05 RX ADMIN — DEXAMETHASONE SODIUM PHOSPHATE 8 MG: 4 INJECTION, SOLUTION INTRAMUSCULAR; INTRAVENOUS at 05:41

## 2017-06-05 RX ADMIN — FAMOTIDINE 20 MG: 20 TABLET, FILM COATED ORAL at 21:19

## 2017-06-05 RX ADMIN — LEVETIRACETAM 500 MG: 500 TABLET, FILM COATED ORAL at 09:05

## 2017-06-05 ASSESSMENT — ENCOUNTER SYMPTOMS
HEADACHES: 1
MUSCULOSKELETAL NEGATIVE: 1
RESPIRATORY NEGATIVE: 1
FOCAL WEAKNESS: 1
FEVER: 0
BLURRED VISION: 1
CHILLS: 0
DOUBLE VISION: 1
CARDIOVASCULAR NEGATIVE: 1
WEAKNESS: 1
DIZZINESS: 1
NERVOUS/ANXIOUS: 1
GASTROINTESTINAL NEGATIVE: 1

## 2017-06-05 ASSESSMENT — PAIN SCALES - GENERAL
PAINLEVEL_OUTOF10: 0
PAINLEVEL_OUTOF10: 6
PAINLEVEL_OUTOF10: 0
PAINLEVEL_OUTOF10: 5
PAINLEVEL_OUTOF10: 4
PAINLEVEL_OUTOF10: 0
PAINLEVEL_OUTOF10: 6
PAINLEVEL_OUTOF10: 4

## 2017-06-05 NOTE — PROGRESS NOTES
[facility] Hospitalist Progress Note    Date of Service: 2017    Chief Complaint  46 y.o. male admitted 6/3/2017 with Unfortunate progression of GBM and seen by Nsx and no further options available, severe progression of tumor with midline shift, Pt c/o anxiety, vision changes, pain, thirst. Lives alone in a [address]      Interval Problem Update  Patient seen and examined today. ICU Care  Care and plan discussed in IDT/Hot rounds.  Lines and assistive devices reviewed.    Patient tolerating treatment and therapies.  All Data, Medication data reviewed.  Case discussed with nursing as available.  Plan of Care reviewed with patient and notified of changes.   admitted to ICU with 3% Na gtt and high dosed decadron to possibly reduce edema and shift, seen by Nsx, Pt anxious , tearful, c/o HA and malaise    Consultants/Specialty  NSX, Dr. Edgar    Disposition  To neuro with palliative/hospice care        Review of Systems   Constitutional: Positive for malaise/fatigue. Negative for fever and chills.   Eyes: Positive for blurred vision and double vision.   Respiratory: Negative.    Cardiovascular: Negative.    Gastrointestinal: Negative.    Genitourinary: Negative.    Musculoskeletal: Negative.    Skin: Negative.    Neurological: Positive for dizziness, focal weakness, weakness and headaches.   Endo/Heme/Allergies: Negative.    Psychiatric/Behavioral: The patient is nervous/anxious.    All other systems reviewed and are negative.     Physical Exam  Laboratory/Imaging   Hemodynamics  Temp (24hrs), Av.7 °C (98 °F), Min:36.4 °C (97.5 °F), Max:37 °C (98.6 °F)   Temperature: 37 °C (98.6 °F)  Pulse  Av.8  Min: 72  Max: 117 Heart Rate (Monitored): (!) 104  NIBP: 124/71 mmHg      Respiratory      Respiration: (!) 26, Pulse Oximetry: 93 %, O2 Daily Delivery Respiratory : Room Air with O2 Available        RUL Breath Sounds: Clear, RML Breath Sounds: Clear, RLL Breath Sounds: Clear, FLY Breath Sounds: Clear, LLL  Breath Sounds: Clear    Fluids    Intake/Output Summary (Last 24 hours) at 06/04/17 1802  Last data filed at 06/04/17 1600   Gross per 24 hour   Intake   6760 ml   Output   5050 ml   Net   1710 ml       Nutrition  Orders Placed This Encounter   Procedures   • DIET ORDER     Standing Status: Standing      Number of Occurrences: 1      Standing Expiration Date:      Order Specific Question:  Diet:     Answer:  Regular [1]     Physical Exam   Constitutional: He is oriented to person, place, and time. He appears well-developed and well-nourished. He appears lethargic.   HENT:   Head: Normocephalic.   Eyes: Pupils are equal, round, and reactive to light.   Neck: Normal range of motion.   Cardiovascular: Normal rate, regular rhythm and normal heart sounds.    Pulmonary/Chest: Effort normal.   Abdominal: Soft. Bowel sounds are normal.   Genitourinary: Rectum normal and penis normal.   Musculoskeletal: Normal range of motion.   Neurological: He is oriented to person, place, and time. He appears lethargic. A cranial nerve deficit is present.   Skin: Skin is warm.   Psychiatric: His affect is blunt. He exhibits a depressed mood. He exhibits abnormal recent memory.   Nursing note and vitals reviewed.      Recent Labs      06/03/17   1630  06/04/17   0412   WBC  10.2  6.2   RBC  5.06  4.56*   HEMOGLOBIN  16.1  14.8   HEMATOCRIT  47.3  43.5   MCV  93.5  95.4   MCH  31.8  32.5   MCHC  34.0  34.0   RDW  44.5  45.5   PLATELETCT  232  219   MPV  9.0  8.9*     Recent Labs      06/03/17   1630  06/04/17   0412   SODIUM  139  147*   POTASSIUM  3.6  4.4   CHLORIDE  106  116*   CO2  22  23   GLUCOSE  108*  134*   BUN  5*  2*   CREATININE  0.51  0.50   CALCIUM  9.1  8.9     Recent Labs      06/03/17   1630   APTT  24.7   INR  0.91         Recent Labs      06/04/17   0412   TRIGLYCERIDE  157*   HDL  42   LDL  106*          Assessment/Plan     Cerebral edema (HCC) (present on admission)  Assessment & Plan  With worsening tumor, salt,  steroids    GBM (glioblastoma multiforme) (CMS-HCC) (present on admission)  Assessment & Plan  Advancing, severe, no further options per Nsx    Headache (present on admission)  Assessment & Plan  2nd to above    ICH (intracerebral hemorrhage) (CMS-HCC) (present on admission)  Assessment & Plan  Tumor related    Vision changes (present on admission)  Assessment & Plan  Tumor related    Tobacco abuse (present on admission)  Assessment & Plan  Cessation encouraged    - thirst with ? DI  -palliative care  - poor social support  Plan c/w steroids  Salt tabs  Sz prophylaxis  Refer to palliative care and hospice  Sx control  Pt is critically ill in ICU, stable for transfer as no further 3% Na  Time spent 35 min, no overlap  Core Measures

## 2017-06-05 NOTE — CARE PLAN
Problem: Pain Management  Goal: Pain level will decrease to patient’s comfort goal  Intervention: Follow pain managment plan developed in collaboration with patient and Interdisciplinary Team  Assess patient frequently for pain and administer medication as needed for comfort      Problem: Mobility  Goal: Risk for activity intolerance will decrease  Intervention: Assess and monitor signs of activity intolerance  Attempt to get to eob or up to commode

## 2017-06-05 NOTE — DISCHARGE PLANNING
Discussed pt during AM Rounds. Pt is in need of Hospice supports. Presented pt with Hospice Choice form. Pt states he does not want to pursue hospice and wants to proceed with active treatment. Update treating physician, Dr. Cardenas. He states he will meet with the pt.

## 2017-06-05 NOTE — CARE PLAN
Problem: Safety  Goal: Will remain free from falls  Outcome: PROGRESSING AS EXPECTED  Fall interventions in place. Bed in lowest position, bed alarm on, appropriate sign placed, staff educated on mobility and risk for falls.     Problem: Skin Integrity  Goal: Risk for impaired skin integrity will decrease  Outcome: PROGRESSING AS EXPECTED  Q2 hr patient turns, Yoav assessment score, monitoring for moisture, and skin assessments.

## 2017-06-05 NOTE — CONSULTS
"Reason for PC Consult: Advance Care Planning    Consulted by: Dr. Dumas    Assessment:  General: 46 year old male admitted 6/3/17 for severe headaches. Seen by palliative care on previous admissions. Progression of GBM with midline sift. Per neurosurgery, no further options available. PMH significant for craniotomy x 2. Patient is alert and oriented to self, place, and event. Disoriented to time and has extremely poor short term memory. He forgot who I was by the end of our 20 minute conversation.     Dyspnea: Present on exertion. 93% room air. Patient stated, \"I've got to quit smoking.\"  Last BM: 06/04/17.    Pain: Headache ranging from 2/10 to 10/10 at times.   Depression: Denies.       Spiritual:  Is Voodoo or spirituality important for coping with this illness? Not addressed at this time; [employer]ism per facesheet.   Has a  or spiritual provider visit been requested? No    Palliative Performance Scale: 40%    Advance Directive: None. Completed with patient. He marked # 2, 3, and 5 for directives. Had a difficult time initialing related to poor vision. Left on hard chart. Needs certificate of competency and notarization.     DPOA: None on file. Patient designated his brother Jose Ricci as his DPOA--[phone].    POLST: None on file. Completed with patient. Left on hard chart for physician review and signature. May hold off as patient selected some forms of care such as antibiotics and a trial of a feeding tube but reports he is also open to hospice care.      Code Status: DNR. Confirmed with patient.       Outcome:  Introduced myself and discussed role of palliative care. Discussed goals of care. Patient is sad that he is not a candidate for more treatment. He stated he wants to take things day by day. He reports his brother is now staying with him at his hotel but works full time. He no longer trusts his father. Discussed hospice care. He expressed, \"I might be open to that depending on how things " "go.\" He appears to needs some time to process.     Updated: Discussed with bedside RN.     Plan: Palliative care to follow-up on goals of care/AD/POLST.     Thank you for allowing Palliative Care to participate in this patient's care. Please feel free to call [phone] with any questions or concerns.  "

## 2017-06-06 PROBLEM — R53.1 GENERALIZED WEAKNESS: Status: ACTIVE | Noted: 2017-06-06

## 2017-06-06 PROBLEM — D72.829 LEUKOCYTOSIS: Status: ACTIVE | Noted: 2017-06-06

## 2017-06-06 LAB
ALBUMIN SERPL BCP-MCNC: 3.3 G/DL (ref 3.2–4.9)
ALBUMIN/GLOB SERPL: 1.4 G/DL
ALP SERPL-CCNC: 72 U/L (ref 30–99)
ALT SERPL-CCNC: 110 U/L (ref 2–50)
ANION GAP SERPL CALC-SCNC: 9 MMOL/L (ref 0–11.9)
AST SERPL-CCNC: 73 U/L (ref 12–45)
BILIRUB SERPL-MCNC: 0.4 MG/DL (ref 0.1–1.5)
BUN SERPL-MCNC: 9 MG/DL (ref 8–22)
CALCIUM SERPL-MCNC: 8.9 MG/DL (ref 8.5–10.5)
CHLORIDE SERPL-SCNC: 109 MMOL/L (ref 96–112)
CO2 SERPL-SCNC: 25 MMOL/L (ref 20–33)
CREAT SERPL-MCNC: 0.45 MG/DL (ref 0.5–1.4)
ERYTHROCYTE [DISTWIDTH] IN BLOOD BY AUTOMATED COUNT: 45.1 FL (ref 35.9–50)
GFR SERPL CREATININE-BSD FRML MDRD: >60 ML/MIN/1.73 M 2
GLOBULIN SER CALC-MCNC: 2.4 G/DL (ref 1.9–3.5)
GLUCOSE SERPL-MCNC: 139 MG/DL (ref 65–99)
HCT VFR BLD AUTO: 39.8 % (ref 42–52)
HGB BLD-MCNC: 13.7 G/DL (ref 14–18)
MCH RBC QN AUTO: 32.1 PG (ref 27–33)
MCHC RBC AUTO-ENTMCNC: 34.4 G/DL (ref 33.7–35.3)
MCV RBC AUTO: 93.2 FL (ref 81.4–97.8)
PHOSPHATE SERPL-MCNC: 2.6 MG/DL (ref 2.5–4.5)
PLATELET # BLD AUTO: 216 K/UL (ref 164–446)
PMV BLD AUTO: 9.1 FL (ref 9–12.9)
POTASSIUM SERPL-SCNC: 4 MMOL/L (ref 3.6–5.5)
PROT SERPL-MCNC: 5.7 G/DL (ref 6–8.2)
RBC # BLD AUTO: 4.27 M/UL (ref 4.7–6.1)
SODIUM SERPL-SCNC: 143 MMOL/L (ref 135–145)
WBC # BLD AUTO: 19.8 K/UL (ref 4.8–10.8)

## 2017-06-06 PROCEDURE — 700111 HCHG RX REV CODE 636 W/ 250 OVERRIDE (IP): Performed by: HOSPITALIST

## 2017-06-06 PROCEDURE — 700102 HCHG RX REV CODE 250 W/ 637 OVERRIDE(OP): Performed by: INTERNAL MEDICINE

## 2017-06-06 PROCEDURE — 700102 HCHG RX REV CODE 250 W/ 637 OVERRIDE(OP): Performed by: HOSPITALIST

## 2017-06-06 PROCEDURE — 85027 COMPLETE CBC AUTOMATED: CPT

## 2017-06-06 PROCEDURE — A9270 NON-COVERED ITEM OR SERVICE: HCPCS | Performed by: HOSPITALIST

## 2017-06-06 PROCEDURE — 80053 COMPREHEN METABOLIC PANEL: CPT

## 2017-06-06 PROCEDURE — A9270 NON-COVERED ITEM OR SERVICE: HCPCS | Performed by: INTERNAL MEDICINE

## 2017-06-06 PROCEDURE — 36415 COLL VENOUS BLD VENIPUNCTURE: CPT

## 2017-06-06 PROCEDURE — 99232 SBSQ HOSP IP/OBS MODERATE 35: CPT | Performed by: INTERNAL MEDICINE

## 2017-06-06 PROCEDURE — 84100 ASSAY OF PHOSPHORUS: CPT

## 2017-06-06 PROCEDURE — 770001 HCHG ROOM/CARE - MED/SURG/GYN PRIV*

## 2017-06-06 RX ADMIN — LORAZEPAM 0.5 MG: 1 TABLET ORAL at 00:55

## 2017-06-06 RX ADMIN — LEVETIRACETAM 500 MG: 500 TABLET, FILM COATED ORAL at 07:41

## 2017-06-06 RX ADMIN — SENNOSIDES AND DOCUSATE SODIUM 2 TABLET: 8.6; 5 TABLET ORAL at 20:41

## 2017-06-06 RX ADMIN — DEXAMETHASONE SODIUM PHOSPHATE 8 MG: 4 INJECTION, SOLUTION INTRAMUSCULAR; INTRAVENOUS at 05:22

## 2017-06-06 RX ADMIN — HYDROMORPHONE HYDROCHLORIDE 1 MG: 1 INJECTION, SOLUTION INTRAMUSCULAR; INTRAVENOUS; SUBCUTANEOUS at 17:21

## 2017-06-06 RX ADMIN — FAMOTIDINE 20 MG: 20 TABLET, FILM COATED ORAL at 07:41

## 2017-06-06 RX ADMIN — DEXAMETHASONE SODIUM PHOSPHATE 8 MG: 4 INJECTION, SOLUTION INTRAMUSCULAR; INTRAVENOUS at 23:45

## 2017-06-06 RX ADMIN — HYDROMORPHONE HYDROCHLORIDE 1 MG: 1 INJECTION, SOLUTION INTRAMUSCULAR; INTRAVENOUS; SUBCUTANEOUS at 20:41

## 2017-06-06 RX ADMIN — SODIUM CHLORIDE TAB 1 GM 1 G: 1 TAB at 11:55

## 2017-06-06 RX ADMIN — DIBASIC SODIUM PHOSPHATE, MONOBASIC POTASSIUM PHOSPHATE AND MONOBASIC SODIUM PHOSPHATE 1 TABLET: 852; 155; 130 TABLET ORAL at 07:41

## 2017-06-06 RX ADMIN — FAMOTIDINE 20 MG: 20 TABLET, FILM COATED ORAL at 20:41

## 2017-06-06 RX ADMIN — LEVETIRACETAM 500 MG: 500 TABLET, FILM COATED ORAL at 20:41

## 2017-06-06 RX ADMIN — DEXAMETHASONE SODIUM PHOSPHATE 8 MG: 4 INJECTION, SOLUTION INTRAMUSCULAR; INTRAVENOUS at 17:21

## 2017-06-06 RX ADMIN — NICOTINE 14 MG: 14 PATCH, EXTENDED RELEASE TRANSDERMAL at 05:22

## 2017-06-06 RX ADMIN — DEXAMETHASONE SODIUM PHOSPHATE 8 MG: 4 INJECTION, SOLUTION INTRAMUSCULAR; INTRAVENOUS at 11:55

## 2017-06-06 RX ADMIN — SODIUM CHLORIDE TAB 1 GM 1 G: 1 TAB at 07:42

## 2017-06-06 ASSESSMENT — ENCOUNTER SYMPTOMS
FOCAL WEAKNESS: 1
CHILLS: 0
CONSTIPATION: 0
BLURRED VISION: 1
VOMITING: 0
NAUSEA: 0
PALPITATIONS: 0
FEVER: 0
HEADACHES: 1
SENSORY CHANGE: 1
ABDOMINAL PAIN: 0
INSOMNIA: 1

## 2017-06-06 ASSESSMENT — PAIN SCALES - GENERAL
PAINLEVEL_OUTOF10: 5
PAINLEVEL_OUTOF10: 0

## 2017-06-06 NOTE — PROGRESS NOTES
Patient received to room S-182-2. Two RN skin check completed per protocol with no acute findings.

## 2017-06-06 NOTE — PROGRESS NOTES
[facility] Hospitalist Progress Note    Date of Service: 2017    Chief Complaint  46 y.o. male admitted 6/3/2017 with glioblastoma multiforme with two prior craniectomies.  Now here with severe vasogenic edema.  No further surgical option per prior neurosurgery consult.    Interval Problem Update      Consultants/Specialty  Dr Mi, Oncology    Disposition  Hospice        ROS   Physical Exam  Laboratory/Imaging   Hemodynamics  Temp (24hrs), Av.2 °C (98.9 °F), Min:37 °C (98.6 °F), Max:37.3 °C (99.1 °F)   Temperature: 37.2 °C (99 °F)  Pulse  Av.8  Min: 70  Max: 117 Heart Rate (Monitored): 92  NIBP: 129/91 mmHg      Respiratory      Respiration: 12, Pulse Oximetry: 97 %        RUL Breath Sounds: Clear, RML Breath Sounds: Clear, RLL Breath Sounds: Clear, FLY Breath Sounds: Clear, LLL Breath Sounds: Clear    Fluids    Intake/Output Summary (Last 24 hours) at 17 192  Last data filed at 17 1800   Gross per 24 hour   Intake   1900 ml   Output   5050 ml   Net  -3150 ml       Nutrition  Orders Placed This Encounter   Procedures   • DIET ORDER     Standing Status: Standing      Number of Occurrences: 1      Standing Expiration Date:      Order Specific Question:  Diet:     Answer:  Regular [1]     Physical Exam    Recent Labs      17   0412  17   0446  17   0557   WBC  6.2  17.3*  17.8*   RBC  4.56*  4.28*  4.58*   HEMOGLOBIN  14.8  13.8*  14.8   HEMATOCRIT  43.5  40.4*  43.7   MCV  95.4  94.4  95.4   MCH  32.5  32.2  32.3   MCHC  34.0  34.2  33.9   RDW  45.5  45.1  46.0   PLATELETCT  219  213  191   MPV  8.9*  9.3  9.3     Recent Labs      17   1630  17   0412  17   0446   SODIUM  139  147*  145   POTASSIUM  3.6  4.4  3.9   CHLORIDE  106  116*  113*   CO2  22  23  23   GLUCOSE  108*  134*  131*   BUN  5*  2*  6*   CREATININE  0.51  0.50  0.43*   CALCIUM  9.1  8.9  9.0     Recent Labs      17   1630   APTT  24.7   INR  0.91         Recent Labs      17    0412   TRIGLYCERIDE  157*   HDL  42   LDL  106*          Assessment/Plan     Cerebral edema (HCC) (present on admission)  Assessment & Plan  With worsening tumor, salt, steroids    GBM (glioblastoma multiforme) (CMS-HCC) (present on admission)  Assessment & Plan  Advancing, severe, no further options per Nsx    Headache (present on admission)  Assessment & Plan  2nd to above    ICH (intracerebral hemorrhage) (CMS-HCC) (present on admission)  Assessment & Plan  Tumor related    Vision changes (present on admission)  Assessment & Plan  Tumor related    Tobacco abuse (present on admission)  Assessment & Plan  Cessation encouraged    Core Measures

## 2017-06-06 NOTE — PALLIATIVE CARE
"PALLIATIVE CARE FOLLOW-UP:    Pt appears to me to be at his baseline mentation from previous admission -- a&o x4.  Valentin is well known to me and continues to struggle with his terminal prognosis, asking \"But isn't that reasonable?\"  Validated that this is a very reasonable and normal response to a very abnormal and difficult situation.  Pt acknowledged that there are no longer any treatments, \"but I'm too tired, I don't want to think about that (hospice) right now.\"  Reiterated benefits of hospice including symptom control and emotional support to assist with acceptance and peace so as to minimize his existential suffering.  Pt again declined hospice.     Updated:  Dr. Weathers      Thank you for allowing Palliative Care to support this pt.  Contact [phone] for additional assistance, questions or concerns.  "

## 2017-06-06 NOTE — PROGRESS NOTES
Patient resting with bed in lowest locked position. VSS with no acute signs of distress. Patient brought extra water. Patient still appears to be confused. Bed alarm in place. Patient denies toileting at this time. Will continue to monitor.

## 2017-06-06 NOTE — PROGRESS NOTES
[facility] Hospitalist Progress Note    Date of Service: 2017    Chief Complaint  46 y.o. male admitted 6/3/2017 with Unfortunate progression of GBM and seen by Nsx and no further options available, severe progression of tumor with midline shift, Pt c/o anxiety, vision changes, pain, thirst. Lives alone in [address]      Interval Problem Update  Patient seen and examined today. ICU Care  Care and plan discussed in IDT/Hot rounds.  Lines and assistive devices reviewed.    Patient tolerating treatment and therapies.  All Data, Medication data reviewed.  Case discussed with nursing as available.  Plan of Care reviewed with patient and notified of changes.   admitted to ICU with 3% Na gtt and high dosed decadron to possibly reduce edema and shift, seen by Nsx, Pt anxious , tearful, c/o HA and malaise   Pt remains lethargic, not deemed a candidate for inpatient hospice yet.. Pt with poor memory and insight, lack of capacity to make decisions, no family at bedside, attempted to call brother, no answer  Consultants/Specialty  NSX, Dr. Edgar    Disposition  To neuro with palliative/hospice care        Review of Systems   Constitutional: Positive for malaise/fatigue. Negative for fever and chills.   Eyes: Positive for blurred vision and double vision.   Respiratory: Negative.    Cardiovascular: Negative.    Gastrointestinal: Negative.    Genitourinary: Negative.    Musculoskeletal: Negative.    Skin: Negative.    Neurological: Positive for dizziness, focal weakness, weakness and headaches.   Endo/Heme/Allergies: Negative.    Psychiatric/Behavioral: The patient is nervous/anxious.    All other systems reviewed and are negative.     Physical Exam  Laboratory/Imaging   Hemodynamics  Temp (24hrs), Av.2 °C (98.9 °F), Min:37 °C (98.6 °F), Max:37.3 °C (99.1 °F)   Temperature: 37.2 °C (99 °F)  Pulse  Av.8  Min: 70  Max: 117 Heart Rate (Monitored): 92  NIBP: 129/91 mmHg      Respiratory      Respiration:  12, Pulse Oximetry: 97 %        RUL Breath Sounds: Clear, RML Breath Sounds: Clear, RLL Breath Sounds: Clear, FLY Breath Sounds: Clear, LLL Breath Sounds: Clear    Fluids    Intake/Output Summary (Last 24 hours) at 06/05/17 1804  Last data filed at 06/05/17 1600   Gross per 24 hour   Intake   1900 ml   Output   3950 ml   Net  -2050 ml       Nutrition  Orders Placed This Encounter   Procedures   • DIET ORDER     Standing Status: Standing      Number of Occurrences: 1      Standing Expiration Date:      Order Specific Question:  Diet:     Answer:  Regular [1]     Physical Exam   Constitutional: He is oriented to person, place, and time. He appears well-developed and well-nourished. He appears lethargic.   HENT:   Head: Normocephalic.   Eyes: Pupils are equal, round, and reactive to light.   Neck: Normal range of motion.   Cardiovascular: Normal rate, regular rhythm and normal heart sounds.    Pulmonary/Chest: Effort normal.   Abdominal: Soft. Bowel sounds are normal.   Genitourinary: Rectum normal and penis normal.   Musculoskeletal: Normal range of motion.   Neurological: He is oriented to person, place, and time. He appears lethargic. A cranial nerve deficit is present.   Skin: Skin is warm.   Psychiatric: His affect is blunt. He exhibits a depressed mood. He exhibits abnormal recent memory.   Nursing note and vitals reviewed.      Recent Labs      06/04/17   0412  06/05/17   0446  06/05/17   0557   WBC  6.2  17.3*  17.8*   RBC  4.56*  4.28*  4.58*   HEMOGLOBIN  14.8  13.8*  14.8   HEMATOCRIT  43.5  40.4*  43.7   MCV  95.4  94.4  95.4   MCH  32.5  32.2  32.3   MCHC  34.0  34.2  33.9   RDW  45.5  45.1  46.0   PLATELETCT  219  213  191   MPV  8.9*  9.3  9.3     Recent Labs      06/03/17   1630  06/04/17   0412  06/05/17   0446   SODIUM  139  147*  145   POTASSIUM  3.6  4.4  3.9   CHLORIDE  106  116*  113*   CO2  22  23  23   GLUCOSE  108*  134*  131*   BUN  5*  2*  6*   CREATININE  0.51  0.50  0.43*   CALCIUM  9.1   8.9  9.0     Recent Labs      06/03/17   1630   APTT  24.7   INR  0.91         Recent Labs      06/04/17   0412   TRIGLYCERIDE  157*   HDL  42   LDL  106*          Assessment/Plan     Cerebral edema (HCC) (present on admission)  Assessment & Plan  With worsening tumor, salt, steroids    GBM (glioblastoma multiforme) (CMS-HCC) (present on admission)  Assessment & Plan  Advancing, severe, no further options per Nsx    Headache (present on admission)  Assessment & Plan  2nd to above    ICH (intracerebral hemorrhage) (CMS-HCC) (present on admission)  Assessment & Plan  Tumor related    Vision changes (present on admission)  Assessment & Plan  Tumor related    Tobacco abuse (present on admission)  Assessment & Plan  Cessation encouraged      - palliative care evaluated  - Pt lacks medical capacity at this point  Plan c/w steroids  Salt tabs  Sz prophylaxis  Refer to palliative care and hospice  Sx control  Doubt Pt is able to continue with home hospice at a [place] location  Updated SW, might benefit from family conference  Pt is ill in ICU, stable for transfer   Time spent 35 min, no overlap  Medications reviewed, Labs reviewed and Radiology images reviewed  Anand catheter: No Naand        DVT prophylaxis - mechanical: SCDs      Assessed for rehab: Patient unable to tolerate rehabilitation therapeutic regimen and Patient was assess for and/or received rehabilitation services during this hospitalization

## 2017-06-06 NOTE — PROGRESS NOTES
[facility] Hospitalist Progress Note    Date of Service: 2017    Chief Complaint  46 y.o. male admitted 6/3/2017 with progression of GBM, evaluated by neurosx with no further tx options available, severe progression of tumor with midline shift, Pt c/o anxiety, vision changes, pain, thirst. Lives alone in a motel [address], difficult disposition       Interval Problem Update  Pt complains of insomnia overnight and continues to have anxiety. PT/OT eval ordered as pt is not ready for hospice presently.     Consultants/Specialty  Hospice  Dr. Edgar - Neurosurgery     Disposition  Home with hospice when accepted, inpt hospice declined.         Review of Systems   Constitutional: Negative for fever and chills.   Eyes: Positive for blurred vision.   Cardiovascular: Negative for chest pain and palpitations.   Gastrointestinal: Negative for nausea, vomiting, abdominal pain and constipation.   Neurological: Positive for sensory change, focal weakness and headaches.   Psychiatric/Behavioral: The patient has insomnia.       Physical Exam  Laboratory/Imaging   Hemodynamics  Temp (24hrs), Av.7 °C (98.1 °F), Min:36.3 °C (97.4 °F), Max:37.2 °C (99 °F)   Temperature: 36.3 °C (97.4 °F)  Pulse  Av.9  Min: 67  Max: 117    Blood Pressure: 131/90 mmHg, NIBP: 129/91 mmHg      Respiratory      Respiration: 18, Pulse Oximetry: 95 %        RUL Breath Sounds: Clear, RML Breath Sounds: Clear, RLL Breath Sounds: Clear, FLY Breath Sounds: Clear, LLL Breath Sounds: Clear    Fluids    Intake/Output Summary (Last 24 hours) at 17 0958  Last data filed at 17 0000   Gross per 24 hour   Intake    550 ml   Output   4200 ml   Net  -3650 ml       Nutrition  Orders Placed This Encounter   Procedures   • DIET ORDER     Standing Status: Standing      Number of Occurrences: 1      Standing Expiration Date:      Order Specific Question:  Diet:     Answer:  Regular [1]     Physical Exam   Constitutional: No distress.   HENT:   Head:  Normocephalic and atraumatic.   Eyes: Conjunctivae and EOM are normal.   Cardiovascular: Normal rate, regular rhythm and normal heart sounds.    Pulmonary/Chest: Effort normal and breath sounds normal. No respiratory distress. He has no wheezes.   Abdominal: Soft. There is no tenderness. There is no rebound and no guarding.   Musculoskeletal: He exhibits no edema.   Neurological: He is alert. A cranial nerve deficit is present.   Skin: Skin is warm and dry. He is not diaphoretic.       Recent Labs      06/05/17   0446  06/05/17   0557  06/06/17   0212   WBC  17.3*  17.8*  19.8*   RBC  4.28*  4.58*  4.27*   HEMOGLOBIN  13.8*  14.8  13.7*   HEMATOCRIT  40.4*  43.7  39.8*   MCV  94.4  95.4  93.2   MCH  32.2  32.3  32.1   MCHC  34.2  33.9  34.4   RDW  45.1  46.0  45.1   PLATELETCT  213  191  216   MPV  9.3  9.3  9.1     Recent Labs      06/04/17   0412  06/05/17   0446  06/06/17   0212   SODIUM  147*  145  143   POTASSIUM  4.4  3.9  4.0   CHLORIDE  116*  113*  109   CO2  23  23  25   GLUCOSE  134*  131*  139*   BUN  2*  6*  9   CREATININE  0.50  0.43*  0.45*   CALCIUM  8.9  9.0  8.9     Recent Labs      06/03/17   1630   APTT  24.7   INR  0.91         Recent Labs      06/04/17   0412   TRIGLYCERIDE  157*   HDL  42   LDL  106*          Assessment/Plan     Cerebral edema (HCC) (present on admission)  Assessment & Plan  - 2/2 to progression of GBM, cont on salt tabs and steroids    GBM (glioblastoma multiforme) (CMS-HCC) (present on admission)  Assessment & Plan  - worsening, advanced, no further options per neurosurgery   - palliative consulted     Headache (present on admission)  Assessment & Plan  - 2/2 to above    ICH (intracerebral hemorrhage) (CMS-HCC) (present on admission)  Assessment & Plan  - tumor related    Vision changes (present on admission)  Assessment & Plan  - tumor related    Leukocytosis  Assessment & Plan  - 2/2 to steroids, pt does not appear to be septic at present time, CTM     Generalized weakness  (present on admission)  Assessment & Plan  - PT/OT eval ordered    Tobacco abuse (present on admission)  Assessment & Plan  - cessation encouraged, pt on nicotine replacement therapy         Plan of care discussed at multidisciplinary rounds, bedside RN and patient.   Labs reviewed and Medications reviewed  Anand catheter: No Anand        DVT prophylaxis - mechanical: SCDs  Ulcer prophylaxis: Yes    Assessed for rehab: Patient was assess for and/or received rehabilitation services during this hospitalization

## 2017-06-07 PROCEDURE — 770001 HCHG ROOM/CARE - MED/SURG/GYN PRIV*

## 2017-06-07 PROCEDURE — A9270 NON-COVERED ITEM OR SERVICE: HCPCS | Performed by: INTERNAL MEDICINE

## 2017-06-07 PROCEDURE — 700111 HCHG RX REV CODE 636 W/ 250 OVERRIDE (IP): Performed by: HOSPITALIST

## 2017-06-07 PROCEDURE — 99232 SBSQ HOSP IP/OBS MODERATE 35: CPT | Performed by: INTERNAL MEDICINE

## 2017-06-07 PROCEDURE — 700102 HCHG RX REV CODE 250 W/ 637 OVERRIDE(OP): Performed by: INTERNAL MEDICINE

## 2017-06-07 RX ADMIN — LEVETIRACETAM 500 MG: 500 TABLET, FILM COATED ORAL at 08:35

## 2017-06-07 RX ADMIN — HYDROMORPHONE HYDROCHLORIDE 1 MG: 1 INJECTION, SOLUTION INTRAMUSCULAR; INTRAVENOUS; SUBCUTANEOUS at 08:35

## 2017-06-07 RX ADMIN — HYDROMORPHONE HYDROCHLORIDE 1 MG: 1 INJECTION, SOLUTION INTRAMUSCULAR; INTRAVENOUS; SUBCUTANEOUS at 22:29

## 2017-06-07 RX ADMIN — FAMOTIDINE 20 MG: 20 TABLET, FILM COATED ORAL at 08:35

## 2017-06-07 RX ADMIN — DEXAMETHASONE SODIUM PHOSPHATE 8 MG: 4 INJECTION, SOLUTION INTRAMUSCULAR; INTRAVENOUS at 22:41

## 2017-06-07 RX ADMIN — DEXAMETHASONE SODIUM PHOSPHATE 8 MG: 4 INJECTION, SOLUTION INTRAMUSCULAR; INTRAVENOUS at 05:19

## 2017-06-07 RX ADMIN — DEXAMETHASONE SODIUM PHOSPHATE 8 MG: 4 INJECTION, SOLUTION INTRAMUSCULAR; INTRAVENOUS at 17:41

## 2017-06-07 RX ADMIN — LEVETIRACETAM 500 MG: 500 TABLET, FILM COATED ORAL at 22:29

## 2017-06-07 RX ADMIN — FAMOTIDINE 20 MG: 20 TABLET, FILM COATED ORAL at 22:29

## 2017-06-07 RX ADMIN — NICOTINE 14 MG: 14 PATCH, EXTENDED RELEASE TRANSDERMAL at 05:19

## 2017-06-07 RX ADMIN — DEXAMETHASONE SODIUM PHOSPHATE 8 MG: 4 INJECTION, SOLUTION INTRAMUSCULAR; INTRAVENOUS at 11:21

## 2017-06-07 RX ADMIN — HYDROMORPHONE HYDROCHLORIDE 1 MG: 1 INJECTION, SOLUTION INTRAMUSCULAR; INTRAVENOUS; SUBCUTANEOUS at 17:42

## 2017-06-07 ASSESSMENT — ENCOUNTER SYMPTOMS
ABDOMINAL PAIN: 0
CHILLS: 0
FEVER: 0
PALPITATIONS: 0
NAUSEA: 0
FOCAL WEAKNESS: 1
INSOMNIA: 0
HEADACHES: 0
CONSTIPATION: 0
VOMITING: 0
SENSORY CHANGE: 1
BLURRED VISION: 0

## 2017-06-07 ASSESSMENT — PAIN SCALES - GENERAL
PAINLEVEL_OUTOF10: 7
PAINLEVEL_OUTOF10: 10

## 2017-06-07 NOTE — PROGRESS NOTES
[facility]ist Progress Note    Date of Service: 2017    Chief Complaint  46 y.o. male admitted 6/3/2017 with progression of GBM, evaluated by neurosx with no further tx options available, severe progression of tumor with midline shift, Pt c/o anxiety, vision changes, pain, thirst. Lives alone in a [address], difficult disposition       Interval Problem Update  Pt agreeable to hospice today. SW assisting with helping set up home hospice. Pt lives alone in motel and may be a barrier to going home.  POLST form completed.     Consultants/Specialty  Hospice  Dr. Edgar - Neurosurgery     Disposition  Home with hospice when accepted, inpt hospice declined.         Review of Systems   Constitutional: Negative for fever and chills.   Eyes: Negative for blurred vision.   Cardiovascular: Negative for chest pain and palpitations.   Gastrointestinal: Negative for nausea, vomiting, abdominal pain and constipation.   Neurological: Positive for sensory change and focal weakness. Negative for headaches.   Psychiatric/Behavioral: The patient does not have insomnia.       Physical Exam  Laboratory/Imaging   Hemodynamics  Temp (24hrs), Av.4 °C (97.6 °F), Min:36.2 °C (97.1 °F), Max:36.7 °C (98.1 °F)   Temperature: 36.2 °C (97.1 °F)  Pulse  Av.3  Min: 67  Max: 117    Blood Pressure: 124/75 mmHg      Respiratory      Respiration: 18, Pulse Oximetry: 93 %        RUL Breath Sounds: Clear, RML Breath Sounds: Clear, RLL Breath Sounds: Clear, FLY Breath Sounds: Clear, LLL Breath Sounds: Clear    Fluids    Intake/Output Summary (Last 24 hours) at 17 09  Last data filed at 17 0000   Gross per 24 hour   Intake      0 ml   Output   2300 ml   Net  -2300 ml       Nutrition  Orders Placed This Encounter   Procedures   • DIET ORDER     Standing Status: Standing      Number of Occurrences: 1      Standing Expiration Date:      Order Specific Question:  Diet:     Answer:  Regular [1]     Physical Exam    Constitutional: No distress.   HENT:   Head: Normocephalic and atraumatic.   Eyes: Conjunctivae and EOM are normal.   Cardiovascular: Normal rate, regular rhythm and normal heart sounds.    Pulmonary/Chest: Effort normal and breath sounds normal. No respiratory distress. He has no wheezes.   Abdominal: Soft. There is no tenderness. There is no rebound and no guarding.   Musculoskeletal: He exhibits no edema.   Neurological: He is alert. A cranial nerve deficit is present.   Skin: Skin is warm and dry. He is not diaphoretic.       Recent Labs      06/05/17   0446  06/05/17   0557  06/06/17   0212   WBC  17.3*  17.8*  19.8*   RBC  4.28*  4.58*  4.27*   HEMOGLOBIN  13.8*  14.8  13.7*   HEMATOCRIT  40.4*  43.7  39.8*   MCV  94.4  95.4  93.2   MCH  32.2  32.3  32.1   MCHC  34.2  33.9  34.4   RDW  45.1  46.0  45.1   PLATELETCT  213  191  216   MPV  9.3  9.3  9.1     Recent Labs      06/05/17   0446  06/06/17   0212   SODIUM  145  143   POTASSIUM  3.9  4.0   CHLORIDE  113*  109   CO2  23  25   GLUCOSE  131*  139*   BUN  6*  9   CREATININE  0.43*  0.45*   CALCIUM  9.0  8.9                      Assessment/Plan     Cerebral edema (HCC) (present on admission)  Assessment & Plan  - 2/2 to progression of GBM, cont on salt tabs and steroids    GBM (glioblastoma multiforme) (CMS-HCC) (present on admission)  Assessment & Plan  - worsening, advanced, no further options per neurosurgery   - palliative following     Headache (present on admission)  Assessment & Plan  - 2/2 to above    ICH (intracerebral hemorrhage) (CMS-HCC) (present on admission)  Assessment & Plan  - tumor related    Vision changes (present on admission)  Assessment & Plan  - tumor related    Leukocytosis  Assessment & Plan  - 2/2 to steroids, pt does not appear to be septic at present time, CTM     Generalized weakness (present on admission)  Assessment & Plan  - PT/OT eval ordered    Tobacco abuse (present on admission)  Assessment & Plan  - cessation  encouraged, pt on nicotine replacement therapy         Plan of care discussed at multidisciplinary rounds, bedside RN and patient.   Labs reviewed and Medications reviewed  Anand catheter: No Anand        DVT prophylaxis - mechanical: SCDs  Ulcer prophylaxis: Yes    Assessed for rehab: Patient was assess for and/or received rehabilitation services during this hospitalization

## 2017-06-07 NOTE — PROGRESS NOTES
Patient ambulated to rest room after a large BM. Patient felt very weak and dizzy on the walk back. Patient should not ambulate too far away from a chair or bed for his safety.

## 2017-06-07 NOTE — PROGRESS NOTES
Spoke with palliative care to come back and re-assess. Patient appears far more agreeable to discuss the concept of hospice.

## 2017-06-07 NOTE — PALLIATIVE CARE
Palliative Care follow-up  Received call from [facility] resident, pt now agreeable to hospice and would like PC RN to come discuss.          Plan:   PC RN will come to discuss hospice with pt    Thank you for allowing Palliative Care to participate in this patient's care. Please feel free to call [phone] with any questions or concerns.

## 2017-06-07 NOTE — PALLIATIVE CARE
Spiritual Care Note           Patient's Name: Fortino Ricci    MRN: [id number]    Age and Gender: 46 y.o. male   YOB: 1970   Place of Residence: [place], [place]   Unit: Neurosciences   Room (and Bed): [place] [address]   Bed 2   Ethnicity or Nationality: white   Primary Language: English   Medical Diagnosis/Procedure: cerebral edema, secondary to progression of GBM   glioblastoma multiforme   headache   intracerebral hemorrhage, tumor related  vision changes, tumor related   leukocytosis  generalized weakness   tobacco abuse    Jew Affiliation: Scientology   Code Status: DNR    Date of Admission: 6/3/2017    Length of Stay: 4 days   Date of Visit: 6/7/2017   Length of Visit: ---       Situation/Reason for Visit:  Patient referred to palliative care.    Receptivity to Visit:  Patient politely declined a spiritual care visit.    Continuing Care:  Upon request.      Contact Information:  Chaplain AMERICA Cassidy  [phone]   [email or web address]

## 2017-06-07 NOTE — PROGRESS NOTES
Patient resting with bed in lowest locked position. VSS with no acute signs of distress. Call light in reach. Patient has not been incontinent and denies need for the restroom at this time. Patient given PRN pain medications. Will continue to monitor.

## 2017-06-08 ENCOUNTER — HOME CARE VISIT (OUTPATIENT)
Dept: HOSPICE | Facility: HOSPICE | Age: 47
End: 2017-06-08
Payer: MEDICARE

## 2017-06-08 ENCOUNTER — HOSPICE ADMISSION (OUTPATIENT)
Dept: HOSPICE | Facility: HOSPICE | Age: 47
End: 2017-06-08
Payer: MEDICARE

## 2017-06-08 PROBLEM — R51 HEADACHE(784.0): Status: RESOLVED | Noted: 2017-06-03 | Resolved: 2017-06-08

## 2017-06-08 PROBLEM — D72.829 LEUKOCYTOSIS: Status: RESOLVED | Noted: 2017-06-06 | Resolved: 2017-06-08

## 2017-06-08 PROBLEM — H53.9 VISION CHANGES: Status: RESOLVED | Noted: 2017-06-04 | Resolved: 2017-06-08

## 2017-06-08 PROCEDURE — 99232 SBSQ HOSP IP/OBS MODERATE 35: CPT | Performed by: INTERNAL MEDICINE

## 2017-06-08 PROCEDURE — 700111 HCHG RX REV CODE 636 W/ 250 OVERRIDE (IP): Performed by: HOSPITALIST

## 2017-06-08 PROCEDURE — 770001 HCHG ROOM/CARE - MED/SURG/GYN PRIV*

## 2017-06-08 PROCEDURE — A9270 NON-COVERED ITEM OR SERVICE: HCPCS | Performed by: INTERNAL MEDICINE

## 2017-06-08 PROCEDURE — 700102 HCHG RX REV CODE 250 W/ 637 OVERRIDE(OP): Performed by: INTERNAL MEDICINE

## 2017-06-08 RX ORDER — OMEPRAZOLE 20 MG/1
20 CAPSULE, DELAYED RELEASE ORAL 2 TIMES DAILY
Qty: 60 CAP | Refills: 1 | Status: SHIPPED | OUTPATIENT
Start: 2017-06-08 | End: 2017-07-08

## 2017-06-08 RX ORDER — DEXAMETHASONE 0.5 MG/1
8 TABLET ORAL 3 TIMES DAILY
Qty: 1440 TAB | Refills: 1 | Status: SHIPPED | OUTPATIENT
Start: 2017-06-08 | End: 2017-07-08

## 2017-06-08 RX ORDER — HYDROMORPHONE HYDROCHLORIDE 2 MG/1
2-4 TABLET ORAL
Qty: 20 TAB | Refills: 0 | Status: SHIPPED | OUTPATIENT
Start: 2017-06-08 | End: 2017-06-23

## 2017-06-08 RX ORDER — LEVETIRACETAM 500 MG/1
500 TABLET ORAL 2 TIMES DAILY
Qty: 60 TAB | Refills: 1 | Status: SHIPPED | OUTPATIENT
Start: 2017-06-08

## 2017-06-08 RX ADMIN — DEXAMETHASONE SODIUM PHOSPHATE 8 MG: 4 INJECTION, SOLUTION INTRAMUSCULAR; INTRAVENOUS at 12:38

## 2017-06-08 RX ADMIN — HYDROMORPHONE HYDROCHLORIDE 1 MG: 1 INJECTION, SOLUTION INTRAMUSCULAR; INTRAVENOUS; SUBCUTANEOUS at 04:22

## 2017-06-08 RX ADMIN — DEXAMETHASONE SODIUM PHOSPHATE 8 MG: 4 INJECTION, SOLUTION INTRAMUSCULAR; INTRAVENOUS at 04:23

## 2017-06-08 RX ADMIN — FAMOTIDINE 20 MG: 20 TABLET, FILM COATED ORAL at 08:55

## 2017-06-08 RX ADMIN — HYDROMORPHONE HYDROCHLORIDE 1 MG: 1 INJECTION, SOLUTION INTRAMUSCULAR; INTRAVENOUS; SUBCUTANEOUS at 07:46

## 2017-06-08 RX ADMIN — LEVETIRACETAM 500 MG: 500 TABLET, FILM COATED ORAL at 19:37

## 2017-06-08 RX ADMIN — LEVETIRACETAM 500 MG: 500 TABLET, FILM COATED ORAL at 08:56

## 2017-06-08 RX ADMIN — HYDROMORPHONE HYDROCHLORIDE 1 MG: 1 INJECTION, SOLUTION INTRAMUSCULAR; INTRAVENOUS; SUBCUTANEOUS at 21:31

## 2017-06-08 RX ADMIN — HYDROMORPHONE HYDROCHLORIDE 1 MG: 1 INJECTION, SOLUTION INTRAMUSCULAR; INTRAVENOUS; SUBCUTANEOUS at 12:38

## 2017-06-08 RX ADMIN — FAMOTIDINE 20 MG: 20 TABLET, FILM COATED ORAL at 19:36

## 2017-06-08 RX ADMIN — HYDROMORPHONE HYDROCHLORIDE 1 MG: 1 INJECTION, SOLUTION INTRAMUSCULAR; INTRAVENOUS; SUBCUTANEOUS at 19:36

## 2017-06-08 RX ADMIN — NICOTINE 14 MG: 14 PATCH, EXTENDED RELEASE TRANSDERMAL at 04:24

## 2017-06-08 RX ADMIN — SENNOSIDES AND DOCUSATE SODIUM 2 TABLET: 8.6; 5 TABLET ORAL at 08:56

## 2017-06-08 RX ADMIN — DEXAMETHASONE SODIUM PHOSPHATE 8 MG: 4 INJECTION, SOLUTION INTRAMUSCULAR; INTRAVENOUS at 17:12

## 2017-06-08 RX ADMIN — HYDROMORPHONE HYDROCHLORIDE 1 MG: 1 INJECTION, SOLUTION INTRAMUSCULAR; INTRAVENOUS; SUBCUTANEOUS at 17:12

## 2017-06-08 RX ADMIN — SENNOSIDES AND DOCUSATE SODIUM 2 TABLET: 8.6; 5 TABLET ORAL at 19:37

## 2017-06-08 ASSESSMENT — ENCOUNTER SYMPTOMS
INSOMNIA: 0
FOCAL WEAKNESS: 1
BLURRED VISION: 0
FEVER: 0
HEADACHES: 0
ABDOMINAL PAIN: 0
SENSORY CHANGE: 1
CONSTIPATION: 0
NAUSEA: 0
PALPITATIONS: 0
VOMITING: 0
CHILLS: 0

## 2017-06-08 ASSESSMENT — PAIN SCALES - GENERAL
PAINLEVEL_OUTOF10: 9
PAINLEVEL_OUTOF10: 9
PAINLEVEL_OUTOF10: 0
PAINLEVEL_OUTOF10: 9

## 2017-06-08 NOTE — PROGRESS NOTES
[facility]ist Progress Note    Date of Service: 2017    Chief Complaint  46 y.o. male admitted 6/3/2017 with progression of GBM, evaluated by neurosx with no further tx options available, severe progression of tumor with midline shift, Pt c/o anxiety, vision changes, pain, thirst. Lives alone in a [address], difficult disposition       Interval Problem Update  Pt eating breakfast sitting up in bed this morning. No new complaints. Social work to follow-up with hospice choice form.     Consultants/Specialty  Hospice  Dr. Edgar - Neurosurgery     Disposition  Home with hospice when accepted, inpt hospice declined.         Review of Systems   Constitutional: Negative for fever and chills.   Eyes: Negative for blurred vision.   Cardiovascular: Negative for chest pain and palpitations.   Gastrointestinal: Negative for nausea, vomiting, abdominal pain and constipation.   Neurological: Positive for sensory change and focal weakness. Negative for headaches.   Psychiatric/Behavioral: The patient does not have insomnia.       Physical Exam  Laboratory/Imaging   Hemodynamics  Temp (24hrs), Av.4 °C (97.5 °F), Min:36.2 °C (97.2 °F), Max:36.8 °C (98.2 °F)   Temperature: 36.2 °C (97.2 °F)  Pulse  Av.6  Min: 67  Max: 117    Blood Pressure: 131/98 mmHg      Respiratory      Respiration: 18, Pulse Oximetry: 94 %        RUL Breath Sounds: Clear, RML Breath Sounds: Clear, RLL Breath Sounds: Clear, FLY Breath Sounds: Clear, LLL Breath Sounds: Clear    Fluids    Intake/Output Summary (Last 24 hours) at 17 1242  Last data filed at 17 0900   Gross per 24 hour   Intake    840 ml   Output   1500 ml   Net   -660 ml       Nutrition  Orders Placed This Encounter   Procedures   • DIET ORDER     Standing Status: Standing      Number of Occurrences: 1      Standing Expiration Date:      Order Specific Question:  Diet:     Answer:  Regular [1]     Physical Exam   Constitutional: No distress.   HENT:   Head:  Normocephalic and atraumatic.   Eyes: Conjunctivae and EOM are normal.   Cardiovascular: Normal rate, regular rhythm and normal heart sounds.    Pulmonary/Chest: Effort normal and breath sounds normal. No respiratory distress. He has no wheezes.   Abdominal: Soft. There is no tenderness. There is no rebound and no guarding.   Musculoskeletal: He exhibits no edema.   Neurological: He is alert. A cranial nerve deficit is present.   Skin: Skin is warm and dry. He is not diaphoretic.       Recent Labs      06/06/17   0212   WBC  19.8*   RBC  4.27*   HEMOGLOBIN  13.7*   HEMATOCRIT  39.8*   MCV  93.2   MCH  32.1   MCHC  34.4   RDW  45.1   PLATELETCT  216   MPV  9.1     Recent Labs      06/06/17   0212   SODIUM  143   POTASSIUM  4.0   CHLORIDE  109   CO2  25   GLUCOSE  139*   BUN  9   CREATININE  0.45*   CALCIUM  8.9                      Assessment/Plan     Cerebral edema (HCC) (present on admission)  Assessment & Plan  - 2/2 to progression of GBM, cont on salt tabs and steroids  - hospice consulting    GBM (glioblastoma multiforme) (CMS-HCC) (present on admission)  Assessment & Plan  - worsening, advanced, no further options per neurosurgery   - palliative following     ICH (intracerebral hemorrhage) (CMS-HCC) (present on admission)  Assessment & Plan  - tumor related    Generalized weakness (present on admission)  Assessment & Plan  - OT eval pending     Tobacco abuse (present on admission)  Assessment & Plan  - cessation encouraged, pt on nicotine replacement therapy         Plan of care discussed at multidisciplinary rounds, bedside RN and patient.   Labs reviewed and Medications reviewed  Anand catheter: No Anand        DVT prophylaxis - mechanical: SCDs  Ulcer prophylaxis: Yes    Assessed for rehab: Patient was assess for and/or received rehabilitation services during this hospitalization

## 2017-06-08 NOTE — DISCHARGE PLANNING
Met with the pt at the bedside. Per pt he wishes to DC to his hotel room with Hospice. Per pt his brother, Jose lives with him there. Pt choice for [facility] faxed to [facility].

## 2017-06-08 NOTE — THERAPY
Cancel OT order patient w/progression of GMB and poor prognosis, no current acute OT needs with POC for hospice to be initiated

## 2017-06-08 NOTE — CARE PLAN
Problem: Safety  Goal: Will remain free from injury  Outcome: PROGRESSING AS EXPECTED    Problem: Infection  Goal: Will remain free from infection  Outcome: PROGRESSING AS EXPECTED    Problem: Medication  Goal: Compliance with prescribed medication will improve  Intervention: Educate patient and significant other/support system medication rationale and regimen  Pt reports name of pain medication when asked. Able to rate pain level. Pt takes medications as prescribed.

## 2017-06-08 NOTE — PROGRESS NOTES
Patient resting with bed in lowest locked position. VSS with no acute signs of distress. Call light in reach. Patient given PRN medication to address pain. Patient denies need for the restroom at this time. Will continue to monitor.

## 2017-06-09 ENCOUNTER — HOME CARE VISIT (OUTPATIENT)
Dept: HOSPICE | Facility: HOSPICE | Age: 47
End: 2017-06-09
Payer: MEDICARE

## 2017-06-09 PROCEDURE — 700111 HCHG RX REV CODE 636 W/ 250 OVERRIDE (IP): Performed by: INTERNAL MEDICINE

## 2017-06-09 PROCEDURE — A9270 NON-COVERED ITEM OR SERVICE: HCPCS | Performed by: INTERNAL MEDICINE

## 2017-06-09 PROCEDURE — 700111 HCHG RX REV CODE 636 W/ 250 OVERRIDE (IP): Performed by: HOSPITALIST

## 2017-06-09 PROCEDURE — 700102 HCHG RX REV CODE 250 W/ 637 OVERRIDE(OP): Performed by: INTERNAL MEDICINE

## 2017-06-09 PROCEDURE — 770001 HCHG ROOM/CARE - MED/SURG/GYN PRIV*

## 2017-06-09 PROCEDURE — 700102 HCHG RX REV CODE 250 W/ 637 OVERRIDE(OP): Performed by: HOSPITALIST

## 2017-06-09 PROCEDURE — 99231 SBSQ HOSP IP/OBS SF/LOW 25: CPT | Performed by: INTERNAL MEDICINE

## 2017-06-09 PROCEDURE — A9270 NON-COVERED ITEM OR SERVICE: HCPCS | Performed by: HOSPITALIST

## 2017-06-09 RX ADMIN — SODIUM CHLORIDE TAB 1 GM 1 G: 1 TAB at 09:12

## 2017-06-09 RX ADMIN — HYDROMORPHONE HYDROCHLORIDE 1 MG: 1 INJECTION, SOLUTION INTRAMUSCULAR; INTRAVENOUS; SUBCUTANEOUS at 16:29

## 2017-06-09 RX ADMIN — SODIUM CHLORIDE TAB 1 GM 1 G: 1 TAB at 17:41

## 2017-06-09 RX ADMIN — NICOTINE 14 MG: 14 PATCH, EXTENDED RELEASE TRANSDERMAL at 05:51

## 2017-06-09 RX ADMIN — DEXAMETHASONE SODIUM PHOSPHATE 8 MG: 4 INJECTION, SOLUTION INTRAMUSCULAR; INTRAVENOUS at 13:29

## 2017-06-09 RX ADMIN — DEXAMETHASONE SODIUM PHOSPHATE 8 MG: 4 INJECTION, SOLUTION INTRAMUSCULAR; INTRAVENOUS at 18:34

## 2017-06-09 RX ADMIN — FAMOTIDINE 20 MG: 20 TABLET, FILM COATED ORAL at 21:19

## 2017-06-09 RX ADMIN — LEVETIRACETAM 500 MG: 500 TABLET, FILM COATED ORAL at 21:19

## 2017-06-09 RX ADMIN — LEVETIRACETAM 500 MG: 500 TABLET, FILM COATED ORAL at 09:11

## 2017-06-09 RX ADMIN — SENNOSIDES AND DOCUSATE SODIUM 2 TABLET: 8.6; 5 TABLET ORAL at 21:19

## 2017-06-09 RX ADMIN — HYDROMORPHONE HYDROCHLORIDE 1 MG: 1 INJECTION, SOLUTION INTRAMUSCULAR; INTRAVENOUS; SUBCUTANEOUS at 05:51

## 2017-06-09 RX ADMIN — SENNOSIDES AND DOCUSATE SODIUM 2 TABLET: 8.6; 5 TABLET ORAL at 09:11

## 2017-06-09 RX ADMIN — ONDANSETRON 4 MG: 2 INJECTION INTRAMUSCULAR; INTRAVENOUS at 06:02

## 2017-06-09 RX ADMIN — HYDROMORPHONE HYDROCHLORIDE 1 MG: 1 INJECTION, SOLUTION INTRAMUSCULAR; INTRAVENOUS; SUBCUTANEOUS at 13:35

## 2017-06-09 RX ADMIN — HYDROMORPHONE HYDROCHLORIDE 1 MG: 1 INJECTION, SOLUTION INTRAMUSCULAR; INTRAVENOUS; SUBCUTANEOUS at 09:11

## 2017-06-09 RX ADMIN — HYDROMORPHONE HYDROCHLORIDE 1 MG: 1 INJECTION, SOLUTION INTRAMUSCULAR; INTRAVENOUS; SUBCUTANEOUS at 21:28

## 2017-06-09 RX ADMIN — DEXAMETHASONE SODIUM PHOSPHATE 8 MG: 4 INJECTION, SOLUTION INTRAMUSCULAR; INTRAVENOUS at 01:32

## 2017-06-09 RX ADMIN — DEXAMETHASONE SODIUM PHOSPHATE 8 MG: 4 INJECTION, SOLUTION INTRAMUSCULAR; INTRAVENOUS at 05:51

## 2017-06-09 RX ADMIN — HYDROMORPHONE HYDROCHLORIDE 1 MG: 1 INJECTION, SOLUTION INTRAMUSCULAR; INTRAVENOUS; SUBCUTANEOUS at 19:34

## 2017-06-09 RX ADMIN — FAMOTIDINE 20 MG: 20 TABLET, FILM COATED ORAL at 09:11

## 2017-06-09 ASSESSMENT — ENCOUNTER SYMPTOMS
VOMITING: 0
CHILLS: 0
INSOMNIA: 0
CONSTIPATION: 0
NAUSEA: 0
ABDOMINAL PAIN: 0
HEADACHES: 1
PALPITATIONS: 0
FOCAL WEAKNESS: 1
BLURRED VISION: 0
FEVER: 0
SENSORY CHANGE: 1

## 2017-06-09 ASSESSMENT — PAIN SCALES - GENERAL
PAINLEVEL_OUTOF10: 10
PAINLEVEL_OUTOF10: 10

## 2017-06-09 NOTE — CARE PLAN
Problem: Communication  Goal: The ability to communicate needs accurately and effectively will improve  Intervention: Reorient patient to environment as needed  Pt becomes confused to time and situation, orienting pt as needed, educating pt on Rx being administered      Problem: Pain Management  Goal: Pain level will decrease to patient’s comfort goal  Intervention: Follow pain managment plan developed in collaboration with patient and Interdisciplinary Team  Pt experiencing pain in head 9/10, goal is to be comfortable at rest and with movement, medicating per MAR

## 2017-06-09 NOTE — DISCHARGE PLANNING
[facility] received notification from [facility] the referral has been accepted.

## 2017-06-09 NOTE — DISCHARGE PLANNING
[facility] had accepted the pt and then rescinded. Had been working with [facility] but at this time the pt would like to attempt to use a different Hospice company. Choice for [facility] faxed to [facility] who will notify [facility] that they are not the current choice any more.

## 2017-06-09 NOTE — PROGRESS NOTES
Pt is disoriented to time, intermittently confused and forgetful - requires constant re-orientation, c/o of 9-10/10 pain in right side of head- medicating per MAR, pt is able to use urinal in bed, bed alarm is on, call light and personal belongings within reach.

## 2017-06-09 NOTE — DISCHARGE PLANNING
Hospice referral sent to [facility] per choice form. Called [facility] and spoke to Indira and told her that family went with a different hospice.

## 2017-06-09 NOTE — PROGRESS NOTES
[facility] Hospitalist Progress Note    Date of Service: 2017    Chief Complaint  46 y.o. male admitted 6/3/2017 with progression of GBM, evaluated by neurosx with no further tx options available, severe progression of tumor with midline shift, Pt c/o anxiety, vision changes, pain, thirst. Lives alone in a [address], difficult disposition       Interval Problem Update  No acute events overnight. Patient resting in bed this morning. Patient states that he has not spoken to his brother in several weeks. Social work assisting to get a hold of patient's brother so he can be discharged home with hospice.    Consultants/Specialty  Hospice  Dr. Edgar - Neurosurgery  - signed off     Disposition  Home with hospice when accepted, inpt hospice declined.         Review of Systems   Constitutional: Negative for fever and chills.   Eyes: Negative for blurred vision.   Cardiovascular: Negative for chest pain and palpitations.   Gastrointestinal: Negative for nausea, vomiting, abdominal pain and constipation.   Neurological: Positive for sensory change, focal weakness and headaches.   Psychiatric/Behavioral: The patient does not have insomnia.       Physical Exam  Laboratory/Imaging   Hemodynamics  Temp (24hrs), Av.3 °C (97.3 °F), Min:36.2 °C (97.1 °F), Max:36.4 °C (97.5 °F)   Temperature: 36.4 °C (97.5 °F)  Pulse  Av.6  Min: 67  Max: 117    Blood Pressure: 136/102 mmHg (RN Notified)      Respiratory      Respiration: 15, Pulse Oximetry: 93 %        RUL Breath Sounds: Clear, RML Breath Sounds: Clear, RLL Breath Sounds: Clear, FLY Breath Sounds: Clear, LLL Breath Sounds: Clear    Fluids    Intake/Output Summary (Last 24 hours) at 17 0946  Last data filed at 17 0939   Gross per 24 hour   Intake   3200 ml   Output   2750 ml   Net    450 ml       Nutrition  Orders Placed This Encounter   Procedures   • DIET ORDER     Standing Status: Standing      Number of Occurrences: 1      Standing Expiration Date:       Order Specific Question:  Diet:     Answer:  Regular [1]     Physical Exam   Constitutional: No distress.   HENT:   Head: Normocephalic and atraumatic.   Eyes: Conjunctivae and EOM are normal.   Cardiovascular: Normal rate, regular rhythm and normal heart sounds.    Pulmonary/Chest: Effort normal and breath sounds normal. No respiratory distress. He has no wheezes.   Abdominal: Soft. There is no tenderness. There is no rebound and no guarding.   Musculoskeletal: He exhibits no edema.   Neurological: He is alert. A cranial nerve deficit is present.   Skin: Skin is warm and dry. He is not diaphoretic.                                Assessment/Plan     Cerebral edema (HCC) (present on admission)  Assessment & Plan  - 2/2 to progression of GBM, cont on salt tabs and steroids  - hospice consulting    GBM (glioblastoma multiforme) (CMS-HCC) (present on admission)  Assessment & Plan  - worsening, advanced, no further options per neurosurgery   - palliative following     ICH (intracerebral hemorrhage) (CMS-HCC) (present on admission)  Assessment & Plan  - tumor related    Generalized weakness (present on admission)  Assessment & Plan  - OT eval pending     Tobacco abuse (present on admission)  Assessment & Plan  - cessation encouraged, pt on nicotine replacement therapy         Plan of care discussed at multidisciplinary rounds, bedside RN and patient.   Labs reviewed and Medications reviewed  Anand catheter: No Anand        DVT prophylaxis - mechanical: SCDs  Ulcer prophylaxis: Yes    Assessed for rehab: Patient was assess for and/or received rehabilitation services during this hospitalization

## 2017-06-10 VITALS
TEMPERATURE: 98 F | SYSTOLIC BLOOD PRESSURE: 144 MMHG | BODY MASS INDEX: 26.48 KG/M2 | RESPIRATION RATE: 16 BRPM | DIASTOLIC BLOOD PRESSURE: 98 MMHG | OXYGEN SATURATION: 92 % | HEIGHT: 69 IN | WEIGHT: 178.79 LBS | HEART RATE: 91 BPM

## 2017-06-10 PROCEDURE — A9270 NON-COVERED ITEM OR SERVICE: HCPCS | Performed by: HOSPITALIST

## 2017-06-10 PROCEDURE — 700111 HCHG RX REV CODE 636 W/ 250 OVERRIDE (IP): Performed by: HOSPITALIST

## 2017-06-10 PROCEDURE — 700102 HCHG RX REV CODE 250 W/ 637 OVERRIDE(OP): Performed by: HOSPITALIST

## 2017-06-10 PROCEDURE — 700111 HCHG RX REV CODE 636 W/ 250 OVERRIDE (IP): Performed by: INTERNAL MEDICINE

## 2017-06-10 PROCEDURE — 700102 HCHG RX REV CODE 250 W/ 637 OVERRIDE(OP): Performed by: INTERNAL MEDICINE

## 2017-06-10 PROCEDURE — 99239 HOSP IP/OBS DSCHRG MGMT >30: CPT | Performed by: INTERNAL MEDICINE

## 2017-06-10 PROCEDURE — A9270 NON-COVERED ITEM OR SERVICE: HCPCS | Performed by: INTERNAL MEDICINE

## 2017-06-10 RX ADMIN — HYDROMORPHONE HYDROCHLORIDE 1 MG: 1 INJECTION, SOLUTION INTRAMUSCULAR; INTRAVENOUS; SUBCUTANEOUS at 01:14

## 2017-06-10 RX ADMIN — SODIUM CHLORIDE TAB 1 GM 1 G: 1 TAB at 08:17

## 2017-06-10 RX ADMIN — DEXAMETHASONE SODIUM PHOSPHATE 8 MG: 4 INJECTION, SOLUTION INTRAMUSCULAR; INTRAVENOUS at 05:28

## 2017-06-10 RX ADMIN — ONDANSETRON 4 MG: 2 INJECTION INTRAMUSCULAR; INTRAVENOUS at 10:47

## 2017-06-10 RX ADMIN — LEVETIRACETAM 500 MG: 500 TABLET, FILM COATED ORAL at 08:18

## 2017-06-10 RX ADMIN — HYDROMORPHONE HYDROCHLORIDE 1 MG: 1 INJECTION, SOLUTION INTRAMUSCULAR; INTRAVENOUS; SUBCUTANEOUS at 11:43

## 2017-06-10 RX ADMIN — DEXAMETHASONE SODIUM PHOSPHATE 8 MG: 4 INJECTION, SOLUTION INTRAMUSCULAR; INTRAVENOUS at 11:43

## 2017-06-10 RX ADMIN — FAMOTIDINE 20 MG: 20 TABLET, FILM COATED ORAL at 08:18

## 2017-06-10 RX ADMIN — HYDROMORPHONE HYDROCHLORIDE 1 MG: 1 INJECTION, SOLUTION INTRAMUSCULAR; INTRAVENOUS; SUBCUTANEOUS at 08:19

## 2017-06-10 RX ADMIN — NICOTINE 14 MG: 14 PATCH, EXTENDED RELEASE TRANSDERMAL at 05:29

## 2017-06-10 RX ADMIN — DEXAMETHASONE SODIUM PHOSPHATE 8 MG: 4 INJECTION, SOLUTION INTRAMUSCULAR; INTRAVENOUS at 01:14

## 2017-06-10 RX ADMIN — HYDROMORPHONE HYDROCHLORIDE 1 MG: 1 INJECTION, SOLUTION INTRAMUSCULAR; INTRAVENOUS; SUBCUTANEOUS at 05:28

## 2017-06-10 ASSESSMENT — PAIN SCALES - GENERAL
PAINLEVEL_OUTOF10: 0
PAINLEVEL_OUTOF10: 8
PAINLEVEL_OUTOF10: 0
PAINLEVEL_OUTOF10: 9
PAINLEVEL_OUTOF10: 0
PAINLEVEL_OUTOF10: 10
PAINLEVEL_OUTOF10: 8

## 2017-06-10 ASSESSMENT — LIFESTYLE VARIABLES: DO YOU DRINK ALCOHOL: NO

## 2017-06-10 NOTE — PROGRESS NOTES
PALLIATIVE CARE FOLLOW UP:  Patient to go home with [facility] today. Completed new POLST with patient. Reviewed and signed by Dr. Weathers. Patient reports nausea. Notified RN. New POLST scanned into EPIC and returned to patient - placed in plastic sleeve with AD. Patient c/o HA. Notified RN. Patient encouraged to call with any questions, needs or concerns in the future and stated he is in good hands with hospice.     Thank you for allowing Palliative Care to follow this patient. Please contact us at  with any questions.

## 2017-06-10 NOTE — DISCHARGE PLANNING
Per [facility] Sus[facility] request, transport arranged for patient to transfer to his home at [address] [place] today at 1300 via [facility].  Spoke with Saida at [facility] advised of discharge time.  Per Saida she will have nurse at discharge address when patient arrives. [facility] Sus[facility] advised  of transport time.

## 2017-06-10 NOTE — DISCHARGE PLANNING
Arranged patient's transport home via [facility] at 1130 on 06/10.  Shamir) notified via voicemail.

## 2017-06-10 NOTE — DISCHARGE INSTRUCTIONS
Discharge Instructions    Discharged to home by medical transportation with escort. Discharged via ambulance, hospital escort: Yes.  Special equipment needed: Not Applicable    Be sure to schedule a follow-up appointment with your primary care doctor or any specialists as instructed.     Discharge Plan:   Smoking Cessation Offered: Patient Refused  Influenza Vaccine Indication: Indicated: Not available from distributor/    I understand that a diet low in cholesterol, fat, and sodium is recommended for good health. Unless I have been given specific instructions below for another diet, I accept this instruction as my diet prescription.   Other diet: Regular    Special Instructions: None    · Is patient discharged on Warfarin / Coumadin?   No     · Is patient Post Blood Transfusion?  NoGlioblastoma  Glioblastoma, also called Grade IV astrocytoma, is a type of brain cancer. Glioblastoma tumors are made up of an overgrowth of normal brain cells. A tumor is formed when these cells grow into a mass of tissue. Most people develop glioblastoma between the ages of 45 years and 70 years.   RISK FACTORS  Tumor cells of people who have had a low-grade astrocytoma may progress to this higher-grade type of cell. People who have had radiation exposure or a family cancer syndrome, such as Li-Fraumeni syndrome or Turcot syndrome, are at greater risk of glioblastoma.   SYMPTOMS   Symptoms of glioblastoma may depend on the size and location of the tumor. Possible symptoms include:   Headache, which may be worse in the morning.    Nausea and vomiting.  Vision changes.    Seizures.    Being unable to walk.  Weakness or numbness on one side of the body or in an arm or leg.    Mood changes.    Problems with memory or thinking.  DIAGNOSIS   Brain tumors can usually be seen on brain imaging, such as CT scan or MRI. A sample of the tumor will need to be studied in a laboratory (biopsy) to confirm the diagnosis of  glioblastoma.  TREATMENT   There are several ways that glioblastoma is treated. Often, a person will undergo more than one type of treatment:  Surgery to remove as much of the tumor as possible.  High-energy rays (radiation therapy) to help shrink or kill the tumor.  Chemotherapy to shrink or kill the tumor. Because normal cells may also be killed, chemotherapy has many side effects.  Targeted therapy uses substances that injure or kill cancer cells without affecting normal cells.  Steroid medicine to decrease brain swelling and improve symptoms.  HOME CARE INSTRUCTIONS  Take all medicines as directed by your health care provider.    Go to all of your follow-up appointments.  SEEK MEDICAL CARE IF:  Any symptoms come back.  You have diarrhea, vomiting, or abdominal pain.  You cannot eat or drink what you need.  You are more weak or tired than usual.    You are losing weight without trying.  SEEK IMMEDIATE MEDICAL CARE IF:  Your diarrhea, vomiting, or abdominal pain does not go away.  You have new symptoms, such as vision problems or difficulty walking.    You have a seizure.    You have bleeding that does not stop.    You have trouble breathing.    You have a fever.       This information is not intended to replace advice given to you by your health care provider. Make sure you discuss any questions you have with your health care provider.     Document Released: 12/23/2014 Document Revised: 01/08/2016 Document Reviewed: 12/23/2014  [employer] Interactive Patient Education ©2016 [employer]      Depression / Suicide Risk    As you are discharged from this [facility] facility, it is important to learn how to keep safe from harming yourself.    Recognize the warning signs:  · Abrupt changes in personality, positive or negative- including increase in energy   · Giving away possessions  · Change in eating patterns- significant weight changes-  positive or negative  · Change in sleeping patterns- unable to sleep or  sleeping all the time   · Unwillingness or inability to communicate  · Depression  · Unusual sadness, discouragement and loneliness  · Talk of wanting to die  · Neglect of personal appearance   · Rebelliousness- reckless behavior  · Withdrawal from people/activities they love  · Confusion- inability to concentrate     If you or a loved one observes any of these behaviors or has concerns about self-harm, here's what you can do:  · Talk about it- your feelings and reasons for harming yourself  · Remove any means that you might use to hurt yourself (examples: pills, rope, extension cords, firearm)  · Get professional help from the community (Mental Health, Substance Abuse, psychological counseling)  · Do not be alone:Call your Safe Contact- someone whom you trust who will be there for you.  · Call your local CRISIS HOTLINE 599-8020 or 645-413-9657  · Call your local Children's Mobile Crisis Response Team [place] (023) 572-8545 or www.[employer]  · Call the toll free National Suicide Prevention Hotlines   · National Suicide Prevention Lifeline 424-343-KIGE (9523)  · National Hope Line Network 800-SUICIDE (067-5851)

## 2017-06-10 NOTE — DISCHARGE PLANNING
Medical Social Work    BECKY spoke with RN who indicated pt will be discharging tomorrow and needs the [facility] to take him home. 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 Pt must be home by 1300 to meet hospice. BECKY completed transport form and faxed to [facility] for processing.

## 2017-06-10 NOTE — DISCHARGE PLANNING
Spoke with kb at [facility] and pt has been accepted to their service.  Pt to DC today.  alondra spoke with rn who advised pt does not meet criteria for the [facility] and will require [facility].  All transport forms faxed to [facility] to arrange [facility] for  time of 1300. KB # [phone] hospice will meet pt at the [facility] today.

## 2017-06-10 NOTE — CARE PLAN
Progressing as expected    Problem: Communication  Goal: The ability to communicate needs accurately and effectively will improve  Intervention: Reorient patient to environment as needed  Pt becomes confused to time and situation, orienting pt as needed, educating pt on Rx being administered      Problem: Pain Management  Goal: Pain level will decrease to patient’s comfort goal  Intervention: Follow pain managment plan developed in collaboration with patient and Interdisciplinary Team  Pt experiencing pain in head 9/10, goal is to be comfortable at rest and with movement, medicating per MAR

## 2017-06-10 NOTE — PROGRESS NOTES
Spoke with Aj from [facility], they will be ready to receive him tomorrow at 1400 at his motel. Transportation needs to be arranged by [facility]. Pt aware but very forgetful. Tearful most of the afternoon. Medicated about every 3 hours with dilaudid 1 mg IVP.   IV leaking and dc'd. IV restarted in left hand with #22, tolerated procedure well.

## 2017-06-10 NOTE — DISCHARGE SUMMARY
CHIEF COMPLAINT ON ADMISSION  Chief Complaint   Patient presents with   • Head Ache   • N/V       CODE STATUS  DNR    HPI & HOSPITAL COURSE  See H&P dictated by Dr. Dumas. This is a 46 y.o. male here with past medical history significant for glioblastoma multiforme that his pelvis craniotomy ×2 by Dr. Edgar and history of chemotherapy and radiation therapy in the past. Patient presented to the ER complaining of severe headaches. Patient had a CT head which showed increased vasogenic edema on the right with effacement of the cortical sulci. There is also mass effect on the ventricles with 7.5 mm midline shift to the left. He was noted to have right temporal and occipital    lesion is again noted involving the splenium of corpus callosum with internal    curvilinear hyperdensity which has increased which may represent a small    amount of intralesional hemorrhage.  There is increased edema in the medial    left temporal frontoparietal lobes as well. When compared to previous CT from April 2017 these findings are significantly worsened. Case was discussed with neurosurgery and it was deemed that patient is not a candidate for any other surgical intervention. Palliative care was consulted patient is discharged home with home hospice.     Therefore, he is discharged in fair and stable condition with close outpatient follow-up.        DISCHARGE PROBLEM LIST  Active Problems:    Cerebral edema (HCC) POA: Yes    GBM (glioblastoma multiforme) (CMS-HCC) POA: Yes    ICH (intracerebral hemorrhage) (CMS-HCC) POA: Yes    Tobacco abuse (Chronic) POA: Yes    Generalized weakness POA: Yes  Resolved Problems:    Headache POA: Yes    Vision changes POA: Yes    Leukocytosis POA: Unknown      FOLLOW UP  Future Appointments  Date Time Provider Department Center   7/5/2017 8:20 AM [facility]  [address]  [facility] [phone]          MEDICATIONS ON  DISCHARGE   Fortino Ricci Jr.   Home Medication Instructions JOSE LUIS:[id number]    Printed on:06/10/17 0944   Medication Information                      dexamethasone (DECADRON) 0.5 MG Tab  Take 16 Tabs by mouth 3 times a day for 30 days.             HYDROmorphone (DILAUDID) 2 MG Tab  Take 1-2 Tabs by mouth every 3 hours as needed for Severe Pain for up to 15 days.             levetiracetam (KEPPRA) 500 MG Tab  Take 1 Tab by mouth 2 Times a Day.             omeprazole (PRILOSEC) 20 MG delayed-release capsule  Take 1 Cap by mouth 2 times a day for 30 days.                 DIET  Orders Placed This Encounter   Procedures   • DIET ORDER     Standing Status: Standing      Number of Occurrences: 1      Standing Expiration Date:      Order Specific Question:  Diet:     Answer:  Regular [1]       ACTIVITY  As tolerated.        CONSULTATIONS  Dr. Edgar - Neurosurgery    PROCEDURES  None     LABORATORY  Lab Results   Component Value Date/Time    SODIUM 143 06/06/2017 02:12 AM    POTASSIUM 4.0 06/06/2017 02:12 AM    CHLORIDE 109 06/06/2017 02:12 AM    CO2 25 06/06/2017 02:12 AM    GLUCOSE 139* 06/06/2017 02:12 AM    BUN 9 06/06/2017 02:12 AM    CREATININE 0.45* 06/06/2017 02:12 AM        Lab Results   Component Value Date/Time    WBC 19.8* 06/06/2017 02:12 AM    HEMOGLOBIN 13.7* 06/06/2017 02:12 AM    HEMATOCRIT 39.8* 06/06/2017 02:12 AM    PLATELET COUNT 216 06/06/2017 02:12 AM        Total time of the discharge process exceeds 38 minutes

## 2017-06-10 NOTE — PROGRESS NOTES
Pt is A&O to self. VSS. Medication given for pain as charted. Tolerating diet. Voiding. Unable to ambulate, repositions self in bed. Discharged home with home hospice via [facility] transportation. INT removed. Hospice aware of transfer. INT removed. Pt stable and transported home.

## 2017-06-26 ENCOUNTER — TELEPHONE (OUTPATIENT)
Dept: MEDICAL GROUP | Facility: MEDICAL CENTER | Age: 47
End: 2017-06-26

## 2017-06-26 NOTE — TELEPHONE ENCOUNTER
Left message for patient to call back regarding NP pre-visit planning. Please transfer call to [phone].

## 2017-07-05 ENCOUNTER — TELEPHONE (OUTPATIENT)
Dept: MEDICAL GROUP | Facility: MEDICAL CENTER | Age: 47
End: 2017-07-05

## 2017-08-16 ENCOUNTER — TELEPHONE (OUTPATIENT)
Dept: HEMATOLOGY ONCOLOGY | Facility: MEDICAL CENTER | Age: 47
End: 2017-08-16

## 2017-08-16 NOTE — TELEPHONE ENCOUNTER
Attempted to contact patient to R/S the appointment he missed.  LM on  for pt to call to R/S with a Jesus gloria.

## 2017-08-24 ENCOUNTER — TELEPHONE (OUTPATIENT)
Dept: HEMATOLOGY ONCOLOGY | Facility: MEDICAL CENTER | Age: 47
End: 2017-08-24

## 2017-08-24 NOTE — TELEPHONE ENCOUNTER
2nd attempt to contact patient.  Pt did not make his last FV with Jesus and requires a continuation of care appointment.  Patient can transition to Dr. Iniguez or Dr. Thomas.

## 2017-10-04 PROBLEM — R51.9 HEADACHE: Status: RESOLVED | Noted: 2017-06-03 | Resolved: 2017-06-08

## 2019-02-07 NOTE — CARE PLAN
Problem: Communication  Goal: The ability to communicate needs accurately and effectively will improve  Pt calls appropriately     Problem: Pain Management  Goal: Pain level will decrease to patient’s comfort goal  Pain assessed. Medicated per MAR with + results.              Yes

## 2020-01-17 NOTE — Clinical Note
Final Anesthesia Post-op Assessment    Patient: Duarte Horn  Procedure(s) Performed: COLONOSCOPY- MAC  Anesthesia type: MAC    Vitals Value Taken Time   Temp 36.1 °C (97 °F) 1/17/2020  9:50 AM   Pulse 74 1/17/2020  9:50 AM   Resp 20 1/17/2020  9:50 AM   SpO2 98 % 1/17/2020  9:50 AM   /68 1/17/2020  9:50 AM       Last 24 I/O:     Intake/Output Summary (Last 24 hours) at 1/17/2020 0951  Last data filed at 1/17/2020 0949  Gross per 24 hour   Intake 250 ml   Output 2 ml   Net 248 ml         Patient Location: Phase II  Post-op Vital Signs:stable  Level of Consciousness: participates in exam, answers questions appropriately, awake, oriented and alert  Respiratory Status: spontaneous ventilation  Cardiovascular blood pressure returned to baseline  Hydration: euvolemic  Pain Management: adequately controlled  Handoff: Handoff to receiving clinician was performed and questions were answered  Nausea: None  Airway Patency:patent  Post-op Assessment: awake, alert, appropriately conversant, or baseline, no complications, patient tolerated procedure well with no complications and evidence of recall       [facility]    [address], [address]  [place] [address]        Date:4/10/2017                     Reference to Fortino Ricci Jr.    Follow Up Note: Oncology     Date: 4/7/17  Time: 1:40 pm     Primary Care Physician: None  Neurosurgery: Dr. Tala Hancock  Radiation Oncology: Dr. Madrigal     Diagnosis: Anaplastic Astrocytoma, Grade III with recurrence with GBM    Chief compliant: He is here for a follow up visit.    History of presenting illness:  Mr. Ricci is a 46-year-old male with GBM.  He has history of grade 3 astrocytoma which has reoccured with glioblastoma. 11/2014 he was diagnosed with grade 3 astrocytoma secondary to his syncopal episode. He was found to have a right temporal mass and uncal herniation at diagnosis. He had a craniotomy and resection of the tumor which was positive for anaplastic astrocytoma, grade 3 with a low to high Ki-67 and IDH1-R132H (E5) negative.  He completed chemoradiation with temozolomide.  He was then placed on maintenance temozolomide. Patient had issues with nausea and vomiting which were fairly well controlled when he was compliant with his medications. Patient has had multiple issues with compliance. He also had significant issues with tobacco use and alcohol use during that time. He was continued on maintenance therapy until 3/2016 when he was lost to follow-up. He was unreachable despite multiple attempts by my office. He then presented to the emergency room in 12/2016 with increased headaches. MRI brain showed progression of disease and he was seen by Dr. Edgar. He underwent biopsy of the mass pathology consistent with glioblastoma. He was seen by Dr. Madrigal during hospitalization was also started back on dexamethasone and Keppra and was to start him on chemoradiation. Patient was discharged however was readmitted shortly after. He received chemoradiation during his hospitalization and completed therapy in 15 fractions of  radiation on 2/16/17. He tolerated the treatment fairly well. He was continued on Keppra and a very slow taper of dexamethasone. He is followed closely as outpatient and has been compliant for now.  His last MRI brain showed a disc concerning for progression versus pseudo-progression. He was continued on his regiment and was slowly tapered on the dexamethasone.      Interval History:  He is here for follow-up visit and is accompanied by his brother who was present in the room.  He has been having dizziness and weakness of his legs are the past 2 weeks. He has been having intermittent fall which were protected. This morning he has a fall and hit is head on the right.  He did not have syncope. He has cut down on the dexamethasone 2 mg a day 2 weeks ago. He is having headaches intermittently.  He vision has been stable.  Intermittent nausea but no vomiting.   He has noticed a decrease in his appetite has been able to eat and has a stable weight. He denies any events, chills or night sweats.     Past Medical History:    1. GBM  2. History of Anaplastic Astrocytoma, Grade III  3. GERD  4. Depression  5. Anxiety     Allergies:  Pcn      Medications:    Current Outpatient Prescriptions on File Prior to Visit   Medication Sig Dispense Refill   • HYDROmorphone (DILAUDID) 2 MG Tab Take 1-2 Tabs by mouth every four hours as needed for Severe Pain. 45 Tab 0   • methocarbamol (ROBAXIN) 750 MG Tab Take 1 Tab by mouth every 6 hours as needed. 45 Tab 0   • gabapentin (NEURONTIN) 300 MG Cap Take 1 Cap by mouth 3 times a day. 90 Cap 3   • famotidine (PEPCID) 20 MG Tab Take 1 Tab by mouth 2 times a day. 60 Tab 3   • acetaminophen (TYLENOL) 500 MG Tab Take 1 Tab by mouth every 6 hours as needed. Indications: Pain     • dexamethasone (DECADRON) 4 MG Tab Take 1 Tab by mouth 3 times a day. 120 Tab 0   • sodium chloride (SALT) 1 GM Tab Take 1 Tab by mouth 2 times a day, with meals. 60 Tab 0   • diphenhydrAMINE (BENADRYL) 25 MG Tab Take 25  "mg by mouth every 6 hours as needed. Indications: Cough     • levetiracetam (KEPPRA) 1000 MG tablet Take 1 Tab by mouth 2 Times a Day. 60 Tab 3   • prochlorperazine (COMPAZINE) 10 MG Tab Take 1 Tab by mouth every 6 hours as needed. 30 Tab 3   • sertraline (ZOLOFT) 25 MG tablet Take 1 Tab by mouth every evening. 30 Tab 11   • ondansetron (ZOFRAN ODT) 4 MG TABLET DISPERSIBLE Take 1 Tab by mouth every four hours as needed for Nausea/Vomiting (give PO if no IV route available). 30 Tab 0   • Temozolomide 140 MG Cap Take 1 Cap by mouth every bedtime. 4 Cap 0   • temozolomide (TEMODAR) 5 MG Cap Take 1 Cap by mouth every bedtime. 4 Cap 0   • Pseudoephedrine-APAP-DM (QC DAYTIME COLD MEDICINE PO) Take 2 Caps by mouth as needed (For cold symptoms).       No current facility-administered medications on file prior to visit.       Review of systems:    All other review of systems are negative except what was mentioned above in the HPI.  Constitutional: Negative for fever and chills. Positive for mild fatigue    HENT: Negative for ear pain and nosebleeds.    Eyes: Stable vision. Positive for photophobia.    Respiratory: Negative for cough, sputum production, and shortness of breath.     Cardiovascular: Negative for chest pain.    Gastrointestinal: Positive for  nausea. Negative for vomiting and abdominal pain.    Genitourinary: Negative for dysuria.    Musculoskeletal: Negative for myalgias.    Skin: Negative for rash.    Neurological: Positive for intermittent headaches, Positive for intermittent orthostatic dizziness and positive for sensory changes stable. Positive for diffuse weakness and unsteady gait.  Endo/Heme/Allergies: No bruise/bleed easily.    Psychiatric/Behavioral: Positive for anxiety. Stable memory      Physical Exam:  Vitals:    Filed Vitals:    04/07/17 1330   BP: 122/88   Pulse: 86   Temp: 36.8 °C (98.2 °F)   Resp: 16   Height: 1.753 m (5' 9.02\")   Weight: 83.915 kg (185 lb)   SpO2: 94%       General: patient is " in moderate distress, alert and oriented    HEENT: Post op changes and healing well, extra ocular muscles intact, pupils are equally reactive bilaterally, moist oral mucus membranes, and oral cavity without any lesions.  Neck: Supple neck and full range of motion  Lymph nodes: No palpable cervical and supraclavicular lymphadenopathy.     CVS: regular rate and rhythm  RESP: Clear to auscultation bilaterally.    ABD: Soft, non tender, non distended.     EXT: No edema   CNS: Alert and oriented, cranial nerves grossly intact, and muscle strength weakness .     Labs:  No visits with results within 1 Day(s) from this visit.  Latest known visit with results is:    Hospital Outpatient Visit on 03/15/2017   Component Date Value Ref Range Status   • Sodium 03/15/2017 137  135 - 145 mmol/L Final   • Potassium 03/15/2017 4.2  3.6 - 5.5 mmol/L Final   • Chloride 03/15/2017 102  96 - 112 mmol/L Final   • Co2 03/15/2017 30  20 - 33 mmol/L Final   • Anion Gap 03/15/2017 5.0  0.0 - 11.9 Final   • Glucose 03/15/2017 91  65 - 99 mg/dL Final   • Bun 03/15/2017 8  8 - 22 mg/dL Final   • Creatinine 03/15/2017 0.57  0.50 - 1.40 mg/dL Final   • Calcium 03/15/2017 9.3  8.5 - 10.5 mg/dL Final   • AST(SGOT) 03/15/2017 20  12 - 45 U/L Final   • ALT(SGPT) 03/15/2017 51* 2 - 50 U/L Final   • Alkaline Phosphatase 03/15/2017 52  30 - 99 U/L Final   • Total Bilirubin 03/15/2017 0.4  0.1 - 1.5 mg/dL Final   • Albumin 03/15/2017 4.5  3.2 - 4.9 g/dL Final   • Total Protein 03/15/2017 7.1  6.0 - 8.2 g/dL Final   • Globulin 03/15/2017 2.6  1.9 - 3.5 g/dL Final   • A-G Ratio 03/15/2017 1.7   Final   • WBC 03/15/2017 11.4* 4.8 - 10.8 K/uL Final   • RBC 03/15/2017 4.65* 4.70 - 6.10 M/uL Final   • Hemoglobin 03/15/2017 15.1  14.0 - 18.0 g/dL Final   • Hematocrit 03/15/2017 44.7  42.0 - 52.0 % Final   • MCV 03/15/2017 96.1  81.4 - 97.8 fL Final   • MCH 03/15/2017 32.5  27.0 - 33.0 pg Final   • MCHC 03/15/2017 33.8  33.7 - 35.3 g/dL Final   • RDW 03/15/2017  49.9  35.9 - 50.0 fL Final   • Platelet Count 03/15/2017 206  164 - 446 K/uL Final   • MPV 03/15/2017 9.1  9.0 - 12.9 fL Final   • Neutrophils-Polys 03/15/2017 78.20* 44.00 - 72.00 % Final   • Lymphocytes 03/15/2017 10.60* 22.00 - 41.00 % Final   • Monocytes 03/15/2017 8.90  0.00 - 13.40 % Final   • Eosinophils 03/15/2017 0.40  0.00 - 6.90 % Final   • Basophils 03/15/2017 0.40  0.00 - 1.80 % Final   • Immature Granulocytes 03/15/2017 1.50* 0.00 - 0.90 % Final   • Nucleated RBC 03/15/2017 0.00   Final   • Neutrophils (Absolute) 03/15/2017 8.94* 1.82 - 7.42 K/uL Final    Includes immature neutrophils, if present.   • Lymphs (Absolute) 03/15/2017 1.21  1.00 - 4.80 K/uL Final   • Monos (Absolute) 03/15/2017 1.01* 0.00 - 0.85 K/uL Final   • Eos (Absolute) 03/15/2017 0.04  0.00 - 0.51 K/uL Final   • Baso (Absolute) 03/15/2017 0.04  0.00 - 0.12 K/uL Final   • Immature Granulocytes (abs) 03/15/2017 0.17* 0.00 - 0.11 K/uL Final   • NRBC (Absolute) 03/15/2017 0.00   Final   • GFR If  03/15/2017 >60  >60 mL/min/1.73 m 2 Final   • GFR If Non  03/15/2017 >60  >60 mL/min/1.73 m 2 Final   ]    Imagin. 3/15/17 MRI brain: Increased size and enhancement of residual glioblastoma centered in the RIGHT parietal lobe, now extending across the splenium of the corpus callosum.  Extensive white matter fluid signal in the RIGHT cerebral hemisphere likely due to combination of post radiation changes and vasogenic edema      Assessment and Plan:  1. GBM, stage IV: History of grade 3 astrocytomas/p chemoradiation followed by maintenance therapy but unable to complete therapy secondary to noncompliance. 2016 he was found to have recurrent with GBM. He is s/p salvage chemoradiation on 17. He was started on maintenance temozolomide in 3/2017 and tolerated it well.  MRI showed changes concerning for progression versus progression. He has been continued on maintenance temozolomide and has been  compliant.  2. Frequent fall: Patient has been having frequent falls and unsteady gait since his dexamethasone dose was decreased. He had a fall this morning and hit his head. I strongly recommend a CT scan without contrast to rule out a bleed. Patient stated that he is unable to afford this at present. Patient was then advised to go to the emergency room if he were to have any worsening of his symptoms. I will start him back on higher doses of dexamethasone at 4 mg BID.   3. Social issues: Patient is more stable at present. He is currently living with his brother in a motel.  4. Pain medications: Patient is on Dilaudid to be taken as needed for headaches. He will be given refills and Dilaudid and robaxin weekly.  5. History of alcoholic abuse: Patient has abstained from alcohol. I reiterated the importance.  6. Tobacco use: He has cut down on chewing tobacco to half a can every 2 weeks. He has started to smoke a little bit more and now is smoking approximately 2-3 cigarettes/a day. I went over the importance of tobacco cessation. Patient states that he understands and let him to cut down.  7. He is to follow-up again in 2 weeks or sooner as needed.      I informed the patient that I will be leaving the [facility] shortly. I went over options for transition of care. He will be scheduled to see Dr. Iniguez     He agreed and verbalized his agreement and understanding with the current plan.  I answered all questions and concerns he has at this time and advised him to call at any time in the interim with questions or concerns in regards to his care.  Thank you for allowing me to participate in his care.    Please note that this dictation was created using voice recognition software. I have made every reasonable attempt to correct obvious errors, but I expect that there are errors of grammar and possibly content that I did not discover before finalizing the note.          Sincerely,  Ashley Lee  [address] [place] [address], [phone]

## 2021-05-22 NOTE — PROGRESS NOTES
"Hospital Medicine Progress Note, Adult, Complex               Author: Nerissa Jaramillo Date & Time created: 4/29/2017  1:12 PM     Interval History:  Hospital Course:  Fortino Ricci Jr. is a 46 y.o. male w/ h/o astrocytoma with glioblastoma stage III admitted 4/24/2017 for increased headaches, fall and urinary incontinence. He ran out of his pain medication as he could not afford to refill.     Today, the patients headache is to controlled. He \"isnt ready to give up yet\" and does not want hospice. Wants to stay on chemotherapy. Denies new complaint.     Review of Systems:  Review of Systems   Constitutional: Positive for malaise/fatigue. Negative for fever and chills.   Eyes: Negative for double vision and photophobia.   Respiratory: Negative for shortness of breath.    Cardiovascular: Negative for chest pain, palpitations and leg swelling.   Gastrointestinal: Negative for nausea, vomiting and abdominal pain.   Genitourinary: Negative for dysuria.        Urinary incontinence resolved   Musculoskeletal: Negative for neck pain (denies neck pain/stiffness).   Neurological: Positive for weakness and headaches. Negative for dizziness and sensory change.   Psychiatric/Behavioral: Positive for depression. Negative for hallucinations.   All other systems reviewed and are negative.      Physical Exam:  Physical Exam   Constitutional: He appears well-developed and well-nourished. No distress.   HENT:   Head: Normocephalic and atraumatic.   Eyes: Conjunctivae are normal.   Neck: Normal range of motion. Neck supple.   Cardiovascular: Normal rate and regular rhythm.    No murmur heard.  Pulmonary/Chest: Effort normal and breath sounds normal. No respiratory distress. He has no wheezes.   Abdominal: Soft. Bowel sounds are normal.   Musculoskeletal: Normal range of motion.   Neurological: He is alert.   Confused to time, oriented to self and situation  Strength LUE 3/5, RUE 4/5, BLE 4/5   Skin: Skin is warm and dry.   Vitals " [facility]: REJI ARELLANO   ACUTE REHABILITATION OCCUPATIONAL THERAPY  DAILY NOTE    Date: 21  Patient Name: Daiana Jarrell      Room: 9638/8500-34    MRN: [id number]   : 1957  (61 y.o.)  Gender: female      Diagnosis: ischemic CVA,  subacute right MCA distribution infarct, L lynette, dysphagia, L neglect, loop recorder 21 (Dr. Cayla Damon)  Additional Pertinent Hx: 60-year-old female who was found down, last known well approximately 21 hours before. Patient was found to have a right MCA M1 occlusion. Thrombectomy was not successful, MRI showed patient had a right MCA stroke with hemorrhagic conversion. displaying hemineglect, she is plegic on the left with a right gaze preference and left facial droop    Restrictions  Restrictions/Precautions: Fall Risk  Implants present? :  (loop recorder)  Other position/activity restrictions: significant L neglect with LUE/LLE deficits  Required Braces or Orthoses?: No  Equipment Used: Bed, Wheelchair, Other (nilson steady )    Subjective  Subjective: \"nope, don't want to\"  Comments: pt declines washing up this date and changing lower body dressing, agreeable for grooming tasks and changing shirt  Patient Currently in Pain: Yes  Pain Level: 2  Pain Location: Head  Restrictions/Precautions: Fall Risk     Patient Observation  Observations: flat affect, declines most tasks, encouragement provided unmotivated this AM  Pain Assessment  Pain Assessment: 0-10  Pain Level: 2  Pain Type: Acute pain  Pain Location: Head  Pain Descriptors: Headache    Objective  Cognition  Overall Cognitive Status: Impaired  Arousal/Alertness: Appropriate responses to stimuli  Following Directions:  Follows one step commands  Attention Span: Attends with cues to redirect  Safety Judgement: Decreased awareness of need for assistance  Insights: Decreased awareness of deficits  Sequencing and Organization: Assistance required to generate solutions  Perception  Overall Perceptual Status: Impaired  Unilateral Attention: Cues to attend left visual field;Cues to maintain midline in sitting;Cues to maintain midline in standing;Cues to attend to left side of body  Balance  Sitting Balance: Stand by assistance (seated EOB with LUE support on bed)  Standing Balance: Dependent/Total (min/CGA in nilson stedy)  Bed mobility  Supine to Sit: Minimal assistance; Moderate assistance (A to lift/move LLE with attempts using RLE, trunk control/support)  Sit to Supine: Minimal assistance (A trunk control and LLE into bed)  Scooting: Minimal assistance (hips EOB)  Comment: pt educated on LUE protection/awareness, poor/fair carryover noted  Transfers  Sit to stand: Dependent/Total (min A nilson stedy)  Stand to sit: Dependent/Total (mod/min A nilson stedy, VCs for controlled sitting)  Transfer Comments: pt impulsive req cuing to wait for assist  Standing Balance  Time: AM: <1min x2  Activity: AM: transfers using nilson stedy  Comment: no LOB Noted but req V/T [facility], PM: pt declines to exit bed, pt reports PT just placed pt into bed just prior to this writers arrival, pt agreeable to tasks in bed due to fatigue.          Type of ROM/Therapeutic Exercise  Type of ROM/Therapeutic Exercise: PROM  Comment: LUE ROM utilizing tapping on muscle bellies to stimulate movement, slight response noted for elbow flex and scapular protraction in gravity eliminated position  Exercises  Scapular Protraction: x  Scapular Retraction: x  Horizontal ABduction: x  Horizontal ADduction: x  Elbow Flexion: x  Elbow Extension: x     Additional Activities: PM: peg activity while seated upright in bed, peg board placed on R side with pegs positioned on L side, pattern created by this writer for pt to follow, prompting req for task initiation/continuation and locating pegs on L side, increased time req, pt fatigued but able to complete with encouragement, task to address L side neglect and visual scanning     Neuromuscular reviewed.      Labs:        Invalid input(s): [id number]      No results for input(s): SODIUM, POTASSIUM, CHLORIDE, CO2, BUN, CREATININE, MAGNESIUM, PHOSPHORUS, CALCIUM in the last 72 hours.  No results for input(s): ALTSGPT, ASTSGOT, ALKPHOSPHAT, TBILIRUBIN, DBILIRUBIN, GAMMAGT, AMYLASE, LIPASE, ALB, PREALBUMIN, GLUCOSE in the last 72 hours.  No results for input(s): RBC, HEMOGLOBIN, HEMATOCRIT, PLATELETCT, PROTHROMBTM, APTT, INR, IRON, FERRITIN, TOTIRONBC in the last 72 hours.            Hemodynamics:  Temp (24hrs), Av.1 °C (98.7 °F), Min:36.8 °C (98.2 °F), Max:37.3 °C (99.2 °F)  Temperature: 37.3 °C (99.2 °F)  Pulse  Av  Min: 51  Max: 100   Blood Pressure: 129/84 mmHg     Respiratory:    Respiration: 16, Pulse Oximetry: 95 %           Fluids:  No intake or output data in the 24 hours ending 17 1312     GI/Nutrition:  Orders Placed This Encounter   Procedures   • Diet Order     Standing Status: Standing      Number of Occurrences: 1      Standing Expiration Date:      Order Specific Question:  Diet:     Answer:  Regular [1]      Comments:  please cut up pancakes, Persian toast, meat, etc due to vision     Order Specific Question:  Texture/Fiber modifications:     Answer:  Chopped Meat [5]     Medical Decision Making, by Problem:  Active Hospital Problems    Diagnosis   • *Headache [R51]  -Pain management- resume home dosing  -Cont decadron, slow taper- taper to BID, taper more tomorrow  -Appreciate oncology consult Dr Lee  -Appreciate NS consult, Dr Shaikh  -CT brain- edema/gliosis in right frontal, parietal, temporal white matter; post op changes; ventriculomegaly  -MRI brain - unchanged from prior  -Palliative care consult- hopsice consult- pt not ready for hospice  -CM assistance in obtaining pain medicine   • Dysarthria [R47.1]- resolved, suspect 2/2 pain  -SLP eval- no dysphagia, no diet/liquid restriction   • Hyperglycemia [R73.9]- suspect 2/2 steroid- trending down  -Glycohemoglobin  5.9   • Urinary incontinence [R32]- resolved   • Leukocytosis [D72.829]- suspect reactive, no e/o infection or SIRS- trending down  -No nuchal rigidity, consider meningitis if HA remains   • Tobacco abuse [Z72.0]- encourage cessation  -Nicotine patch   • 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         Education  Neuromuscular education: Yes  Vibration: vibration applied to LUE to stimuate sensory receptors in muscles to increase functional return, PROM facilitated with desired ROM, pt supine in bed with fair tolerance to vibrations  Other: tapping to muscle bellies to stimuate functional movements, slight response noted in elbow flexion on LUE           ADL  Equipment Provided: Reacher  Feeding: Setup;Pureed diet  Grooming: Setup;Verbal cueing (seated sinkside, oral care and washing face)  UE Bathing: None (declines)  LE Bathing: None (declines)  UE Dressing: Minimal assistance; Increased time to complete;Verbal cueing (VCs for lynette tech when threading RUE/LUE, adjustments)  LE Dressing: Maximum assistance (max A for TEDS, shoes and R AFO, declines to change pants)  Toileting: None  Positioning  Wheelchair Position Type: 1/2 lap tray  Wheelchair Postion Comment: good LUE positioning in w/c with L arm tray in place       Assessment  Performance deficits / Impairments: Decreased functional mobility ; Decreased ADL status; Decreased strength;Decreased endurance;Decreased balance;Decreased high-level IADLs;Decreased vision/visual deficit; Decreased cognition;Decreased safe awareness;Decreased ROM; Decreased fine motor control;Decreased coordination;Decreased posture;Decreased sensation  Prognosis: Good  Discharge Recommendations: Patient would benefit from continued therapy after discharge  Activity Tolerance: Patient limited by fatigue  Activity Tolerance: unmotivated this date for OT, declines most tasks  Safety Devices in place: Yes  Type of devices: Nurse notified; Left in bed;Patient at risk for falls;Call light within reach  Equipment Recommendations  Equipment Needed:  (TBD)       Patient Education: ADL tasks, therapy participation, L side awareness, joint protection   Patient Goals   Patient goals : \"get as close to normal as possible. \"  specifies:\"get in and out of bed on my own, shower by myself. \"  Learner:patient  Method: demonstration and explanation       Outcome: needs reinforcement        Plan  Plan  Times per week: 5-7  Times per day: Twice a day  Current Treatment Recommendations: Strengthening, Positioning, ROM, Safety Education & Training, Balance Training, Patient/Caregiver Education & Training, Self-Care / ADL, Cognitive/Perceptual Training, Functional Mobility Training, Neuromuscular Re-education, Equipment Evaluation, Education, & procurement, Endurance Training, Cognitive Reorientation  Patient Goals   Patient goals : \"get as close to normal as possible. \"  specifies:\"get in and out of bed on my own, shower by myself. \"  Short term goals  Time Frame for Short term goals: 1 week  Short term goal 1: mod A UE bathe and dress  Short term goal 2: set up brush teeth (goal met)  Short term goal 3: min feeding, with cues able to locate L side of tray and scan to locate items on L. Short term goal 4: tolerate 6-8 min sitting edge of bed (static sit)  with min assist and RUE support, head at midline and fair safety awareness  Short term goal 5: pt to initiate reposition LUE with RUE with good safety ie. hold by wrist not by 1 finger  Short term goal 6: demo improved awareness of L side and neuromuscular christian by weight bearing on LUE with physical assist to complete  Short term goal 7: from w/c:  consistently maintain midline trunk control and demo able to lean forward 4 inches away from back of chair without physical assist for adls. Short term goal 8: tolerate 2 min static stand for adls with RUE support with mod of 2 and assist with LLE as needed. Long term goals  Time Frame for Long term goals : by discharge  Long term goal 1: set up and occasional verbal cues ie.  L visual scan for self feeding  Long term goal 2: set up oral care (goal met)  Long term goal 3: max x 1 toileting and adl transfers  Long term goal 4: min UE bathe and dress (shirt)  Long term goal 5: max x 1 LE bathe and dress, able to cross to reach RLE with min assist and keep trunk balance        05/22/21 0855 05/22/21 1400   OT Individual Minutes   Time In 0855 1400   Time Out 0935 1422   Minutes 40 22     Electronically signed by ERIN Tijerina on 5/22/21 at 3:33 PM EDT

## 2023-04-21 NOTE — PALLIATIVE CARE
Problem: Pain  Goal: # Verbalizes understanding of pain management  Description: Documented in Patient Education Activity  Outcome: Outcome Met, Continue evaluating goal progress toward completion     Problem: Fluid Volume Excess, Risk for  Goal: # Absence of Rapid Weight Gain (no more than 2kg in 24 hours)  Description: FVE Risk Patients may gain weight (but not more than 2 kg) but may not require aggressive treatment if in the absence of dyspnea; FVE (actual) patients should be monitored to achieve no weight gain.   Outcome: Outcome Met, Continue evaluating goal progress toward completion     Problem: Fluid Volume Excess, Risk for  Goal: # Absence of New Onset Dyspnea  Description: Dyspnea greater than SOB with Activity may be indicator of fluid volume excess  Outcome: Outcome Met, Continue evaluating goal progress toward completion      Palliative Care follow-up  PC RN discussed hospice in detail, answered questions, pt willing to do choice.    Left msg for SW that pt is ready for hospice choice.          Plan:   Pt to do hospice choice    Thank you for allowing Palliative Care to participate in this patient's care. Please feel free to call [phone] with any questions or concerns.

## 2024-04-08 NOTE — PROGRESS NOTES
04/08/24 1046   Wound 02/19/24 Finger (Comment which one) Right 2nd finger   Date First Assessed/Time First Assessed: 02/19/24 0948   Present on Original Admission: Yes  Primary Wound Type: Other (comment)  Location: Finger (Comment which one)  Wound Location Orientation: Right  Wound Description (Comments): 2nd finger   Wound Image    Wound Etiology Other   Wound Cleansed Wound cleanser   Dressing/Treatment Collagen;Alginate with Ag;Adhesive bandage   Wound Length (cm) 0.3 cm   Wound Width (cm) 0.4 cm   Wound Depth (cm) 0.1 cm   Wound Surface Area (cm^2) 0.12 cm^2   Change in Wound Size % (l*w) 52   Wound Volume (cm^3) 0.012 cm^3   Wound Healing % 76   Post-Procedure Length (cm) 0.4 cm   Post-Procedure Width (cm) 0.5 cm   Post-Procedure Depth (cm) 0.3 cm   Post-Procedure Surface Area (cm^2) 0.2 cm^2   Post-Procedure Volume (cm^3) 0.06 cm^3   Wound Assessment Pink/red   Drainage Amount None (dry)   Odor None   Jaylin-wound Assessment Intact   Wound 02/19/24 Pretibial Left;Anterior   Date First Assessed/Time First Assessed: 02/19/24 1047   Present on Original Admission: Yes  Primary Wound Type: Venous Ulcer  Location: Pretibial  Wound Location Orientation: Left;Anterior   Wound Image     Wound Etiology Venous   Dressing Status Intact   Wound Cleansed Soap and water;Wound cleanser   Offloading for Diabetic Foot Ulcers Offloading not required   Wound Length (cm) 0 cm   Wound Width (cm) 0 cm   Wound Depth (cm) 0 cm   Wound Surface Area (cm^2) 0 cm^2   Change in Wound Size % (l*w) 100   Wound Volume (cm^3) 0 cm^3   Wound Healing % 100   Wound Assessment Epithelialization;Dry   Drainage Amount None (dry)   Odor None   Jaylin-wound Assessment Dry/flaky   Wound 02/19/24 Pretibial Right;Dorsal   Date First Assessed/Time First Assessed: 02/19/24 1047   Present on Original Admission: Yes  Primary Wound Type: Venous Ulcer  Location: Pretibial  Wound Location Orientation: Right;Dorsal   Wound Image     Wound Etiology Venous  Pt transported via gurney with CCT to S182/06. Pt left with all belongings, chart and medications. Report given to neuro floor RN. All questions answered.

## 2025-01-24 NOTE — PROGRESS NOTES
Pt brought down for 12 of 15 radiation treatments. Will continue Monday as planned.   Statement Selected